# Patient Record
Sex: FEMALE | Race: WHITE | NOT HISPANIC OR LATINO | Employment: OTHER | URBAN - METROPOLITAN AREA
[De-identification: names, ages, dates, MRNs, and addresses within clinical notes are randomized per-mention and may not be internally consistent; named-entity substitution may affect disease eponyms.]

---

## 2017-01-05 ENCOUNTER — ALLSCRIPTS OFFICE VISIT (OUTPATIENT)
Dept: OTHER | Facility: OTHER | Age: 57
End: 2017-01-05

## 2017-01-13 ENCOUNTER — ALLSCRIPTS OFFICE VISIT (OUTPATIENT)
Dept: OTHER | Facility: OTHER | Age: 57
End: 2017-01-13

## 2017-01-19 ENCOUNTER — GENERIC CONVERSION - ENCOUNTER (OUTPATIENT)
Dept: OTHER | Facility: OTHER | Age: 57
End: 2017-01-19

## 2017-02-10 ENCOUNTER — GENERIC CONVERSION - ENCOUNTER (OUTPATIENT)
Dept: OTHER | Facility: OTHER | Age: 57
End: 2017-02-10

## 2017-02-15 ENCOUNTER — HOSPITAL ENCOUNTER (OUTPATIENT)
Dept: MAMMOGRAPHY | Facility: MEDICAL CENTER | Age: 57
Discharge: HOME | End: 2017-02-15
Payer: COMMERCIAL

## 2017-02-15 DIAGNOSIS — Z12.39 ENCOUNTER FOR OTHER SCREENING FOR MALIGNANT NEOPLASM OF BREAST: ICD-10-CM

## 2017-02-15 PROCEDURE — G0202 SCR MAMMO BI INCL CAD: HCPCS

## 2017-02-18 ENCOUNTER — GENERIC CONVERSION - ENCOUNTER (OUTPATIENT)
Dept: OTHER | Facility: OTHER | Age: 57
End: 2017-02-18

## 2017-02-28 ENCOUNTER — GENERIC CONVERSION - ENCOUNTER (OUTPATIENT)
Dept: OTHER | Facility: OTHER | Age: 57
End: 2017-02-28

## 2017-03-20 ENCOUNTER — LAB REQUISITION (OUTPATIENT)
Dept: LAB | Facility: HOSPITAL | Age: 57
End: 2017-03-20
Payer: COMMERCIAL

## 2017-03-20 ENCOUNTER — ALLSCRIPTS OFFICE VISIT (OUTPATIENT)
Dept: OTHER | Facility: OTHER | Age: 57
End: 2017-03-20

## 2017-03-20 DIAGNOSIS — E55.9 VITAMIN D DEFICIENCY: ICD-10-CM

## 2017-03-20 DIAGNOSIS — K21.9 GASTRO-ESOPHAGEAL REFLUX DISEASE WITHOUT ESOPHAGITIS: ICD-10-CM

## 2017-03-20 DIAGNOSIS — F41.9 ANXIETY DISORDER: ICD-10-CM

## 2017-03-20 DIAGNOSIS — E78.2 MIXED HYPERLIPIDEMIA: ICD-10-CM

## 2017-03-20 DIAGNOSIS — E11.9 TYPE 2 DIABETES MELLITUS WITHOUT COMPLICATIONS (HCC): ICD-10-CM

## 2017-03-20 DIAGNOSIS — I10 ESSENTIAL (PRIMARY) HYPERTENSION: ICD-10-CM

## 2017-03-20 DIAGNOSIS — N95.1 FEMALE CLIMACTERIC STATE: ICD-10-CM

## 2017-03-20 DIAGNOSIS — G47.00 INSOMNIA: ICD-10-CM

## 2017-03-20 LAB
25(OH)D3 SERPL-MCNC: 28.5 NG/ML (ref 30–100)
ALBUMIN SERPL BCP-MCNC: 3.4 G/DL (ref 3.5–5)
ALP SERPL-CCNC: 74 U/L (ref 46–116)
ALT SERPL W P-5'-P-CCNC: 32 U/L (ref 12–78)
ANION GAP SERPL CALCULATED.3IONS-SCNC: 8 MMOL/L (ref 4–13)
AST SERPL W P-5'-P-CCNC: 12 U/L (ref 5–45)
BASOPHILS # BLD AUTO: 0.06 THOUSANDS/ΜL (ref 0–0.1)
BASOPHILS NFR BLD AUTO: 0 % (ref 0–1)
BILIRUB SERPL-MCNC: 0.26 MG/DL (ref 0.2–1)
BUN SERPL-MCNC: 10 MG/DL (ref 5–25)
CALCIUM SERPL-MCNC: 9.2 MG/DL (ref 8.3–10.1)
CHLORIDE SERPL-SCNC: 106 MMOL/L (ref 100–108)
CHOLEST SERPL-MCNC: 149 MG/DL (ref 50–200)
CO2 SERPL-SCNC: 28 MMOL/L (ref 21–32)
CREAT SERPL-MCNC: 0.61 MG/DL (ref 0.6–1.3)
EOSINOPHIL # BLD AUTO: 0.14 THOUSAND/ΜL (ref 0–0.61)
EOSINOPHIL NFR BLD AUTO: 1 % (ref 0–6)
ERYTHROCYTE [DISTWIDTH] IN BLOOD BY AUTOMATED COUNT: 14 % (ref 11.6–15.1)
EST. AVERAGE GLUCOSE BLD GHB EST-MCNC: 157 MG/DL
FSH SERPL-ACNC: 47.7 MIU/ML
GFR SERPL CREATININE-BSD FRML MDRD: >60 ML/MIN/1.73SQ M
GLUCOSE P FAST SERPL-MCNC: 120 MG/DL (ref 65–99)
HBA1C MFR BLD: 7.1 % (ref 4.2–6.3)
HCT VFR BLD AUTO: 37.3 % (ref 34.8–46.1)
HDLC SERPL-MCNC: 40 MG/DL (ref 40–60)
HGB BLD-MCNC: 12.5 G/DL (ref 11.5–15.4)
LDLC SERPL CALC-MCNC: 72 MG/DL (ref 0–100)
LH SERPL-ACNC: 18 MIU/ML
LYMPHOCYTES # BLD AUTO: 4.71 THOUSANDS/ΜL (ref 0.6–4.47)
LYMPHOCYTES NFR BLD AUTO: 34 % (ref 14–44)
MCH RBC QN AUTO: 30.4 PG (ref 26.8–34.3)
MCHC RBC AUTO-ENTMCNC: 33.5 G/DL (ref 31.4–37.4)
MCV RBC AUTO: 91 FL (ref 82–98)
MONOCYTES # BLD AUTO: 1.16 THOUSAND/ΜL (ref 0.17–1.22)
MONOCYTES NFR BLD AUTO: 8 % (ref 4–12)
NEUTROPHILS # BLD AUTO: 7.87 THOUSANDS/ΜL (ref 1.85–7.62)
NEUTS SEG NFR BLD AUTO: 57 % (ref 43–75)
NRBC BLD AUTO-RTO: 0 /100 WBCS
PLATELET # BLD AUTO: 581 THOUSANDS/UL (ref 149–390)
PMV BLD AUTO: 10.2 FL (ref 8.9–12.7)
POTASSIUM SERPL-SCNC: 4 MMOL/L (ref 3.5–5.3)
PROT SERPL-MCNC: 6.6 G/DL (ref 6.4–8.2)
RBC # BLD AUTO: 4.11 MILLION/UL (ref 3.81–5.12)
SODIUM SERPL-SCNC: 142 MMOL/L (ref 136–145)
TRIGL SERPL-MCNC: 183 MG/DL
TSH SERPL DL<=0.05 MIU/L-ACNC: 0.76 UIU/ML (ref 0.36–3.74)
WBC # BLD AUTO: 13.97 THOUSAND/UL (ref 4.31–10.16)

## 2017-03-20 PROCEDURE — 82672 ASSAY OF ESTROGEN: CPT | Performed by: FAMILY MEDICINE

## 2017-03-20 PROCEDURE — 83002 ASSAY OF GONADOTROPIN (LH): CPT | Performed by: FAMILY MEDICINE

## 2017-03-20 PROCEDURE — 80061 LIPID PANEL: CPT | Performed by: FAMILY MEDICINE

## 2017-03-20 PROCEDURE — 80053 COMPREHEN METABOLIC PANEL: CPT | Performed by: FAMILY MEDICINE

## 2017-03-20 PROCEDURE — 82306 VITAMIN D 25 HYDROXY: CPT | Performed by: FAMILY MEDICINE

## 2017-03-20 PROCEDURE — 85025 COMPLETE CBC W/AUTO DIFF WBC: CPT | Performed by: FAMILY MEDICINE

## 2017-03-20 PROCEDURE — 83036 HEMOGLOBIN GLYCOSYLATED A1C: CPT | Performed by: FAMILY MEDICINE

## 2017-03-20 PROCEDURE — 84443 ASSAY THYROID STIM HORMONE: CPT | Performed by: FAMILY MEDICINE

## 2017-03-20 PROCEDURE — 83001 ASSAY OF GONADOTROPIN (FSH): CPT | Performed by: FAMILY MEDICINE

## 2017-03-22 LAB — ESTROGEN SERPL-MCNC: 59 PG/ML

## 2017-03-27 ENCOUNTER — ALLSCRIPTS OFFICE VISIT (OUTPATIENT)
Dept: OTHER | Facility: OTHER | Age: 57
End: 2017-03-27

## 2017-03-27 DIAGNOSIS — R10.811 RIGHT UPPER QUADRANT ABDOMINAL TENDERNESS: ICD-10-CM

## 2017-03-27 DIAGNOSIS — R05.9 COUGH: ICD-10-CM

## 2017-03-27 DIAGNOSIS — M54.9 DORSALGIA: ICD-10-CM

## 2017-03-27 DIAGNOSIS — R31.29 OTHER MICROSCOPIC HEMATURIA: ICD-10-CM

## 2017-04-03 ENCOUNTER — HOSPITAL ENCOUNTER (OUTPATIENT)
Dept: ULTRASOUND IMAGING | Facility: MEDICAL CENTER | Age: 57
Discharge: HOME | End: 2017-04-03
Payer: COMMERCIAL

## 2017-04-03 DIAGNOSIS — R10.811 RIGHT UPPER QUADRANT ABDOMINAL TENDERNESS: ICD-10-CM

## 2017-04-03 PROCEDURE — 76705 ECHO EXAM OF ABDOMEN: CPT

## 2017-04-07 ENCOUNTER — ALLSCRIPTS OFFICE VISIT (OUTPATIENT)
Dept: OTHER | Facility: OTHER | Age: 57
End: 2017-04-07

## 2017-04-07 ENCOUNTER — HOSPITAL ENCOUNTER (OUTPATIENT)
Dept: CT IMAGING | Facility: HOSPITAL | Age: 57
Discharge: HOME | End: 2017-04-07
Payer: COMMERCIAL

## 2017-04-07 DIAGNOSIS — M54.9 DORSALGIA: ICD-10-CM

## 2017-04-07 DIAGNOSIS — R31.29 OTHER MICROSCOPIC HEMATURIA: ICD-10-CM

## 2017-04-07 DIAGNOSIS — R10.811 RIGHT UPPER QUADRANT ABDOMINAL TENDERNESS: ICD-10-CM

## 2017-04-07 LAB
BILIRUB UR QL STRIP: NORMAL
CLARITY UR: NORMAL
COLOR UR: YELLOW
GLUCOSE (HISTORICAL): NORMAL
HGB UR QL STRIP.AUTO: NORMAL
KETONES UR STRIP-MCNC: NORMAL MG/DL
LEUKOCYTE ESTERASE UR QL STRIP: NORMAL
NITRITE UR QL STRIP: NORMAL
PH UR STRIP.AUTO: 5.5 [PH]
PROT UR STRIP-MCNC: NORMAL MG/DL
SP GR UR STRIP.AUTO: 1.03
UROBILINOGEN UR QL STRIP.AUTO: 0.2

## 2017-04-07 PROCEDURE — 74177 CT ABD & PELVIS W/CONTRAST: CPT

## 2017-04-07 RX ADMIN — IOHEXOL 100 ML: 350 INJECTION, SOLUTION INTRAVENOUS at 15:19

## 2017-04-07 RX ADMIN — IOHEXOL 50 ML: 240 INJECTION, SOLUTION INTRATHECAL; INTRAVASCULAR; INTRAVENOUS; ORAL at 15:19

## 2017-04-13 ENCOUNTER — GENERIC CONVERSION - ENCOUNTER (OUTPATIENT)
Dept: OTHER | Facility: OTHER | Age: 57
End: 2017-04-13

## 2017-04-17 ENCOUNTER — GENERIC CONVERSION - ENCOUNTER (OUTPATIENT)
Dept: OTHER | Facility: OTHER | Age: 57
End: 2017-04-17

## 2017-04-18 ENCOUNTER — HOSPITAL ENCOUNTER (OUTPATIENT)
Dept: RADIOLOGY | Facility: MEDICAL CENTER | Age: 57
Discharge: HOME | End: 2017-04-18
Admitting: FAMILY MEDICINE
Payer: COMMERCIAL

## 2017-04-18 ENCOUNTER — OFFICE VISIT (OUTPATIENT)
Dept: URGENT CARE | Facility: MEDICAL CENTER | Age: 57
End: 2017-04-18
Payer: COMMERCIAL

## 2017-04-18 DIAGNOSIS — R05.9 COUGH: ICD-10-CM

## 2017-04-18 PROCEDURE — 99203 OFFICE O/P NEW LOW 30 MIN: CPT

## 2017-04-18 PROCEDURE — 71020 HB CHEST X-RAY 2VW FRONTAL&LATL: CPT

## 2017-05-03 ENCOUNTER — GENERIC CONVERSION - ENCOUNTER (OUTPATIENT)
Dept: OTHER | Facility: OTHER | Age: 57
End: 2017-05-03

## 2017-05-03 ENCOUNTER — HOSPITAL ENCOUNTER (OUTPATIENT)
Dept: RADIOLOGY | Facility: MEDICAL CENTER | Age: 57
Discharge: HOME | End: 2017-05-03
Payer: COMMERCIAL

## 2017-05-03 ENCOUNTER — LAB (OUTPATIENT)
Dept: LAB | Facility: MEDICAL CENTER | Age: 57
End: 2017-05-03
Payer: COMMERCIAL

## 2017-05-03 ENCOUNTER — TRANSCRIBE ORDERS (OUTPATIENT)
Dept: ADMINISTRATIVE | Facility: HOSPITAL | Age: 57
End: 2017-05-03

## 2017-05-03 DIAGNOSIS — K31.89 DYSPEPSIA AND OTHER SPECIFIED DISORDERS OF FUNCTION OF STOMACH: ICD-10-CM

## 2017-05-03 DIAGNOSIS — K76.0 FATTY METAMORPHOSIS OF LIVER: ICD-10-CM

## 2017-05-03 DIAGNOSIS — I10 HTN, GOAL BELOW 140/80: ICD-10-CM

## 2017-05-03 DIAGNOSIS — R10.13 DYSPEPSIA AND OTHER SPECIFIED DISORDERS OF FUNCTION OF STOMACH: ICD-10-CM

## 2017-05-03 DIAGNOSIS — K29.20 ALCOHOLIC GASTRITIS WITHOUT BLEEDING, UNSPECIFIED CHRONICITY: ICD-10-CM

## 2017-05-03 DIAGNOSIS — R93.2 NONVISUALIZATION OF GALLBLADDER: Primary | ICD-10-CM

## 2017-05-03 DIAGNOSIS — R14.0 ABDOMINAL DISTENTION: ICD-10-CM

## 2017-05-03 DIAGNOSIS — R10.11 ABDOMINAL PAIN, RIGHT UPPER QUADRANT: ICD-10-CM

## 2017-05-03 DIAGNOSIS — R68.81 EARLY SATIETY: ICD-10-CM

## 2017-05-03 DIAGNOSIS — K29.20 ALCOHOLIC GASTRITIS WITHOUT BLEEDING, UNSPECIFIED CHRONICITY: Primary | ICD-10-CM

## 2017-05-03 DIAGNOSIS — K59.00 UNSPECIFIED CONSTIPATION: ICD-10-CM

## 2017-05-03 DIAGNOSIS — R93.2 NONVISUALIZATION OF GALLBLADDER: ICD-10-CM

## 2017-05-03 DIAGNOSIS — R11.0 NAUSEA ALONE: ICD-10-CM

## 2017-05-03 DIAGNOSIS — R11.0 NAUSEA ALONE: Primary | ICD-10-CM

## 2017-05-03 PROCEDURE — 74022 RADEX COMPL AQT ABD SERIES: CPT

## 2017-05-03 PROCEDURE — 86705 HEP B CORE ANTIBODY IGM: CPT

## 2017-05-03 PROCEDURE — 86704 HEP B CORE ANTIBODY TOTAL: CPT

## 2017-05-03 PROCEDURE — 86706 HEP B SURFACE ANTIBODY: CPT

## 2017-05-03 PROCEDURE — 86803 HEPATITIS C AB TEST: CPT

## 2017-05-03 PROCEDURE — 86256 FLUORESCENT ANTIBODY TITER: CPT

## 2017-05-03 PROCEDURE — 36415 COLL VENOUS BLD VENIPUNCTURE: CPT

## 2017-05-03 PROCEDURE — 87340 HEPATITIS B SURFACE AG IA: CPT

## 2017-05-03 PROCEDURE — 86708 HEPATITIS A ANTIBODY: CPT

## 2017-05-03 PROCEDURE — 86038 ANTINUCLEAR ANTIBODIES: CPT

## 2017-05-03 PROCEDURE — 86235 NUCLEAR ANTIGEN ANTIBODY: CPT

## 2017-05-04 LAB
ACTIN IGG SERPL-ACNC: 15 UNITS (ref 0–19)
HAV AB SER QL IA: NORMAL
HBV CORE AB SER QL: REACTIVE
HBV SURFACE AB SER-ACNC: 243.78 MIU/ML
HBV SURFACE AG SER QL: NORMAL
HCV AB SER QL: NORMAL
MITOCHONDRIA M2 IGG SER-ACNC: 2.5 UNITS (ref 0–20)

## 2017-05-05 LAB — RYE IGE QN: NEGATIVE

## 2017-05-25 ENCOUNTER — GENERIC CONVERSION - ENCOUNTER (OUTPATIENT)
Dept: OTHER | Facility: OTHER | Age: 57
End: 2017-05-25

## 2017-05-26 LAB — MISCELLANEOUS LAB TEST RESULT: NORMAL

## 2017-06-07 ENCOUNTER — HOSPITAL ENCOUNTER (OUTPATIENT)
Dept: NUCLEAR MEDICINE | Facility: HOSPITAL | Age: 57
Discharge: HOME | End: 2017-06-07
Payer: COMMERCIAL

## 2017-06-07 DIAGNOSIS — R68.81 EARLY SATIETY: ICD-10-CM

## 2017-06-07 DIAGNOSIS — R11.0 NAUSEA ALONE: ICD-10-CM

## 2017-06-07 PROCEDURE — 78264 GASTRIC EMPTYING IMG STUDY: CPT

## 2017-06-07 PROCEDURE — A9541 TC99M SULFUR COLLOID: HCPCS

## 2017-06-08 ENCOUNTER — TRANSCRIBE ORDERS (OUTPATIENT)
Dept: ADMINISTRATIVE | Facility: HOSPITAL | Age: 57
End: 2017-06-08

## 2017-06-08 ENCOUNTER — APPOINTMENT (OUTPATIENT)
Dept: LAB | Facility: MEDICAL CENTER | Age: 57
End: 2017-06-08
Attending: INTERNAL MEDICINE
Payer: COMMERCIAL

## 2017-06-08 DIAGNOSIS — K76.0 FATTY METAMORPHOSIS OF LIVER: Primary | ICD-10-CM

## 2017-06-08 DIAGNOSIS — K76.0 FATTY METAMORPHOSIS OF LIVER: ICD-10-CM

## 2017-06-08 LAB
ALBUMIN SERPL BCP-MCNC: 3.8 G/DL (ref 3.5–5)
ALP SERPL-CCNC: 84 U/L (ref 46–116)
ALT SERPL W P-5'-P-CCNC: 37 U/L (ref 12–78)
AMYLASE SERPL-CCNC: 42 IU/L (ref 25–115)
ANION GAP SERPL CALCULATED.3IONS-SCNC: 8 MMOL/L (ref 4–13)
AST SERPL W P-5'-P-CCNC: 17 U/L (ref 5–45)
BILIRUB SERPL-MCNC: 0.35 MG/DL (ref 0.2–1)
BUN SERPL-MCNC: 11 MG/DL (ref 5–25)
CALCIUM SERPL-MCNC: 9.3 MG/DL (ref 8.3–10.1)
CHLORIDE SERPL-SCNC: 102 MMOL/L (ref 100–108)
CO2 SERPL-SCNC: 30 MMOL/L (ref 21–32)
CREAT SERPL-MCNC: 0.78 MG/DL (ref 0.6–1.3)
GFR SERPL CREATININE-BSD FRML MDRD: >60 ML/MIN/1.73SQ M
GLUCOSE P FAST SERPL-MCNC: 230 MG/DL (ref 65–99)
LIPASE SERPL-CCNC: 239 U/L (ref 73–393)
POTASSIUM SERPL-SCNC: 3.8 MMOL/L (ref 3.5–5.3)
PROT SERPL-MCNC: 7.6 G/DL (ref 6.4–8.2)
SODIUM SERPL-SCNC: 140 MMOL/L (ref 136–145)

## 2017-06-08 PROCEDURE — 36415 COLL VENOUS BLD VENIPUNCTURE: CPT

## 2017-06-08 PROCEDURE — 83690 ASSAY OF LIPASE: CPT

## 2017-06-08 PROCEDURE — 82150 ASSAY OF AMYLASE: CPT

## 2017-06-08 PROCEDURE — 80053 COMPREHEN METABOLIC PANEL: CPT

## 2017-07-13 ENCOUNTER — ALLSCRIPTS OFFICE VISIT (OUTPATIENT)
Dept: OTHER | Facility: OTHER | Age: 57
End: 2017-07-13

## 2017-07-14 ENCOUNTER — GENERIC CONVERSION - ENCOUNTER (OUTPATIENT)
Dept: OTHER | Facility: OTHER | Age: 57
End: 2017-07-14

## 2017-08-09 ENCOUNTER — OFFICE VISIT (OUTPATIENT)
Dept: URGENT CARE | Facility: MEDICAL CENTER | Age: 57
End: 2017-08-09
Payer: COMMERCIAL

## 2017-08-09 PROCEDURE — S9083 URGENT CARE CENTER GLOBAL: HCPCS

## 2017-08-09 PROCEDURE — G0382 LEV 3 HOSP TYPE B ED VISIT: HCPCS

## 2017-08-11 ENCOUNTER — ALLSCRIPTS OFFICE VISIT (OUTPATIENT)
Dept: OTHER | Facility: OTHER | Age: 57
End: 2017-08-11

## 2017-08-24 ENCOUNTER — OFFICE VISIT (OUTPATIENT)
Dept: URGENT CARE | Facility: MEDICAL CENTER | Age: 57
End: 2017-08-24
Payer: COMMERCIAL

## 2017-08-24 PROCEDURE — 99213 OFFICE O/P EST LOW 20 MIN: CPT

## 2017-08-28 ENCOUNTER — GENERIC CONVERSION - ENCOUNTER (OUTPATIENT)
Dept: OTHER | Facility: OTHER | Age: 57
End: 2017-08-28

## 2017-08-29 ENCOUNTER — ALLSCRIPTS OFFICE VISIT (OUTPATIENT)
Dept: OTHER | Facility: OTHER | Age: 57
End: 2017-08-29

## 2017-09-06 ENCOUNTER — GENERIC CONVERSION - ENCOUNTER (OUTPATIENT)
Dept: OTHER | Facility: OTHER | Age: 57
End: 2017-09-06

## 2017-09-12 ENCOUNTER — TRANSCRIBE ORDERS (OUTPATIENT)
Dept: ADMINISTRATIVE | Facility: HOSPITAL | Age: 57
End: 2017-09-12

## 2017-09-12 DIAGNOSIS — F90.9 ATTENTION DEFICIT HYPERACTIVITY DISORDER (ADHD), UNSPECIFIED ADHD TYPE: Primary | ICD-10-CM

## 2017-09-18 ENCOUNTER — GENERIC CONVERSION - ENCOUNTER (OUTPATIENT)
Dept: OTHER | Facility: OTHER | Age: 57
End: 2017-09-18

## 2017-09-26 ENCOUNTER — ALLSCRIPTS OFFICE VISIT (OUTPATIENT)
Dept: OTHER | Facility: OTHER | Age: 57
End: 2017-09-26

## 2017-09-26 LAB — S PYO AG THROAT QL: NEGATIVE

## 2017-09-28 ENCOUNTER — GENERIC CONVERSION - ENCOUNTER (OUTPATIENT)
Dept: OTHER | Facility: OTHER | Age: 57
End: 2017-09-28

## 2017-10-30 ENCOUNTER — GENERIC CONVERSION - ENCOUNTER (OUTPATIENT)
Dept: OTHER | Facility: OTHER | Age: 57
End: 2017-10-30

## 2017-11-01 ENCOUNTER — ALLSCRIPTS OFFICE VISIT (OUTPATIENT)
Dept: OTHER | Facility: OTHER | Age: 57
End: 2017-11-01

## 2017-11-02 NOTE — PROGRESS NOTES
Assessment  1  Dacryocystitis of left lacrimal sac (375 30) (H04 302)   2  Need for vaccination (V05 9) (Z23)    Plan  Dacryocystitis of left lacrimal sac    · Tobramycin-Dexamethasone 0 3-0 1 % Ophthalmic Suspension; 1 gtt ou qid x 3  days  Then bid x 3 days  Need for vaccination    · Fluzone Quadrivalent Intramuscular Suspension; INJECT 0 5  ML  Intramuscular; To Be Done: 83HKH9387    Discussion/Summary    --Dacryocystitis L eye: sxs x 2 days  rx given for tobradex gtts  cont zyrtec  don't rub eyes  call further probs  Possible side effects of new medications were reviewed with the patient/guardian today  The treatment plan was reviewed with the patient/guardian  The patient/guardian understands and agrees with the treatment plan      Chief Complaint  Pt presents for irritated L eye x1d  Pt c/o of discharge and itchiness  Pt d/c omeprazole, clonidine, and dicyclomine  History of Present Illness  HPI: 2 day hx of itchy lump L eye x 2 days  recent allergy flare  takes zyrtec daily  Review of Systems    Constitutional: No fever, no chills, feels well, no tiredness, no recent weight gain or loss  ENT: as noted in HPI  Cardiovascular: no complaints of slow or fast heart rate, no chest pain, no palpitations, no leg claudication or lower extremity edema  Active Problems  1  Acute bronchitis (466 0) (J20 9)   2  Adjustment reaction with anxiety (309 24) (F43 22)   3  Anxiety disorder (300 00) (F41 9)   4  Benign essential hypertension (401 1) (I10)   5  Coronary atherosclerosis (414 00) (I25 10)   6  Current tobacco use (305 1) (Z72 0)   7  CVA tenderness (724 5) (M54 9)   8  Depressed (311) (F32 9)   9  Diverticulitis of colon (562 11) (K57 32)   10  DMII (diabetes mellitus, type 2) (250 00) (E11 9)   11  GERD without esophagitis (530 81) (K21 9)   12  IBS (irritable bowel syndrome) (564 1) (K58 9)   13  Inflamed skin tag (701 9,686 9) (L91 8)   14  Insomnia (780 52) (G47 00)   15   Left shoulder pain (719 41) (M25 512)   16  Menopausal disorder (627 9) (N95 9)   17  Menopausal syndrome (627 2) (N95 1)   18  Mixed hyperlipidemia (272 2) (E78 2)   19  Seasonal allergies (477 9) (J30 2)   20  Sore throat (462) (J02 9)   21  Vaginal candidiasis (112 1) (B37 3)   22  Vitamin D deficiency (268 9) (E55 9)    Past Medical History  1  History of Abdominal pain, LLQ (left lower quadrant) (789 04) (R10 32)   2  History of Abnormal mammogram (793 80) (R92 8)   3  History of Acute diverticulitis (562 11) (K57 92)   4  History of Acute sinusitis, recurrence not specified, unspecified location (461 9) (J01 90)   5  History of Acute upper respiratory infection (465 9) (J06 9)   6  History of Acute upper respiratory infection (465 9) (J06 9)   7  History of Acute upper respiratory infection (465 9) (J06 9)   8  History of Cervical cancer screening (V76 2) (Z12 4)   9  History of Cough (786 2) (R05)   10  History of Cough (786 2) (R05)   11  History of Eczema of right external ear (380 22) (H60 541)   12  History of Encounter for other screening for malignant neoplasm of breast (V76 19)    (Z12 39)   13  History of Encounter for screening colonoscopy (V76 51) (Z12 11)   14  History of Encounter for screening mammogram for malignant neoplasm of breast    (V76 12) (Z12 31)   15  History of Facial pain (784 0) (R51)   16  History of Flu vaccine need (V04 81) (Z23)   17  History of Foot Pain (Soft Tissue) (729 5)   18  History of Hip pain, unspecified laterality   19  History of abdominal pain (V13 89) (Z87 898)   20  History of acute bacterial sinusitis (V12 69) (Z87 09)   21  History of acute bronchitis (V12 69) (Z87 09)   22  History of acute bronchitis (V12 69) (Z87 09)   23  History of acute gastritis (V12 70) (Z87 19)   24  History of acute sinusitis (V12 69) (Z87 09)   25  History of acute sinusitis (V12 69) (Z87 09)   26  History of acute sinusitis (V12 69) (Z87 09)   27  History of adverse drug reaction (V14 9) (Z88 9)   28  History of chest pain (V13 89) (Z87 898)   29  History of dizziness (V13 89) (Z87 898)   30  History of fever (V13 89) (Z87 898)   31  History of gastroenteritis (V12 79) (Z87 19)   32  History of hematuria (V13 09) (Z87 448)   33  History of hepatitis A vaccination (V49 89) (Z92 29)   34  History of impetigo (V13 3) (Z87 2)   35  History of ingrown nail (V13 3) (Z87 2)   36  History of nausea (V12 79) (Z87 898)   37  History of nausea and vomiting (V12 79) (Z87 898)   38  History of nausea and vomiting (V12 79) (Z87 898)   39  History of other disorder of urinary system (V13 09) (Z87 448)   40  History of sinusitis (V12 69) (Z87 09)   41  History of streptococcal pharyngitis (V12 09) (Z87 09)   42  Denied: History of substance abuse   37  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)   44  History of Left foot pain (729 5) (M79 672)   45  History of Microscopic hematuria (599 72) (R31 29)   46  History of Myalgia And Myositis (729 1)   47  History of Need for pneumococcal vaccination (V03 82) (Z23)   48  History of Need for vaccination for DTP (V06 1) (Z23)   49  History of Oropharyngeal dysphagia (787 22) (R13 12)   50  History of Other acute sinusitis, recurrence not specified (461 8) (J01 80)   51  History of Other screening mammogram (V76 12) (Z12 31)   52  History of Otitis, externa, infective, right (380 10) (H60 391)   53  History of Right shoulder pain (719 41) (M25 511)   54  History of Right upper quadrant abdominal tenderness (789 61) (R10 811)   55  History of Screening for breast cancer (V76 10) (Z12 31)   56  History of Screening for colon cancer (V76 51) (Z12 11)   57  History of Screening for depression (V79 0) (Z13 89)   58  History of Screening for diabetic retinopathy (V80 2) (Z13 5)   59  History of Sore throat (462) (J02 9)   60  History of Symptoms involving urinary system (788 99) (R39 9)   61  History of Trapezius muscle spasm (028 85) (M62 838)   62   History of Visit For:  Active Problems And Past Medical History Reviewed: The active problems and past medical history were reviewed and updated today  Family History  Mother    1  Denied: Family history of substance abuse   2  Denied: Family history of Mental health problem   3  Family history of Stroke (434 91) (I63 9)  Father    4  Family history of Cancer (199 1) (C80 1)   5  Family history of Diabetes mellitus (250 00) (E11 9)   6  Denied: Family history of substance abuse   7  Family history of Heart disease (429 9) (I51 9)   8  Family history of HTN (hypertension) (401 9) (I10)   9  Denied: Family history of Mental health problem  Sister    8  Family history of Heart disease (429 9) (I51 9)  Brother    6  Family history of Heart disease (429 9) (I51 9)    Social History   · Current Smoker (305 1)   · Current tobacco use (305 1) (Z72 0)  The social history was reviewed and updated today  The social history was reviewed and is unchanged  Surgical History  1  History of Stent Indications: Restenosis At Previous [facility] Location    Current Meds   1  ALPRAZolam 0 5 MG Oral Tablet; Take 1 tablet twice daily as needed, 2 qhs prn sleep; Last Rx:02Jan2015 Ordered   2  Atorvastatin Calcium 40 MG Oral Tablet; Take 1 tablet daily; Therapy: 85LBA6200 to (Last Deisy Cord)  Requested for: 02XBM7920 Ordered   3  Hiram Contour Test In [employer]; TEST TWICE A DAY; Therapy: 08ICB3142 to (Last Rx:61Taq5487)  Requested for: 96Nhi1401 Ordered   4  CloNIDine HCl - 0 1 MG Oral Tablet; TAKE 1 TABLET AT BEDTIME; Therapy: 91MNR5624 to Recorded   5  Dicyclomine HCl - 10 MG Oral Capsule; Therapy: 90UZD3478 to Recorded   6  Doxycycline Monohydrate 100 MG Oral Capsule; TAKE 1 CAPSULE EVERY 12 HOURS   DAILY; Therapy: 61CTN1228 to ([facility])  Requested for: 81MBT4349; Last   Rx:59Ytn7393 Ordered   7  Ecotrin Low Strength 81 MG Oral Tablet Delayed Release; TAKE 1 TABLET DAILY; Therapy: 56NYW3927 to Recorded   8  Fluconazole 150 MG Oral Tablet; TAKE 1 TABLET 1 TIME ONLY; Therapy: 55WWC9877 to (Evaluate:11Oct2017)  Requested for: 10YSY3364; Last   Rx:10Oct2017 Ordered   9  Januvia 100 MG Oral Tablet; Take 1 tablet daily; Therapy: 01HRO1261 to (Regine Naresh)  Requested for: 44Vdz5274; Last   Rx:88Asy2601 Ordered   10  Lexapro 10 MG Oral Tablet; Take 1 tablet daily Recorded   11  Lisinopril 5 MG Oral Tablet; TAKE ONE-HALF (1/2) TABLET DAILY; Therapy: 75DNO3108 to (Last Rx:90Kkc7383)  Requested for: 96Gis7733 Ordered   12  MetFORMIN HCl  MG Oral Tablet Extended Release 24 Hour; take 1 tablet twice a    day; Therapy: 77URM0951 to ()  Requested for: 24Apr2017; Last    Rx:01Bjm0464 Ordered   13  Metoprolol Succinate ER 25 MG Oral Tablet Extended Release 24 Hour; Take 1 tablet    daily; Therapy: 69LLA5028 to (Last Rx:41Isv1488)  Requested for: 20KPL9560 Ordered   14  Modafinil 200 MG Oral Tablet; Therapy: (Mouna Recio) to Recorded   15  Omeprazole 40 MG Oral Capsule Delayed Release; TAKE 1 CAPSULE TWICE DAILY; Therapy: 30YAA4625 to (544 2932) Recorded   16  OneTouch Lancets Miscellaneous; USE AS DIRECTED TWICE DAILY; Therapy: 84NBO1239 to (Last Rx:27Mar2017)  Requested for: 27Mar2017 Ordered   17  OneTouch Ultra Blue In [employer]; TEST TWICE A DAY; Therapy: 43KLQ6108 to (Evaluate:10Wys0395)  Requested for: 27Mar2017; Last    Rx:27Mar2017 Ordered   18  OneTouch Ultra Mini w/Device Kit; ACCU-CHEK RONI SMART VIEW GLUCOMETER    USE AS DIRECTED TWICE DAILY DX 99557; Therapy: 36TMR6864 to (Last Rx:50Zef5090)  Requested for: 41Lin5488 Ordered   19  Pepcid 20 MG Oral Tablet; TAKE 1 TABLET DAILY AS DIRECTED; Therapy: 49FFJ0683 to Recorded   20  Promethazine-Codeine 6 25-10 MG/5ML Oral Syrup; TAKE 1 TSP Every 6 hours PRN; Therapy: 78ACA6477 to (Evaluate:02Oct2017); Last Rx:26Sep2017 Ordered   21  Vitamin D 1000 UNIT Oral Tablet; TAKE 1 TABLET DAILY;     Therapy: 72EWJ4190 to (Evaluate:11Oct2017) Recorded   22  Vyvanse 70 MG Oral Capsule; TAKE 1 CAPSULE DAILY IN THE MORNING Recorded   23  ZyrTEC Allergy 10 MG Oral Tablet; Therapy: 07Apr2017 to Recorded    The medication list was reviewed and updated today  Allergies  1  Levaquin TABS   2  Flexeril TABS    Vitals   Recorded: 37ZMC1275 04:09PM   Temperature 97 6 F, Tympanic   Heart Rate 96   Pulse Quality Normal   Respiration Quality Normal   Respiration 20   Systolic 703, LUE, Sitting   Diastolic 80, LUE, Sitting   Height 5 ft 8 in   Weight 167 lb 8 oz   BMI Calculated 25 47   BSA Calculated 1 9   O2 Saturation 97   LMP Menopause   Pain Scale 0     Physical Exam    Constitutional   General appearance: No acute distress, well appearing and well nourished  Eyes   Conjunctiva and lids: Abnormal  -- inflamed lacrymal duct L eye  Future Appointments    Date/Time Provider Specialty Site   12/06/2017 01:00 PM Shelby Briceño Nurse Schedule  [facility]     Signatures   Electronically signed by :  Miriam Durand DO; Nov 1 2017  4:24PM EST                       (Author)

## 2017-11-06 ENCOUNTER — ALLSCRIPTS OFFICE VISIT (OUTPATIENT)
Dept: OTHER | Facility: OTHER | Age: 57
End: 2017-11-06

## 2017-11-07 NOTE — PROGRESS NOTES
Assessment  1  Laceration of left lower leg with infection (891 1) (S81 812A,L08 9)   2  Cough (786 2) (R05)    Plan   Cough    · Promethazine-Codeine 6 25-10 MG/5ML Oral Syrup; TAKE 5 ML BY MOUTH AT  BEDTIME AS NEEDED    Sulfamethoxazole-Trimethoprim 800-160 MG Oral Tablet; take 1 tablet every twelve hours; Therapy: 65AQZ0016 to (Evaluate:37Fdo3892)  Requested for: 82IBW2863; Last RY:34SGJ0950; Status: ACTIVE Ordered  Rx By: Selestino Place; Dispense: 7 Days ; #:14 Tablet; Refill: 0;   For: Laceration of left lower leg with infection; GOLDIE = N; Verified Transmission to [id number] Last Updated By: System, [employer]; 11/6/2017 2:59:36 PM     Discussion/Summary    Patient is a 53-year-old femaleLeg laceration -appears mildly erythematous  At this time, start treatment for possible bacterial infection  She is given a prescription for Bactrim DS b i d  to take for the next 7 days  She is up-to-date with her Adacel vaccine  ]Persistent cough -likely secondary to patient's 's upper respiratory infection  Start treatment with Bactrim DS b i d  She is also given promethazine with codeine  Follow up if any symptoms are persisting  Chief Complaint  cut left leg on metal can      History of Present Illness  HPI: Patient is a 53-year-old female presents today with CC of left anterior lower leg laceration  She states that she sustained this laceration when she was thrown away garbage  She states that a metal can of dog food caused a laceration on her leg  She did notice bleeding immediately  She did apply a bandage over this area however this morning, she has noticed increased warmth as well as pruritus  She denies any discharge  She states that she has noticed increasing redness surrounding the wound  She also has noticed tenderness  She had implying of antibiotic ointment with minimal relief  also has complaints today of a persistent cough   She believes that she developed this cough from her  who currently has an upper respiratory infection  She has been trying over-the-counter medications with minimal relief  Review of Systems    Constitutional: not feeling poorly-- and-- not feeling tired  ENT: no nosebleeds-- and-- no nasal discharge  Cardiovascular: no chest pain-- and-- no palpitations  Respiratory: cough, but-- no shortness of breath  Gastrointestinal: no nausea-- and-- no diarrhea  Genitourinary: no dysuria-- and-- no pelvic pain  Musculoskeletal: no joint swelling-- and-- no joint stiffness  Integumentary: rash-- and-- skin wound  Neurological: no numbness-- and-- no dizziness  Active Problems  1  Acute bronchitis (466 0) (J20 9)   2  Adjustment reaction with anxiety (309 24) (F43 22)   3  Anxiety disorder (300 00) (F41 9)   4  Benign essential hypertension (401 1) (I10)   5  Coronary atherosclerosis (414 00) (I25 10)   6  Current tobacco use (305 1) (Z72 0)   7  CVA tenderness (724 5) (M54 9)   8  Dacryocystitis of left lacrimal sac (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 30) (H04 302)   9  Depressed (311) (F32 9)   10  Diverticulitis of colon (562 11) (K57 32)   11  DMII (diabetes mellitus, type 2) (250 00) (E11 9)   12  GERD without esophagitis (530 81) (K21 9)   13  IBS (irritable bowel syndrome) (564 1) (K58 9)   14  Inflamed skin tag (701 9,686 9) (L91 8)   15  Insomnia (780 52) (G47 00)   16  Left shoulder pain (719 41) (M25 512)   17  Menopausal disorder (627 9) (N95 9)   18  Menopausal syndrome (627 2) (N95 1)   19  Mixed hyperlipidemia (272 2) (E78 2)   20  Need for vaccination (V05 9) (Z23)   21  Seasonal allergies (477 9) (J30 2)   22  Sore throat (462) (J02 9)   23  Vaginal candidiasis (112 1) (B37 3)   24  Vitamin D deficiency (268 9) (E55 9)    Past Medical History  1  History of Abdominal pain, LLQ (left lower quadrant) (789 04) (R10 32)   2  History of Abnormal mammogram (793 80) (R92 8)   3  History of Acute diverticulitis (562 11) (K57 92)   4   History of Acute sinusitis, recurrence not specified, unspecified location (461 9) (J01 90)   5  History of Acute upper respiratory infection (465 9) (J06 9)   6  History of Acute upper respiratory infection (465 9) (J06 9)   7  History of Acute upper respiratory infection (465 9) (J06 9)   8  History of Cervical cancer screening (V76 2) (Z12 4)   9  History of Cough (786 2) (R05)   10  History of Cough (786 2) (R05)   11  History of Eczema of right external ear (380 22) (H60 541)   12  History of Encounter for other screening for malignant neoplasm of breast (V76 19)    (Z12 39)   13  History of Encounter for screening colonoscopy (V76 51) (Z12 11)   14  History of Encounter for screening mammogram for malignant neoplasm of breast    (V76 12) (Z12 31)   15  History of Facial pain (784 0) (R51)   16  History of Flu vaccine need (V04 81) (Z23)   17  History of Foot Pain (Soft Tissue) (729 5)   18  History of Hip pain, unspecified laterality   19  History of abdominal pain (V13 89) (Z87 898)   20  History of acute bacterial sinusitis (V12 69) (Z87 09)   21  History of acute bronchitis (V12 69) (Z87 09)   22  History of acute bronchitis (V12 69) (Z87 09)   23  History of acute gastritis (V12 70) (Z87 19)   24  History of acute sinusitis (V12 69) (Z87 09)   25  History of acute sinusitis (V12 69) (Z87 09)   26  History of acute sinusitis (V12 69) (Z87 09)   27  History of adverse drug reaction (V14 9) (Z88 9)   28  History of chest pain (V13 89) (Z87 898)   29  History of dizziness (V13 89) (Z87 898)   30  History of fever (V13 89) (Z87 898)   31  History of gastroenteritis (V12 79) (Z87 19)   32  History of hematuria (V13 09) (Z87 448)   33  History of hepatitis A vaccination (V49 89) (Z92 29)   34  History of impetigo (V13 3) (Z87 2)   35  History of ingrown nail (V13 3) (Z87 2)   36  History of nausea (V12 79) (Z87 898)   37  History of nausea and vomiting (V12 79) (Z87 898)   38  History of nausea and vomiting (V12 79) (Z87 898)   39   History of other disorder of urinary system (V13 09) (Z87 448)   40  History of sinusitis (V12 69) (Z87 09)   41  History of streptococcal pharyngitis (V12 09) (Z87 09)   42  Denied: History of substance abuse   37  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)   44  History of Left foot pain (729 5) (M79 672)   45  History of Microscopic hematuria (599 72) (R31 29)   46  History of Myalgia And Myositis (729 1)   47  History of Need for pneumococcal vaccination (V03 82) (Z23)   48  History of Need for vaccination for DTP (V06 1) (Z23)   49  History of Oropharyngeal dysphagia (787 22) (R13 12)   50  History of Other acute sinusitis, recurrence not specified (461 8) (J01 80)   51  History of Other screening mammogram (V76 12) (Z12 31)   52  History of Otitis, externa, infective, right (380 10) (H60 391)   53  History of Right shoulder pain (719 41) (M25 511)   54  History of Right upper quadrant abdominal tenderness (789 61) (R10 811)   55  History of Screening for breast cancer (V76 10) (Z12 31)   56  History of Screening for colon cancer (V76 51) (Z12 11)   57  History of Screening for depression (V79 0) (Z13 89)   58  History of Screening for diabetic retinopathy (V80 2) (Z13 5)   59  History of Sore throat (462) (J02 9)   60  History of Symptoms involving urinary system (788 99) (R39 9)   61  History of Trapezius muscle spasm (728 85) (M62 838)   62  History of Visit For:  Active Problems And Past Medical History Reviewed: The active problems and past medical history were reviewed and updated today  Family History  Mother    1  Denied: Family history of substance abuse   2  Denied: Family history of Mental health problem   3  Family history of Stroke (434 91) (I63 9)  Father    4  Family history of Cancer (199 1) (C80 1)   5  Family history of Diabetes mellitus (250 00) (E11 9)   6  Denied: Family history of substance abuse   7  Family history of Heart disease (429 9) (I51 9)   8   Family history of HTN (hypertension) (401 9) (I10)   9  Denied: Family history of Mental health problem  Sister    8  Family history of Heart disease (429 9) (I51 9)  Brother    6  Family history of Heart disease (429 9) (I51 9)  Family History Reviewed: The family history was reviewed and updated today  Social History   · Current Smoker (305 1)   · Current tobacco use (305 1) (Z72 0)  The social history was reviewed and updated today  The social history was reviewed and is unchanged  Surgical History  1  History of Stent Indications: Restenosis At Previous [facility] Location  Surgical History Reviewed: The surgical history was reviewed and updated today  Current Meds   1  ALPRAZolam 0 5 MG Oral Tablet; Take 1 tablet twice daily as needed, 2 qhs prn sleep; Last Rx:02Jan2015 Ordered   2  Atorvastatin Calcium 40 MG Oral Tablet; Take 1 tablet daily; Therapy: 97DBN5773 to (Last Cady Haw)  Requested for: 25WIG2692 Ordered   3  Hiram Contour Test In [employer]; TEST TWICE A DAY; Therapy: 16CXQ2807 to (Last Rx:69Qep5190)  Requested for: 89Cbc2599 Ordered   4  CloNIDine HCl - 0 1 MG Oral Tablet; TAKE 1 TABLET AT BEDTIME; Therapy: 85KMY7016 to Recorded   5  Dicyclomine HCl - 10 MG Oral Capsule; Therapy: 45AMH2760 to Recorded   6  Doxycycline Monohydrate 100 MG Oral Capsule; TAKE 1 CAPSULE EVERY 12 HOURS   DAILY; Therapy: 96KJV4039 to ()  Requested for: 89EPX5797; Last   Rx:16Cqy6827 Ordered   7  Ecotrin Low Strength 81 MG Oral Tablet Delayed Release; TAKE 1 TABLET DAILY; Therapy: 19QVB2587 to Recorded   8  Fluconazole 150 MG Oral Tablet; TAKE 1 TABLET 1 TIME ONLY; Therapy: 55JTE8727 to (Evaluate:11Oct2017)  Requested for: 64HJS4891; Last   Rx:10Oct2017 Ordered   9  Januvia 100 MG Oral Tablet; Take 1 tablet daily; Therapy: 55CMP0485 to (Williams Fairchild)  Requested for: 14Nij8423; Last   Rx:74Cry4772 Ordered   10  Lexapro 10 MG Oral Tablet; Take 1 tablet daily Recorded   11   Lisinopril 5 MG Oral Tablet; TAKE ONE-HALF (1/2) TABLET DAILY; Therapy: 34AVJ7162 to (Last Rx:93Dcf0414)  Requested for: 71Rvf7983 Ordered   12  MetFORMIN HCl  MG Oral Tablet Extended Release 24 Hour; take 1 tablet twice a    day; Therapy: 74ZKW1772 to [phone])  Requested for: 57Gls8505; Last    Rx:26Zes7595 Ordered   13  Metoprolol Succinate ER 25 MG Oral Tablet Extended Release 24 Hour; Take 1 tablet    daily; Therapy: 60WMB4235 to (Last Rx:42Hvd0255)  Requested for: 57BKQ1752 Ordered   14  Modafinil 200 MG Oral Tablet; Therapy: (Nicole Bustamante) to Recorded   15  Omeprazole 40 MG Oral Capsule Delayed Release; TAKE 1 CAPSULE TWICE DAILY; Therapy: 73PCM5304 to ([phone]) Recorded   16  OneTouch Lancets Miscellaneous; USE AS DIRECTED TWICE DAILY; Therapy: 70DKQ2113 to (Last Rx:27Mar2017)  Requested for: 27Mar2017 Ordered   17  OneTouch Ultra Blue In [employer]; TEST TWICE A DAY; Therapy: 38WWR1110 to (Evaluate:00Rmq0267)  Requested for: 27Mar2017; Last    Rx:27Mar2017 Ordered   18  OneTouch Ultra Mini w/Device Kit; ACCU-CHEK RONI SMART VIEW GLUCOMETER    USE AS DIRECTED TWICE DAILY DX 70812; Therapy: 75INQ6338 to (Last Rx:62Xqk2978)  Requested for: 10Zzx0890 Ordered   19  Pepcid 20 MG Oral Tablet; TAKE 1 TABLET DAILY AS DIRECTED; Therapy: 06HZM0736 to Recorded   20  Promethazine-Codeine 6 25-10 MG/5ML Oral Syrup; TAKE 1 TSP Every 6 hours PRN; Therapy: 29NXA8907 to (Evaluate:02Oct2017); Last Rx:52Wlr7863 Ordered   21  Tobramycin-Dexamethasone 0 3-0 1 % Ophthalmic Suspension; 1 gtt ou qid x 3 days  Then bid x 3 days; Therapy: 28CZH4118 to (Evaluate:07Nov2017)  Requested for: 74HIA9161; Last    Rx:01Nov2017 Ordered   22  Vitamin D 1000 UNIT Oral Tablet; TAKE 1 TABLET DAILY; Therapy: 05FMI0314 to (Evaluate:11Oct2017) Recorded   23  Vyvanse 70 MG Oral Capsule; TAKE 1 CAPSULE DAILY IN THE MORNING Recorded   24  ZyrTEC Allergy 10 MG Oral Tablet;     Therapy: 66Luw9451 to Recorded    The medication list was reviewed and updated today  Allergies  1  Levaquin TABS   2  Flexeril TABS    Physical Exam    Constitutional   General appearance: No acute distress, well appearing and well nourished  Eyes   Conjunctiva and lids: No swelling, erythema or discharge  Pupils and irises: Equal, round and reactive to light  Ears, Nose, Mouth, and Throat   External inspection of ears and nose: Normal     Nasal mucosa, septum, and turbinates: Normal without edema or erythema  Oropharynx: Normal with no erythema, edema, exudate or lesions  Pulmonary   Respiratory effort: No increased work of breathing or signs of respiratory distress  Auscultation of lungs: Clear to auscultation  Cardiovascular   Auscultation of heart: Normal rate and rhythm, normal S1 and S2, without murmurs  Examination of extremities for edema and/or varicosities: Normal     Abdomen   Abdomen: Non-tender, no masses  Liver and spleen: No hepatomegaly or splenomegaly  Lymphatic   Palpation of lymph nodes in neck: No lymphadenopathy  Musculoskeletal   Gait and station: Normal     Inspection/palpation of joints, bones, and muscles: Normal     Skin   Skin and subcutaneous tissue: Normal without rashes or lesions  Neurologic   Cranial nerves: Cranial nerves 2-12 intact  Sensation: No sensory loss  Psychiatric   Orientation to person, place, and time: Normal     Mood and affect: Normal          Future Appointments    Date/Time Provider Specialty Site   12/06/2017 01:00 PM Nurse Edwin Schedule  [facility] [address] [place] [address]   Electronically signed by :  Lavinia Lanes, DO; Nov 6 2017 10:02PM EST                       (Author)

## 2017-11-24 ENCOUNTER — ALLSCRIPTS OFFICE VISIT (OUTPATIENT)
Dept: OTHER | Facility: OTHER | Age: 57
End: 2017-11-24

## 2017-11-25 NOTE — PROGRESS NOTES
Assessment    1  Acute bronchitis (466 0) (J20 9)    Plan  Acute bronchitis    · Amoxicillin-Pot Clavulanate 875-125 MG Oral Tablet; TAKE 1 TABLET EVERY 12HOURS UNTIL GONE   · Promethazine-Codeine 6 25-10 MG/5ML Oral Syrup; TAKE 1 TSP Every 6 hoursPRN    Chief Complaint  Pt c/o cough and sore throat x 5 days  History of Present Illness  HPI: Patient complains of for 5 day history of harsh cough which is minimally productive with postnasal drainage and chest congestion  Review of Systems   Constitutional: feeling poorly-- and-- feeling tired  ENT: as noted in HPI  Cardiovascular: no complaints of slow or fast heart rate, no chest pain, no palpitations, no leg claudication or lower extremity edema  Respiratory: as noted in HPI  Active Problems  1  Acute bronchitis (466 0) (J20 9)   2  Adjustment reaction with anxiety (309 24) (F43 22)   3  Anxiety disorder (300 00) (F41 9)   4  Benign essential hypertension (401 1) (I10)   5  Coronary atherosclerosis (414 00) (I25 10)   6  Cough (786 2) (R05)   7  Current tobacco use (305 1) (Z72 0)   8  CVA tenderness (724 5) (M54 9)   9  Dacryocystitis of left lacrimal sac (375 30) (H04 302)   10  Depressed (311) (F32 9)   11  Diverticulitis of colon (562 11) (K57 32)   12  DMII (diabetes mellitus, type 2) (250 00) (E11 9)   13  GERD without esophagitis (530 81) (K21 9)   14  IBS (irritable bowel syndrome) (564 1) (K58 9)   15  Inflamed skin tag (701 9,686 9) (L91 8)   16  Insomnia (780 52) (G47 00)   17  Laceration of left lower leg with infection (891 1) (S81 812A,L08 9)   18  Left shoulder pain (719 41) (M25 512)   19  Menopausal disorder (627 9) (N95 9)   20  Menopausal syndrome (627 2) (N95 1)   21  Mixed hyperlipidemia (272 2) (E78 2)   22  Need for vaccination (V05 9) (Z23)   23  Seasonal allergies (477 9) (J30 2)   24  Sore throat (462) (J02 9)   25  Vaginal candidiasis (112 1) (B37 3)   26  Vitamin D deficiency (268 9) (E55 9)    Past Medical History    1  History of Abdominal pain, LLQ (left lower quadrant) (789 04) (R10 32)   2  History of Abnormal mammogram (793 80) (R92 8)   3  History of Acute diverticulitis (562 11) (K57 92)   4  History of Acute sinusitis, recurrence not specified, unspecified location (461 9) (J01 90)   5  History of Acute upper respiratory infection (465 9) (J06 9)   6  History of Acute upper respiratory infection (465 9) (J06 9)   7  History of Acute upper respiratory infection (465 9) (J06 9)   8  History of Cervical cancer screening (V76 2) (Z12 4)   9  History of Cough (786 2) (R05)   10  History of Cough (786 2) (R05)   11  History of Eczema of right external ear (380 22) (H60 541)   12  History of Encounter for other screening for malignant neoplasm of breast (V76 19)  (Z12 39)   13  History of Encounter for screening colonoscopy (V76 51) (Z12 11)   14  History of Encounter for screening mammogram for malignant neoplasm of breast  (V76 12) (Z12 31)   15  History of Facial pain (784 0) (R51)   16  History of Flu vaccine need (V04 81) (Z23)   17  History of Foot Pain (Soft Tissue) (729 5)   18  History of Hip pain, unspecified laterality   19  History of abdominal pain (V13 89) (Z87 898)   20  History of acute bacterial sinusitis (V12 69) (Z87 09)   21  History of acute bronchitis (V12 69) (Z87 09)   22  History of acute bronchitis (V12 69) (Z87 09)   23  History of acute gastritis (V12 70) (Z87 19)   24  History of acute sinusitis (V12 69) (Z87 09)   25  History of acute sinusitis (V12 69) (Z87 09)   26  History of acute sinusitis (V12 69) (Z87 09)   27  History of adverse drug reaction (V14 9) (Z88 9)   28  History of chest pain (V13 89) (Z87 898)   29  History of dizziness (V13 89) (Z87 898)   30  History of fever (V13 89) (Z87 898)   31  History of gastroenteritis (V12 79) (Z87 19)   32  History of hematuria (V13 09) (Z87 448)   33  History of hepatitis A vaccination (V49 89) (Z92 29)   34  History of impetigo (V13 3) (Z87 2)   35  History of ingrown nail (V13 3) (Z87 2)   36  History of nausea (V12 79) (Z87 898)   37  History of nausea and vomiting (V12 79) (Z87 898)   38  History of nausea and vomiting (V12 79) (Z87 898)   39  History of other disorder of urinary system (V13 09) (Z87 448)   40  History of sinusitis (V12 69) (Z87 09)   41  History of streptococcal pharyngitis (V12 09) (Z87 09)   42  Denied: History of substance abuse   37  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)   44  History of Left foot pain (729 5) (M79 672)   45  History of Microscopic hematuria (599 72) (R31 29)   46  History of Myalgia And Myositis (729 1)   47  History of Need for pneumococcal vaccination (V03 82) (Z23)   48  History of Need for vaccination for DTP (V06 1) (Z23)   49  History of Oropharyngeal dysphagia (787 22) (R13 12)   50  History of Other acute sinusitis, recurrence not specified (461 8) (J01 80)   51  History of Other screening mammogram (V76 12) (Z12 31)   52  History of Otitis, externa, infective, right (380 10) (H60 391)   53  History of Right shoulder pain (719 41) (M25 511)   54  History of Right upper quadrant abdominal tenderness (789 61) (R10 811)   55  History of Screening for breast cancer (V76 10) (Z12 31)   56  History of Screening for colon cancer (V76 51) (Z12 11)   57  History of Screening for depression (V79 0) (Z13 89)   58  History of Screening for diabetic retinopathy (V80 2) (Z13 5)   59  History of Sore throat (462) (J02 9)   60  History of Symptoms involving urinary system (788 99) (R39 9)   61  History of Trapezius muscle spasm (728 85) (M62 838)   62  History of Visit For:  Active Problems And Past Medical History Reviewed: The active problems and past medical history were reviewed and updated today  Family History  Mother    1  Denied: Family history of substance abuse   2  Denied: Family history of Mental health problem   3  Family history of Stroke (434 91) (I63 9)  Father    4   Family history of Cancer (199 1) (C80 1)   5  Family history of Diabetes mellitus (250 00) (E11 9)   6  Denied: Family history of substance abuse   7  Family history of Heart disease (429 9) (I51 9)   8  Family history of HTN (hypertension) (401 9) (I10)   9  Denied: Family history of Mental health problem  Sister    8  Family history of Heart disease (429 9) (I51 9)  Brother    6  Family history of Heart disease (429 9) (I51 9)    Social History   · Current Smoker (305 1)   · Current tobacco use (305 1) (Z72 0)    Surgical History    1  History of Stent Indications: Restenosis At Previous [facility] Location    Current Meds   1  ALPRAZolam 0 5 MG Oral Tablet; Take 1 tablet twice daily as needed, 2 qhs prn sleep; Last Rx:02Jan2015 Ordered   2  Atorvastatin Calcium 40 MG Oral Tablet; Take 1 tablet daily; Therapy: 12SGQ3916 to (Last Shila Hammans)  Requested for: 54DFF4074 Ordered   3  Hiram Contour Test In [employer]; TEST TWICE A DAY; Therapy: 90BXA4983 to (Last Rx:10Adu1080)  Requested for: 29Cei4650 Ordered   4  CloNIDine HCl - 0 1 MG Oral Tablet; TAKE 1 TABLET AT BEDTIME; Therapy: 80JVX3964 to Recorded   5  Dicyclomine HCl - 10 MG Oral Capsule; Therapy: 40PIE9718 to Recorded   6  Ecotrin Low Strength 81 MG Oral Tablet Delayed Release; TAKE 1 TABLET DAILY; Therapy: 57JRW6997 to Recorded   7  Fluconazole 150 MG Oral Tablet; TAKE 1 TABLET 1 TIME ONLY; Therapy: 89LBN7764 to (Evaluate:11Oct2017)  Requested for: 68NQY1812; Last Rx:10Oct2017 Ordered   8  Januvia 100 MG Oral Tablet; Take 1 tablet daily; Therapy: 30OKN0794 to ([facility] Risk)  Requested for: 37Ljb5090; Last Rx:49Rfq1714 Ordered   9  Lexapro 10 MG Oral Tablet; Take 1 tablet daily Recorded   10  Lisinopril 5 MG Oral Tablet; TAKE ONE-HALF (1/2) TABLET DAILY; Therapy: 16AQR0272 to (Last Rx:13Nov2017)  Requested for: 88WFY7090 Ordered   11  MetFORMIN HCl  MG Oral Tablet Extended Release 24 Hour; take 1 tablet twice a  day;   Therapy: 33HWT1139 to ([phone])  Requested for: 24Apr2017; Last  Rx:31Wey6433 Ordered   12  Metoprolol Succinate ER 25 MG Oral Tablet Extended Release 24 Hour; Take 1 tablet  daily; Therapy: 49ICY4860 to (Last Rx:75Ljm1627)  Requested for: 47WPK4118 Ordered   13  Modafinil 200 MG Oral Tablet; Therapy: (Roxanne Schilling) to Recorded   14  Omeprazole 40 MG Oral Capsule Delayed Release; TAKE 1 CAPSULE TWICE DAILY; Therapy: 90PJX1364 to ([phone]) Recorded   15  OneTouch Lancets Miscellaneous; USE AS DIRECTED TWICE DAILY; Therapy: 79UBJ5655 to (Last Rx:27Mar2017)  Requested for: 27Mar2017 Ordered   16  OneTouch Ultra Blue In [employer]; TEST TWICE A DAY; Therapy: 70ILN0615 to (Evaluate:90Yyq3418)  Requested for: 27Mar2017; Last  Rx:27Mar2017 Ordered   17  OneTouch Ultra Mini w/Device Kit; ACCU-CHEK RONI SMART VIEW GLUCOMETER  USE AS DIRECTED TWICE DAILY DX 96813; Therapy: 00ZCE6159 to (Last Rx:37Jjd0904)  Requested for: 94Rio2460 Ordered   18  Pepcid 20 MG Oral Tablet; TAKE 1 TABLET DAILY AS DIRECTED; Therapy: 37HFO7760 to Recorded   19  Promethazine-Codeine 6 25-10 MG/5ML Oral Syrup; TAKE 1 TSP Every 6 hours PRN; Therapy: 85WUH0327 to (Evaluate:02Oct2017); Last Rx:93Duh7520 Ordered   20  Tobramycin-Dexamethasone 0 3-0 1 % Ophthalmic Suspension; 1 gtt ou qid x 3 days  Then bid x 3 days; Therapy: 60GLF4283 to (Evaluate:07Nov2017)  Requested for: 85LZG4584; Last  Rx:01Nov2017 Ordered   21  Vitamin D 1000 UNIT Oral Tablet; TAKE 1 TABLET DAILY; Therapy: 21SUY6139 to (Evaluate:11Oct2017) Recorded   22  Vyvanse 70 MG Oral Capsule; TAKE 1 CAPSULE DAILY IN THE MORNING Recorded   23  ZyrTEC Allergy 10 MG Oral Tablet; Therapy: 07Apr2017 to Recorded    The medication list was reviewed and updated today  Allergies  1  Levaquin TABS   2   Flexeril TABS    Vitals   Recorded: [id number] 02:33PM   Temperature 97 8 F, Tympanic   Heart Rate 93   Respiration Quality Normal   Respiration 18   Systolic 780, LUE, Sitting Diastolic 86, LUE, Sitting   Weight 169 lb 1 oz   BMI Calculated 25 71   BSA Calculated 1 9   O2 Saturation 98, RA   Pain Scale 0       Physical Exam   Constitutional  General appearance: No acute distress, well appearing and well nourished  Pulmonary  Respiratory effort: No increased work of breathing or signs of respiratory distress  Auscultation of lungs: Clear to auscultation  Cardiovascular  Palpation of heart: Normal PMI, no thrills  Auscultation of heart: Normal rate and rhythm, normal S1 and S2, without murmurs     Examination of extremities for edema and/or varicosities: Normal          Future Appointments    Date/Time Provider Specialty Site   12/06/2017 01:00 PM Riri Peña, Nurse Schedule  [facility]   Electronically signed by : Miriam Gotti DO; Nov 24 2017  3:00PM EST                       (Author)

## 2017-12-05 ENCOUNTER — TRANSCRIBE ORDERS (OUTPATIENT)
Dept: SLEEP CENTER | Facility: CLINIC | Age: 57
End: 2017-12-05

## 2017-12-05 ENCOUNTER — HOSPITAL ENCOUNTER (OUTPATIENT)
Dept: SLEEP CENTER | Facility: CLINIC | Age: 57
Discharge: HOME | End: 2017-12-05
Payer: COMMERCIAL

## 2017-12-05 DIAGNOSIS — F90.9 ATTENTION DEFICIT HYPERACTIVITY DISORDER (ADHD), UNSPECIFIED ADHD TYPE: ICD-10-CM

## 2017-12-05 DIAGNOSIS — G47.33 OBSTRUCTIVE SLEEP APNEA SYNDROME: Primary | ICD-10-CM
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
## 2017-12-06 ENCOUNTER — ALLSCRIPTS OFFICE VISIT (OUTPATIENT)
Dept: OTHER | Facility: OTHER | Age: 57
End: 2017-12-06

## 2017-12-13 ENCOUNTER — HOSPITAL ENCOUNTER (OUTPATIENT)
Dept: SLEEP CENTER | Facility: CLINIC | Age: 57
Discharge: HOME | End: 2017-12-13
Payer: COMMERCIAL

## 2017-12-13 DIAGNOSIS — G47.33 OBSTRUCTIVE SLEEP APNEA SYNDROME: ICD-10-CM

## 2017-12-13 PROCEDURE — 95810 POLYSOM 6/> YRS 4/> PARAM: CPT

## 2017-12-19 ENCOUNTER — TRANSCRIBE ORDERS (OUTPATIENT)
Dept: SLEEP CENTER | Facility: CLINIC | Age: 57
End: 2017-12-19

## 2017-12-19 DIAGNOSIS — G47.33 OBSTRUCTIVE SLEEP APNEA SYNDROME: Primary | ICD-10-CM

## 2017-12-22 ENCOUNTER — TRANSCRIBE ORDERS (OUTPATIENT)
Dept: ADMINISTRATIVE | Facility: HOSPITAL | Age: 57
End: 2017-12-22

## 2017-12-22 ENCOUNTER — GENERIC CONVERSION - ENCOUNTER (OUTPATIENT)
Dept: OTHER | Facility: OTHER | Age: 57
End: 2017-12-22

## 2017-12-22 ENCOUNTER — ALLSCRIPTS OFFICE VISIT (OUTPATIENT)
Dept: OTHER | Facility: OTHER | Age: 57
End: 2017-12-22

## 2017-12-22 ENCOUNTER — HOSPITAL ENCOUNTER (OUTPATIENT)
Dept: CT IMAGING | Facility: HOSPITAL | Age: 57
Discharge: HOME | End: 2017-12-22
Payer: COMMERCIAL

## 2017-12-22 DIAGNOSIS — M54.5 LOW BACK PAIN, UNSPECIFIED BACK PAIN LATERALITY, UNSPECIFIED CHRONICITY, WITH SCIATICA PRESENCE UNSPECIFIED: ICD-10-CM

## 2017-12-22 DIAGNOSIS — R10.2 ADNEXAL TENDERNESS, RIGHT: ICD-10-CM

## 2017-12-22 DIAGNOSIS — M25.561 PAIN IN RIGHT KNEE: ICD-10-CM

## 2017-12-22 DIAGNOSIS — R10.2 PELVIC AND PERINEAL PAIN: ICD-10-CM

## 2017-12-22 DIAGNOSIS — M54.5 LOW BACK PAIN, UNSPECIFIED BACK PAIN LATERALITY, UNSPECIFIED CHRONICITY, WITH SCIATICA PRESENCE UNSPECIFIED: Primary | ICD-10-CM

## 2017-12-22 DIAGNOSIS — M54.50 LOW BACK PAIN: ICD-10-CM

## 2017-12-22 LAB
BILIRUB UR QL STRIP: NEGATIVE
CLARITY UR: NORMAL
COLOR UR: YELLOW
GLUCOSE (HISTORICAL): NEGATIVE
HGB UR QL STRIP.AUTO: NEGATIVE
KETONES UR STRIP-MCNC: NEGATIVE MG/DL
LEUKOCYTE ESTERASE UR QL STRIP: NEGATIVE
NITRITE UR QL STRIP: NEGATIVE
PH UR STRIP.AUTO: 6 [PH]
PROT UR STRIP-MCNC: NEGATIVE MG/DL
SP GR UR STRIP.AUTO: 1.02
UROBILINOGEN UR QL STRIP.AUTO: 0.2

## 2017-12-22 PROCEDURE — 74176 CT ABD & PELVIS W/O CONTRAST: CPT

## 2017-12-23 NOTE — PROGRESS NOTES
Assessment   1  Suprapubic cramping (789 09) (R10 2)   2  Lumbago (724 2) (M54 5)    Plan   Lumbago, Suprapubic cramping    · TraMADol HCl - 50 MG Oral Tablet; TAKE 1 TABLET EVERY 12 HOURS as needed   · CT RENAL STONE STUDY ABDOMEN PELVIS WO CONTRAST; Status:Active; Requested for:94Zhd8246;   Vaginal candidiasis    · Urine Dip Automated- POC; Status:Complete;   Done: 15GQK0150 04:21PM    Discussion/Summary      Patient is a 29-year-old female Suprapubic abdominal discomfort/lumbago -unclear as to the exact etiology of patient's symptoms today  Her urinalysis was negative grossly  She did have a history of nephrolithiasis in the past  Her symptoms do appear likely secondary to this problem however she does have the absence of microscopic hematuria check stat CT stone study to further rule out abnormalities  She was advised on the importance of maintaining proper hydration  She was given a prescription for tramadol to take for her discomfort  She was advised to call if any of her symptoms are worsening  Chief Complaint   Patient is present c/o of Kidney problem and lower back pain is been going on for x 6 days   Pt states she has seen blood when she wipes down and on her urine  History of Present Illness   Back Pain Lumbar, Acute (Brief): The patient is being seen for an initial evaluation of this episode of acute low back pain  The patient presents with complaints of gradual onset of mild symmetrical back pain, described as sharp and aching, non-radiating starting 1 day ago  Associated symptoms: hematuria,-- suprapubic pain-- and-- abdominal pain, but-- no fecal incontinence-- and-- no urinary incontinence  Current Treatment:  she is currently not being treated for this problem  Review of Systems        Constitutional: feeling poorly-- and-- feeling tired  ENT: no ear ache, no loss of hearing, no nosebleeds or nasal discharge, no sore throat or hoarseness        Cardiovascular: no complaints of slow or fast heart rate, no chest pain, no palpitations, no leg claudication or lower extremity edema  Respiratory: no complaints of shortness of breath, no wheezing, no dyspnea on exertion, no orthopnea or PND  Breasts: no complaints of breast pain, breast lump or nipple discharge  Gastrointestinal: no complaints of abdominal pain, no constipation, no nausea or diarrhea, no vomiting, no bloody stools  Genitourinary: no dysuria  Musculoskeletal: arthralgias-- and-- myalgias  Integumentary: no complaints of skin rash or lesion, no itching or dry skin, no skin wounds  Neurological: no complaints of headache, no confusion, no numbness or tingling, no dizziness or fainting  Active Problems   1  Acute gastroenteritis (558 9) (K52 9)   2  Acute headache (784 0) (R51)   3  Acute sinusitis, recurrence not specified, unspecified location (461 9) (J01 90)   4  Adjustment reaction with anxiety (309 24) (F43 22)   5  Anxiety disorder (300 00) (F41 9)   6  Benign essential hypertension (401 1) (I10)   7  Coronary atherosclerosis (414 00) (I25 10)   8  Cough (786 2) (R05)   9  Current tobacco use (305 1) (Z72 0)   10  CVA tenderness (724 5) (M54 9)   11  Dacryocystitis of left lacrimal sac (375 30) (H04 302)   12  Depressed (311) (F32 9)   13  Diverticulitis of colon (562 11) (K57 32)   14  DMII (diabetes mellitus, type 2) (250 00) (E11 9)   15  GERD without esophagitis (530 81) (K21 9)   16  IBS (irritable bowel syndrome) (564 1) (K58 9)   17  Inflamed skin tag (701 9,686 9) (L91 8)   18  Insomnia (780 52) (G47 00)   19  Laceration of left lower leg with infection (891 1) (S81 812A,L08 9)   20  Left shoulder pain (719 41) (M25 512)   21  Menopausal disorder (627 9) (N95 9)   22  Menopausal syndrome (627 2) (N95 1)   23  Mixed hyperlipidemia (272 2) (E78 2)   24  Need for hepatitis A immunization (V05 3) (Z23)   25  Need for vaccination (V05 9) (Z23)   26   Seasonal allergies (477 9) (J30 2)   27  Sore throat (462) (J02 9)   28  Vaginal candidiasis (112 1) (B37 3)   29  Vitamin D deficiency (268 9) (E55 9)    Past Medical History   1  History of Abdominal pain, LLQ (left lower quadrant) (789 04) (R10 32)   2  History of Abnormal mammogram (793 80) (R92 8)   3  History of Acute diverticulitis (562 11) (K57 92)   4  History of Acute sinusitis, recurrence not specified, unspecified location (461 9) (J01 90)   5  History of Acute upper respiratory infection (465 9) (J06 9)   6  History of Acute upper respiratory infection (465 9) (J06 9)   7  History of Acute upper respiratory infection (465 9) (J06 9)   8  History of Cervical cancer screening (V76 2) (Z12 4)   9  History of Cough (786 2) (R05)   10  History of Cough (786 2) (R05)   11  History of Eczema of right external ear (380 22) (H60 541)   12  History of Encounter for other screening for malignant neoplasm of breast (V76 19)      (Z12 39)   13  History of Encounter for screening colonoscopy (V76 51) (Z12 11)   14  History of Encounter for screening mammogram for malignant neoplasm of breast      (V76 12) (Z12 31)   15  History of Facial pain (784 0) (R51)   16  History of Flu vaccine need (V04 81) (Z23)   17  History of Foot Pain (Soft Tissue) (729 5)   18  History of Hip pain, unspecified laterality   19  History of abdominal pain (V13 89) (Z87 898)   20  History of acute bacterial sinusitis (V12 69) (Z87 09)   21  History of acute bronchitis (V12 69) (Z87 09)   22  History of acute bronchitis (V12 69) (Z87 09)   23  History of acute gastritis (V12 70) (Z87 19)   24  History of acute sinusitis (V12 69) (Z87 09)   25  History of acute sinusitis (V12 69) (Z87 09)   26  History of adverse drug reaction (V14 9) (Z88 9)   27  History of chest pain (V13 89) (Z87 898)   28  History of dizziness (V13 89) (Z87 898)   29  History of fever (V13 89) (Z87 898)   30  History of gastroenteritis (V12 79) (Z87 19)   31   History of hematuria (V13 09) (Z87 448)   32  History of hepatitis A vaccination (V49 89) (Z92 29)   33  History of impetigo (V13 3) (Z87 2)   34  History of ingrown nail (V13 3) (Z87 2)   35  History of nausea (V12 79) (Z87 898)   36  History of nausea and vomiting (V12 79) (Z87 898)   37  History of nausea and vomiting (V12 79) (Z87 898)   38  History of other disorder of urinary system (V13 09) (Z87 448)   39  History of sinusitis (V12 69) (Z87 09)   40  History of streptococcal pharyngitis (V12 09) (Z87 09)   41  Denied: History of substance abuse   43  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)   43  History of Left foot pain (729 5) (M79 672)   44  History of Microscopic hematuria (599 72) (R31 29)   45  History of Myalgia And Myositis (729 1)   46  History of Need for pneumococcal vaccination (V03 82) (Z23)   47  History of Need for vaccination for DTP (V06 1) (Z23)   48  History of Oropharyngeal dysphagia (787 22) (R13 12)   49  History of Other acute sinusitis, recurrence not specified (461 8) (J01 80)   50  History of Other screening mammogram (V76 12) (Z12 31)   51  History of Otitis, externa, infective, right (380 10) (H60 391)   52  History of Right shoulder pain (719 41) (M25 511)   53  History of Right upper quadrant abdominal tenderness (789 61) (R10 811)   54  History of Screening for breast cancer (V76 10) (Z12 31)   55  History of Screening for colon cancer (V76 51) (Z12 11)   56  History of Screening for depression (V79 0) (Z13 89)   57  History of Screening for diabetic retinopathy (V80 2) (Z13 5)   58  History of Sore throat (462) (J02 9)   59  History of Symptoms involving urinary system (788 99) (R39 9)   60  History of Trapezius muscle spasm (728 85) (M62 838)   61  History of Visit For:  Active Problems And Past Medical History Reviewed: The active problems and past medical history were reviewed and updated today  Family History   Mother    1  Denied: Family history of substance abuse   2  Denied: Family history of Mental health problem   3  Family history of Stroke (434 91) (I63 9)  Father    4  Family history of Cancer (199 1) (C80 1)   5  Family history of Diabetes mellitus (250 00) (E11 9)   6  Denied: Family history of substance abuse   7  Family history of Heart disease (429 9) (I51 9)   8  Family history of HTN (hypertension) (401 9) (I10)   9  Denied: Family history of Mental health problem  Sister    8  Family history of Heart disease (429 9) (I51 9)  Brother    6  Family history of Heart disease (429 9) (I51 9)  Family History Reviewed: The family history was reviewed and updated today  Social History    · Current Smoker (305 1)   · Current tobacco use (305 1) (Z72 0)  The social history was reviewed and updated today  The social history was reviewed and is unchanged  Surgical History   1  History of Stent Indications: Restenosis At Previous [facility] Location  Surgical History Reviewed: The surgical history was reviewed and updated today  Current Meds    1  Acetaminophen-Codeine #3 300-30 MG Oral Tablet; take 1 tab PO TID prn severe     headaches; Therapy: 62HVN9478 to (Last Rx:93Xfm3924) Ordered   2  ALPRAZolam 0 5 MG Oral Tablet; Take 1 tablet twice daily as needed, 2 qhs prn sleep; Last Rx:02Jan2015 Ordered   3  Atorvastatin Calcium 40 MG Oral Tablet; Take 1 tablet daily; Therapy: 53TFL0167 to (Last [facility])  Requested for: 52NWI9316 Ordered   4  Hiram Contour Test In [employer]; TEST TWICE A DAY; Therapy: 64ETB3859 to (Last Rx:09Oaw4957)  Requested for: 76Dik5769 Ordered   5  CloNIDine HCl - 0 1 MG Oral Tablet; TAKE 1 TABLET AT BEDTIME; Therapy: 58ZRL5574 to Recorded   6  Dicyclomine HCl - 10 MG Oral Capsule; Therapy: 91GHX9959 to Recorded   7  Ecotrin Low Strength 81 MG Oral Tablet Delayed Release; TAKE 1 TABLET DAILY; Therapy: 00XHW4654 to Recorded   8  Januvia 100 MG Oral Tablet; Take 1 tablet daily;      Therapy: 64WNC5737 to (Vickey Yang)  Requested for: 91Nwi8074; Last     Rx:17Ofi2834 Ordered   9  Lexapro 10 MG Oral Tablet; Take 1 tablet daily Recorded   10  Lisinopril 5 MG Oral Tablet; TAKE ONE-HALF (1/2) TABLET DAILY; Therapy: 53FZN4060 to (Last Rx:13Nov2017)  Requested for: 72SPR2296 Ordered   11  MetFORMIN HCl  MG Oral Tablet Extended Release 24 Hour; take 1 tablet twice a      day; Therapy: 18KWA1589 to (Evaluate:32Yyt2151)  Requested for: 41TIL2004; Last      Rx:27Nov2017 Ordered   12  Metoprolol Succinate ER 25 MG Oral Tablet Extended Release 24 Hour; Take 1 tablet      daily; Therapy: 80BSG8444 to (Last Rx:79Ufm5019)  Requested for: 31DSL7323 Ordered   13  Modafinil 200 MG Oral Tablet; Therapy: (Sheryle Reeds) to Recorded   14  Omeprazole 40 MG Oral Capsule Delayed Release; TAKE 1 CAPSULE TWICE DAILY; Therapy: 38GYC8395 to (22 922523) Recorded   15  OneTouch Lancets Miscellaneous; USE AS DIRECTED TWICE DAILY; Therapy: 06QTG0710 to (Last Rx:27Mar2017)  Requested for: 27Mar2017 Ordered   16  OneTouch Ultra Blue In [employer]; TEST TWICE A DAY; Therapy: 23OEU5177 to (Evaluate:80Wes3199)  Requested for: 27Mar2017; Last      Rx:27Mar2017 Ordered   17  OneTouch Ultra Mini w/Device Kit; ACCU-CHEK RONI SMART VIEW GLUCOMETER      USE AS DIRECTED TWICE DAILY DX 03402; Therapy: 15ZCJ8385 to (Last Rx:13Jcr3394)  Requested for: 04Kig5514 Ordered   18  Pepcid 20 MG Oral Tablet; TAKE 1 TABLET DAILY AS DIRECTED; Therapy: 44SBR1231 to Recorded   19  Tobramycin-Dexamethasone 0 3-0 1 % Ophthalmic Suspension; 1 gtt ou qid x 3 days  Then bid x 3 days; Therapy: 67WLA1969 to (Evaluate:07Nov2017)  Requested for: 48QJL4186; Last      Rx:01Nov2017 Ordered   20  Vitamin D 1000 UNIT Oral Tablet; TAKE 1 TABLET DAILY; Therapy: 38VQO9028 to (Evaluate:11Oct2017) Recorded   21  Vyvanse 70 MG Oral Capsule; TAKE 1 CAPSULE DAILY IN THE MORNING Recorded   22   [facility] 10 MG Oral Tablet; Therapy: 07Apr2017 to Recorded     The medication list was reviewed and updated today  Allergies   1  Levaquin TABS   2  Flexeril TABS    Vitals    Recorded: 57Zfy0186 01:57PM   Temperature 97 2 F, Tympanic   Heart Rate 86   Pulse Quality Normal   Respiration Quality Normal   Respiration 16   Systolic 950, LUE, Sitting   Diastolic 80, LUE, Sitting   Height 5 ft 4 57 in   Weight 165 lb 1 oz   BMI Calculated 27 84   BSA Calculated 1 81   O2 Saturation 98, RA   LMP Hysterectomy   Pain Scale 3     Physical Exam        Constitutional      General appearance: No acute distress, well appearing and well nourished  Eyes      Conjunctiva and lids: No swelling, erythema or discharge  Pupils and irises: Equal, round and reactive to light  Ears, Nose, Mouth, and Throat      Nasal mucosa, septum, and turbinates: Normal without edema or erythema  Oropharynx: Normal with no erythema, edema, exudate or lesions  Pulmonary      Respiratory effort: No increased work of breathing or signs of respiratory distress  Auscultation of lungs: Clear to auscultation  Cardiovascular      Auscultation of heart: Normal rate and rhythm, normal S1 and S2, without murmurs  Examination of extremities for edema and/or varicosities: Normal        Abdomen      Abdomen: Abnormal   The abdomen was flat  Bowel sounds were normal  There was moderate tenderness in the suprapubic area-- and-- in the left lower quadrant  Liver and spleen: No hepatomegaly or splenomegaly  Lymphatic      Palpation of lymph nodes in neck: No lymphadenopathy  Musculoskeletal      Gait and station: Normal        Inspection/palpation of joints, bones, and muscles: Abnormal   Palpation - lower mid-lumbar tenderness           Results/Data   Urine Dip Automated- POC 66ZDB8725 04:21PM Garry Johnston      Test Name Result Flag Reference   Color Yellow     Clarity Hazy     Leukocytes NEGATIVE Nitrite NEGATIVE     Blood NEGATIVE     Bilirubin NEGATIVE     Urobilinogen 0 2     Protein NEGATIVE     Ph 6 0     Specific Gravity 1 025     Ketone NEGATIVE     Glucose NEGATIVE          Signatures    Electronically signed by :  Stevie Collins DO; Dec 22 2017  8:52PM EST                       (Author)

## 2018-01-12 VITALS
HEART RATE: 102 BPM | SYSTOLIC BLOOD PRESSURE: 130 MMHG | DIASTOLIC BLOOD PRESSURE: 76 MMHG | OXYGEN SATURATION: 97 % | BODY MASS INDEX: 26.55 KG/M2 | HEIGHT: 65 IN | WEIGHT: 159.38 LBS | TEMPERATURE: 97.3 F

## 2018-01-12 VITALS
DIASTOLIC BLOOD PRESSURE: 86 MMHG | OXYGEN SATURATION: 98 % | HEART RATE: 93 BPM | BODY MASS INDEX: 27.29 KG/M2 | SYSTOLIC BLOOD PRESSURE: 130 MMHG | WEIGHT: 169.06 LBS | RESPIRATION RATE: 18 BRPM | TEMPERATURE: 97.8 F

## 2018-01-13 VITALS
HEIGHT: 65 IN | DIASTOLIC BLOOD PRESSURE: 76 MMHG | TEMPERATURE: 96.8 F | SYSTOLIC BLOOD PRESSURE: 132 MMHG | BODY MASS INDEX: 27.41 KG/M2 | HEART RATE: 75 BPM | RESPIRATION RATE: 18 BRPM | OXYGEN SATURATION: 97 % | WEIGHT: 164.5 LBS

## 2018-01-13 VITALS
DIASTOLIC BLOOD PRESSURE: 64 MMHG | RESPIRATION RATE: 17 BRPM | SYSTOLIC BLOOD PRESSURE: 108 MMHG | TEMPERATURE: 96.8 F | BODY MASS INDEX: 26.26 KG/M2 | WEIGHT: 163.38 LBS | OXYGEN SATURATION: 97 % | HEART RATE: 104 BPM | HEIGHT: 66 IN

## 2018-01-13 VITALS
HEART RATE: 96 BPM | SYSTOLIC BLOOD PRESSURE: 122 MMHG | BODY MASS INDEX: 25.39 KG/M2 | OXYGEN SATURATION: 97 % | TEMPERATURE: 97.6 F | DIASTOLIC BLOOD PRESSURE: 80 MMHG | RESPIRATION RATE: 20 BRPM | WEIGHT: 167.5 LBS | HEIGHT: 68 IN

## 2018-01-14 VITALS
BODY MASS INDEX: 27.58 KG/M2 | OXYGEN SATURATION: 96 % | TEMPERATURE: 95.7 F | HEART RATE: 84 BPM | DIASTOLIC BLOOD PRESSURE: 84 MMHG | WEIGHT: 165.56 LBS | HEIGHT: 65 IN | RESPIRATION RATE: 18 BRPM | SYSTOLIC BLOOD PRESSURE: 144 MMHG

## 2018-01-14 VITALS
RESPIRATION RATE: 18 BRPM | BODY MASS INDEX: 25.64 KG/M2 | OXYGEN SATURATION: 97 % | HEART RATE: 94 BPM | SYSTOLIC BLOOD PRESSURE: 146 MMHG | WEIGHT: 169.19 LBS | HEIGHT: 68 IN | DIASTOLIC BLOOD PRESSURE: 76 MMHG | TEMPERATURE: 97.5 F

## 2018-01-14 VITALS
BODY MASS INDEX: 26.25 KG/M2 | SYSTOLIC BLOOD PRESSURE: 118 MMHG | TEMPERATURE: 95.9 F | OXYGEN SATURATION: 95 % | RESPIRATION RATE: 18 BRPM | DIASTOLIC BLOOD PRESSURE: 74 MMHG | WEIGHT: 163.31 LBS | HEIGHT: 66 IN | HEART RATE: 89 BPM

## 2018-01-14 VITALS
SYSTOLIC BLOOD PRESSURE: 132 MMHG | HEIGHT: 66 IN | TEMPERATURE: 97 F | OXYGEN SATURATION: 98 % | HEART RATE: 98 BPM | WEIGHT: 156.56 LBS | RESPIRATION RATE: 16 BRPM | BODY MASS INDEX: 25.16 KG/M2 | DIASTOLIC BLOOD PRESSURE: 72 MMHG

## 2018-01-14 VITALS
DIASTOLIC BLOOD PRESSURE: 100 MMHG | TEMPERATURE: 99 F | SYSTOLIC BLOOD PRESSURE: 148 MMHG | HEART RATE: 90 BPM | BODY MASS INDEX: 25.55 KG/M2 | HEIGHT: 66 IN | OXYGEN SATURATION: 98 % | WEIGHT: 159 LBS

## 2018-01-14 NOTE — RESULT NOTES
Verified Results  (1) HEMOGLOBIN A1C 64JEJ0566 12:00AM Nunu Knox Order Number: [id number]      5 7-6 4% impaired fasting glucose  >=6 5% diagnosis of diabetes    Falsely low levels are seen in conditions linked to short RBC life span-  hemolytic anemia, and splenomegaly  Falsely elevated levels are seen in situations where there is an increased production of RBC- receipt of erythropoietin or blood transfusions  Adopted from ADA-Clinical Practice Recommendations     Test Name Result Flag Reference   HEMOGLOBIN A1C 6 6 % H 4 0-5 6   EST  AVG  GLUCOSE 143 mg/dl         Plan  DMII (diabetes mellitus, type 2)    · (1) HEMOGLOBIN A1C ; every 6 months;  Last 05OMG5132; Next 39Sib8725;  Status:Active

## 2018-01-15 VITALS
RESPIRATION RATE: 17 BRPM | WEIGHT: 163.44 LBS | HEART RATE: 73 BPM | HEIGHT: 66 IN | BODY MASS INDEX: 26.27 KG/M2 | TEMPERATURE: 95.5 F | DIASTOLIC BLOOD PRESSURE: 60 MMHG | SYSTOLIC BLOOD PRESSURE: 106 MMHG | OXYGEN SATURATION: 98 %

## 2018-01-17 NOTE — RESULT NOTES
Message   Please call pt  Mammogram was normal   Continue with yearly monitoring      Verified Results  [facility] SCREENING BILATERAL W CAD 35EEI1318 03:14PM Gm Burns Order Number: [id number]    - Patient Instructions: To schedule this appointment, please contact Central Scheduling at [phone]  Do not wear any perfume, powder, lotion or deodorant on breast or underarm area  Please bring your doctors order, referral (if needed) and insurance information with you on the day of the test  Failure to bring this information may result in this test being rescheduled  Arrive 15 minutes prior to your appointment time to register  On the day of your test, please bring any prior mammogram or breast studies with you that were not performed at a [facility]  Failure to bring prior exams may result in your test needing to be rescheduled   Order Number: [id number]    - Patient Instructions: To schedule this appointment, please contact Central Scheduling at [phone]  Do not wear any perfume, powder, lotion or deodorant on breast or underarm area  Please bring your doctors order, referral (if needed) and insurance information with you on the day of the test  Failure to bring this information may result in this test being rescheduled  Arrive 15 minutes prior to your appointment time to register  On the day of your test, please bring any prior mammogram or breast studies with you that were not performed at a [facility]  Failure to bring prior exams may result in your test needing to be rescheduled  Test Name Result Flag Reference   MAMMO SCREENING BILATERAL W CAD (Report)     Patient History:   Patient is postmenopausal    No known family history of cancer  Patient is an every day smoker  Patient's BMI is 23 9  Reason for exam: screening, asymptomatic       Mammo Screening Bilateral W CAD: February 15, 2017 - Check In #:    [de-identified]   Bilateral MLO and CC view(s) were taken  XCCL view(s) were taken   of the right breast      Technologist: FAISAL Germain T (R)(M)   Prior study comparison: October 28, 2015, bilateral digital    screening mammogram performed at [place] [address]  September 12, 2014, bilateral WB digitl bilat    fay, performed at [address]  September 9, 2014, bilateral digital screening mammogram performed at [address]  There are scattered fibroglandular densities  No new dominant    soft tissue mass, architectural distortion or suspicious    calcifications are noted  The skin and nipple structures are    within normal limits  Stable nodular density present    bilaterally  No mammographic evidence of malignancy  No    significant changes when compared with prior studies  ACR BI-RADS® Assessments: BiRad:2 - Benign     Recommendation:   Routine screening mammogram of both breasts in 1 year  A    reminder letter will be scheduled  Analyzed by CAD     8-10% of cancers will be missed on mammography  Management of a    palpable abnormality must be based on clinical grounds  Patients   will be notified of their results via letter from our facility  Accredited by [employer] of Radiology and FDA  Transcription Location: [facility] 98: [id number]     Risk Value(s):   Tyrer-Cuzick 10 Year: 2 600%, Tyrer-Cuzick Lifetime: 9 200%,    Myriad Table: 1 5%, JOHN 5 Year: 1 3%, NCI Lifetime: 9 3%   Signed by:   Zeeshan Navarreet MD   2/15/17       Plan  PMH: Encounter for screening mammogram for malignant neoplasm of breast    · DIGTL SCRN MAMMO W/CAD ; every 1 year; Last 45CSX5816;  Next 06FOW0352;  Status:Active

## 2018-01-19 ENCOUNTER — APPOINTMENT (OUTPATIENT)
Dept: RADIOLOGY | Facility: MEDICAL CENTER | Age: 58
End: 2018-01-19
Payer: COMMERCIAL

## 2018-01-19 ENCOUNTER — GENERIC CONVERSION - ENCOUNTER (OUTPATIENT)
Dept: OTHER | Facility: OTHER | Age: 58
End: 2018-01-19

## 2018-01-19 DIAGNOSIS — M25.561 PAIN IN RIGHT KNEE: ICD-10-CM

## 2018-01-19 PROCEDURE — 73564 X-RAY EXAM KNEE 4 OR MORE: CPT

## 2018-01-22 VITALS
BODY MASS INDEX: 25.32 KG/M2 | RESPIRATION RATE: 17 BRPM | OXYGEN SATURATION: 99 % | WEIGHT: 166.5 LBS | DIASTOLIC BLOOD PRESSURE: 84 MMHG | SYSTOLIC BLOOD PRESSURE: 126 MMHG | TEMPERATURE: 96.4 F | HEART RATE: 93 BPM

## 2018-01-22 VITALS
SYSTOLIC BLOOD PRESSURE: 140 MMHG | DIASTOLIC BLOOD PRESSURE: 80 MMHG | OXYGEN SATURATION: 97 % | BODY MASS INDEX: 25.34 KG/M2 | RESPIRATION RATE: 18 BRPM | HEIGHT: 68 IN | WEIGHT: 167.19 LBS | TEMPERATURE: 94.6 F | HEART RATE: 110 BPM

## 2018-01-22 VITALS
OXYGEN SATURATION: 98 % | HEIGHT: 68 IN | DIASTOLIC BLOOD PRESSURE: 88 MMHG | TEMPERATURE: 95.9 F | WEIGHT: 173 LBS | RESPIRATION RATE: 20 BRPM | SYSTOLIC BLOOD PRESSURE: 174 MMHG | HEART RATE: 87 BPM | BODY MASS INDEX: 26.22 KG/M2

## 2018-01-23 ENCOUNTER — GENERIC CONVERSION - ENCOUNTER (OUTPATIENT)
Dept: OTHER | Facility: OTHER | Age: 58
End: 2018-01-23

## 2018-01-23 VITALS
WEIGHT: 165.06 LBS | HEIGHT: 65 IN | SYSTOLIC BLOOD PRESSURE: 140 MMHG | OXYGEN SATURATION: 98 % | BODY MASS INDEX: 27.5 KG/M2 | RESPIRATION RATE: 16 BRPM | TEMPERATURE: 97.2 F | DIASTOLIC BLOOD PRESSURE: 80 MMHG | HEART RATE: 86 BPM

## 2018-01-24 VITALS
TEMPERATURE: 97.7 F | WEIGHT: 159.31 LBS | HEIGHT: 65 IN | BODY MASS INDEX: 26.54 KG/M2 | OXYGEN SATURATION: 99 % | HEART RATE: 92 BPM | SYSTOLIC BLOOD PRESSURE: 132 MMHG | RESPIRATION RATE: 16 BRPM | DIASTOLIC BLOOD PRESSURE: 78 MMHG

## 2018-01-24 NOTE — RESULT NOTES
Verified Results  * XR KNEE 4+ VW RIGHT INJURY 57LOK5341 04:54PM Fariha Poster Order Number: [id number]     Test Name Result Flag Reference   XR KNEE 4+ VW RIGHT (Report)     RIGHT KNEE     INDICATION: Right knee pain  COMPARISON: None  VIEWS: 4     IMAGES: 4     FINDINGS:     There is no acute fracture or dislocation  Trace joint effusion present     There is a mild degree of right knee osteoarthritis  There is perhaps slight narrowing of the medial joint compartment with small marginal hypertrophic spurs  No lytic or blastic lesions are seen  Soft tissues are unremarkable  IMPRESSION:     Mild osteoarthritis of the right knee with trace joint effusion   No acute osseous abnormality       Workstation performed: [id number]     Signed by:   Amee Payne MD   1/23/18

## 2018-01-28 ENCOUNTER — OFFICE VISIT (OUTPATIENT)
Dept: URGENT CARE | Facility: MEDICAL CENTER | Age: 58
End: 2018-01-28
Payer: COMMERCIAL

## 2018-01-28 ENCOUNTER — TRANSCRIBE ORDERS (OUTPATIENT)
Dept: URGENT CARE | Facility: MEDICAL CENTER | Age: 58
End: 2018-01-28

## 2018-01-28 VITALS
DIASTOLIC BLOOD PRESSURE: 74 MMHG | TEMPERATURE: 96.9 F | HEART RATE: 95 BPM | SYSTOLIC BLOOD PRESSURE: 146 MMHG | RESPIRATION RATE: 18 BRPM | OXYGEN SATURATION: 99 %

## 2018-01-28 DIAGNOSIS — R07.89 LEFT-SIDED CHEST WALL PAIN: ICD-10-CM

## 2018-01-28 DIAGNOSIS — M25.561 ACUTE PAIN OF RIGHT KNEE: Primary | ICD-10-CM

## 2018-01-28 DIAGNOSIS — M25.532 LEFT WRIST PAIN: ICD-10-CM

## 2018-01-28 PROCEDURE — 73564 X-RAY EXAM KNEE 4 OR MORE: CPT

## 2018-01-28 PROCEDURE — 71101 X-RAY EXAM UNILAT RIBS/CHEST: CPT

## 2018-01-28 PROCEDURE — 73110 X-RAY EXAM OF WRIST: CPT

## 2018-01-28 PROCEDURE — 99213 OFFICE O/P EST LOW 20 MIN: CPT | Performed by: EMERGENCY MEDICINE

## 2018-01-28 RX ORDER — NAPROXEN 500 MG/1
500 TABLET ORAL 2 TIMES DAILY WITH MEALS
Qty: 20 TABLET | Refills: 0 | Status: SHIPPED | OUTPATIENT
Start: 2018-01-28 | End: 2018-03-01 | Stop reason: SDUPTHER

## 2018-01-28 RX ORDER — PHENTERMINE HYDROCHLORIDE 15 MG/1
15 CAPSULE ORAL EVERY MORNING
COMMUNITY
End: 2018-03-01

## 2018-01-28 RX ORDER — METFORMIN HYDROCHLORIDE 500 MG/1
1 TABLET, EXTENDED RELEASE ORAL 2 TIMES DAILY
COMMUNITY
Start: 2016-06-10 | End: 2018-03-27 | Stop reason: SDUPTHER

## 2018-01-28 RX ORDER — FAMOTIDINE 20 MG/1
1 TABLET, FILM COATED ORAL DAILY
COMMUNITY
Start: 2017-04-07

## 2018-01-28 RX ORDER — OXYCODONE HYDROCHLORIDE AND ACETAMINOPHEN 5; 325 MG/1; MG/1
1 TABLET ORAL EVERY 6 HOURS PRN
Qty: 10 TABLET | Refills: 0 | Status: SHIPPED | OUTPATIENT
Start: 2018-01-28 | End: 2018-03-01 | Stop reason: HOSPADM

## 2018-01-28 RX ORDER — ESCITALOPRAM OXALATE 10 MG/1
10 TABLET ORAL
COMMUNITY
Start: 2014-03-05 | End: 2018-12-04 | Stop reason: SDUPTHER

## 2018-01-28 RX ORDER — OXYCODONE HYDROCHLORIDE AND ACETAMINOPHEN 5; 325 MG/1; MG/1
1 TABLET ORAL EVERY 6 HOURS PRN
Qty: 10 TABLET | Refills: 0 | Status: SHIPPED | OUTPATIENT
Start: 2018-01-28 | End: 2018-01-28 | Stop reason: SDUPTHER

## 2018-01-28 RX ORDER — METOPROLOL SUCCINATE 25 MG/1
1 TABLET, EXTENDED RELEASE ORAL DAILY
COMMUNITY
Start: 2014-07-09 | End: 2019-02-07 | Stop reason: SDUPTHER

## 2018-01-28 RX ORDER — ATORVASTATIN CALCIUM 40 MG/1
1 TABLET, FILM COATED ORAL DAILY
COMMUNITY
Start: 2012-11-27 | End: 2018-04-20 | Stop reason: SDUPTHER

## 2018-01-28 RX ORDER — ALPRAZOLAM 0.5 MG/1
TABLET ORAL
COMMUNITY
End: 2018-03-30

## 2018-01-28 RX ORDER — TRAMADOL HYDROCHLORIDE 50 MG/1
1 TABLET ORAL EVERY 12 HOURS PRN
COMMUNITY
Start: 2018-01-19 | End: 2018-03-01 | Stop reason: HOSPADM

## 2018-01-28 NOTE — PATIENT INSTRUCTIONS
Abrasion   WHAT YOU NEED TO KNOW:   An abrasion is a scrape on your skin  It happens when your skin rubs against a rough surface  Some examples of an abrasion include rug burn, a skinned elbow, or road rash  Abrasions can be many shapes and sizes  The wound may hurt, bleed, bruise, or swell  DISCHARGE INSTRUCTIONS:   Return to the emergency department if:   · The bleeding does not stop after 10 minutes of firm pressure  · You cannot rinse one or more foreign objects out of your wound  · You have red streaks on your skin coming from your wound  Contact your healthcare provider if:   · You have a fever or chills  · Your abrasion is red, warm, swollen, or draining pus  · You have questions or concerns about your condition or care  Care for your abrasion:   · Wash your hands and dry them with a clean towel  · Press a clean cloth against your wound to stop any bleeding  · Rinse your wound with a lot of clean water  Do not use harsh soap, alcohol, or iodine solutions  · Use a clean, wet cloth to remove any objects, such as small pieces of rocks or dirt  · Rub antibiotic ointment on your wound  This may help prevent infection and help your wound heal     · Cover the wound with a non-stick bandage  Change the bandage daily, and if gets wet or dirty  Follow up with your healthcare provider as directed:  Write down your questions so you remember to ask them during your visits  © 2017 [address] Information is for End User's use only and may not be sold, redistributed or otherwise used for commercial purposes  All illustrations and images included in CareNotes® are the copyrighted property of [employer]  or [employer]  The above information is an  only  It is not intended as medical advice for individual conditions or treatments   Talk to your doctor, nurse or pharmacist before following any medical regimen to see if it is safe and effective for you   Knee Pain   WHAT YOU NEED TO KNOW:   Knee pain may start suddenly, or it may be a long-term problem  You may have pain on the side, front, or back of your knee  You may have knee stiffness and swelling  You may hear popping sounds or feel like your knee is giving way or locking up as you walk  You may feel pain when you sit, stand, walk, or climb up and down stairs  Knee pain can be caused by conditions such as obesity, inflammation, or strains or tears in ligaments or tendons  DISCHARGE INSTRUCTIONS:   Follow up with your healthcare provider within 24 hours or as directed: You may need follow-up treatments, such as steroid injections to decrease pain  Write down your questions so you remember to ask them during your visits  Self-care:   · Rest  your knee so it can heal  Limit activities that increase your pain  · Ice  can help reduce swelling  Wrap ice in a towel and put it on your knee for as long and as often as directed  · Compression  with a brace or bandage can help reduce swelling  Use a brace or bandage only as directed  · Elevation  helps decrease pain and swelling  Elevate your knee while you are sitting or lying down  Prop your leg on pillows to keep your knee above the level of your heart  Medicines:   · NSAIDs  help decrease swelling and pain or fever  This medicine is available with or without a doctor's order  NSAIDs can cause stomach bleeding or kidney problems in certain people  If you take blood thinner medicine, always ask your healthcare provider if NSAIDs are safe for you  Always read the medicine label and follow directions  · Acetaminophen  decreases pain and fever  It is available without a doctor's order  Ask how much to take and when to take it  Follow directions  Acetaminophen can cause liver damage if not taken correctly  · Take your medicine as directed  Contact your healthcare provider if you think your medicine is not helping or if you have side effects  Tell him or her if you are allergic to any medicine  Keep a list of the medicines, vitamins, and herbs you take  Include the amounts, and when and why you take them  Bring the list or the pill bottles to follow-up visits  Carry your medicine list with you in case of an emergency  Exercise as directed: You may need to see a physical therapist or do recommended exercises to improve movement and decrease your pain  You may be directed to walk, swim, or ride a bike  Follow your exercise plan exactly as directed to avoid further injury  Contact your healthcare provider if:   · You have questions or concerns about your condition or care  Return to the emergency department if:   · Your pain is worse, even after treatment  · You cannot bend or straighten your leg completely  · The swelling around your knee does not go down even with treatment  · Your knee is painful and hot to the touch  © 2017 [address] Information is for End User's use only and may not be sold, redistributed or otherwise used for commercial purposes  All illustrations and images included in CareNotes® are the copyrighted property of [employer]  or [employer] Guzman  The above information is an  only  It is not intended as medical advice for individual conditions or treatments  Talk to your doctor, nurse or pharmacist before following any medical regimen to see if it is safe and effective for you

## 2018-01-28 NOTE — PROGRESS NOTES
Assessment/Plan:   S/p mechanical fall  R knee pain/swelling, L wrist, L anterior chest wall  Will obtain XRs for fracture, treat pain  Bacitracin applied to wounds  Will place ace wrap on R knee  No fractures seen on my read  Formal read to follow  Diagnoses and all orders for this visit:    Acute pain of right knee  -     XR knee 4+ vw right injury    Left-sided chest wall pain  -     X-ray ribs left with PA chest; Future    Left wrist pain  -     X-ray wrist left 3+ views; Future    Other orders  -     ALPRAZolam (XANAX) 0 5 mg tablet; Take by mouth  -     atorvastatin (LIPITOR) 40 mg tablet; Take 1 tablet by mouth daily  -     Cetirizine HCl (ZYRTEC ALLERGY) 10 MG CAPS; Take by mouth  -     cholecalciferol (VITAMIN D3) 1,000 units tablet; Take by mouth  -     escitalopram (LEXAPRO) 10 mg tablet; Take 10 mg by mouth  -     sitaGLIPtin (JANUVIA) 100 mg tablet; Take 1 tablet by mouth daily  -     lisdexamfetamine (VYVANSE) 70 MG capsule; Take 60 mg by mouth  -     metFORMIN (GLUCOPHAGE-XR) 500 mg 24 hr tablet; Take 1 tablet by mouth 2 (two) times a day  -     metoprolol succinate (TOPROL-XL) 25 mg 24 hr tablet; Take 1 tablet by mouth daily  -     famotidine (PEPCID) 20 mg tablet; Take 1 tablet by mouth daily  -     traMADol (ULTRAM) 50 mg tablet; Take 1 tablet by mouth every 12 (twelve) hours as needed  -     isometheptene-dichloral-apap (MIDRIN) -325 MG per capsule; Take 1 capsule by mouth 4 (four) times a day as needed for headaches  -     phentermine 15 MG capsule; Take 15 mg by mouth every morning          Subjective:     Patient ID: Abelardo Bullard is a 56 y o  female  56year old presents for evaluation after a fall  Patient reports that she tripped over her dogs around 1am this morning and fell down about 6 concrete steps  Denies hitting head or LOC  Denies any neck pain  No blood thinners  She is complaining primarily of R knee pain, L wrist, and L anterior chest wall pain   Abrasions to knees and L arm  Review of Systems   Constitutional: Negative for chills and fever  HENT: Negative for congestion and sore throat  Eyes: Negative for pain and redness  Respiratory: Negative for shortness of breath and wheezing  Cardiovascular: Negative for chest pain and palpitations  Gastrointestinal: Negative for abdominal pain, diarrhea and vomiting  Endocrine: Negative for polydipsia and polyphagia  Genitourinary: Negative for dysuria and flank pain  Musculoskeletal: Positive for arthralgias  Negative for back pain  Skin: Positive for wound  Negative for rash  Neurological: Negative for seizures and headaches  Psychiatric/Behavioral: Negative for agitation and behavioral problems  All other systems reviewed and are negative  Objective:     Physical Exam   Constitutional: She is oriented to person, place, and time  She appears well-developed and well-nourished  HENT:   Head: Normocephalic and atraumatic  Right Ear: External ear normal    Left Ear: External ear normal    Mouth/Throat: Oropharynx is clear and moist    Eyes: Pupils are equal, round, and reactive to light  Right eye exhibits no discharge  Left eye exhibits no discharge  Neck: Normal range of motion  No thyromegaly present  Cardiovascular: Normal rate, regular rhythm and normal heart sounds  No murmur heard  Pulmonary/Chest: Effort normal  No stridor  No respiratory distress  She has no wheezes  She has no rales  Abdominal: Soft  She exhibits no distension  Musculoskeletal: She exhibits no edema  R knee swollen  Pain with pROM  Decreased ROM of knee  Palpable pedal pulses  Tenderness over dorsum of L wrist  No snuff box tenderness  No swelling or bruising  Pain with palpation over L anterior chest wall, just inferior to breast      No c/t/l spine tenderness  No trauma to head  Neurological: She is alert and oriented to person, place, and time  Skin: Skin is warm  No rash noted  Psychiatric: She has a normal mood and affect  Her behavior is normal    Nursing note and vitals reviewed

## 2018-02-11 DIAGNOSIS — I10 ESSENTIAL HYPERTENSION: Primary | ICD-10-CM

## 2018-02-11 RX ORDER — LISINOPRIL 5 MG/1
TABLET ORAL
Qty: 45 TABLET | Refills: 0 | Status: SHIPPED | OUTPATIENT
Start: 2018-02-11 | End: 2018-05-12 | Stop reason: SDUPTHER

## 2018-02-14 ENCOUNTER — OFFICE VISIT (OUTPATIENT)
Dept: FAMILY MEDICINE CLINIC | Facility: CLINIC | Age: 58
End: 2018-02-14
Payer: COMMERCIAL

## 2018-02-14 VITALS
OXYGEN SATURATION: 97 % | BODY MASS INDEX: 26.82 KG/M2 | SYSTOLIC BLOOD PRESSURE: 124 MMHG | TEMPERATURE: 96.5 F | HEIGHT: 64 IN | DIASTOLIC BLOOD PRESSURE: 86 MMHG | WEIGHT: 157.13 LBS | HEART RATE: 85 BPM | RESPIRATION RATE: 16 BRPM

## 2018-02-14 DIAGNOSIS — J01.90 ACUTE NON-RECURRENT SINUSITIS, UNSPECIFIED LOCATION: Primary | ICD-10-CM

## 2018-02-14 PROBLEM — M54.50 LOW BACK PAIN: Status: ACTIVE | Noted: 2017-12-22

## 2018-02-14 PROBLEM — J30.2 SEASONAL ALLERGIES: Status: ACTIVE | Noted: 2017-08-24

## 2018-02-14 PROBLEM — M25.561 RIGHT KNEE PAIN: Status: ACTIVE | Noted: 2018-01-19

## 2018-02-14 PROBLEM — N95.1 MENOPAUSAL SYNDROME: Status: ACTIVE | Noted: 2017-03-20

## 2018-02-14 PROBLEM — K44.9 HIATAL HERNIA: Status: ACTIVE | Noted: 2017-08-28

## 2018-02-14 PROBLEM — H04.302 DACRYOCYSTITIS OF LEFT LACRIMAL SAC: Status: ACTIVE | Noted: 2017-11-01

## 2018-02-14 PROBLEM — K31.84 GASTROPARESIS: Status: ACTIVE | Noted: 2017-08-28

## 2018-02-14 PROBLEM — M25.512 LEFT SHOULDER PAIN: Status: ACTIVE | Noted: 2017-08-11

## 2018-02-14 PROCEDURE — 99213 OFFICE O/P EST LOW 20 MIN: CPT | Performed by: FAMILY MEDICINE

## 2018-02-14 RX ORDER — CEFDINIR 300 MG/1
300 CAPSULE ORAL EVERY 12 HOURS SCHEDULED
Qty: 20 CAPSULE | Refills: 0 | Status: SHIPPED | OUTPATIENT
Start: 2018-02-14 | End: 2018-02-24

## 2018-02-14 RX ORDER — MODAFINIL 200 MG/1
TABLET ORAL
COMMUNITY
End: 2018-03-01 | Stop reason: SDUPTHER

## 2018-02-14 RX ORDER — DICYCLOMINE HYDROCHLORIDE 10 MG/1
CAPSULE ORAL
COMMUNITY
Start: 2017-09-28 | End: 2018-02-14

## 2018-02-14 RX ORDER — TOBRAMYCIN AND DEXAMETHASONE 3; 1 MG/ML; MG/ML
SUSPENSION/ DROPS OPHTHALMIC
COMMUNITY
Start: 2017-11-01 | End: 2018-03-01 | Stop reason: HOSPADM

## 2018-02-14 RX ORDER — CLONIDINE HYDROCHLORIDE 0.1 MG/1
1 TABLET ORAL
COMMUNITY
Start: 2017-09-28 | End: 2018-03-01 | Stop reason: HOSPADM

## 2018-02-14 RX ORDER — BLOOD-GLUCOSE METER
KIT MISCELLANEOUS
COMMUNITY
Start: 2016-08-18

## 2018-02-14 RX ORDER — ACETAMINOPHEN AND CODEINE PHOSPHATE 300; 30 MG/1; MG/1
TABLET ORAL
COMMUNITY
Start: 2017-12-06 | End: 2018-03-30

## 2018-02-14 RX ORDER — ASPIRIN 81 MG/1
1 TABLET ORAL DAILY
COMMUNITY
Start: 2017-09-28

## 2018-02-14 RX ORDER — PROMETHAZINE HYDROCHLORIDE AND CODEINE PHOSPHATE 6.25; 1 MG/5ML; MG/5ML
5 SYRUP ORAL EVERY 4 HOURS PRN
Qty: 120 ML | Refills: 0 | Status: SHIPPED | OUTPATIENT
Start: 2018-02-14 | End: 2018-03-01 | Stop reason: HOSPADM

## 2018-02-14 RX ORDER — FLUCONAZOLE 150 MG/1
1 TABLET ORAL
COMMUNITY
Start: 2017-10-10 | End: 2018-02-14

## 2018-02-14 NOTE — PROGRESS NOTES
Assessment/Plan:    No problem-specific Assessment & Plan notes found for this encounter  There are no diagnoses linked to this encounter  Subjective:      Patient ID: Francie Londono is a 56 y o  female  Patient presents with upper respiratory symptoms     Symptoms started:    a week ago    chills, headache, nasal blockage, pain while swallowing, post nasal drip, productive cough, sinus and nasal congestion, sore throat and nausea      Taking :    mucinex, robitussin dm        The following portions of the patient's history were reviewed and updated as appropriate: allergies, current medications, past family history, past medical history, past social history, past surgical history and problem list     Review of Systems   Constitutional: Negative  HENT: Positive for congestion and postnasal drip  Negative for ear pain, hearing loss, nosebleeds, sore throat and trouble swallowing  Eyes: Negative  Respiratory: Positive for cough  Negative for apnea, chest tightness, shortness of breath and wheezing  Cardiovascular: Negative  Gastrointestinal: Negative for abdominal pain, blood in stool, constipation, diarrhea, nausea and vomiting  Endocrine: Negative  Genitourinary: Negative for difficulty urinating, dysuria, frequency, hematuria and urgency  Musculoskeletal: Negative for arthralgias, joint swelling and myalgias  Skin: Negative for rash  Neurological: Negative for dizziness, syncope, light-headedness, numbness and headaches  Hematological: Negative  Psychiatric/Behavioral: Negative for confusion, dysphoric mood and sleep disturbance  The patient is not nervous/anxious  Objective:    Vitals:    02/14/18 1415   BP: 124/86   Pulse: 85   Resp: 16   Temp: (!) 96 5 °F (35 8 °C)   SpO2: 97%        Physical Exam   Constitutional: She is oriented to person, place, and time  She appears well-developed and well-nourished  No distress     HENT:   Head: Normocephalic and atraumatic  Eyes: Conjunctivae are normal  Pupils are equal, round, and reactive to light  Right eye exhibits no discharge  Neck: Normal range of motion  No thyromegaly present  Injected pharynx with postnasal drainage   Cardiovascular: Normal rate and regular rhythm  Pulmonary/Chest: Effort normal and breath sounds normal  No respiratory distress  Neurological: She is alert and oriented to person, place, and time  Skin: Skin is warm and dry  She is not diaphoretic  Psychiatric: She has a normal mood and affect  Her behavior is normal  Judgment and thought content normal    Nursing note and vitals reviewed

## 2018-02-14 NOTE — PATIENT INSTRUCTIONS
Rhinosinusitis   WHAT YOU NEED TO KNOW:   Rhinosinusitis (RS) is inflammation of your nose and sinuses  It commonly begins as a virus, often as a common cold  Viruses usually last 7 to 10 days and do not need treatment  When the virus does not get better on its own, you may have bacterial RS  This means that bacteria have begun to grow inside your sinuses  Acute RS lasts less than 4 weeks  Chronic RS lasts 12 weeks or more  Recurrent RS is when you have 4 or more episodes of RS in one year  DISCHARGE INSTRUCTIONS:   Return to the emergency department if:   · Your eye and eyelid are red, swollen, and painful  · You cannot open your eye  · You have double vision or you cannot see  · Your eyeball bulges out or you cannot move your eye  · You are more sleepy than normal or you notice changes in your ability to think, move, or talk  · You have a stiff neck, a fever, or a bad headache  · You have swelling of your forehead or scalp  Contact your healthcare provider if:   · Your symptoms are worse or do not improve after 3 to 5 days of treatment  · You have questions or concerns about your condition or care  Medicines: You may need any of the following:  · Acetaminophen  decreases pain and fever  It is available without a doctor's order  Ask how much to take and how often to take it  Follow directions  Acetaminophen can cause liver damage if not taken correctly  · NSAIDs , such as ibuprofen, help decrease swelling, pain, and fever  This medicine is available with or without a doctor's order  NSAIDs can cause stomach bleeding or kidney problems in certain people  If you take blood thinner medicine, always ask your healthcare provider if NSAIDs are safe for you  Always read the medicine label and follow directions  · Nasal steroid sprays  decrease inflammation in your nose and sinuses  · Decongestants  reduce swelling and drain mucus in the nose and sinuses   They may help you breathe easier  · Antihistamines  dry mucus in the nose and relieve sneezing  · Antibiotics  treat a bacterial infection and may be needed if your symptoms do not improve or they get worse  · Take your medicine as directed  Contact your healthcare provider if you think your medicine is not helping or if you have side effects  Tell him or her if you are allergic to any medicine  Keep a list of the medicines, vitamins, and herbs you take  Include the amounts, and when and why you take them  Bring the list or the pill bottles to follow-up visits  Carry your medicine list with you in case of an emergency  Self-care:   · Rinse your sinuses  Use a sinus rinse device to rinse your nasal passages with a saline (salt water) solution  This will help thin the mucus in your nose and rinse away pollen and dirt  It will also help reduce swelling so you can breathe normally  Ask your healthcare provider how often to do this  · Breathe in steam   Heat a bowl of water until you see steam  Lean over the bowl and make a tent over your head with a large towel  Breathe deeply for about 20 minutes  Be careful not to get too close to the steam or burn yourself  Do this 3 times a day  You can also breathe deeply when you take a hot shower  · Sleep with your head elevated  Place an extra pillow under your head before you go to sleep to help your sinuses drain  · Drink liquids as directed  Ask your healthcare provider how much liquid to drink each day and which liquids are best for you  Liquids will thin the mucus in your nose and help it drain  Avoid drinks that contain alcohol or caffeine  · Do not smoke, and avoid secondhand smoke  Nicotine and other chemicals in cigarettes and cigars can make your symptoms worse  Ask your healthcare provider for information if you currently smoke and need help to quit  E-cigarettes or smokeless tobacco still contain nicotine   Talk to your healthcare provider before you use these products  Follow up with your healthcare provider as directed: Follow up if your symptoms are worse or not better after 3 to 5 days of treatment  Write down your questions so you remember to ask them during your visits  © 2017 [facility] Information is for End User's use only and may not be sold, redistributed or otherwise used for commercial purposes  All illustrations and images included in CareNotes® are the copyrighted property of [employer]  or [employer]  The above information is an  only  It is not intended as medical advice for individual conditions or treatments  Talk to your doctor, nurse or pharmacist before following any medical regimen to see if it is safe and effective for you

## 2018-02-20 DIAGNOSIS — IMO0001 UNCONTROLLED TYPE 2 DIABETES MELLITUS WITHOUT COMPLICATION, WITHOUT LONG-TERM CURRENT USE OF INSULIN: Primary | ICD-10-CM

## 2018-02-22 RX ORDER — SITAGLIPTIN 100 MG/1
TABLET, FILM COATED ORAL
Qty: 90 TABLET | Refills: 0 | Status: SHIPPED | OUTPATIENT
Start: 2018-02-22 | End: 2018-05-29 | Stop reason: SDUPTHER

## 2018-03-01 ENCOUNTER — OFFICE VISIT (OUTPATIENT)
Dept: FAMILY MEDICINE CLINIC | Facility: CLINIC | Age: 58
End: 2018-03-01
Payer: COMMERCIAL

## 2018-03-01 VITALS
WEIGHT: 163.44 LBS | DIASTOLIC BLOOD PRESSURE: 78 MMHG | OXYGEN SATURATION: 98 % | HEART RATE: 97 BPM | RESPIRATION RATE: 17 BRPM | BODY MASS INDEX: 27.9 KG/M2 | SYSTOLIC BLOOD PRESSURE: 132 MMHG | HEIGHT: 64 IN | TEMPERATURE: 97.1 F

## 2018-03-01 DIAGNOSIS — J06.9 ACUTE URI: ICD-10-CM

## 2018-03-01 DIAGNOSIS — G89.29 CHRONIC PAIN OF RIGHT KNEE: Primary | ICD-10-CM

## 2018-03-01 DIAGNOSIS — M25.561 CHRONIC PAIN OF RIGHT KNEE: Primary | ICD-10-CM

## 2018-03-01 PROCEDURE — 99214 OFFICE O/P EST MOD 30 MIN: CPT | Performed by: FAMILY MEDICINE

## 2018-03-01 RX ORDER — PROMETHAZINE HYDROCHLORIDE AND CODEINE PHOSPHATE 6.25; 1 MG/5ML; MG/5ML
5 SYRUP ORAL
Qty: 120 ML | Refills: 0 | Status: SHIPPED | OUTPATIENT
Start: 2018-03-01 | End: 2018-03-30 | Stop reason: SDUPTHER

## 2018-03-01 RX ORDER — PHENTERMINE HYDROCHLORIDE 30 MG/1
30 CAPSULE ORAL DAILY
Refills: 1 | COMMUNITY
Start: 2018-02-23 | End: 2018-10-05

## 2018-03-01 RX ORDER — IBUPROFEN 800 MG/1
800 TABLET ORAL EVERY 12 HOURS PRN
Qty: 30 TABLET | Refills: 0 | Status: SHIPPED | OUTPATIENT
Start: 2018-03-01 | End: 2018-10-05

## 2018-03-01 RX ORDER — CEFUROXIME AXETIL 500 MG/1
500 TABLET ORAL EVERY 12 HOURS SCHEDULED
Qty: 14 TABLET | Refills: 0 | Status: SHIPPED | OUTPATIENT
Start: 2018-03-01 | End: 2018-03-08

## 2018-03-01 NOTE — PROGRESS NOTES
Assessment/Plan:   1  Chronic pain of right knee  For patient has been having persistent pain in her right knee  She has been following up with her Orthopedics regularly  She did have a recent arthrocentesis  At this time, continue with current treatment of ibuprofen 100 mg b i d  for symptom relief  She states that this medication has provided moderate relief  The  - ibuprofen (MOTRIN) 800 mg tablet; Take 1 tablet (800 mg total) by mouth every 12 (twelve) hours as needed for mild pain  Dispense: 30 tablet; Refill: 0    2  Acute URI  Patient's symptoms have been persisting  Her she has been having this cough for the past 1-2 weeks  Continue with supportive care  Maintain hydration  Take over-the-counter Mucinex  Start treatment with Ceftin 500 mg b i d  She was advised that she may hold off on starting this and start this if any of her symptoms should worsen  - cefuroxime (CEFTIN) 500 mg tablet; Take 1 tablet (500 mg total) by mouth every 12 (twelve) hours for 7 days  Dispense: 14 tablet; Refill: 0  - promethazine-codeine (PHENERGAN WITH CODEINE) 6 25-10 mg/5 mL syrup; Take 5 mL by mouth daily at bedtime as needed for cough  Dispense: 120 mL; Refill: 0     Diagnoses and all orders for this visit:    Chronic pain of right knee  -     ibuprofen (MOTRIN) 800 mg tablet; Take 1 tablet (800 mg total) by mouth every 12 (twelve) hours as needed for mild pain    Acute URI  -     cefuroxime (CEFTIN) 500 mg tablet; Take 1 tablet (500 mg total) by mouth every 12 (twelve) hours for 7 days  -     promethazine-codeine (PHENERGAN WITH CODEINE) 6 25-10 mg/5 mL syrup; Take 5 mL by mouth daily at bedtime as needed for cough    Other orders  -     phentermine 30 MG capsule; Take 30 mg by mouth daily          Subjective:  Chief Complaint   Patient presents with    Earache     right /     Sore Throat    Generalized Body Aches    Diarrhea     x 2 days     Cough        Patient ID: Cora Pan is a 56 y o  female      URI This is a new problem  The current episode started in the past 7 days  The problem has been unchanged  There has been no fever  Associated symptoms include coughing, headaches, sinus pain, sneezing and a sore throat  Pertinent negatives include no abdominal pain, chest pain, congestion, diarrhea, dysuria, ear pain or nausea  She has tried nothing for the symptoms  The treatment provided no relief  Review of Systems   Constitutional: Negative for activity change, chills, fatigue and fever  HENT: Positive for sinus pain, sneezing and sore throat  Negative for congestion, ear pain and sinus pressure  Eyes: Negative for redness, itching and visual disturbance  Respiratory: Positive for cough  Negative for shortness of breath  Cardiovascular: Negative for chest pain and palpitations  Gastrointestinal: Negative for abdominal pain, diarrhea and nausea  Endocrine: Negative for cold intolerance and heat intolerance  Genitourinary: Negative for dysuria, flank pain and frequency  Musculoskeletal: Negative for arthralgias, back pain, gait problem and myalgias  Skin: Negative for color change  Allergic/Immunologic: Negative for environmental allergies  Neurological: Positive for headaches  Negative for dizziness and numbness  Psychiatric/Behavioral: Negative for behavioral problems and sleep disturbance  The following portions of the patient's history were reviewed and updated as appropriate : past family history, past medical history, past social history and past surgical history  Objective:  Vitals:    03/01/18 1138   BP: 132/78   BP Location: Left arm   Patient Position: Sitting   Cuff Size: Standard   Pulse: 97   Resp: 17   Temp: (!) 97 1 °F (36 2 °C)   TempSrc: Tympanic   SpO2: 98%   Weight: 74 1 kg (163 lb 7 oz)   Height: 5' 4" (1 626 m)        Physical Exam   Constitutional: She is oriented to person, place, and time  She appears well-developed and well-nourished     HENT: Head: Normocephalic and atraumatic  Nose: Nose normal    Mouth/Throat: No oropharyngeal exudate  Eyes: Pupils are equal, round, and reactive to light  Right eye exhibits no discharge  Left eye exhibits no discharge  Neck: Normal range of motion  Neck supple  No tracheal deviation present  Cardiovascular: Normal rate, regular rhythm and intact distal pulses  Exam reveals no gallop and no friction rub  No murmur heard  Pulses:       Dorsalis pedis pulses are 2+ on the right side, and 2+ on the left side  Posterior tibial pulses are 2+ on the right side, and 2+ on the left side  Pulmonary/Chest: Effort normal and breath sounds normal  No respiratory distress  She has no wheezes  She has no rales  Abdominal: Soft  Bowel sounds are normal  She exhibits no distension  There is no tenderness  There is no rebound and no guarding  Musculoskeletal: Normal range of motion  She exhibits no edema  Lymphadenopathy:        Head (right side): No submental and no submandibular adenopathy present  Head (left side): No submental and no submandibular adenopathy present  She has no cervical adenopathy  Right cervical: No superficial cervical, no deep cervical and no posterior cervical adenopathy present  Left cervical: No superficial cervical, no deep cervical and no posterior cervical adenopathy present  Neurological: She is alert and oriented to person, place, and time  No cranial nerve deficit or sensory deficit  Skin: Skin is warm, dry and intact  Psychiatric: Her speech is normal and behavior is normal  Judgment normal  Her mood appears not anxious  Cognition and memory are normal  She does not exhibit a depressed mood  Vitals reviewed

## 2018-03-06 ENCOUNTER — OFFICE VISIT (OUTPATIENT)
Dept: FAMILY MEDICINE CLINIC | Facility: CLINIC | Age: 58
End: 2018-03-06
Payer: COMMERCIAL

## 2018-03-06 VITALS
WEIGHT: 163.8 LBS | BODY MASS INDEX: 27.96 KG/M2 | OXYGEN SATURATION: 96 % | HEART RATE: 76 BPM | RESPIRATION RATE: 20 BRPM | HEIGHT: 64 IN | TEMPERATURE: 96.2 F | SYSTOLIC BLOOD PRESSURE: 130 MMHG | DIASTOLIC BLOOD PRESSURE: 80 MMHG

## 2018-03-06 DIAGNOSIS — E78.2 MIXED HYPERLIPIDEMIA: ICD-10-CM

## 2018-03-06 DIAGNOSIS — F41.9 ANXIETY DISORDER, UNSPECIFIED TYPE: ICD-10-CM

## 2018-03-06 DIAGNOSIS — I10 BENIGN ESSENTIAL HYPERTENSION: ICD-10-CM

## 2018-03-06 DIAGNOSIS — E55.9 VITAMIN D DEFICIENCY: ICD-10-CM

## 2018-03-06 DIAGNOSIS — F32.A DEPRESSION, UNSPECIFIED DEPRESSION TYPE: ICD-10-CM

## 2018-03-06 DIAGNOSIS — I25.10 ATHEROSCLEROSIS OF CORONARY ARTERY OF NATIVE HEART WITHOUT ANGINA PECTORIS, UNSPECIFIED VESSEL OR LESION TYPE: ICD-10-CM

## 2018-03-06 DIAGNOSIS — IMO0001 UNCONTROLLED TYPE 2 DIABETES MELLITUS WITHOUT COMPLICATION, WITHOUT LONG-TERM CURRENT USE OF INSULIN: Primary | ICD-10-CM

## 2018-03-06 LAB
25(OH)D3 SERPL-MCNC: 35.6 NG/ML (ref 30–100)
ALBUMIN SERPL BCP-MCNC: 4.3 G/DL (ref 3.5–5)
ALP SERPL-CCNC: 79 U/L (ref 46–116)
ALT SERPL W P-5'-P-CCNC: 28 U/L (ref 12–78)
ANION GAP SERPL CALCULATED.3IONS-SCNC: 5 MMOL/L (ref 4–13)
AST SERPL W P-5'-P-CCNC: 20 U/L (ref 5–45)
BASOPHILS # BLD AUTO: 0.07 THOUSANDS/ΜL (ref 0–0.1)
BASOPHILS NFR BLD AUTO: 1 % (ref 0–1)
BILIRUB SERPL-MCNC: 0.41 MG/DL (ref 0.2–1)
BUN SERPL-MCNC: 21 MG/DL (ref 5–25)
CALCIUM SERPL-MCNC: 9.7 MG/DL (ref 8.3–10.1)
CHLORIDE SERPL-SCNC: 104 MMOL/L (ref 100–108)
CO2 SERPL-SCNC: 30 MMOL/L (ref 21–32)
CREAT SERPL-MCNC: 0.89 MG/DL (ref 0.6–1.3)
CREAT UR-MCNC: 122 MG/DL
EOSINOPHIL # BLD AUTO: 0.14 THOUSAND/ΜL (ref 0–0.61)
EOSINOPHIL NFR BLD AUTO: 1 % (ref 0–6)
ERYTHROCYTE [DISTWIDTH] IN BLOOD BY AUTOMATED COUNT: 14.6 % (ref 11.6–15.1)
EST. AVERAGE GLUCOSE BLD GHB EST-MCNC: 154 MG/DL
GFR SERPL CREATININE-BSD FRML MDRD: 73 ML/MIN/1.73SQ M
GLUCOSE P FAST SERPL-MCNC: 101 MG/DL (ref 65–99)
HBA1C MFR BLD: 7 % (ref 4.2–6.3)
HCT VFR BLD AUTO: 40.7 % (ref 34.8–46.1)
HGB BLD-MCNC: 13.1 G/DL (ref 11.5–15.4)
LYMPHOCYTES # BLD AUTO: 3.89 THOUSANDS/ΜL (ref 0.6–4.47)
LYMPHOCYTES NFR BLD AUTO: 40 % (ref 14–44)
MCH RBC QN AUTO: 30.3 PG (ref 26.8–34.3)
MCHC RBC AUTO-ENTMCNC: 32.2 G/DL (ref 31.4–37.4)
MCV RBC AUTO: 94 FL (ref 82–98)
MICROALBUMIN UR-MCNC: 19.3 MG/L (ref 0–20)
MICROALBUMIN/CREAT 24H UR: 16 MG/G CREATININE (ref 0–30)
MONOCYTES # BLD AUTO: 1.09 THOUSAND/ΜL (ref 0.17–1.22)
MONOCYTES NFR BLD AUTO: 11 % (ref 4–12)
NEUTROPHILS # BLD AUTO: 4.46 THOUSANDS/ΜL (ref 1.85–7.62)
NEUTS SEG NFR BLD AUTO: 47 % (ref 43–75)
NRBC BLD AUTO-RTO: 0 /100 WBCS
PLATELET # BLD AUTO: 576 THOUSANDS/UL (ref 149–390)
PMV BLD AUTO: 10.4 FL (ref 8.9–12.7)
POTASSIUM SERPL-SCNC: 5.1 MMOL/L (ref 3.5–5.3)
PROT SERPL-MCNC: 7.6 G/DL (ref 6.4–8.2)
RBC # BLD AUTO: 4.33 MILLION/UL (ref 3.81–5.12)
SODIUM SERPL-SCNC: 139 MMOL/L (ref 136–145)
T4 FREE SERPL-MCNC: 0.84 NG/DL (ref 0.76–1.46)
TSH SERPL DL<=0.05 MIU/L-ACNC: 3.93 UIU/ML (ref 0.36–3.74)
WBC # BLD AUTO: 9.66 THOUSAND/UL (ref 4.31–10.16)

## 2018-03-06 PROCEDURE — 82043 UR ALBUMIN QUANTITATIVE: CPT | Performed by: PHYSICIAN ASSISTANT

## 2018-03-06 PROCEDURE — 36415 COLL VENOUS BLD VENIPUNCTURE: CPT | Performed by: PHYSICIAN ASSISTANT

## 2018-03-06 PROCEDURE — 99214 OFFICE O/P EST MOD 30 MIN: CPT | Performed by: PHYSICIAN ASSISTANT

## 2018-03-06 PROCEDURE — 3061F NEG MICROALBUMINURIA REV: CPT | Performed by: FAMILY MEDICINE

## 2018-03-06 PROCEDURE — 80053 COMPREHEN METABOLIC PANEL: CPT | Performed by: PHYSICIAN ASSISTANT

## 2018-03-06 PROCEDURE — 84439 ASSAY OF FREE THYROXINE: CPT | Performed by: PHYSICIAN ASSISTANT

## 2018-03-06 PROCEDURE — 84443 ASSAY THYROID STIM HORMONE: CPT | Performed by: PHYSICIAN ASSISTANT

## 2018-03-06 PROCEDURE — 85025 COMPLETE CBC W/AUTO DIFF WBC: CPT | Performed by: PHYSICIAN ASSISTANT

## 2018-03-06 PROCEDURE — 82306 VITAMIN D 25 HYDROXY: CPT | Performed by: PHYSICIAN ASSISTANT

## 2018-03-06 PROCEDURE — 83036 HEMOGLOBIN GLYCOSYLATED A1C: CPT | Performed by: PHYSICIAN ASSISTANT

## 2018-03-06 PROCEDURE — 80061 LIPID PANEL: CPT | Performed by: PHYSICIAN ASSISTANT

## 2018-03-06 PROCEDURE — 82570 ASSAY OF URINE CREATININE: CPT | Performed by: PHYSICIAN ASSISTANT

## 2018-03-06 NOTE — PROGRESS NOTES
Assessment/Plan:         Diagnoses and all orders for this visit:    Uncontrolled type 2 diabetes mellitus without complication, without long-term current use of insulin (HCC)  -     glucose blood (ONE TOUCH ULTRA TEST) test strip; 1 each by Other route 2 (two) times a day  -     CBC and differential  -     Comprehensive metabolic panel  -     Lipid panel  -     TSH, 3rd generation with T4 reflex  -     Vitamin D 25 hydroxy  -     HEMOGLOBIN A1C W/ EAG ESTIMATION  -     Microalbumin / creatinine urine ratio  -     T4, free; Future  -     T4, free    Benign essential hypertension  -     CBC and differential  -     Comprehensive metabolic panel  -     Lipid panel  -     TSH, 3rd generation with T4 reflex  -     Vitamin D 25 hydroxy  -     HEMOGLOBIN A1C W/ EAG ESTIMATION  -     Microalbumin / creatinine urine ratio  -     T4, free; Future  -     T4, free    Mixed hyperlipidemia  -     CBC and differential  -     Comprehensive metabolic panel  -     Lipid panel  -     TSH, 3rd generation with T4 reflex  -     Vitamin D 25 hydroxy  -     HEMOGLOBIN A1C W/ EAG ESTIMATION  -     Microalbumin / creatinine urine ratio  -     T4, free; Future  -     T4, free    Vitamin D deficiency  -     CBC and differential  -     Comprehensive metabolic panel  -     Lipid panel  -     TSH, 3rd generation with T4 reflex  -     Vitamin D 25 hydroxy  -     HEMOGLOBIN A1C W/ EAG ESTIMATION  -     Microalbumin / creatinine urine ratio  -     T4, free; Future  -     T4, free    Anxiety disorder, unspecified type    Atherosclerosis of coronary artery of native heart without angina pectoris, unspecified vessel or lesion type  -     CBC and differential  -     Comprehensive metabolic panel  -     Lipid panel  -     TSH, 3rd generation with T4 reflex  -     Vitamin D 25 hydroxy  -     HEMOGLOBIN A1C W/ EAG ESTIMATION  -     Microalbumin / creatinine urine ratio  -     T4, free;  Future  -     T4, free    Depression, unspecified depression type Discussed pt's chronic conditions, medications, and assessed her present condition today  Her BP is well controlled on Metoprolol and Lisinopril  Will evaluate her DM , hyperlipidemia, and Vit D deficiency with FBW and urine for MA today and call with results once obtained  Outside of her external stressors causing some breakthrough symptoms, her anxiety and depression are usually well controlled on Lexapro and Alprazolam  I stressed the need for her to tell her podiatrist and eye doctor to send reports of their diabetic foot and eye exams to us for documenting and reporting purposes  She is overall doing well  She says she has been following up every 6 months but if her A1C is not controlled, then she will need to RTO in 4 months with repeat FBW pre-appt  Chief Complaint   Patient presents with    Follow-up     fasting blood work, med check  Subjective:      Patient ID: Devan Yao is a 56 y o  female  Pt presents for F/U of her chronic medical conditions  She is feeling fatigued and is still taking Cefuroxime for a URI  She does not do well in the winter time and it makes her depressed and she also has her hands full because she has a 24year old son who still does not drive and she needs to get him everywhere and is also trying to handle health issues with several family members  She is fasting today for blood work  She has noticed that when she cuts herself that she is a slow healer but she does also have asplenia  She is taking her meds compliantly without issues  Needs refill of her One Touch Test Strips  She checked her sugar last night and it was at 140 which she says is her norm   She is UTD with routine health maintenance and diabetic measures and sees both eye doctor and podiatrist          The following portions of the patient's history were reviewed and updated as appropriate: allergies, current medications, past family history, past medical history, past social history, past surgical history and problem list     Review of Systems   Constitutional: Positive for fatigue  Respiratory: Negative  Cardiovascular: Negative  Gastrointestinal: Negative  Genitourinary: Negative  Psychiatric/Behavioral: Positive for dysphoric mood  Negative for suicidal ideas  The patient is not nervous/anxious  Objective:      /80 (BP Location: Left arm, Patient Position: Sitting, Cuff Size: Standard)   Pulse 76   Temp (!) 96 2 °F (35 7 °C) (Tympanic)   Resp 20   Ht 5' 4" (1 626 m)   Wt 74 3 kg (163 lb 12 8 oz)   SpO2 96%   Breastfeeding? No   BMI 28 12 kg/m²          Physical Exam   Constitutional: She is oriented to person, place, and time  She appears well-developed and well-nourished  No distress  HENT:   Mouth/Throat: Oropharynx is clear and moist    Neck: Neck supple  No thyromegaly present  Cardiovascular: Normal rate, regular rhythm and normal heart sounds  Pulmonary/Chest: Effort normal and breath sounds normal    Musculoskeletal: She exhibits no edema  Neurological: She is alert and oriented to person, place, and time  Psychiatric: She has a normal mood and affect  Vitals reviewed

## 2018-03-07 LAB
CHOLEST SERPL-MCNC: 184 MG/DL (ref 50–200)
HDLC SERPL-MCNC: 46 MG/DL (ref 40–60)
LDLC SERPL CALC-MCNC: 105 MG/DL (ref 0–100)
TRIGL SERPL-MCNC: 164 MG/DL

## 2018-03-08 ENCOUNTER — TELEPHONE (OUTPATIENT)
Dept: FAMILY MEDICINE CLINIC | Facility: CLINIC | Age: 58
End: 2018-03-08

## 2018-03-08 DIAGNOSIS — IMO0001 UNCONTROLLED TYPE 2 DIABETES MELLITUS WITHOUT COMPLICATION, WITHOUT LONG-TERM CURRENT USE OF INSULIN: ICD-10-CM

## 2018-03-08 NOTE — TELEPHONE ENCOUNTER
[facility] called stating that they need a specific name for the strips that you prescribe for patient to test her glucose, they ask if this can be fax to the pharmacy, thank you

## 2018-03-09 DIAGNOSIS — IMO0001 UNCONTROLLED TYPE 2 DIABETES MELLITUS WITHOUT COMPLICATION, WITHOUT LONG-TERM CURRENT USE OF INSULIN: ICD-10-CM

## 2018-03-09 NOTE — TELEPHONE ENCOUNTER
Rx printed out on my printer to be faxed to the pharmacy specifying she needs One Touch Ultra Test Strips

## 2018-03-27 DIAGNOSIS — IMO0001 UNCONTROLLED TYPE 2 DIABETES MELLITUS WITHOUT COMPLICATION, WITHOUT LONG-TERM CURRENT USE OF INSULIN: Primary | ICD-10-CM

## 2018-03-27 RX ORDER — METFORMIN HYDROCHLORIDE 500 MG/1
TABLET, EXTENDED RELEASE ORAL
Qty: 60 TABLET | Refills: 2 | Status: SHIPPED | OUTPATIENT
Start: 2018-03-27 | End: 2018-06-21 | Stop reason: SDUPTHER

## 2018-03-30 ENCOUNTER — OFFICE VISIT (OUTPATIENT)
Dept: FAMILY MEDICINE CLINIC | Facility: CLINIC | Age: 58
End: 2018-03-30
Payer: COMMERCIAL

## 2018-03-30 VITALS
WEIGHT: 163.8 LBS | HEIGHT: 64 IN | TEMPERATURE: 97.8 F | BODY MASS INDEX: 27.96 KG/M2 | RESPIRATION RATE: 15 BRPM | HEART RATE: 94 BPM | DIASTOLIC BLOOD PRESSURE: 64 MMHG | OXYGEN SATURATION: 98 % | SYSTOLIC BLOOD PRESSURE: 110 MMHG

## 2018-03-30 DIAGNOSIS — J06.9 UPPER RESPIRATORY TRACT INFECTION, UNSPECIFIED TYPE: Primary | ICD-10-CM

## 2018-03-30 PROCEDURE — 99213 OFFICE O/P EST LOW 20 MIN: CPT | Performed by: PHYSICIAN ASSISTANT

## 2018-03-30 RX ORDER — CEFUROXIME AXETIL 500 MG/1
500 TABLET ORAL EVERY 12 HOURS SCHEDULED
Qty: 20 TABLET | Refills: 0 | Status: SHIPPED | OUTPATIENT
Start: 2018-03-30 | End: 2018-04-09

## 2018-03-30 RX ORDER — PROMETHAZINE HYDROCHLORIDE AND CODEINE PHOSPHATE 6.25; 1 MG/5ML; MG/5ML
5 SYRUP ORAL
Qty: 120 ML | Refills: 0
Start: 2018-03-30 | End: 2018-10-05

## 2018-03-30 RX ORDER — ALPRAZOLAM 0.25 MG/1
TABLET ORAL
COMMUNITY
Start: 2018-03-20 | End: 2018-11-01 | Stop reason: SDUPTHER

## 2018-03-30 RX ORDER — PROMETHAZINE HYDROCHLORIDE AND CODEINE PHOSPHATE 6.25; 1 MG/5ML; MG/5ML
5 SYRUP ORAL
Qty: 120 ML | Refills: 0 | Status: SHIPPED | OUTPATIENT
Start: 2018-03-30 | End: 2018-03-30 | Stop reason: SDUPTHER

## 2018-04-20 DIAGNOSIS — E78.2 MIXED HYPERLIPIDEMIA: Primary | ICD-10-CM

## 2018-04-20 RX ORDER — ATORVASTATIN CALCIUM 40 MG/1
TABLET, FILM COATED ORAL
Qty: 90 TABLET | Refills: 0 | Status: SHIPPED | OUTPATIENT
Start: 2018-04-20 | End: 2018-07-19 | Stop reason: SDUPTHER

## 2018-05-10 ENCOUNTER — OFFICE VISIT (OUTPATIENT)
Dept: FAMILY MEDICINE CLINIC | Facility: CLINIC | Age: 58
End: 2018-05-10
Payer: COMMERCIAL

## 2018-05-10 VITALS
WEIGHT: 163.4 LBS | HEART RATE: 78 BPM | BODY MASS INDEX: 28.05 KG/M2 | RESPIRATION RATE: 16 BRPM | DIASTOLIC BLOOD PRESSURE: 82 MMHG | SYSTOLIC BLOOD PRESSURE: 164 MMHG | OXYGEN SATURATION: 97 % | TEMPERATURE: 97.3 F

## 2018-05-10 DIAGNOSIS — J30.9 ALLERGIC RHINITIS, UNSPECIFIED SEASONALITY, UNSPECIFIED TRIGGER: Primary | ICD-10-CM

## 2018-05-10 DIAGNOSIS — J06.9 UPPER RESPIRATORY TRACT INFECTION, UNSPECIFIED TYPE: ICD-10-CM

## 2018-05-10 DIAGNOSIS — I10 BENIGN ESSENTIAL HYPERTENSION: ICD-10-CM

## 2018-05-10 DIAGNOSIS — IMO0001 UNCONTROLLED TYPE 2 DIABETES MELLITUS WITHOUT COMPLICATION, WITHOUT LONG-TERM CURRENT USE OF INSULIN: ICD-10-CM

## 2018-05-10 DIAGNOSIS — L30.9 DERMATITIS: ICD-10-CM

## 2018-05-10 PROCEDURE — 99214 OFFICE O/P EST MOD 30 MIN: CPT | Performed by: PHYSICIAN ASSISTANT

## 2018-05-10 RX ORDER — PREDNISONE 20 MG/1
TABLET ORAL
Qty: 18 TABLET | Refills: 0 | Status: SHIPPED | OUTPATIENT
Start: 2018-05-10 | End: 2018-07-25

## 2018-05-10 RX ORDER — CEFUROXIME AXETIL 500 MG/1
500 TABLET ORAL EVERY 12 HOURS SCHEDULED
Qty: 20 TABLET | Refills: 0 | Status: SHIPPED | OUTPATIENT
Start: 2018-05-10 | End: 2018-05-20

## 2018-05-10 RX ORDER — MONTELUKAST SODIUM 10 MG/1
10 TABLET ORAL
Qty: 30 TABLET | Refills: 3 | Status: SHIPPED | OUTPATIENT
Start: 2018-05-10 | End: 2018-09-11 | Stop reason: SDUPTHER

## 2018-05-10 NOTE — PROGRESS NOTES
Assessment/Plan:      Diagnoses and all orders for this visit:    Allergic rhinitis, unspecified seasonality, unspecified trigger  -     cefuroxime (CEFTIN) 500 mg tablet; Take 1 tablet (500 mg total) by mouth every 12 (twelve) hours for 10 days  -     predniSONE 20 mg tablet; Take 3 tabs PO daily x 3 days, then 2 tabs PO daily x 3 days, then 1 tab PO daily x 3 days  -     montelukast (SINGULAIR) 10 mg tablet; Take 1 tablet (10 mg total) by mouth daily at bedtime For allergies    Dermatitis  -     cefuroxime (CEFTIN) 500 mg tablet; Take 1 tablet (500 mg total) by mouth every 12 (twelve) hours for 10 days  -     predniSONE 20 mg tablet; Take 3 tabs PO daily x 3 days, then 2 tabs PO daily x 3 days, then 1 tab PO daily x 3 days    Upper respiratory tract infection, unspecified type  -     cefuroxime (CEFTIN) 500 mg tablet; Take 1 tablet (500 mg total) by mouth every 12 (twelve) hours for 10 days  -     predniSONE 20 mg tablet; Take 3 tabs PO daily x 3 days, then 2 tabs PO daily x 3 days, then 1 tab PO daily x 3 days    Uncontrolled type 2 diabetes mellitus without complication, without long-term current use of insulin (HCC)    Benign essential hypertension       61-year-old female here today for upper respiratory symptoms consistent with a URI /allergic rhinitis  I am uncertain if this is of bacterial cause though she does appear to have excoriations on her face consistent with a nonspecific dermatitis versus folliculitis  She states that some of them were pus filled  She states that started after cutting the grass and sweating and she was scratching at them  She will complete a course of Ceftin b I d  Times 10 days  I have recommended she try Delsym OTC for cough  Unfortunately she is already taking Zyrtec and reports feeling excessively dry so I would need patient to stop this    However her blood pressure is elevated today so I would not recommend any other OTC cold/decongestant medications aside from [place]  I do believe from all aspects from her dermatitis to patient's chest tightness and respiratory symptoms to her allergies being uncontrolled she would benefit from a course of prednisone  I did explain to patient that prednisone can increase her heart rate, blood pressure and her sugars and unfortunately her recent A1c was mildly elevated  At 7  I have advised that she check her blood sugars every day fasting in the a m  And postprandial and should they be consistently greater than 200 she needs to stop prednisone and call the office  She was advised to stay hydrated drinking plenty of water  In place of Zyrtec since it seems to be drying her out excessively I will put her on Singulair  She will already be due for her 4 month follow-up for chronic conditions in 2 months so I have advised that she schedule this before leaving the office today, as she will need to have follow-up for diabetes and hypertension recheck especially  She will call sooner with any concerns  Chief Complaint   Patient presents with    Sore Throat    Earache    Generalized Body Aches       Subjective:     Patient ID: Kaila Plasencia is a 56 y o  female     58y/o female here today for URI sxs past several days, worsening  She reports ear pain, sore throat, chest tightness and hurts to breath in, voice raspy  Was cutting grass and has noticed it started after  Deep cough  She does have some sores in nose  Slight nasal congestion, clear  No fevers  She has tried zyrtec, nasal saline, tylenol  Review of Systems   Constitutional: Positive for fatigue  Negative for fever  HENT: Positive for congestion, ear pain, rhinorrhea, sneezing and sore throat  Respiratory: Positive for cough and chest tightness  Cardiovascular: Negative  Psychiatric/Behavioral: Negative            The following portions of the patient's history were reviewed and updated as appropriate: allergies, current medications, past family history, past medical history, past social history, past surgical history and problem list       Objective:     Physical Exam   Constitutional: She is oriented to person, place, and time  She appears well-developed  She appears ill  No distress  Appearing fatigued   HENT:   Right Ear: Tympanic membrane normal    Left Ear: Tympanic membrane normal    Nose: Mucosal edema and rhinorrhea present  Mouth/Throat: Posterior oropharyngeal erythema present  No oropharyngeal exudate or posterior oropharyngeal edema  Neck: Neck supple  No LAD   Cardiovascular: Normal rate, regular rhythm and normal heart sounds  Pulmonary/Chest: She has decreased breath sounds (moderate, coarse)  Rasp deep dry cough   Neurological: She is alert and oriented to person, place, and time  Skin:   Several scabbed lesions on her face as well as within creases of her external ear on the right, nonspecific,  Possible dermatitis   Psychiatric: She has a normal mood and affect  Vitals reviewed        Vitals:    05/10/18 1350 05/10/18 1425   BP: 150/80 164/82   BP Location: Left arm    Patient Position: Sitting    Cuff Size: Adult    Pulse: 78    Resp: 16    Temp: (!) 97 3 °F (36 3 °C)    TempSrc: Tympanic    SpO2: 97%    Weight: 74 1 kg (163 lb 6 4 oz)

## 2018-05-12 DIAGNOSIS — I10 ESSENTIAL HYPERTENSION: ICD-10-CM

## 2018-05-12 RX ORDER — LISINOPRIL 5 MG/1
TABLET ORAL
Qty: 45 TABLET | Refills: 0 | Status: SHIPPED | OUTPATIENT
Start: 2018-05-12 | End: 2018-08-10 | Stop reason: SDUPTHER

## 2018-05-29 DIAGNOSIS — IMO0001 UNCONTROLLED TYPE 2 DIABETES MELLITUS WITHOUT COMPLICATION, WITHOUT LONG-TERM CURRENT USE OF INSULIN: ICD-10-CM

## 2018-06-04 ENCOUNTER — TELEPHONE (OUTPATIENT)
Dept: FAMILY MEDICINE CLINIC | Facility: CLINIC | Age: 58
End: 2018-06-04

## 2018-06-20 DIAGNOSIS — Z12.39 SCREENING FOR BREAST CANCER: Primary | ICD-10-CM

## 2018-06-21 DIAGNOSIS — IMO0001 UNCONTROLLED TYPE 2 DIABETES MELLITUS WITHOUT COMPLICATION, WITHOUT LONG-TERM CURRENT USE OF INSULIN: ICD-10-CM

## 2018-06-22 RX ORDER — METFORMIN HYDROCHLORIDE 500 MG/1
TABLET, EXTENDED RELEASE ORAL
Qty: 60 TABLET | Refills: 2 | Status: SHIPPED | OUTPATIENT
Start: 2018-06-22 | End: 2018-10-02 | Stop reason: SDUPTHER

## 2018-07-19 DIAGNOSIS — E78.2 MIXED HYPERLIPIDEMIA: ICD-10-CM

## 2018-07-19 RX ORDER — ATORVASTATIN CALCIUM 40 MG/1
TABLET, FILM COATED ORAL
Qty: 90 TABLET | Refills: 1 | Status: SHIPPED | OUTPATIENT
Start: 2018-07-19 | End: 2019-01-15 | Stop reason: SDUPTHER

## 2018-07-25 ENCOUNTER — OFFICE VISIT (OUTPATIENT)
Dept: FAMILY MEDICINE CLINIC | Facility: CLINIC | Age: 58
End: 2018-07-25
Payer: COMMERCIAL

## 2018-07-25 VITALS
RESPIRATION RATE: 16 BRPM | TEMPERATURE: 97.4 F | OXYGEN SATURATION: 97 % | WEIGHT: 153.3 LBS | HEART RATE: 85 BPM | DIASTOLIC BLOOD PRESSURE: 82 MMHG | HEIGHT: 65 IN | SYSTOLIC BLOOD PRESSURE: 118 MMHG | BODY MASS INDEX: 25.54 KG/M2

## 2018-07-25 DIAGNOSIS — I10 BENIGN ESSENTIAL HYPERTENSION: ICD-10-CM

## 2018-07-25 DIAGNOSIS — F41.9 ANXIETY DISORDER, UNSPECIFIED TYPE: ICD-10-CM

## 2018-07-25 DIAGNOSIS — IMO0001 UNCONTROLLED TYPE 2 DIABETES MELLITUS WITHOUT COMPLICATION, WITHOUT LONG-TERM CURRENT USE OF INSULIN: ICD-10-CM

## 2018-07-25 DIAGNOSIS — K21.9 GERD WITHOUT ESOPHAGITIS: ICD-10-CM

## 2018-07-25 DIAGNOSIS — L03.211 FACIAL CELLULITIS: Primary | ICD-10-CM

## 2018-07-25 DIAGNOSIS — E78.2 MIXED HYPERLIPIDEMIA: ICD-10-CM

## 2018-07-25 PROCEDURE — 3079F DIAST BP 80-89 MM HG: CPT | Performed by: FAMILY MEDICINE

## 2018-07-25 PROCEDURE — 3074F SYST BP LT 130 MM HG: CPT | Performed by: FAMILY MEDICINE

## 2018-07-25 PROCEDURE — 99214 OFFICE O/P EST MOD 30 MIN: CPT | Performed by: FAMILY MEDICINE

## 2018-07-25 PROCEDURE — 92250 FUNDUS PHOTOGRAPHY W/I&R: CPT | Performed by: FAMILY MEDICINE

## 2018-07-25 RX ORDER — CEPHALEXIN 500 MG/1
500 CAPSULE ORAL EVERY 12 HOURS SCHEDULED
Qty: 14 CAPSULE | Refills: 0 | Status: SHIPPED | OUTPATIENT
Start: 2018-07-25 | End: 2018-08-01

## 2018-07-25 NOTE — PROGRESS NOTES
Assessment/Plan:   1  Facial cellulitis  Symptoms today appear likely secondary to possible cellulitis  At this time, she is noticing mild discharge  She was instructed on proper wound care  Will start treatment empirically with Keflex 500 mg b i d  for 7 days  - cephalexin (KEFLEX) 500 mg capsule; Take 1 capsule (500 mg total) by mouth every 12 (twelve) hours for 7 days  Dispense: 14 capsule; Refill: 0    2  Uncontrolled type 2 diabetes mellitus without complication, without long-term current use of insulin (HCC)  Unclear of control  She will have her fasting blood work next month  Patient states that her blood sugars have been normal   Will check fasting blood work next week  Continue with metformin, Januvia  High exam was completed today  Follow-up in 6 months   - ONETOUCH DELICA LANCETS FINE MISC; by Does not apply route daily  Dispense: 100 each; Refill: 1  - glucose blood (ONE TOUCH ULTRA TEST) test strip; 1 each by Other route 2 (two) times a day  Dispense: 100 each; Refill: 0  - POCT diabetic eye exam    3  Benign essential hypertension  Blood pressure appears stable today  Continue with current treatment of metoprolol, lisinopril  Encouraged check her blood pressure regularly at home  4  Mixed hyperlipidemia  Will continue with a strict low-fat/low-cholesterol diet dot as well as current treatment with Lipitor  Recheck fasting lipid panel next month  5  Anxiety disorder, unspecified type  Patient's exactly has been stable  Will continue with current treatment of alprazolam as well as her dot Lexapro  6  GERD without esophagitis  Dot the patient's symptoms appear well controlled  Will continue with dot dietary trigger avoidance as well as treatment with Pepcid    Follow-up in 6 months     Diagnoses and all orders for this visit:    Uncontrolled type 2 diabetes mellitus without complication, without long-term current use of insulin (HCC)  -     glucose blood (ONE TOUCH ULTRA TEST) test strip; 1 each by Other route 2 (two) times a day  -     [address]; by Does not apply route daily          Subjective:    Chief Complaint   Patient presents with    Follow-up     Patient presents for a Diabetic Check  Patient ID: Jose E Winston is a 56 y o  female  Patient is a 61-year-old female presents today for follow-up on chronic conditions  She has type 2 diabetes, hypertension, dyslipidemia, anxiety disorder as well as current  She has been taking his medications regularly and tolerating them very well  She has been noticing increasing pustular rash over her face  She has not been applying anything to this area        Review of Systems   Constitutional: Negative for activity change, chills, fatigue and fever  HENT: Negative for congestion, ear pain, sinus pressure and sore throat  Eyes: Negative for redness, itching and visual disturbance  Respiratory: Negative for cough and shortness of breath  Cardiovascular: Negative for chest pain and palpitations  Gastrointestinal: Negative for abdominal pain, diarrhea and nausea  Endocrine: Negative for cold intolerance and heat intolerance  Genitourinary: Negative for dysuria, flank pain and frequency  Musculoskeletal: Negative for arthralgias, back pain, gait problem and myalgias  Skin: Negative for color change  Allergic/Immunologic: Negative for environmental allergies  Neurological: Negative for dizziness, numbness and headaches  Psychiatric/Behavioral: Negative for behavioral problems and sleep disturbance  The following portions of the patient's history were reviewed and updated as appropriate : past family history, past medical history, past social history and past surgical history        Current Outpatient Prescriptions:     ALPRAZolam (XANAX) 0 25 mg tablet, , Disp: , Rfl:     aspirin (ECOTRIN LOW STRENGTH) 81 mg EC tablet, Take 1 tablet by mouth daily, Disp: , Rfl:     atorvastatin (LIPITOR) 40 mg tablet, TAKE 1 TABLET DAILY, Disp: 90 tablet, Rfl: 1    Blood Glucose Monitoring Suppl (ONE TOUCH ULTRA MINI) w/Device KIT, by Does not apply route, Disp: , Rfl:     Cetirizine HCl (ZYRTEC ALLERGY) 10 MG CAPS, Take by mouth, Disp: , Rfl:     cholecalciferol (VITAMIN D3) 1,000 units tablet, Take by mouth, Disp: , Rfl:     escitalopram (LEXAPRO) 10 mg tablet, Take 10 mg by mouth, Disp: , Rfl:     famotidine (PEPCID) 20 mg tablet, Take 1 tablet by mouth daily, Disp: , Rfl:     glucose blood (ONE TOUCH ULTRA TEST) test strip, 1 each by Other route 2 (two) times a day, Disp: 100 each, Rfl: 3    ibuprofen (MOTRIN) 800 mg tablet, Take 1 tablet (800 mg total) by mouth every 12 (twelve) hours as needed for mild pain, Disp: 30 tablet, Rfl: 0    lisdexamfetamine (VYVANSE) 70 MG capsule, Take 60 mg by mouth, Disp: , Rfl:     lisinopril (ZESTRIL) 5 mg tablet, TAKE ONE-HALF (1/2) TABLET DAILY, Disp: 45 tablet, Rfl: 0    metFORMIN (GLUCOPHAGE-XR) 500 mg 24 hr tablet, TAKE 1 TABLET TWICE A DAY, Disp: 60 tablet, Rfl: 2    metoprolol succinate (TOPROL-XL) 25 mg 24 hr tablet, Take 1 tablet by mouth daily, Disp: , Rfl:     montelukast (SINGULAIR) 10 mg tablet, Take 1 tablet (10 mg total) by mouth daily at bedtime For allergies, Disp: 30 tablet, Rfl: 3    phentermine 30 MG capsule, Take 30 mg by mouth daily, Disp: , Rfl: 1    predniSONE 20 mg tablet, Take 3 tabs PO daily x 3 days, then 2 tabs PO daily x 3 days, then 1 tab PO daily x 3 days, Disp: 18 tablet, Rfl: 0    Probiotic Product (PROBIOTIC-10) CAPS, Take by mouth, Disp: , Rfl:     promethazine-codeine (PHENERGAN WITH CODEINE) 6 25-10 mg/5 mL syrup, Take 5 mL by mouth daily at bedtime as needed for cough, Disp: 120 mL, Rfl: 0    sitaGLIPtin (JANUVIA) 100 mg tablet, Take 1 tablet (100 mg total) by mouth daily, Disp: 90 tablet, Rfl: 1    Objective:    Vitals:    07/25/18 1345   BP: 118/82   BP Location: Left arm   Patient Position: Sitting   Cuff Size: Standard   Pulse: 85   Resp: 16   Temp: (!) 97 4 °F (36 3 °C)   TempSrc: Tympanic   SpO2: 97%   Weight: 69 5 kg (153 lb 4 8 oz)   Height: 5' 4 96" (1 65 m)        Physical Exam   Constitutional: She is oriented to person, place, and time  She appears well-developed and well-nourished  HENT:   Head: Normocephalic and atraumatic  Nose: Nose normal    Mouth/Throat: No oropharyngeal exudate  Eyes: Pupils are equal, round, and reactive to light  Right eye exhibits no discharge  Left eye exhibits no discharge  Neck: Normal range of motion  Neck supple  No tracheal deviation present  Cardiovascular: Normal rate, regular rhythm and intact distal pulses  Exam reveals no gallop and no friction rub  No murmur heard  Pulses:       Dorsalis pedis pulses are 2+ on the right side, and 2+ on the left side  Posterior tibial pulses are 2+ on the right side, and 2+ on the left side  Pulmonary/Chest: Effort normal and breath sounds normal  No respiratory distress  She has no wheezes  She has no rales  Abdominal: Soft  Bowel sounds are normal  She exhibits no distension  There is no tenderness  There is no rebound and no guarding  Musculoskeletal: Normal range of motion  She exhibits no edema  Lymphadenopathy:        Head (right side): No submental and no submandibular adenopathy present  Head (left side): No submental and no submandibular adenopathy present  She has no cervical adenopathy  Right cervical: No superficial cervical, no deep cervical and no posterior cervical adenopathy present  Left cervical: No superficial cervical, no deep cervical and no posterior cervical adenopathy present  Neurological: She is alert and oriented to person, place, and time  No cranial nerve deficit or sensory deficit  Skin: Skin is warm, dry and intact  Rash noted  Rash is pustular          Psychiatric: Her speech is normal and behavior is normal  Judgment normal  Her mood appears not anxious  Cognition and memory are normal  She does not exhibit a depressed mood  Vitals reviewed

## 2018-08-10 ENCOUNTER — TELEPHONE (OUTPATIENT)
Dept: FAMILY MEDICINE CLINIC | Facility: CLINIC | Age: 58
End: 2018-08-10

## 2018-08-10 DIAGNOSIS — I10 ESSENTIAL HYPERTENSION: ICD-10-CM

## 2018-08-10 LAB
LEFT EYE DIABETIC RETINOPATHY: NORMAL
LEFT EYE IMAGE QUALITY: NORMAL
RIGHT EYE DIABETIC RETINOPATHY: NORMAL
RIGHT EYE IMAGE QUALITY: NORMAL
SEVERITY (EYE EXAM): NORMAL

## 2018-08-10 PROCEDURE — 3072F LOW RISK FOR RETINOPATHY: CPT | Performed by: FAMILY MEDICINE

## 2018-08-10 PROCEDURE — 4010F ACE/ARB THERAPY RXD/TAKEN: CPT | Performed by: FAMILY MEDICINE

## 2018-08-10 RX ORDER — LISINOPRIL 5 MG/1
TABLET ORAL
Qty: 45 TABLET | Refills: 0 | Status: SHIPPED | OUTPATIENT
Start: 2018-08-10 | End: 2018-11-08 | Stop reason: SDUPTHER

## 2018-08-10 NOTE — TELEPHONE ENCOUNTER
Amarilys Lopez called the office she stated she received an e-mail  from her psychiatrist office A[facility]  She was scheduled to see him on 8/17/2018 and the e-mail said if need medication refills you need to see your pcp  I informed pt that she should bring her actual medication bottles to her appointment with you on 8/16/2018  I did inform her that you would want records  She said she is going to try her best to get them to you   Any question and or concerns please call pt at [phone]

## 2018-08-16 ENCOUNTER — TELEPHONE (OUTPATIENT)
Dept: FAMILY MEDICINE CLINIC | Facility: CLINIC | Age: 58
End: 2018-08-16

## 2018-08-16 ENCOUNTER — OFFICE VISIT (OUTPATIENT)
Dept: FAMILY MEDICINE CLINIC | Facility: CLINIC | Age: 58
End: 2018-08-16
Payer: COMMERCIAL

## 2018-08-16 VITALS
HEIGHT: 64 IN | WEIGHT: 151.6 LBS | HEART RATE: 69 BPM | OXYGEN SATURATION: 98 % | DIASTOLIC BLOOD PRESSURE: 68 MMHG | RESPIRATION RATE: 15 BRPM | SYSTOLIC BLOOD PRESSURE: 128 MMHG | TEMPERATURE: 96.1 F | BODY MASS INDEX: 25.88 KG/M2

## 2018-08-16 DIAGNOSIS — F43.10 PTSD (POST-TRAUMATIC STRESS DISORDER): ICD-10-CM

## 2018-08-16 DIAGNOSIS — F90.1 ATTENTION DEFICIT HYPERACTIVITY DISORDER (ADHD), PREDOMINANTLY HYPERACTIVE TYPE: Primary | ICD-10-CM

## 2018-08-16 PROCEDURE — 99214 OFFICE O/P EST MOD 30 MIN: CPT | Performed by: FAMILY MEDICINE

## 2018-08-16 NOTE — PROGRESS NOTES
Assessment/Plan:   1  Attention deficit hyperactivity disorder (ADHD), predominantly hyperactive type/ PTSD (post-traumatic stress disorder)  Patient appears clinically stable today  She has been seeing her psychiatrist regularly however states that her psychiatrist recently had his license removed  At this time, she states that she has been feeling much better  She has been on high doses of Vyvanse in the past   She is interested in weaning down on this medication  She was previously taking Vyvanse 70 mg b i d     Will decrease this dose to 60 mg b i d  and will continue to decrease monthly until she is stable  Her mood also has been much improved  She only takes her alprazolam p r n  for symptom relief  Lexapro as well  Will continue with his current treatment  Referral given for Psychiatry  Follow up in 1-2 months   - lisdexamfetamine (VYVANSE) 60 MG capsule; Take 1 capsule (60 mg total) by mouth 2 (two) times a day Max Daily Amount: 120 mg  Dispense: 60 capsule; Refill: 0  - Ambulatory referral to Psychiatry; Future         There are no diagnoses linked to this encounter  Subjective:    Chief Complaint   Patient presents with    Follow-up        Patient ID: Shante Navas is a 56 y o  female  Patient is a 82-year-old female presents today  She states that she recently received a letter from her psychiatrist stating that he will no longer be seeing her as a patient  Patient was seeing her psychiatrist for ADHD, as well as PTSD  She has been on treatment with Vyvanse, alprazolam as well as her Lexapro  She has been tolerating all very well  She was on very high doses of Vyvanse follow-up by her psychiatrist and states that she would like to dot weaning down off this medication  Review of Systems   Constitutional: Negative for activity change, chills, fatigue and fever  HENT: Negative for congestion, ear pain, sinus pressure and sore throat      Eyes: Negative for redness, itching and visual disturbance  Respiratory: Negative for cough and shortness of breath  Cardiovascular: Negative for chest pain and palpitations  Gastrointestinal: Negative for abdominal pain, diarrhea and nausea  Endocrine: Negative for cold intolerance and heat intolerance  Genitourinary: Negative for dysuria, flank pain and frequency  Musculoskeletal: Negative for arthralgias, back pain, gait problem and myalgias  Skin: Negative for color change  Allergic/Immunologic: Negative for environmental allergies  Neurological: Negative for dizziness, numbness and headaches  Psychiatric/Behavioral: Negative for behavioral problems and sleep disturbance  The following portions of the patient's history were reviewed and updated as appropriate : past family history, past medical history, past social history and past surgical history        Current Outpatient Prescriptions:     ALPRAZolam (XANAX) 0 25 mg tablet, , Disp: , Rfl:     aspirin (ECOTRIN LOW STRENGTH) 81 mg EC tablet, Take 1 tablet by mouth daily, Disp: , Rfl:     atorvastatin (LIPITOR) 40 mg tablet, TAKE 1 TABLET DAILY, Disp: 90 tablet, Rfl: 1    Blood Glucose Monitoring Suppl (ONE TOUCH ULTRA MINI) w/Device KIT, by Does not apply route, Disp: , Rfl:     cholecalciferol (VITAMIN D3) 1,000 units tablet, Take by mouth, Disp: , Rfl:     escitalopram (LEXAPRO) 10 mg tablet, Take 10 mg by mouth, Disp: , Rfl:     famotidine (PEPCID) 20 mg tablet, Take 1 tablet by mouth daily, Disp: , Rfl:     glucose blood (ONE TOUCH ULTRA TEST) test strip, 1 each by Other route 2 (two) times a day, Disp: 100 each, Rfl: 0    ibuprofen (MOTRIN) 800 mg tablet, Take 1 tablet (800 mg total) by mouth every 12 (twelve) hours as needed for mild pain, Disp: 30 tablet, Rfl: 0    lisdexamfetamine (VYVANSE) 70 MG capsule, Take 60 mg by mouth, Disp: , Rfl:     lisinopril (ZESTRIL) 5 mg tablet, TAKE ONE-HALF (1/2) TABLET DAILY, Disp: 45 tablet, Rfl: 0    metFORMIN (GLUCOPHAGE-XR) 500 mg 24 hr tablet, TAKE 1 TABLET TWICE A DAY, Disp: 60 tablet, Rfl: 2    metoprolol succinate (TOPROL-XL) 25 mg 24 hr tablet, Take 1 tablet by mouth daily, Disp: , Rfl:     montelukast (SINGULAIR) 10 mg tablet, Take 1 tablet (10 mg total) by mouth daily at bedtime For allergies, Disp: 30 tablet, Rfl: 3    ONETOUCH DELICA LANCETS FINE MISC, by Does not apply route daily, Disp: 100 each, Rfl: 1    sitaGLIPtin (JANUVIA) 100 mg tablet, Take 1 tablet (100 mg total) by mouth daily, Disp: 90 tablet, Rfl: 1    Cetirizine HCl (ZYRTEC ALLERGY) 10 MG CAPS, Take by mouth, Disp: , Rfl:     phentermine 30 MG capsule, Take 30 mg by mouth daily, Disp: , Rfl: 1    Probiotic Product (PROBIOTIC-10) CAPS, Take by mouth, Disp: , Rfl:     promethazine-codeine (PHENERGAN WITH CODEINE) 6 25-10 mg/5 mL syrup, Take 5 mL by mouth daily at bedtime as needed for cough (Patient not taking: Reported on 8/16/2018 ), Disp: 120 mL, Rfl: 0    Objective:    Vitals:    08/16/18 1125   BP: 128/68   BP Location: Left arm   Patient Position: Sitting   Cuff Size: Adult   Pulse: 69   Resp: 15   Temp: (!) 96 1 °F (35 6 °C)   TempSrc: Tympanic   SpO2: 98%   Weight: 68 8 kg (151 lb 9 6 oz)   Height: 5' 4" (1 626 m)        Physical Exam   Constitutional: Vital signs are normal  She appears well-developed and well-nourished  She is cooperative  She does not appear ill  No distress  She is sedated  HENT:   Head: Normocephalic and atraumatic  Right Ear: Hearing and external ear normal    Left Ear: Hearing and external ear normal    Nose: Nose normal  No nasal deformity or septal deviation  Mouth/Throat: Oropharynx is clear and moist and mucous membranes are normal    Eyes: EOM and lids are normal  Pupils are equal, round, and reactive to light  Neck: Trachea normal and normal range of motion  Neck supple  No edema and no erythema present  No thyromegaly present  Cardiovascular: Normal rate      Pulmonary/Chest: Effort normal  No respiratory distress  Abdominal: Normal appearance  There is no guarding  Musculoskeletal: Normal range of motion  Right shoulder: She exhibits no pain  Neurological: She is alert  She has normal strength  No cranial nerve deficit or sensory deficit  Skin: Skin is warm and dry  Psychiatric: She has a normal mood and affect   Her speech is normal and behavior is normal  Judgment and thought content normal  Cognition and memory are normal

## 2018-08-16 NOTE — TELEPHONE ENCOUNTER
Patient was seen in our office for an appointment with Jairon Gonsales on 8/16/2018  Pt's psych provider's office is currently closed  I did call I was not bale to get a fax number  I have pt's records release for the [facility] in my bin until we can get a fax number

## 2018-08-20 ENCOUNTER — OFFICE VISIT (OUTPATIENT)
Dept: FAMILY MEDICINE CLINIC | Facility: CLINIC | Age: 58
End: 2018-08-20
Payer: COMMERCIAL

## 2018-08-20 VITALS
WEIGHT: 144.5 LBS | BODY MASS INDEX: 24.67 KG/M2 | SYSTOLIC BLOOD PRESSURE: 110 MMHG | HEIGHT: 64 IN | DIASTOLIC BLOOD PRESSURE: 70 MMHG | OXYGEN SATURATION: 97 % | TEMPERATURE: 98 F | HEART RATE: 101 BPM | RESPIRATION RATE: 16 BRPM

## 2018-08-20 DIAGNOSIS — L25.9 CONTACT DERMATITIS, UNSPECIFIED CONTACT DERMATITIS TYPE, UNSPECIFIED TRIGGER: Primary | ICD-10-CM

## 2018-08-20 PROCEDURE — 96372 THER/PROPH/DIAG INJ SC/IM: CPT | Performed by: FAMILY MEDICINE

## 2018-08-20 PROCEDURE — 99214 OFFICE O/P EST MOD 30 MIN: CPT | Performed by: FAMILY MEDICINE

## 2018-08-20 RX ORDER — DEXAMETHASONE SODIUM PHOSPHATE 4 MG/ML
6 INJECTION, SOLUTION INTRA-ARTICULAR; INTRALESIONAL; INTRAMUSCULAR; INTRAVENOUS; SOFT TISSUE ONCE
Status: COMPLETED | OUTPATIENT
Start: 2018-08-20 | End: 2018-08-20

## 2018-08-20 RX ORDER — PREDNISONE 10 MG/1
TABLET ORAL
Qty: 33 TABLET | Refills: 0 | Status: SHIPPED | OUTPATIENT
Start: 2018-08-20 | End: 2018-10-05

## 2018-08-20 RX ADMIN — DEXAMETHASONE SODIUM PHOSPHATE 6 MG: 4 INJECTION, SOLUTION INTRA-ARTICULAR; INTRALESIONAL; INTRAMUSCULAR; INTRAVENOUS; SOFT TISSUE at 14:55

## 2018-08-20 NOTE — PROGRESS NOTES
Assessment/Plan:   1  Contact dermatitis, unspecified contact dermatitis type, unspecified trigger  Patient's symptoms appear likely secondary to contact dermatitis  She was given IM Decadron today  Start treatment with prednisone taper  She may continue with her cold compresses as well as common lotion  Follow up if any symptoms are persisting   - dexamethasone (DECADRON) injection 6 mg; Inject 1 5 mL (6 mg total) into a muscle once   - predniSONE 10 mg tablet; Take 60mg daily for 3 days, On day day 4 decrease dose by 1 tablet until completed  Dispense: 33 tablet; Refill: 0     There are no diagnoses linked to this encounter  Subjective:    Chief Complaint   Patient presents with    Follow-up     poison ivy/tired/diarrhea/ x 3 days        Patient ID: Diana Frias is a 56 y o  female  Patient is a 29-year-old female presents today with CC of diffuse rash erythematous over her body  She states that she was cutting her lawn over the weekend  She was pulling weeds as well  She has been developing increasing pruritus  Denies any drainage  She has noticed this over her face, arms and legs bilaterally as well as her trunk dot she has been applying calamine lotion with minimal relief  Review of Systems   Constitutional: Positive for fatigue  Negative for activity change, chills and fever  HENT: Negative for congestion, ear pain, sinus pressure and sore throat  Eyes: Negative for redness, itching and visual disturbance  Respiratory: Negative for cough and shortness of breath  Cardiovascular: Negative for chest pain and palpitations  Gastrointestinal: Negative for abdominal pain, diarrhea and nausea  Endocrine: Negative for cold intolerance and heat intolerance  Genitourinary: Negative for dysuria, flank pain and frequency  Musculoskeletal: Negative for arthralgias, back pain, gait problem and myalgias  Skin: Positive for rash  Negative for color change     Allergic/Immunologic: Negative for environmental allergies  Neurological: Negative for dizziness, numbness and headaches  Psychiatric/Behavioral: Negative for behavioral problems and sleep disturbance  The following portions of the patient's history were reviewed and updated as appropriate : past family history, past medical history, past social history and past surgical history        Current Outpatient Prescriptions:     ALPRAZolam (XANAX) 0 25 mg tablet, , Disp: , Rfl:     aspirin (ECOTRIN LOW STRENGTH) 81 mg EC tablet, Take 1 tablet by mouth daily, Disp: , Rfl:     atorvastatin (LIPITOR) 40 mg tablet, TAKE 1 TABLET DAILY, Disp: 90 tablet, Rfl: 1    Blood Glucose Monitoring Suppl (ONE TOUCH ULTRA MINI) w/Device KIT, by Does not apply route, Disp: , Rfl:     Cetirizine HCl (ZYRTEC ALLERGY) 10 MG CAPS, Take by mouth, Disp: , Rfl:     cholecalciferol (VITAMIN D3) 1,000 units tablet, Take by mouth, Disp: , Rfl:     escitalopram (LEXAPRO) 10 mg tablet, Take 10 mg by mouth, Disp: , Rfl:     famotidine (PEPCID) 20 mg tablet, Take 1 tablet by mouth daily, Disp: , Rfl:     glucose blood (ONE TOUCH ULTRA TEST) test strip, 1 each by Other route 2 (two) times a day, Disp: 100 each, Rfl: 0    ibuprofen (MOTRIN) 800 mg tablet, Take 1 tablet (800 mg total) by mouth every 12 (twelve) hours as needed for mild pain, Disp: 30 tablet, Rfl: 0    lisdexamfetamine (VYVANSE) 60 MG capsule, Take 1 capsule (60 mg total) by mouth 2 (two) times a day Max Daily Amount: 120 mg, Disp: 60 capsule, Rfl: 0    lisinopril (ZESTRIL) 5 mg tablet, TAKE ONE-HALF (1/2) TABLET DAILY, Disp: 45 tablet, Rfl: 0    metFORMIN (GLUCOPHAGE-XR) 500 mg 24 hr tablet, TAKE 1 TABLET TWICE A DAY, Disp: 60 tablet, Rfl: 2    metoprolol succinate (TOPROL-XL) 25 mg 24 hr tablet, Take 1 tablet by mouth daily, Disp: , Rfl:     montelukast (SINGULAIR) 10 mg tablet, Take 1 tablet (10 mg total) by mouth daily at bedtime For allergies, Disp: 30 tablet, Rfl: 3    ONETOUCH LEONELA LANCRIVKA FINE MISC, by Does not apply route daily, Disp: 100 each, Rfl: 1    phentermine 30 MG capsule, Take 30 mg by mouth daily, Disp: , Rfl: 1    Probiotic Product (PROBIOTIC-10) CAPS, Take by mouth, Disp: , Rfl:     promethazine-codeine (PHENERGAN WITH CODEINE) 6 25-10 mg/5 mL syrup, Take 5 mL by mouth daily at bedtime as needed for cough (Patient not taking: Reported on 8/16/2018 ), Disp: 120 mL, Rfl: 0    sitaGLIPtin (JANUVIA) 100 mg tablet, Take 1 tablet (100 mg total) by mouth daily, Disp: 90 tablet, Rfl: 1    Objective:    Vitals:    08/20/18 1409   BP: 110/70   BP Location: Left arm   Patient Position: Sitting   Cuff Size: Adult   Pulse: 101   Resp: 16   Temp: 98 °F (36 7 °C)   TempSrc: Tympanic   SpO2: 97%   Weight: 65 5 kg (144 lb 8 oz)   Height: 5' 4 17" (1 63 m)        Physical Exam   Constitutional: Vital signs are normal  She appears well-developed and well-nourished  She is cooperative  She does not appear ill  No distress  HENT:   Head: Normocephalic and atraumatic  Right Ear: Hearing and external ear normal    Left Ear: Hearing and external ear normal    Nose: Nose normal  No nasal deformity or septal deviation  Mouth/Throat: Oropharynx is clear and moist and mucous membranes are normal    Eyes: EOM and lids are normal  Pupils are equal, round, and reactive to light  Neck: Trachea normal and normal range of motion  Neck supple  No edema and no erythema present  No thyromegaly present  Cardiovascular: Normal rate  Pulmonary/Chest: Effort normal  No respiratory distress  Abdominal: Normal appearance  There is no guarding  Musculoskeletal: Normal range of motion  Right shoulder: She exhibits no pain  Neurological: She is alert  She has normal strength  No cranial nerve deficit or sensory deficit  Skin: Skin is warm and dry  Psychiatric: She has a normal mood and affect   Her speech is normal and behavior is normal  Judgment and thought content normal  Cognition and memory are normal

## 2018-09-10 ENCOUNTER — TELEPHONE (OUTPATIENT)
Dept: FAMILY MEDICINE CLINIC | Facility: CLINIC | Age: 58
End: 2018-09-10

## 2018-09-10 DIAGNOSIS — F43.10 PTSD (POST-TRAUMATIC STRESS DISORDER): ICD-10-CM

## 2018-09-10 DIAGNOSIS — F90.1 ATTENTION DEFICIT HYPERACTIVITY DISORDER (ADHD), PREDOMINANTLY HYPERACTIVE TYPE: ICD-10-CM

## 2018-09-10 NOTE — TELEPHONE ENCOUNTER
Per PDMP, last refilled 08/16/2018 - Patient states she is leaving to go out of town due to a family emergency  She is asking if she can have a written RX to take with her out of town

## 2018-09-10 NOTE — TELEPHONE ENCOUNTER
Pt is asking if she can get a fill on her vyvanse 50mg because she is going away this weekend and won't be back for a few weeks and will need a refill in between

## 2018-09-11 DIAGNOSIS — J30.9 ALLERGIC RHINITIS, UNSPECIFIED SEASONALITY, UNSPECIFIED TRIGGER: ICD-10-CM

## 2018-09-11 RX ORDER — MONTELUKAST SODIUM 10 MG/1
10 TABLET ORAL
Qty: 30 TABLET | Refills: 5 | Status: SHIPPED | OUTPATIENT
Start: 2018-09-11 | End: 2020-03-05 | Stop reason: ALTCHOICE

## 2018-10-02 DIAGNOSIS — IMO0001 UNCONTROLLED TYPE 2 DIABETES MELLITUS WITHOUT COMPLICATION, WITHOUT LONG-TERM CURRENT USE OF INSULIN: ICD-10-CM

## 2018-10-02 RX ORDER — METFORMIN HYDROCHLORIDE 500 MG/1
TABLET, EXTENDED RELEASE ORAL
Qty: 60 TABLET | Refills: 2 | Status: SHIPPED | OUTPATIENT
Start: 2018-10-02 | End: 2019-02-05 | Stop reason: SDUPTHER

## 2018-10-05 ENCOUNTER — OFFICE VISIT (OUTPATIENT)
Dept: FAMILY MEDICINE CLINIC | Facility: CLINIC | Age: 58
End: 2018-10-05
Payer: COMMERCIAL

## 2018-10-05 VITALS
OXYGEN SATURATION: 98 % | BODY MASS INDEX: 25.8 KG/M2 | HEIGHT: 64 IN | WEIGHT: 151.1 LBS | HEART RATE: 80 BPM | DIASTOLIC BLOOD PRESSURE: 70 MMHG | RESPIRATION RATE: 16 BRPM | SYSTOLIC BLOOD PRESSURE: 138 MMHG | TEMPERATURE: 96.4 F

## 2018-10-05 DIAGNOSIS — Z23 NEED FOR INFLUENZA VACCINATION: ICD-10-CM

## 2018-10-05 DIAGNOSIS — F43.10 PTSD (POST-TRAUMATIC STRESS DISORDER): ICD-10-CM

## 2018-10-05 DIAGNOSIS — F90.1 ATTENTION DEFICIT HYPERACTIVITY DISORDER (ADHD), PREDOMINANTLY HYPERACTIVE TYPE: ICD-10-CM

## 2018-10-05 DIAGNOSIS — J30.2 SEASONAL ALLERGIES: Primary | ICD-10-CM

## 2018-10-05 PROCEDURE — 90682 RIV4 VACC RECOMBINANT DNA IM: CPT | Performed by: FAMILY MEDICINE

## 2018-10-05 PROCEDURE — 90471 IMMUNIZATION ADMIN: CPT | Performed by: FAMILY MEDICINE

## 2018-10-05 PROCEDURE — 99214 OFFICE O/P EST MOD 30 MIN: CPT | Performed by: FAMILY MEDICINE

## 2018-10-05 NOTE — PROGRESS NOTES
Assessment/Plan:   1  Seasonal allergies  Reviewed patient's symptoms with her  At this time, it appears that her symptoms are likely secondary to allergic rhinitis  There is no sign of infection today  She was advised to continue with supportive care  Maintain hydration  May take over-the-counter Mucinex as well as fluticasone nasal spray  She was advised to follow up if any symptoms are worsening  2  Attention deficit hyperactivity disorder (ADHD), predominantly hyperactive type/PTSD (post-traumatic stress disorder)  Reviewed patient's symptoms with her  At this time, she has been attempting to wean off her Vyvanse over the past 2 months  She has been able to decrease her dose to 50 mg b i d  However while at this dose, she states that she has been noticing severe nervousness as well as decreased concentration  At this time, it is unclear if her symptoms are truly secondary to ADHD versus possible severe anxiety disorder/PTSD  She states that she has been attempting to schedule a consultation with a new psychiatrist however she is looking at a very long wait time for this  Will attempt to help this patient schedule this appointment soon  - lisdexamfetamine (VYVANSE) 60 MG capsule; Take 1 capsule (60 mg total) by mouth 2 (two) times a day Max Daily Amount: 120 mg  Dispense: 60 capsule; Refill: 0    3  Need for influenza vaccination  - influenza vaccine, [id number], quadrivalent, recombinant, PF, 0 5 mL, for patients 18 yr+ (FLUBLOK)     There are no diagnoses linked to this encounter  Subjective:    Chief Complaint   Patient presents with    Follow-up     med check forgetting things   Cold Like Symptoms        Patient ID: Mckay Melvin is a 66 y o  female  Patient is a 77-year-old female presents today with CC of sinus pressure and congestion for the past days  Symptoms started gradually  She denies any fevers chills sinus pressure her cough    She has not been taking anything for symptoms  She believes that her symptoms have been slowly improving  She is also here to follow-up on her ADHD dot since her last visit, she has been able to wean herself down to Vyvanse 50 mg b i d  However at this dose, she has been noticing severe anxiety dot/decrease compress concentration  Her she would like to consider going back to the 60 mg dose for an additional month  Review of Systems   Constitutional: Negative for activity change, chills, fatigue and fever  HENT: Negative for congestion, ear pain, sinus pressure and sore throat  Eyes: Negative for redness, itching and visual disturbance  Respiratory: Negative for cough and shortness of breath  Cardiovascular: Negative for chest pain and palpitations  Gastrointestinal: Negative for abdominal pain, diarrhea and nausea  Endocrine: Negative for cold intolerance and heat intolerance  Genitourinary: Negative for dysuria, flank pain and frequency  Musculoskeletal: Negative for arthralgias, back pain, gait problem and myalgias  Skin: Negative for color change  Allergic/Immunologic: Negative for environmental allergies  Neurological: Negative for dizziness, numbness and headaches  Psychiatric/Behavioral: Positive for decreased concentration  Negative for behavioral problems and sleep disturbance  The patient is nervous/anxious  The following portions of the patient's history were reviewed and updated as appropriate : past family history, past medical history, past social history and past surgical history        Current Outpatient Prescriptions:     ALPRAZolam (XANAX) 0 25 mg tablet, , Disp: , Rfl:     aspirin (ECOTRIN LOW STRENGTH) 81 mg EC tablet, Take 1 tablet by mouth daily, Disp: , Rfl:     atorvastatin (LIPITOR) 40 mg tablet, TAKE 1 TABLET DAILY, Disp: 90 tablet, Rfl: 1    Blood Glucose Monitoring Suppl (ONE TOUCH ULTRA MINI) w/Device KIT, by Does not apply route, Disp: , Rfl:     cholecalciferol (VITAMIN D3) 1,000 units tablet, Take by mouth, Disp: , Rfl:     escitalopram (LEXAPRO) 10 mg tablet, Take 10 mg by mouth, Disp: , Rfl:     famotidine (PEPCID) 20 mg tablet, Take 1 tablet by mouth daily, Disp: , Rfl:     glucose blood (ONE TOUCH ULTRA TEST) test strip, 1 each by Other route 2 (two) times a day, Disp: 100 each, Rfl: 0    lisdexamfetamine (VYVANSE) 50 MG capsule, Take 1 capsule (50 mg total) by mouth 2 (two) times a day Max Daily Amount: 100 mg, Disp: 60 capsule, Rfl: 0    lisinopril (ZESTRIL) 5 mg tablet, TAKE ONE-HALF (1/2) TABLET DAILY, Disp: 45 tablet, Rfl: 0    metFORMIN (GLUCOPHAGE-XR) 500 mg 24 hr tablet, TAKE 1 TABLET TWICE A DAY, Disp: 60 tablet, Rfl: 2    metoprolol succinate (TOPROL-XL) 25 mg 24 hr tablet, Take 1 tablet by mouth daily, Disp: , Rfl:     montelukast (SINGULAIR) 10 mg tablet, TAKE 1 TABLET (10 MG TOTAL) BY MOUTH DAILY AT BEDTIME FOR ALLERGIES, Disp: 30 tablet, Rfl: 5    ONETOUCH DELICA LANCETS FINE MISC, by Does not apply route daily, Disp: 100 each, Rfl: 1    sitaGLIPtin (JANUVIA) 100 mg tablet, Take 1 tablet (100 mg total) by mouth daily, Disp: 90 tablet, Rfl: 1    Objective:    Vitals:    10/05/18 1154   BP: 138/70   BP Location: Left arm   Patient Position: Sitting   Cuff Size: Adult   Pulse: 80   Resp: 16   Temp: (!) 96 4 °F (35 8 °C)   TempSrc: Tympanic   SpO2: 98%   Weight: 68 5 kg (151 lb 1 6 oz)   Height: 5' 4" (1 626 m)        Physical Exam   Constitutional: She is oriented to person, place, and time  She appears well-developed and well-nourished  HENT:   Head: Normocephalic and atraumatic  Nose: Nose normal    Mouth/Throat: No oropharyngeal exudate  Eyes: Pupils are equal, round, and reactive to light  Right eye exhibits no discharge  Left eye exhibits no discharge  Neck: Normal range of motion  Neck supple  No tracheal deviation present  Cardiovascular: Normal rate, regular rhythm and intact distal pulses  Exam reveals no gallop and no friction rub      No murmur heard  Pulses:       Dorsalis pedis pulses are 2+ on the right side, and 2+ on the left side  Posterior tibial pulses are 2+ on the right side, and 2+ on the left side  Pulmonary/Chest: Effort normal and breath sounds normal  No respiratory distress  She has no wheezes  She has no rales  Abdominal: Soft  Bowel sounds are normal  She exhibits no distension  There is no tenderness  There is no rebound and no guarding  Musculoskeletal: Normal range of motion  She exhibits no edema  Lymphadenopathy:        Head (right side): No submental and no submandibular adenopathy present  Head (left side): No submental and no submandibular adenopathy present  She has no cervical adenopathy  Right cervical: No superficial cervical, no deep cervical and no posterior cervical adenopathy present  Left cervical: No superficial cervical, no deep cervical and no posterior cervical adenopathy present  Neurological: She is alert and oriented to person, place, and time  No cranial nerve deficit or sensory deficit  Skin: Skin is warm, dry and intact  Psychiatric: Her speech is normal and behavior is normal  Judgment normal  Her mood appears not anxious  Cognition and memory are normal  She does not exhibit a depressed mood  Vitals reviewed

## 2018-10-29 DIAGNOSIS — F90.1 ATTENTION DEFICIT HYPERACTIVITY DISORDER (ADHD), PREDOMINANTLY HYPERACTIVE TYPE: ICD-10-CM

## 2018-10-29 DIAGNOSIS — F43.10 PTSD (POST-TRAUMATIC STRESS DISORDER): ICD-10-CM

## 2018-10-30 RX ORDER — ALPRAZOLAM 0.25 MG/1
TABLET ORAL
Qty: 30 TABLET | Refills: 0 | Status: CANCELLED | OUTPATIENT
Start: 2018-10-30

## 2018-11-01 DIAGNOSIS — F41.9 ANXIETY DISORDER, UNSPECIFIED TYPE: Primary | ICD-10-CM

## 2018-11-02 RX ORDER — ALPRAZOLAM 0.25 MG/1
0.25 TABLET ORAL
Qty: 30 TABLET | Refills: 0 | Status: SHIPPED | OUTPATIENT
Start: 2018-11-02 | End: 2019-02-19 | Stop reason: SDUPTHER

## 2018-11-08 DIAGNOSIS — I10 ESSENTIAL HYPERTENSION: ICD-10-CM

## 2018-11-08 PROCEDURE — 4010F ACE/ARB THERAPY RXD/TAKEN: CPT | Performed by: FAMILY MEDICINE

## 2018-11-08 RX ORDER — LISINOPRIL 5 MG/1
TABLET ORAL
Qty: 45 TABLET | Refills: 0 | Status: SHIPPED | OUTPATIENT
Start: 2018-11-08 | End: 2019-02-06 | Stop reason: SDUPTHER

## 2018-11-28 DIAGNOSIS — F43.10 PTSD (POST-TRAUMATIC STRESS DISORDER): ICD-10-CM

## 2018-11-28 DIAGNOSIS — F90.1 ATTENTION DEFICIT HYPERACTIVITY DISORDER (ADHD), PREDOMINANTLY HYPERACTIVE TYPE: ICD-10-CM

## 2018-11-28 NOTE — TELEPHONE ENCOUNTER
Last appt 10/05/2018 - Next appt 01/28/19    Per PDMP - Last filled 11/01/2018 - Gumaro'd up to fill 11/30/18

## 2018-12-04 ENCOUNTER — OFFICE VISIT (OUTPATIENT)
Dept: FAMILY MEDICINE CLINIC | Facility: CLINIC | Age: 58
End: 2018-12-04
Payer: COMMERCIAL

## 2018-12-04 VITALS
TEMPERATURE: 97.8 F | BODY MASS INDEX: 25.18 KG/M2 | HEART RATE: 91 BPM | HEIGHT: 64 IN | DIASTOLIC BLOOD PRESSURE: 80 MMHG | SYSTOLIC BLOOD PRESSURE: 120 MMHG | OXYGEN SATURATION: 97 % | WEIGHT: 147.5 LBS

## 2018-12-04 DIAGNOSIS — F41.9 ANXIETY DISORDER, UNSPECIFIED TYPE: Primary | ICD-10-CM

## 2018-12-04 DIAGNOSIS — R53.82 CHRONIC FATIGUE: ICD-10-CM

## 2018-12-04 DIAGNOSIS — F90.1 ATTENTION DEFICIT HYPERACTIVITY DISORDER (ADHD), PREDOMINANTLY HYPERACTIVE TYPE: ICD-10-CM

## 2018-12-04 DIAGNOSIS — F43.10 PTSD (POST-TRAUMATIC STRESS DISORDER): ICD-10-CM

## 2018-12-04 PROCEDURE — 3008F BODY MASS INDEX DOCD: CPT | Performed by: FAMILY MEDICINE

## 2018-12-04 PROCEDURE — 99214 OFFICE O/P EST MOD 30 MIN: CPT | Performed by: FAMILY MEDICINE

## 2018-12-04 RX ORDER — ESCITALOPRAM OXALATE 10 MG/1
10 TABLET ORAL DAILY
Qty: 45 TABLET | Refills: 0 | Status: SHIPPED | OUTPATIENT
Start: 2018-12-04 | End: 2018-12-04 | Stop reason: SDUPTHER

## 2018-12-04 RX ORDER — ESCITALOPRAM OXALATE 20 MG/1
20 TABLET ORAL DAILY
Qty: 30 TABLET | Refills: 1 | Status: SHIPPED | OUTPATIENT
Start: 2018-12-04 | End: 2019-02-19 | Stop reason: SDUPTHER

## 2018-12-04 NOTE — PROGRESS NOTES
Assessment/Plan:   1  Anxiety disorder, unspecified type/PTSD (post-traumatic stress disorder)/Chronic fatigue  Reviewed patient's symptoms today  It appears that her anxiety is exacerbated by multiple home stressors  She was educated on different treatment options for this problem  At this time, she may highly benefit from seeing a psychologist   She may benefit as well from seeing a group/couples counselor  Will increase her Lexapro to 20 mg daily  She was advised to minimize any use of her alprazolam   Follow-up if any symptoms are worsening   - Comprehensive metabolic panel; Future  - Lipid Panel with Direct LDL reflex; Future  - TSH, 3rd generation with Free T4 reflex; Future  - Hemoglobin A1C W/Refl To Glycomark(R); Future  - escitalopram (LEXAPRO) 20 mg tablet; Take 1 tablet (20 mg total) by mouth daily  Dispense: 30 tablet; Refill: 1    2  Attention deficit hyperactivity disorder (ADHD), predominantly hyperactive type  Symptoms have been stable with her current dose of Vyvanse  She was advised that she would highly benefit from seeing a psychiatrist   She is still waiting for an evaluation  Continue with his current treatment  - Comprehensive metabolic panel; Future  - Lipid Panel with Direct LDL reflex; Future  - TSH, 3rd generation with Free T4 reflex; Future  - Hemoglobin A1C W/Refl To Glycomark(R); Future  - lisdexamfetamine (VYVANSE) 60 MG capsule; Take 1 capsule (60 mg total) by mouth 2 (two) times a day Earliest Fill Date: 12/26/18 Max Daily Amount: 120 mg  Dispense: 60 capsule; Refill: 0     Diagnoses and all orders for this visit:    Anxiety disorder, unspecified type  -     escitalopram (LEXAPRO) 10 mg tablet;  Take 1 tablet (10 mg total) by mouth daily    Attention deficit hyperactivity disorder (ADHD), predominantly hyperactive type    PTSD (post-traumatic stress disorder)    Chronic fatigue    Mixed hyperlipidemia    Benign essential hypertension    Uncontrolled type 2 diabetes mellitus without complication, without long-term current use of insulin ([facility] [address] )          Subjective:    Chief Complaint   Patient presents with    Follow-up     med check         Patient ID: Yohan Bell is a 66 y o  female  Patient is a 82-year-old female presents today for a follow-up on her psychiatric conditions  She has anxiety disorder, PTSD as well as ADHD  She has been taking her medications regularly  She denies adverse reactions with her medications  She states that she has been noticing persistent fatigue  She has been under significant stress secondary to multiple factors with her   Review of Systems   Constitutional: Negative for activity change, chills, fatigue and fever  HENT: Negative for congestion, ear pain, sinus pressure and sore throat  Eyes: Negative for redness, itching and visual disturbance  Respiratory: Negative for cough and shortness of breath  Cardiovascular: Negative for chest pain and palpitations  Gastrointestinal: Negative for abdominal pain, diarrhea and nausea  Endocrine: Negative for cold intolerance and heat intolerance  Genitourinary: Negative for dysuria, flank pain and frequency  Musculoskeletal: Negative for arthralgias, back pain, gait problem and myalgias  Skin: Negative for color change  Allergic/Immunologic: Negative for environmental allergies  Neurological: Negative for dizziness, numbness and headaches  Psychiatric/Behavioral: Negative for behavioral problems and sleep disturbance  The following portions of the patient's history were reviewed and updated as appropriate : past family history, past medical history, past social history and past surgical history        Current Outpatient Prescriptions:     ALPRAZolam (XANAX) 0 25 mg tablet, Take 1 tablet (0 25 mg total) by mouth daily at bedtime as needed for anxiety for up to 30 days, Disp: 30 tablet, Rfl: 0    aspirin (ECOTRIN LOW STRENGTH) 81 mg EC tablet, Take 1 tablet by mouth daily, Disp: , Rfl:     atorvastatin (LIPITOR) 40 mg tablet, TAKE 1 TABLET DAILY, Disp: 90 tablet, Rfl: 1    Blood Glucose Monitoring Suppl (ONE TOUCH ULTRA MINI) w/Device KIT, by Does not apply route, Disp: , Rfl:     cholecalciferol (VITAMIN D3) 1,000 units tablet, Take by mouth, Disp: , Rfl:     escitalopram (LEXAPRO) 10 mg tablet, Take 1 tablet (10 mg total) by mouth daily, Disp: 45 tablet, Rfl: 0    famotidine (PEPCID) 20 mg tablet, Take 1 tablet by mouth daily, Disp: , Rfl:     glucose blood (ONE TOUCH ULTRA TEST) test strip, 1 each by Other route 2 (two) times a day, Disp: 100 each, Rfl: 0    lisdexamfetamine (VYVANSE) 60 MG capsule, Take 1 capsule (60 mg total) by mouth 2 (two) times a day Max Daily Amount: 120 mg, Disp: 60 capsule, Rfl: 0    lisinopril (ZESTRIL) 5 mg tablet, TAKE ONE-HALF (1/2) TABLET DAILY, Disp: 45 tablet, Rfl: 0    metFORMIN (GLUCOPHAGE-XR) 500 mg 24 hr tablet, TAKE 1 TABLET TWICE A DAY, Disp: 60 tablet, Rfl: 2    metoprolol succinate (TOPROL-XL) 25 mg 24 hr tablet, Take 1 tablet by mouth daily, Disp: , Rfl:     montelukast (SINGULAIR) 10 mg tablet, TAKE 1 TABLET (10 MG TOTAL) BY MOUTH DAILY AT BEDTIME FOR ALLERGIES, Disp: 30 tablet, Rfl: 5    ONETOUCH DELICA LANCETS FINE MISC, by Does not apply route daily, Disp: 100 each, Rfl: 1    sitaGLIPtin (JANUVIA) 100 mg tablet, Take 1 tablet (100 mg total) by mouth daily, Disp: 90 tablet, Rfl: 1    Objective:    Vitals:    12/04/18 1336   BP: 120/80   BP Location: Left arm   Patient Position: Sitting   Cuff Size: Adult   Pulse: 91   Temp: 97 8 °F (36 6 °C)   TempSrc: Tympanic   SpO2: 97%   Weight: 66 9 kg (147 lb 8 oz)   Height: 5' 4" (1 626 m)        Physical Exam   Constitutional: She is oriented to person, place, and time  She appears well-developed and well-nourished  HENT:   Head: Normocephalic and atraumatic  Nose: Nose normal    Mouth/Throat: No oropharyngeal exudate     Eyes: Pupils are equal, round, and reactive to light  Right eye exhibits no discharge  Left eye exhibits no discharge  Neck: Normal range of motion  Neck supple  No tracheal deviation present  Cardiovascular: Normal rate, regular rhythm and intact distal pulses  Exam reveals no gallop and no friction rub  No murmur heard  Pulses:       Dorsalis pedis pulses are 2+ on the right side, and 2+ on the left side  Posterior tibial pulses are 2+ on the right side, and 2+ on the left side  Pulmonary/Chest: Effort normal and breath sounds normal  No respiratory distress  She has no wheezes  She has no rales  Abdominal: Soft  Bowel sounds are normal  She exhibits no distension  There is no tenderness  There is no rebound and no guarding  Musculoskeletal: Normal range of motion  She exhibits no edema  Lymphadenopathy:        Head (right side): No submental and no submandibular adenopathy present  Head (left side): No submental and no submandibular adenopathy present  She has no cervical adenopathy  Right cervical: No superficial cervical, no deep cervical and no posterior cervical adenopathy present  Left cervical: No superficial cervical, no deep cervical and no posterior cervical adenopathy present  Neurological: She is alert and oriented to person, place, and time  No cranial nerve deficit or sensory deficit  Skin: Skin is warm, dry and intact  Psychiatric: Her speech is normal and behavior is normal  Judgment normal  Her mood appears not anxious  Cognition and memory are normal  She does not exhibit a depressed mood  Vitals reviewed

## 2019-01-02 ENCOUNTER — DOCUMENTATION (OUTPATIENT)
Dept: FAMILY MEDICINE CLINIC | Facility: CLINIC | Age: 59
End: 2019-01-02

## 2019-01-02 DIAGNOSIS — F43.10 PTSD (POST-TRAUMATIC STRESS DISORDER): ICD-10-CM

## 2019-01-02 DIAGNOSIS — F90.1 ATTENTION DEFICIT HYPERACTIVITY DISORDER (ADHD), PREDOMINANTLY HYPERACTIVE TYPE: ICD-10-CM

## 2019-01-02 NOTE — TELEPHONE ENCOUNTER
Pt had short time supply of 7days for rx picked up is now requesting full script sent to [facility]  will need prior authorization

## 2019-01-15 ENCOUNTER — HOSPITAL ENCOUNTER (OUTPATIENT)
Dept: MAMMOGRAPHY | Facility: MEDICAL CENTER | Age: 59
Discharge: HOME | End: 2019-01-15
Payer: COMMERCIAL

## 2019-01-15 VITALS — WEIGHT: 152 LBS | HEIGHT: 65 IN | BODY MASS INDEX: 25.33 KG/M2

## 2019-01-15 DIAGNOSIS — E78.2 MIXED HYPERLIPIDEMIA: ICD-10-CM

## 2019-01-15 DIAGNOSIS — Z12.39 SCREENING FOR BREAST CANCER: ICD-10-CM

## 2019-01-15 PROCEDURE — 77063 BREAST TOMOSYNTHESIS BI: CPT

## 2019-01-15 PROCEDURE — 77067 SCR MAMMO BI INCL CAD: CPT

## 2019-01-15 RX ORDER — ATORVASTATIN CALCIUM 40 MG/1
TABLET, FILM COATED ORAL
Qty: 90 TABLET | Refills: 1 | Status: SHIPPED | OUTPATIENT
Start: 2019-01-15 | End: 2019-07-14 | Stop reason: SDUPTHER

## 2019-01-24 DIAGNOSIS — F43.10 PTSD (POST-TRAUMATIC STRESS DISORDER): ICD-10-CM

## 2019-01-24 DIAGNOSIS — F90.1 ATTENTION DEFICIT HYPERACTIVITY DISORDER (ADHD), PREDOMINANTLY HYPERACTIVE TYPE: ICD-10-CM

## 2019-02-05 ENCOUNTER — OFFICE VISIT (OUTPATIENT)
Dept: FAMILY MEDICINE CLINIC | Facility: CLINIC | Age: 59
End: 2019-02-05
Payer: COMMERCIAL

## 2019-02-05 VITALS
SYSTOLIC BLOOD PRESSURE: 110 MMHG | HEIGHT: 64 IN | TEMPERATURE: 96.3 F | WEIGHT: 149.5 LBS | OXYGEN SATURATION: 97 % | DIASTOLIC BLOOD PRESSURE: 60 MMHG | BODY MASS INDEX: 25.52 KG/M2 | RESPIRATION RATE: 16 BRPM | HEART RATE: 89 BPM

## 2019-02-05 DIAGNOSIS — IMO0001 UNCONTROLLED TYPE 2 DIABETES MELLITUS WITHOUT COMPLICATION, WITHOUT LONG-TERM CURRENT USE OF INSULIN: ICD-10-CM

## 2019-02-05 DIAGNOSIS — J01.90 ACUTE SINUSITIS, RECURRENCE NOT SPECIFIED, UNSPECIFIED LOCATION: Primary | ICD-10-CM

## 2019-02-05 PROCEDURE — 99214 OFFICE O/P EST MOD 30 MIN: CPT | Performed by: FAMILY MEDICINE

## 2019-02-05 RX ORDER — AMOXICILLIN AND CLAVULANATE POTASSIUM 875; 125 MG/1; MG/1
1 TABLET, FILM COATED ORAL EVERY 12 HOURS SCHEDULED
Qty: 14 TABLET | Refills: 0 | Status: SHIPPED | OUTPATIENT
Start: 2019-02-05 | End: 2019-02-12

## 2019-02-05 RX ORDER — METFORMIN HYDROCHLORIDE 500 MG/1
500 TABLET, EXTENDED RELEASE ORAL 2 TIMES DAILY
Qty: 60 TABLET | Refills: 0 | Status: SHIPPED | OUTPATIENT
Start: 2019-02-05 | End: 2019-02-05 | Stop reason: SDUPTHER

## 2019-02-05 RX ORDER — METFORMIN HYDROCHLORIDE 500 MG/1
500 TABLET, EXTENDED RELEASE ORAL 2 TIMES DAILY
Qty: 180 TABLET | Refills: 1 | Status: SHIPPED | OUTPATIENT
Start: 2019-02-05 | End: 2019-07-12 | Stop reason: SDUPTHER

## 2019-02-05 RX ORDER — PROMETHAZINE HYDROCHLORIDE AND CODEINE PHOSPHATE 6.25; 1 MG/5ML; MG/5ML
5 SYRUP ORAL
Qty: 120 ML | Refills: 0 | Status: SHIPPED | OUTPATIENT
Start: 2019-02-05 | End: 2019-12-19 | Stop reason: SDUPTHER

## 2019-02-05 NOTE — PROGRESS NOTES
Assessment/Plan:   1  Acute sinusitis, recurrence not specified, unspecified location  Start supportive care  Maintain hydration  Take over-the-counter Mucinex for symptom relief  She may benefit from a steroid nasal spray such as fluticasone  Will start treatment with Augmentin b i d  For 7 days  She was also given a prescription for promethazine with codeine  Follow up if any symptoms persist   - amoxicillin-clavulanate (AUGMENTIN) 875-125 mg per tablet; Take 1 tablet by mouth every 12 (twelve) hours for 7 days  Dispense: 14 tablet; Refill: 0  - promethazine-codeine (PHENERGAN WITH CODEINE) 6 25-10 mg/5 mL syrup; Take 5 mL by mouth daily at bedtime as needed for cough  Dispense: 120 mL; Refill: 0       Diagnoses and all orders for this visit:    Uncontrolled type 2 diabetes mellitus without complication, without long-term current use of insulin ([facility])  -     metFORMIN (GLUCOPHAGE-XR) 500 mg 24 hr tablet; Take 1 tablet (500 mg total) by mouth 2 (two) times a day          Subjective:    Chief Complaint   Patient presents with    Cold Like Symptoms     pt complains of cough/congested/post nasal drip/ears hurt/diarrhia/vomiting started Wednesday        Patient ID: Lyndon Sykes is a 66 y o  female  URI    This is a new problem  The current episode started in the past 7 days  The problem has been unchanged  There has been no fever  Associated symptoms include congestion, coughing, headaches, rhinorrhea and sinus pain  Pertinent negatives include no abdominal pain, chest pain, diarrhea, dysuria, ear pain, nausea, sore throat or wheezing  Treatments tried: tylenol  The treatment provided mild relief  Review of Systems   Constitutional: Negative for activity change, chills, fatigue and fever  HENT: Positive for congestion, rhinorrhea and sinus pain  Negative for ear pain, sinus pressure and sore throat  Eyes: Negative for redness, itching and visual disturbance  Respiratory: Positive for cough  Negative for shortness of breath and wheezing  Cardiovascular: Negative for chest pain and palpitations  Gastrointestinal: Negative for abdominal pain, diarrhea and nausea  Endocrine: Negative for cold intolerance and heat intolerance  Genitourinary: Negative for dysuria, flank pain and frequency  Musculoskeletal: Negative for arthralgias, back pain, gait problem and myalgias  Skin: Negative for color change  Allergic/Immunologic: Negative for environmental allergies  Neurological: Positive for headaches  Negative for dizziness and numbness  Psychiatric/Behavioral: Negative for behavioral problems and sleep disturbance  The following portions of the patient's history were reviewed and updated as appropriate : past family history, past medical history, past social history and past surgical history        Current Outpatient Prescriptions:     aspirin (ECOTRIN LOW STRENGTH) 81 mg EC tablet, Take 1 tablet by mouth daily, Disp: , Rfl:     atorvastatin (LIPITOR) 40 mg tablet, TAKE 1 TABLET DAILY, Disp: 90 tablet, Rfl: 1    Blood Glucose Monitoring Suppl (ONE TOUCH ULTRA MINI) w/Device KIT, by Does not apply route, Disp: , Rfl:     cholecalciferol (VITAMIN D3) 1,000 units tablet, Take by mouth, Disp: , Rfl:     escitalopram (LEXAPRO) 20 mg tablet, Take 1 tablet (20 mg total) by mouth daily, Disp: 30 tablet, Rfl: 1    famotidine (PEPCID) 20 mg tablet, Take 1 tablet by mouth daily, Disp: , Rfl:     glucose blood (ONE TOUCH ULTRA TEST) test strip, 1 each by Other route 2 (two) times a day, Disp: 100 each, Rfl: 0    lisdexamfetamine (VYVANSE) 60 MG capsule, Take 1 capsule (60 mg total) by mouth 2 (two) times a day Max Daily Amount: 120 mg, Disp: 60 capsule, Rfl: 0    lisinopril (ZESTRIL) 5 mg tablet, TAKE ONE-HALF (1/2) TABLET DAILY, Disp: 45 tablet, Rfl: 0    metFORMIN (GLUCOPHAGE-XR) 500 mg 24 hr tablet, Take 1 tablet (500 mg total) by mouth 2 (two) times a day, Disp: 60 tablet, Rfl: 0   metoprolol succinate (TOPROL-XL) 25 mg 24 hr tablet, Take 1 tablet by mouth daily, Disp: , Rfl:     montelukast (SINGULAIR) 10 mg tablet, TAKE 1 TABLET (10 MG TOTAL) BY MOUTH DAILY AT BEDTIME FOR ALLERGIES, Disp: 30 tablet, Rfl: 5    ONETOUCH DELICA LANCETS FINE MISC, by Does not apply route daily, Disp: 100 each, Rfl: 1    sitaGLIPtin (JANUVIA) 100 mg tablet, Take 1 tablet (100 mg total) by mouth daily, Disp: 90 tablet, Rfl: 1    ALPRAZolam (XANAX) 0 25 mg tablet, Take 1 tablet (0 25 mg total) by mouth daily at bedtime as needed for anxiety for up to 30 days, Disp: 30 tablet, Rfl: 0    Objective:    Vitals:    02/05/19 1036   BP: 110/60   BP Location: Left arm   Patient Position: Sitting   Cuff Size: Adult   Pulse: 89   Resp: 16   Temp: (!) 96 3 °F (35 7 °C)   TempSrc: Tympanic   SpO2: 97%   Weight: 67 8 kg (149 lb 8 oz)   Height: 5' 4" (1 626 m)        Physical Exam   Constitutional: She is oriented to person, place, and time  She appears well-developed and well-nourished  HENT:   Head: Normocephalic and atraumatic  Nose: Nose normal    Mouth/Throat: No oropharyngeal exudate  Eyes: Pupils are equal, round, and reactive to light  Right eye exhibits no discharge  Left eye exhibits no discharge  Neck: Normal range of motion  Neck supple  No tracheal deviation present  Cardiovascular: Normal rate, regular rhythm and intact distal pulses  Exam reveals no gallop and no friction rub  No murmur heard  Pulses:       Dorsalis pedis pulses are 2+ on the right side, and 2+ on the left side  Posterior tibial pulses are 2+ on the right side, and 2+ on the left side  Pulmonary/Chest: Effort normal and breath sounds normal  No respiratory distress  She has no wheezes  She has no rales  Abdominal: Soft  Bowel sounds are normal  She exhibits no distension  There is no tenderness  There is no rebound and no guarding  Musculoskeletal: Normal range of motion  She exhibits no edema     Lymphadenopathy: Head (right side): No submental and no submandibular adenopathy present  Head (left side): No submental and no submandibular adenopathy present  She has no cervical adenopathy  Right cervical: No superficial cervical, no deep cervical and no posterior cervical adenopathy present  Left cervical: No superficial cervical, no deep cervical and no posterior cervical adenopathy present  Neurological: She is alert and oriented to person, place, and time  No cranial nerve deficit or sensory deficit  Skin: Skin is warm, dry and intact  Psychiatric: Her speech is normal and behavior is normal  Judgment normal  Her mood appears not anxious  Cognition and memory are normal  She does not exhibit a depressed mood  Vitals reviewed

## 2019-02-06 DIAGNOSIS — I10 ESSENTIAL HYPERTENSION: ICD-10-CM

## 2019-02-06 PROCEDURE — 4010F ACE/ARB THERAPY RXD/TAKEN: CPT | Performed by: FAMILY MEDICINE

## 2019-02-06 RX ORDER — LISINOPRIL 5 MG/1
TABLET ORAL
Qty: 45 TABLET | Refills: 0 | Status: SHIPPED | OUTPATIENT
Start: 2019-02-06 | End: 2019-05-07 | Stop reason: SDUPTHER

## 2019-02-07 DIAGNOSIS — I10 ESSENTIAL HYPERTENSION: Primary | ICD-10-CM

## 2019-02-07 RX ORDER — METOPROLOL SUCCINATE 25 MG/1
TABLET, EXTENDED RELEASE ORAL
Qty: 90 TABLET | Refills: 1 | Status: SHIPPED | OUTPATIENT
Start: 2019-02-07 | End: 2019-07-09 | Stop reason: SDUPTHER

## 2019-02-09 LAB — HBA1C MFR BLD HPLC: 6.7 %

## 2019-02-19 ENCOUNTER — OFFICE VISIT (OUTPATIENT)
Dept: FAMILY MEDICINE CLINIC | Facility: CLINIC | Age: 59
End: 2019-02-19
Payer: COMMERCIAL

## 2019-02-19 VITALS
HEART RATE: 90 BPM | BODY MASS INDEX: 25.32 KG/M2 | TEMPERATURE: 97.8 F | HEIGHT: 64 IN | DIASTOLIC BLOOD PRESSURE: 78 MMHG | OXYGEN SATURATION: 100 % | RESPIRATION RATE: 17 BRPM | WEIGHT: 148.3 LBS | SYSTOLIC BLOOD PRESSURE: 122 MMHG

## 2019-02-19 DIAGNOSIS — F41.9 ANXIETY DISORDER, UNSPECIFIED TYPE: ICD-10-CM

## 2019-02-19 DIAGNOSIS — E78.2 MIXED HYPERLIPIDEMIA: ICD-10-CM

## 2019-02-19 DIAGNOSIS — F90.1 ATTENTION DEFICIT HYPERACTIVITY DISORDER (ADHD), PREDOMINANTLY HYPERACTIVE TYPE: ICD-10-CM

## 2019-02-19 DIAGNOSIS — F41.9 ANXIETY DISORDER, UNSPECIFIED TYPE: Primary | ICD-10-CM

## 2019-02-19 DIAGNOSIS — IMO0001 UNCONTROLLED TYPE 2 DIABETES MELLITUS WITHOUT COMPLICATION, WITHOUT LONG-TERM CURRENT USE OF INSULIN: ICD-10-CM

## 2019-02-19 DIAGNOSIS — I10 BENIGN ESSENTIAL HYPERTENSION: ICD-10-CM

## 2019-02-19 PROCEDURE — 3078F DIAST BP <80 MM HG: CPT | Performed by: FAMILY MEDICINE

## 2019-02-19 PROCEDURE — 3074F SYST BP LT 130 MM HG: CPT | Performed by: FAMILY MEDICINE

## 2019-02-19 PROCEDURE — 3008F BODY MASS INDEX DOCD: CPT | Performed by: FAMILY MEDICINE

## 2019-02-19 PROCEDURE — 99215 OFFICE O/P EST HI 40 MIN: CPT | Performed by: FAMILY MEDICINE

## 2019-02-19 RX ORDER — ALPRAZOLAM 0.25 MG/1
0.25 TABLET ORAL DAILY PRN
Qty: 30 TABLET | Refills: 0 | Status: SHIPPED | OUTPATIENT
Start: 2019-02-19 | End: 2019-03-01 | Stop reason: SDUPTHER

## 2019-02-19 RX ORDER — ESCITALOPRAM OXALATE 20 MG/1
20 TABLET ORAL DAILY
Qty: 30 TABLET | Refills: 1 | Status: SHIPPED | OUTPATIENT
Start: 2019-02-19 | End: 2019-04-17 | Stop reason: SDUPTHER

## 2019-02-19 NOTE — PROGRESS NOTES
Assessment/Plan:   1  Anxiety disorder, unspecified type  Symptoms appear significantly worsening  She states that this is all secondary to her home stressors with her   She was counseled greatly concerning this matter  At this time, she was advised that she would highly benefit from seeing a counselor  She would ultimately benefit from dot couples counseling with her   Will continue with her current treatment  Will add alprazolam 0 25 mg to be used p r n  For panic symptoms  If any of her symptoms should worsen, she was advised to follow up  - ALPRAZolam (XANAX) 0 25 mg tablet; Take 1 tablet (0 25 mg total) by mouth daily as needed for anxiety for up to 30 days  Dispense: 30 tablet; Refill: 0    2  Attention deficit hyperactivity disorder (ADHD), predominantly hyperactive type  Symptoms appear stable today  At this time, patient would like to decrease her dose ofvyvanse  t will decrease her dose to 50 mg b i d  Mckay Gordon - lisdexamfetamine (VYVANSE) 50 MG capsule; Take 1 capsule (50 mg total) by mouth 2 (two) times a day for 30 daysMax Daily Amount: 100 mg  Dispense: 60 capsule; Refill: 0    3  Uncontrolled type 2 diabetes mellitus without complication, without long-term current use of insulin ([address] )  Patient's A1c appears improving  Her A1c has decreased from 7 0-6 7  Continue with a strict diabetic diet as well as current treatment with Januvia as well as her metformin  4  Benign essential hypertension  Blood pressure is stable today  At this time, will continue with her current treatment of lisinopril    5  Mixed hyperlipidemia  Reviewed fasting lipid panel patient stable today  Continue with her current treatment of atorvastatin  Follow up with patient in 6 months  Diagnoses and all orders for this visit:    Anxiety disorder, unspecified type  -     ALPRAZolam (XANAX) 0 25 mg tablet;  Take 1 tablet (0 25 mg total) by mouth daily as needed for anxiety for up to 30 days    Attention deficit hyperactivity disorder (ADHD), predominantly hyperactive type  -     lisdexamfetamine (VYVANSE) 50 MG capsule; Take 1 capsule (50 mg total) by mouth 2 (two) times a day for 30 daysMax Daily Amount: 100 mg          Subjective:    Chief Complaint   Patient presents with    Diarrhea     for the past 3 days     Abdominal Pain        Patient ID: Maci Chavez is a 66 y o  female  Patient is a 79-year-old female presents today with CC of increasing anxiety  She has had this problem for the past few months  Has been increasing significantly over the past few weeks  She states that she has been failing incredible stress due to her marriage  She has been having great difficulty in dealing with her   She states that she has been frequently working at home to keep things managed however she states that her  has not been helping with anything  This has been causing significant anxiety for patient  She has had this problem with her  in the past   She states that her daughter is visiting from [place] and will be taking her mother back home with her for approximately 1 month  She states that she would like to have her Adderall dose decreased  She believes that this medication is causing more problems for her  Patient also has type 2 diabetes, hypertension as well as dyslipidemia  She has been taking her medications regularly  She denies adverse reactions with her medications  She recently had blood work completed in light reviewed this today  Review of Systems   Constitutional: Negative for activity change, chills, fatigue and fever  HENT: Negative for congestion, ear pain, sinus pressure and sore throat  Eyes: Negative for redness, itching and visual disturbance  Respiratory: Negative for cough and shortness of breath  Cardiovascular: Negative for chest pain and palpitations  Gastrointestinal: Negative for abdominal pain, diarrhea and nausea     Endocrine: Negative for cold intolerance and heat intolerance  Genitourinary: Negative for dysuria, flank pain and frequency  Musculoskeletal: Negative for arthralgias, back pain, gait problem and myalgias  Skin: Negative for color change  Allergic/Immunologic: Negative for environmental allergies  Neurological: Negative for dizziness, numbness and headaches  Psychiatric/Behavioral: Negative for behavioral problems and sleep disturbance  The following portions of the patient's history were reviewed and updated as appropriate : past family history, past medical history, past social history and past surgical history        Current Outpatient Medications:     aspirin (ECOTRIN LOW STRENGTH) 81 mg EC tablet, Take 1 tablet by mouth daily, Disp: , Rfl:     atorvastatin (LIPITOR) 40 mg tablet, TAKE 1 TABLET DAILY, Disp: 90 tablet, Rfl: 1    Blood Glucose Monitoring Suppl (ONE TOUCH ULTRA MINI) w/Device KIT, by Does not apply route, Disp: , Rfl:     cholecalciferol (VITAMIN D3) 1,000 units tablet, Take by mouth, Disp: , Rfl:     escitalopram (LEXAPRO) 20 mg tablet, Take 1 tablet (20 mg total) by mouth daily, Disp: 30 tablet, Rfl: 1    famotidine (PEPCID) 20 mg tablet, Take 1 tablet by mouth daily, Disp: , Rfl:     glucose blood (ONE TOUCH ULTRA TEST) test strip, 1 each by Other route 2 (two) times a day, Disp: 100 each, Rfl: 0    lisdexamfetamine (VYVANSE) 50 MG capsule, Take 1 capsule (50 mg total) by mouth 2 (two) times a day for 30 daysMax Daily Amount: 100 mg, Disp: 60 capsule, Rfl: 0    lisinopril (ZESTRIL) 5 mg tablet, TAKE ONE-HALF (1/2) TABLET DAILY, Disp: 45 tablet, Rfl: 0    metFORMIN (GLUCOPHAGE-XR) 500 mg 24 hr tablet, Take 1 tablet (500 mg total) by mouth 2 (two) times a day, Disp: 180 tablet, Rfl: 1    metoprolol succinate (TOPROL-XL) 25 mg 24 hr tablet, TAKE 1 TABLET DAILY, Disp: 90 tablet, Rfl: 1    montelukast (SINGULAIR) 10 mg tablet, TAKE 1 TABLET (10 MG TOTAL) BY MOUTH DAILY AT BEDTIME FOR ALLERGIES, Disp: 30 tablet, Rfl: 5    ONETOUCH DELICA LANCETS FINE MISC, by Does not apply route daily, Disp: 100 each, Rfl: 1    promethazine-codeine (PHENERGAN WITH CODEINE) 6 25-10 mg/5 mL syrup, Take 5 mL by mouth daily at bedtime as needed for cough, Disp: 120 mL, Rfl: 0    sitaGLIPtin (JANUVIA) 100 mg tablet, Take 1 tablet (100 mg total) by mouth daily, Disp: 90 tablet, Rfl: 1    ALPRAZolam (XANAX) 0 25 mg tablet, Take 1 tablet (0 25 mg total) by mouth daily as needed for anxiety for up to 30 days, Disp: 30 tablet, Rfl: 0    Objective:    Vitals:    02/19/19 1603   BP: 122/78   BP Location: Left arm   Patient Position: Sitting   Cuff Size: Adult   Pulse: 90   Resp: 17   Temp: 97 8 °F (36 6 °C)   TempSrc: Tympanic   SpO2: 100%   Weight: 67 3 kg (148 lb 4 8 oz)   Height: 5' 4" (1 626 m)        Physical Exam   Constitutional: She is oriented to person, place, and time  She appears well-developed and well-nourished  HENT:   Head: Normocephalic and atraumatic  Nose: Nose normal    Mouth/Throat: No oropharyngeal exudate  Eyes: Pupils are equal, round, and reactive to light  Right eye exhibits no discharge  Left eye exhibits no discharge  Neck: Normal range of motion  Neck supple  No tracheal deviation present  Cardiovascular: Normal rate, regular rhythm and intact distal pulses  Exam reveals no gallop and no friction rub  No murmur heard  Pulses:       Dorsalis pedis pulses are 2+ on the right side, and 2+ on the left side  Posterior tibial pulses are 2+ on the right side, and 2+ on the left side  Pulmonary/Chest: Effort normal and breath sounds normal  No respiratory distress  She has no wheezes  She has no rales  Abdominal: Soft  Bowel sounds are normal  She exhibits no distension  There is no tenderness  There is no rebound and no guarding  Musculoskeletal: Normal range of motion  She exhibits no edema     Lymphadenopathy:        Head (right side): No submental and no submandibular adenopathy present  Head (left side): No submental and no submandibular adenopathy present  She has no cervical adenopathy  Right cervical: No superficial cervical, no deep cervical and no posterior cervical adenopathy present  Left cervical: No superficial cervical, no deep cervical and no posterior cervical adenopathy present  Neurological: She is alert and oriented to person, place, and time  No cranial nerve deficit or sensory deficit  Skin: Skin is warm, dry and intact  Psychiatric: Her speech is normal and behavior is normal  Judgment normal  Her mood appears not anxious  Cognition and memory are normal  She does not exhibit a depressed mood  Vitals reviewed

## 2019-03-01 DIAGNOSIS — F41.9 ANXIETY DISORDER, UNSPECIFIED TYPE: ICD-10-CM

## 2019-03-01 DIAGNOSIS — IMO0001 UNCONTROLLED TYPE 2 DIABETES MELLITUS WITHOUT COMPLICATION, WITHOUT LONG-TERM CURRENT USE OF INSULIN: Primary | ICD-10-CM

## 2019-03-01 RX ORDER — ALPRAZOLAM 0.5 MG/1
0.5 TABLET ORAL
Qty: 30 TABLET | Refills: 0 | Status: SHIPPED | OUTPATIENT
Start: 2019-03-01 | End: 2019-08-09 | Stop reason: SDUPTHER

## 2019-03-11 DIAGNOSIS — F90.1 ATTENTION DEFICIT HYPERACTIVITY DISORDER (ADHD), PREDOMINANTLY HYPERACTIVE TYPE: ICD-10-CM

## 2019-03-11 NOTE — TELEPHONE ENCOUNTER
Patient called stating that she will be living out of town tomorrow for her daughter wedding and she will be staying with her for over 2 weeks, patient wants a early refill on her vyvanse send to pharmacy on file, patient is do for her med on 03/19/19 okay per PDMP

## 2019-03-13 ENCOUNTER — TELEPHONE (OUTPATIENT)
Dept: FAMILY MEDICINE CLINIC | Facility: CLINIC | Age: 59
End: 2019-03-13

## 2019-03-13 DIAGNOSIS — F90.1 ATTENTION DEFICIT HYPERACTIVITY DISORDER (ADHD), PREDOMINANTLY HYPERACTIVE TYPE: ICD-10-CM

## 2019-03-20 ENCOUNTER — APPOINTMENT (OUTPATIENT)
Dept: RADIOLOGY | Facility: MEDICAL CENTER | Age: 59
End: 2019-03-20
Payer: COMMERCIAL

## 2019-03-20 ENCOUNTER — OFFICE VISIT (OUTPATIENT)
Dept: FAMILY MEDICINE CLINIC | Facility: CLINIC | Age: 59
End: 2019-03-20
Payer: COMMERCIAL

## 2019-03-20 VITALS
TEMPERATURE: 97.3 F | RESPIRATION RATE: 15 BRPM | SYSTOLIC BLOOD PRESSURE: 124 MMHG | WEIGHT: 153 LBS | DIASTOLIC BLOOD PRESSURE: 76 MMHG | HEART RATE: 65 BPM | OXYGEN SATURATION: 97 % | BODY MASS INDEX: 26.12 KG/M2 | HEIGHT: 64 IN

## 2019-03-20 DIAGNOSIS — M25.512 ACUTE PAIN OF LEFT SHOULDER: ICD-10-CM
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
## 2019-03-20 DIAGNOSIS — R07.81 RIB PAIN ON LEFT SIDE: ICD-10-CM
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
## 2019-03-20 PROCEDURE — 99214 OFFICE O/P EST MOD 30 MIN: CPT | Performed by: FAMILY MEDICINE

## 2019-03-20 PROCEDURE — 3008F BODY MASS INDEX DOCD: CPT | Performed by: FAMILY MEDICINE

## 2019-03-20 PROCEDURE — 73030 X-RAY EXAM OF SHOULDER: CPT

## 2019-03-20 PROCEDURE — 71101 X-RAY EXAM UNILAT RIBS/CHEST: CPT

## 2019-03-20 RX ORDER — TRAMADOL HYDROCHLORIDE 50 MG/1
50 TABLET ORAL EVERY 12 HOURS PRN
Qty: 30 TABLET | Refills: 0 | Status: SHIPPED | OUTPATIENT
Start: 2019-03-20 | End: 2020-02-04 | Stop reason: SDUPTHER

## 2019-03-20 NOTE — PROGRESS NOTES
Assessment/Plan:   1  Fall down steps, initial encounter/ Acute pain of left shoulder/Rib pain on left side  Unclear as to the exact cause of patient's fall  At this time, will continue with routine monitoring  Will check x-ray of shoulder as well as left ribs to rule out any gross fractures  She was advised on importance of maintaining proper inspiratory volume  She may take tramadol for further symptom relief  Continue as well with her Tylenol/Aleve  If any symptoms worsen, she was advised to follow up  - traMADol (ULTRAM) 50 mg tablet; Take 1 tablet (50 mg total) by mouth every 12 (twelve) hours as needed for moderate pain  Dispense: 30 tablet; Refill: 0         There are no diagnoses linked to this encounter  Subjective:    Chief Complaint   Patient presents with    Rib Injury     Pt states she was going down the stairs and she then thinks she may have blanked out  Pt states she fell directly on her L/side hitting her L/shoulder, hip and rib area  Pt states she does have some discomfort when taking deep breaths and coughing            Patient ID: Shad Arevalo is a 66 y o  female  Patient is a 51-year-old female presents today with CC of left shoulder and rib pain  She has had this pain for the past few days  She states that she was at a wedding in [place]  She was walking down stairs and believes that she fell  It is unclear if she had a syncopal episode or if she tripped  Patient states that she did fall forward and hit her ribs as well as her shoulder on the cement floor  She denies any he did head injury or loss of consciousness  She did not proceed to see anybody for this problem  She has been having moderate pain in these areas  She has been having pain with coughing  Review of Systems   Constitutional: Negative for activity change, chills, fatigue and fever  HENT: Negative for congestion, ear pain, sinus pressure and sore throat      Eyes: Negative for redness, itching and visual disturbance  Respiratory: Negative for cough and shortness of breath  Cardiovascular: Negative for chest pain and palpitations  Gastrointestinal: Negative for abdominal pain, diarrhea and nausea  Endocrine: Negative for cold intolerance and heat intolerance  Genitourinary: Negative for dysuria, flank pain and frequency  Musculoskeletal: Positive for arthralgias  Negative for back pain, gait problem and myalgias  Skin: Negative for color change  Allergic/Immunologic: Negative for environmental allergies  Neurological: Negative for dizziness, numbness and headaches  Psychiatric/Behavioral: Negative for behavioral problems and sleep disturbance  The following portions of the patient's history were reviewed and updated as appropriate : past family history, past medical history, past social history and past surgical history        Current Outpatient Medications:     ALPRAZolam (XANAX) 0 5 mg tablet, Take 1 tablet (0 5 mg total) by mouth daily at bedtime as needed for anxiety for up to 30 days, Disp: 30 tablet, Rfl: 0    aspirin (ECOTRIN LOW STRENGTH) 81 mg EC tablet, Take 1 tablet by mouth daily, Disp: , Rfl:     atorvastatin (LIPITOR) 40 mg tablet, TAKE 1 TABLET DAILY, Disp: 90 tablet, Rfl: 1    Blood Glucose Monitoring Suppl (ONE TOUCH ULTRA MINI) w/Device KIT, by Does not apply route, Disp: , Rfl:     cholecalciferol (VITAMIN D3) 1,000 units tablet, Take by mouth, Disp: , Rfl:     Empagliflozin 10 MG TABS, Take 1 tablet (10 mg total) by mouth every morning, Disp: 30 tablet, Rfl: 0    escitalopram (LEXAPRO) 20 mg tablet, Take 1 tablet (20 mg total) by mouth daily, Disp: 30 tablet, Rfl: 1    famotidine (PEPCID) 20 mg tablet, Take 1 tablet by mouth daily, Disp: , Rfl:     glucose blood (ONE TOUCH ULTRA TEST) test strip, 1 each by Other route 2 (two) times a day, Disp: 100 each, Rfl: 0    lisdexamfetamine (VYVANSE) 50 MG capsule, Take 1 capsule (50 mg total) by mouth 2 (two) times a day for 30 daysMax Daily Amount: 100 mg, Disp: 60 capsule, Rfl: 0    lisinopril (ZESTRIL) 5 mg tablet, TAKE ONE-HALF (1/2) TABLET DAILY, Disp: 45 tablet, Rfl: 0    metFORMIN (GLUCOPHAGE-XR) 500 mg 24 hr tablet, Take 1 tablet (500 mg total) by mouth 2 (two) times a day, Disp: 180 tablet, Rfl: 1    metoprolol succinate (TOPROL-XL) 25 mg 24 hr tablet, TAKE 1 TABLET DAILY, Disp: 90 tablet, Rfl: 1    montelukast (SINGULAIR) 10 mg tablet, TAKE 1 TABLET (10 MG TOTAL) BY MOUTH DAILY AT BEDTIME FOR ALLERGIES, Disp: 30 tablet, Rfl: 5    ONETOUCH DELICA LANCETS FINE MISC, by Does not apply route daily, Disp: 100 each, Rfl: 1    promethazine-codeine (PHENERGAN WITH CODEINE) 6 25-10 mg/5 mL syrup, Take 5 mL by mouth daily at bedtime as needed for cough (Patient not taking: Reported on 3/20/2019), Disp: 120 mL, Rfl: 0    sitaGLIPtin (JANUVIA) 100 mg tablet, Take 1 tablet (100 mg total) by mouth daily (Patient not taking: Reported on 3/20/2019), Disp: 90 tablet, Rfl: 1    Objective:    Vitals:    03/20/19 1120   BP: 124/76   BP Location: Left arm   Patient Position: Sitting   Cuff Size: Adult   Pulse: 65   Resp: 15   Temp: (!) 97 3 °F (36 3 °C)   TempSrc: Tympanic   SpO2: 97%   Weight: 69 4 kg (153 lb)   Height: 5' 4" (1 626 m)        Physical Exam   Constitutional: Vital signs are normal  She appears well-developed and well-nourished  She is cooperative  She does not appear ill  No distress  HENT:   Head: Normocephalic and atraumatic  Right Ear: Hearing and external ear normal    Left Ear: Hearing and external ear normal    Nose: Nose normal  No nasal deformity or septal deviation  Mouth/Throat: Oropharynx is clear and moist and mucous membranes are normal    Eyes: Pupils are equal, round, and reactive to light  EOM and lids are normal    Neck: Trachea normal and normal range of motion  Neck supple  No edema and no erythema present  No thyromegaly present  Cardiovascular: Normal rate  Pulmonary/Chest: Effort normal  No respiratory distress  Abdominal: Normal appearance  There is no guarding  Musculoskeletal: Normal range of motion  Right shoulder: She exhibits no pain  Left shoulder: She exhibits tenderness and bony tenderness  Arms:  Neurological: She is alert  She has normal strength  No cranial nerve deficit or sensory deficit  Skin: Skin is warm and dry  Psychiatric: She has a normal mood and affect   Her speech is normal and behavior is normal  Judgment and thought content normal  Cognition and memory are normal

## 2019-03-25 ENCOUNTER — TELEPHONE (OUTPATIENT)
Dept: FAMILY MEDICINE CLINIC | Facility: CLINIC | Age: 59
End: 2019-03-25

## 2019-03-25 NOTE — TELEPHONE ENCOUNTER
Patient would like to know what is causing the pain on her ribs, since the x-rays came back normal, patient would like to know if she needs to follow up with you or what's the next step  Please advice

## 2019-03-26 NOTE — TELEPHONE ENCOUNTER
Called pt, advised per Dr Elizabeth Leo, the pain is caused by a strain in her ribs and a strain can cause severe pain  At this time there is nothing further needed, and she should continue with the treatment she was advised of at her last appointment  Pt stated today is feeling better than yesterday and every day continues to get a little better  Advised pt I would let Dr Elizabeth Leo know that and she should call with any further questions

## 2019-03-26 NOTE — TELEPHONE ENCOUNTER
The pain is caused by a strain in her ribs  A strain can cause severe pain  At this time, there is no further workup needed  Please have her continue with the treatment that I advised her to continue with    Thank you

## 2019-04-08 DIAGNOSIS — F90.1 ATTENTION DEFICIT HYPERACTIVITY DISORDER (ADHD), PREDOMINANTLY HYPERACTIVE TYPE: ICD-10-CM

## 2019-04-09 DIAGNOSIS — F90.1 ATTENTION DEFICIT HYPERACTIVITY DISORDER (ADHD), PREDOMINANTLY HYPERACTIVE TYPE: ICD-10-CM

## 2019-04-17 DIAGNOSIS — F41.9 ANXIETY DISORDER, UNSPECIFIED TYPE: ICD-10-CM

## 2019-04-17 RX ORDER — ESCITALOPRAM OXALATE 20 MG/1
TABLET ORAL
Qty: 30 TABLET | Refills: 1 | Status: SHIPPED | OUTPATIENT
Start: 2019-04-17 | End: 2019-06-23 | Stop reason: SDUPTHER

## 2019-05-06 DIAGNOSIS — F90.1 ATTENTION DEFICIT HYPERACTIVITY DISORDER (ADHD), PREDOMINANTLY HYPERACTIVE TYPE: ICD-10-CM

## 2019-05-07 DIAGNOSIS — I10 ESSENTIAL HYPERTENSION: ICD-10-CM

## 2019-05-07 PROCEDURE — 4010F ACE/ARB THERAPY RXD/TAKEN: CPT | Performed by: FAMILY MEDICINE

## 2019-05-07 RX ORDER — LISINOPRIL 5 MG/1
TABLET ORAL
Qty: 45 TABLET | Refills: 0 | Status: SHIPPED | OUTPATIENT
Start: 2019-05-07 | End: 2019-08-05 | Stop reason: SDUPTHER

## 2019-05-28 ENCOUNTER — OFFICE VISIT (OUTPATIENT)
Dept: FAMILY MEDICINE CLINIC | Facility: CLINIC | Age: 59
End: 2019-05-28
Payer: COMMERCIAL

## 2019-05-28 VITALS
HEIGHT: 66 IN | TEMPERATURE: 96.6 F | BODY MASS INDEX: 25.39 KG/M2 | RESPIRATION RATE: 16 BRPM | HEART RATE: 80 BPM | OXYGEN SATURATION: 97 % | WEIGHT: 158 LBS | SYSTOLIC BLOOD PRESSURE: 130 MMHG | DIASTOLIC BLOOD PRESSURE: 70 MMHG

## 2019-05-28 DIAGNOSIS — F90.1 ATTENTION DEFICIT HYPERACTIVITY DISORDER (ADHD), PREDOMINANTLY HYPERACTIVE TYPE: ICD-10-CM

## 2019-05-28 DIAGNOSIS — J01.90 ACUTE SINUSITIS, RECURRENCE NOT SPECIFIED, UNSPECIFIED LOCATION: Primary | ICD-10-CM

## 2019-05-28 PROCEDURE — 99214 OFFICE O/P EST MOD 30 MIN: CPT | Performed by: FAMILY MEDICINE

## 2019-05-28 RX ORDER — AMOXICILLIN AND CLAVULANATE POTASSIUM 875; 125 MG/1; MG/1
1 TABLET, FILM COATED ORAL EVERY 12 HOURS SCHEDULED
Qty: 14 TABLET | Refills: 0 | Status: SHIPPED | OUTPATIENT
Start: 2019-05-28 | End: 2019-06-04

## 2019-06-17 ENCOUNTER — OFFICE VISIT (OUTPATIENT)
Dept: FAMILY MEDICINE CLINIC | Facility: CLINIC | Age: 59
End: 2019-06-17
Payer: COMMERCIAL

## 2019-06-17 VITALS
TEMPERATURE: 97.6 F | HEART RATE: 86 BPM | WEIGHT: 157.8 LBS | HEIGHT: 64 IN | RESPIRATION RATE: 17 BRPM | DIASTOLIC BLOOD PRESSURE: 68 MMHG | BODY MASS INDEX: 26.94 KG/M2 | OXYGEN SATURATION: 97 % | SYSTOLIC BLOOD PRESSURE: 112 MMHG

## 2019-06-17 DIAGNOSIS — F90.1 ATTENTION DEFICIT HYPERACTIVITY DISORDER (ADHD), PREDOMINANTLY HYPERACTIVE TYPE: ICD-10-CM

## 2019-06-17 PROCEDURE — 99214 OFFICE O/P EST MOD 30 MIN: CPT | Performed by: FAMILY MEDICINE

## 2019-06-17 PROCEDURE — 3008F BODY MASS INDEX DOCD: CPT | Performed by: FAMILY MEDICINE

## 2019-06-23 DIAGNOSIS — F41.9 ANXIETY DISORDER, UNSPECIFIED TYPE: ICD-10-CM

## 2019-06-24 RX ORDER — ESCITALOPRAM OXALATE 20 MG/1
TABLET ORAL
Qty: 30 TABLET | Refills: 1 | Status: SHIPPED | OUTPATIENT
Start: 2019-06-24 | End: 2019-08-17 | Stop reason: SDUPTHER

## 2019-07-09 ENCOUNTER — HOSPITAL ENCOUNTER (OUTPATIENT)
Dept: RADIOLOGY | Facility: CLINIC | Age: 59
Discharge: HOME | End: 2019-07-09

## 2019-07-09 ENCOUNTER — OFFICE VISIT (OUTPATIENT)
Dept: URGENT CARE | Facility: CLINIC | Age: 59
End: 2019-07-09
Payer: COMMERCIAL

## 2019-07-09 VITALS
SYSTOLIC BLOOD PRESSURE: 118 MMHG | OXYGEN SATURATION: 98 % | DIASTOLIC BLOOD PRESSURE: 76 MMHG | RESPIRATION RATE: 20 BRPM | TEMPERATURE: 97.8 F | HEART RATE: 87 BPM

## 2019-07-09 DIAGNOSIS — S99.921A FOOT INJURY, RIGHT, INITIAL ENCOUNTER: ICD-10-CM

## 2019-07-09 DIAGNOSIS — S92.514A CLOSED NONDISPLACED FRACTURE OF PROXIMAL PHALANX OF LESSER TOE OF RIGHT FOOT, INITIAL ENCOUNTER: Primary | ICD-10-CM

## 2019-07-09 DIAGNOSIS — I10 ESSENTIAL HYPERTENSION: ICD-10-CM

## 2019-07-09 PROCEDURE — S9083 URGENT CARE CENTER GLOBAL: HCPCS | Performed by: PHYSICIAN ASSISTANT

## 2019-07-09 PROCEDURE — G0382 LEV 3 HOSP TYPE B ED VISIT: HCPCS | Performed by: PHYSICIAN ASSISTANT

## 2019-07-09 PROCEDURE — 73630 X-RAY EXAM OF FOOT: CPT

## 2019-07-09 RX ORDER — TRAMADOL HYDROCHLORIDE 50 MG/1
50 TABLET ORAL EVERY 6 HOURS PRN
Qty: 12 TABLET | Refills: 0 | Status: SHIPPED | OUTPATIENT
Start: 2019-07-09 | End: 2020-03-05 | Stop reason: ALTCHOICE

## 2019-07-09 RX ORDER — METOPROLOL SUCCINATE 25 MG/1
25 TABLET, EXTENDED RELEASE ORAL DAILY
Qty: 90 TABLET | Refills: 1 | Status: SHIPPED | OUTPATIENT
Start: 2019-07-09 | End: 2019-08-13 | Stop reason: SDUPTHER

## 2019-07-09 NOTE — PROGRESS NOTES
[facility]    NAME: Griselda Rider is a 66 y o  female  : 1960    MRN: [id number]  DATE: 2019  TIME: 2:14 PM    Assessment and Plan   Closed nondisplaced fracture of proximal phalanx of lesser toe of right foot, initial encounter [S92 514A]  1  Closed nondisplaced fracture of proximal phalanx of lesser toe of right foot, initial encounter  XR foot 3+ vw right    traMADol (ULTRAM) 50 mg tablet     X-ray right foot:  4th toe proximal phalanx with oblique fracture minimal separation no gross displacement  [facility] narcotic website consulted  *Pt currently has Rx other controlled substances  Instructed not to take alprazolam with tramadol  Patient was offered ortho through [email or web address]  Patient has her own orthopedic contacts at [facility] and will call 1 of them  Patient had brought her own surgical shoe  Nurse helped patient apply shoe  Nurse fixed crutches to patient and provided instruction for use of crutches  Patient Instructions     Patient Instructions   Rest injured body part  Ice 10-15 minutes off and on every 2-3 hours while awake for 48 hours after injury  After 48 hours you may start using warm compresses if appropriate  Compression:  ACE wrap for compression and support as needed (if indicated)  DO NOT wear ACE wrap to bed  Elevate injured body part as able to help decrease pain and swelling  Call your orthopedist to arrange follow up evaluation  Recommend ambulate with crutches or walker  Surgical shoe for support of foot  Chief Complaint     Chief Complaint   Patient presents with    Foot Pain     Right foot pain  Collided with son's shoe last night       History of Present Illness   Griselda Rider presents to the clinic c/o  82-year-old female with pain right foot near the base of her 3rd and 4th toes  Ran into her grandson and there feet collided  She felt a pop and pain        Review of Systems   Review of Systems Constitutional: Positive for activity change  Negative for appetite change, chills and fever  Respiratory: Negative  Cardiovascular: Negative  Musculoskeletal: Positive for gait problem and joint swelling  Skin: Positive for color change         Current Medications     Long-Term Medications   Medication Sig Dispense Refill    ALPRAZolam (XANAX) 0 5 mg tablet Take 1 tablet (0 5 mg total) by mouth daily at bedtime as needed for anxiety for up to 30 days 30 tablet 0    aspirin (ECOTRIN LOW STRENGTH) 81 mg EC tablet Take 1 tablet by mouth daily      atorvastatin (LIPITOR) 40 mg tablet TAKE 1 TABLET DAILY 90 tablet 1    Blood Glucose Monitoring Suppl (ONE TOUCH ULTRA MINI) w/Device KIT by Does not apply route      cholecalciferol (VITAMIN D3) 1,000 units tablet Take by mouth      Empagliflozin 10 MG TABS Take 1 tablet (10 mg total) by mouth every morning 30 tablet 0    escitalopram (LEXAPRO) 20 mg tablet TAKE 1 TABLET BY MOUTH EVERY DAY 30 tablet 1    famotidine (PEPCID) 20 mg tablet Take 1 tablet by mouth daily      lisdexamfetamine (VYVANSE) 40 MG capsule Take 1 capsule (40 mg total) by mouth 2 (two) times a day for 30 daysMax Daily Amount: 80 mg 60 capsule 0    lisinopril (ZESTRIL) 5 mg tablet TAKE ONE-HALF (1/2) TABLET DAILY 45 tablet 0    metFORMIN (GLUCOPHAGE-XR) 500 mg 24 hr tablet Take 1 tablet (500 mg total) by mouth 2 (two) times a day 180 tablet 1    metoprolol succinate (TOPROL-XL) 25 mg 24 hr tablet TAKE 1 TABLET DAILY 90 tablet 1    montelukast (SINGULAIR) 10 mg tablet TAKE 1 TABLET (10 MG TOTAL) BY MOUTH DAILY AT BEDTIME FOR ALLERGIES 30 tablet 5    ONETOUCH DELICA LANCETS FINE MISC by Does not apply route daily 100 each 1       Current Allergies     Allergies as of 07/09/2019 - Reviewed 07/09/2019   Allergen Reaction Noted    Levaquin [levofloxacin] Anaphylaxis 05/18/2012    Cyclobenzaprine  05/18/2012          The following portions of the patient's history were reviewed and updated as appropriate: allergies, current medications, past family history, past medical history, past social history, past surgical history and problem list   Past Medical History:   Diagnosis Date    Abnormal mammogram     RESOLVED: [id number]    Arthritis     Diabetes mellitus ([facility] [address] )     Eczema of right external ear     RESOLVED: [id number]    Foot drop, left foot     Heart attack ([facility] [address] )     Hematuria     RESOLVED: [id number]    Impetigo     RESOLVED: [id number]    Insomnia related to another mental disorder     AXIS I/II MENTAL DISORDER; RESOLVED: [id number]     Past Surgical History:   Procedure Laterality Date   31 [facility]    still has 1 ovary not sure which one was removed    OTHER SURGICAL HISTORY      STEND INDICATIONS: RESTENOSIS AT PREVIOUS PTCA LOCATION     SPLENECTOMY      TOTAL HIP ARTHROPLASTY Left      Family History   Problem Relation Age of Onset    Stroke Mother     Osteoporosis Mother     Diabetes type II Father     Heart attack Father     Cancer Father     Heart disease Father     Hypertension Father     Heart disease Sister     Diabetes type II Brother     Heart attack Brother     Heart disease Brother     Heart attack Brother     Heart attack Brother     Heart attack Brother     Diabetes type II Brother     Alcohol abuse Neg Hx     Substance Abuse Neg Hx        Objective   /76   Pulse 87   Temp 97 8 °F (36 6 °C) (Tympanic)   Resp 20   SpO2 98%   No LMP recorded  Patient is postmenopausal        Physical Exam     Physical Exam   Constitutional: She appears well-developed and well-nourished  No distress  Antalgic gait  Walking with cane  Musculoskeletal:        Right foot: There is decreased range of motion, tenderness, bony tenderness and swelling  There is no deformity  Feet:    Neurological: She is alert  Skin: She is not diaphoretic  Contusion dorsal aspect right foot near base of 3rd and 4th toes     Nursing note and vitals reviewed

## 2019-07-09 NOTE — PATIENT INSTRUCTIONS
Rest injured body part  Ice 10-15 minutes off and on every 2-3 hours while awake for 48 hours after injury  After 48 hours you may start using warm compresses if appropriate  Compression:  ACE wrap for compression and support as needed (if indicated)  DO NOT wear ACE wrap to bed  Elevate injured body part as able to help decrease pain and swelling  Call your orthopedist to arrange follow up evaluation  Recommend ambulate with crutches or walker  Surgical shoe for support of foot

## 2019-07-12 DIAGNOSIS — IMO0001 UNCONTROLLED TYPE 2 DIABETES MELLITUS WITHOUT COMPLICATION, WITHOUT LONG-TERM CURRENT USE OF INSULIN: ICD-10-CM

## 2019-07-12 DIAGNOSIS — F90.1 ATTENTION DEFICIT HYPERACTIVITY DISORDER (ADHD), PREDOMINANTLY HYPERACTIVE TYPE: ICD-10-CM

## 2019-07-12 NOTE — TELEPHONE ENCOUNTER
Pt called req a refill    lisdexamfetamine (VYVANSE) 40 mg capsule  Take 1 capsule (40 mg total) by mouth 2 (two) times a day for 30 days   Max Daily Amount: 80 mg  Qty: 60 capsule    metFORMIN (GLUCOPHAGE-XR) 500 mg 24 hr tablet  Take 1 tablet (500 mg total) by mouth 2 (two) times a day  Qty: 180 tablet  Refill: 1    Please send to [facility]

## 2019-07-13 DIAGNOSIS — IMO0001 UNCONTROLLED TYPE 2 DIABETES MELLITUS WITHOUT COMPLICATION, WITHOUT LONG-TERM CURRENT USE OF INSULIN: ICD-10-CM

## 2019-07-14 DIAGNOSIS — E78.2 MIXED HYPERLIPIDEMIA: ICD-10-CM

## 2019-07-14 RX ORDER — METFORMIN HYDROCHLORIDE 500 MG/1
TABLET, EXTENDED RELEASE ORAL
Qty: 60 TABLET | Refills: 0 | Status: SHIPPED | OUTPATIENT
Start: 2019-07-14 | End: 2020-03-05 | Stop reason: SDUPTHER

## 2019-07-14 RX ORDER — METFORMIN HYDROCHLORIDE 500 MG/1
500 TABLET, EXTENDED RELEASE ORAL 2 TIMES DAILY
Qty: 180 TABLET | Refills: 1 | Status: SHIPPED | OUTPATIENT
Start: 2019-07-14 | End: 2019-10-24 | Stop reason: SDUPTHER

## 2019-07-15 RX ORDER — ATORVASTATIN CALCIUM 40 MG/1
TABLET, FILM COATED ORAL
Qty: 90 TABLET | Refills: 1 | Status: SHIPPED | OUTPATIENT
Start: 2019-07-15 | End: 2020-01-11

## 2019-08-05 DIAGNOSIS — I10 ESSENTIAL HYPERTENSION: ICD-10-CM

## 2019-08-05 PROCEDURE — 4010F ACE/ARB THERAPY RXD/TAKEN: CPT | Performed by: FAMILY MEDICINE

## 2019-08-05 RX ORDER — LISINOPRIL 5 MG/1
TABLET ORAL
Qty: 45 TABLET | Refills: 0 | Status: SHIPPED | OUTPATIENT
Start: 2019-08-05 | End: 2019-11-03 | Stop reason: SDUPTHER

## 2019-08-08 ENCOUNTER — OFFICE VISIT (OUTPATIENT)
Dept: FAMILY MEDICINE CLINIC | Facility: CLINIC | Age: 59
End: 2019-08-08
Payer: COMMERCIAL

## 2019-08-08 VITALS
HEIGHT: 66 IN | BODY MASS INDEX: 26.03 KG/M2 | WEIGHT: 162 LBS | SYSTOLIC BLOOD PRESSURE: 118 MMHG | RESPIRATION RATE: 16 BRPM | OXYGEN SATURATION: 96 % | HEART RATE: 83 BPM | DIASTOLIC BLOOD PRESSURE: 72 MMHG | TEMPERATURE: 96.8 F

## 2019-08-08 DIAGNOSIS — F90.1 ATTENTION DEFICIT HYPERACTIVITY DISORDER (ADHD), PREDOMINANTLY HYPERACTIVE TYPE: ICD-10-CM

## 2019-08-08 DIAGNOSIS — J31.0 CHRONIC RHINITIS: Primary | ICD-10-CM

## 2019-08-08 PROCEDURE — 99213 OFFICE O/P EST LOW 20 MIN: CPT | Performed by: FAMILY MEDICINE

## 2019-08-08 PROCEDURE — 3008F BODY MASS INDEX DOCD: CPT | Performed by: FAMILY MEDICINE

## 2019-08-08 NOTE — PROGRESS NOTES
Assessment/Plan:   1  Chronic rhinitis  Reviewed patient's symptoms today  Symptoms appear likely secondary to chronic rhinitis  Continue supportive care  Maintain hydration  She may use over-the-counter saline nasal spray for symptom relief  Will hold off on starting antibiotics at this point  If symptoms are not improving, will consider antibiotic treatment  There are no diagnoses linked to this encounter  Subjective:    Chief Complaint   Patient presents with    Nasal Congestion    Eye Problem        Patient ID: Ari Connell is a 66 y o  female  Sinus Problem   This is a new problem  The current episode started in the past 7 days  The problem is unchanged  There has been no fever  Her pain is at a severity of 5/10  The pain is mild  Associated symptoms include ear pain, sinus pressure and sneezing  Pertinent negatives include no chills, congestion, coughing, headaches, shortness of breath or sore throat  Past treatments include saline sprays (zyrtec, guafenisen )  The treatment provided mild relief  Review of Systems   Constitutional: Negative for activity change, chills, fatigue and fever  HENT: Positive for ear pain, sinus pressure and sneezing  Negative for congestion and sore throat  Eyes: Negative for redness, itching and visual disturbance  Respiratory: Negative for cough and shortness of breath  Cardiovascular: Negative for chest pain and palpitations  Gastrointestinal: Negative for abdominal pain, diarrhea and nausea  Endocrine: Negative for cold intolerance and heat intolerance  Genitourinary: Negative for dysuria, flank pain and frequency  Musculoskeletal: Negative for arthralgias, back pain, gait problem and myalgias  Skin: Negative for color change  Allergic/Immunologic: Negative for environmental allergies  Neurological: Negative for dizziness, numbness and headaches  Psychiatric/Behavioral: Negative for behavioral problems and sleep disturbance  The following portions of the patient's history were reviewed and updated as appropriate : past family history, past medical history, past social history and past surgical history        Current Outpatient Medications:     aspirin (ECOTRIN LOW STRENGTH) 81 mg EC tablet, Take 1 tablet by mouth daily, Disp: , Rfl:     atorvastatin (LIPITOR) 40 mg tablet, TAKE 1 TABLET DAILY, Disp: 90 tablet, Rfl: 1    Blood Glucose Monitoring Suppl (ONE TOUCH ULTRA MINI) w/Device KIT, by Does not apply route, Disp: , Rfl:     cholecalciferol (VITAMIN D3) 1,000 units tablet, Take by mouth, Disp: , Rfl:     escitalopram (LEXAPRO) 20 mg tablet, TAKE 1 TABLET BY MOUTH EVERY DAY, Disp: 30 tablet, Rfl: 1    famotidine (PEPCID) 20 mg tablet, Take 1 tablet by mouth daily, Disp: , Rfl:     glucose blood (ONE TOUCH ULTRA TEST) test strip, 1 each by Other route 2 (two) times a day, Disp: 100 each, Rfl: 0    lisdexamfetamine (VYVANSE) 40 MG capsule, Take 1 capsule (40 mg total) by mouth 2 (two) times a day for 30 daysMax Daily Amount: 80 mg, Disp: 60 capsule, Rfl: 0    lisinopril (ZESTRIL) 5 mg tablet, TAKE ONE-HALF (1/2) TABLET DAILY, Disp: 45 tablet, Rfl: 0    metFORMIN (GLUCOPHAGE-XR) 500 mg 24 hr tablet, Take 1 tablet (500 mg total) by mouth 2 (two) times a day, Disp: 180 tablet, Rfl: 1    metFORMIN (GLUCOPHAGE-XR) 500 mg 24 hr tablet, TAKE 1 TABLET BY MOUTH TWICE A DAY, Disp: 60 tablet, Rfl: 0    metoprolol succinate (TOPROL-XL) 25 mg 24 hr tablet, Take 1 tablet (25 mg total) by mouth daily, Disp: 90 tablet, Rfl: 1    ONETOUCH DELICA LANCETS FINE MISC, by Does not apply route daily, Disp: 100 each, Rfl: 1    ALPRAZolam (XANAX) 0 5 mg tablet, Take 1 tablet (0 5 mg total) by mouth daily at bedtime as needed for anxiety for up to 30 days (Patient not taking: Reported on 8/8/2019), Disp: 30 tablet, Rfl: 0    Empagliflozin 10 MG TABS, Take 1 tablet (10 mg total) by mouth every morning, Disp: 30 tablet, Rfl: 0    montelukast (SINGULAIR) 10 mg tablet, TAKE 1 TABLET (10 MG TOTAL) BY MOUTH DAILY AT BEDTIME FOR ALLERGIES (Patient not taking: Reported on 8/8/2019), Disp: 30 tablet, Rfl: 5    promethazine-codeine (PHENERGAN WITH CODEINE) 6 25-10 mg/5 mL syrup, Take 5 mL by mouth daily at bedtime as needed for cough (Patient not taking: Reported on 8/8/2019), Disp: 120 mL, Rfl: 0    traMADol (ULTRAM) 50 mg tablet, Take 1 tablet (50 mg total) by mouth every 12 (twelve) hours as needed for moderate pain (Patient not taking: Reported on 8/8/2019), Disp: 30 tablet, Rfl: 0    traMADol (ULTRAM) 50 mg tablet, Take 1 tablet (50 mg total) by mouth every 6 (six) hours as needed for moderate pain (Patient not taking: Reported on 8/8/2019), Disp: 12 tablet, Rfl: 0    Objective:    Vitals:    08/08/19 1123   BP: 118/72   BP Location: Left arm   Patient Position: Sitting   Pulse: 83   Resp: 16   Temp: (!) 96 8 °F (36 °C)   TempSrc: Tympanic   SpO2: 96%   Weight: 73 5 kg (162 lb)   Height: 5' 6" (1 676 m)        Physical Exam   Constitutional: She is oriented to person, place, and time  She appears well-developed and well-nourished  HENT:   Head: Normocephalic and atraumatic  Nose: Nose normal    Mouth/Throat: No oropharyngeal exudate  Eyes: Pupils are equal, round, and reactive to light  Right eye exhibits no discharge  Left eye exhibits no discharge  Neck: Normal range of motion  Neck supple  No tracheal deviation present  Cardiovascular: Normal rate, regular rhythm and intact distal pulses  Exam reveals no gallop and no friction rub  No murmur heard  Pulses:       Dorsalis pedis pulses are 2+ on the right side, and 2+ on the left side  Posterior tibial pulses are 2+ on the right side, and 2+ on the left side  Pulmonary/Chest: Effort normal and breath sounds normal  No respiratory distress  She has no wheezes  She has no rales  Abdominal: Soft  Bowel sounds are normal  She exhibits no distension  There is no tenderness   There is no rebound and no guarding  Musculoskeletal: Normal range of motion  She exhibits no edema  Lymphadenopathy:        Head (right side): No submental and no submandibular adenopathy present  Head (left side): No submental and no submandibular adenopathy present  She has no cervical adenopathy  Right cervical: No superficial cervical, no deep cervical and no posterior cervical adenopathy present  Left cervical: No superficial cervical, no deep cervical and no posterior cervical adenopathy present  Neurological: She is alert and oriented to person, place, and time  No cranial nerve deficit or sensory deficit  Skin: Skin is warm, dry and intact  Psychiatric: Her speech is normal and behavior is normal  Judgment normal  Her mood appears not anxious  Cognition and memory are normal  She does not exhibit a depressed mood  Vitals reviewed

## 2019-08-09 ENCOUNTER — TELEPHONE (OUTPATIENT)
Dept: FAMILY MEDICINE CLINIC | Facility: CLINIC | Age: 59
End: 2019-08-09

## 2019-08-09 DIAGNOSIS — F41.9 ANXIETY DISORDER, UNSPECIFIED TYPE: ICD-10-CM

## 2019-08-09 RX ORDER — ALPRAZOLAM 0.5 MG/1
0.5 TABLET ORAL
Qty: 30 TABLET | Refills: 0 | Status: SHIPPED | OUTPATIENT
Start: 2019-08-09 | End: 2020-02-04 | Stop reason: SDUPTHER

## 2019-08-13 DIAGNOSIS — I10 ESSENTIAL HYPERTENSION: ICD-10-CM

## 2019-08-13 RX ORDER — METOPROLOL SUCCINATE 25 MG/1
TABLET, EXTENDED RELEASE ORAL
Qty: 90 TABLET | Refills: 1 | Status: SHIPPED | OUTPATIENT
Start: 2019-08-13 | End: 2020-03-10

## 2019-08-17 DIAGNOSIS — F41.9 ANXIETY DISORDER, UNSPECIFIED TYPE: ICD-10-CM

## 2019-08-19 RX ORDER — ESCITALOPRAM OXALATE 20 MG/1
TABLET ORAL
Qty: 30 TABLET | Refills: 1 | Status: SHIPPED | OUTPATIENT
Start: 2019-08-19 | End: 2019-10-24 | Stop reason: SDUPTHER

## 2019-08-30 ENCOUNTER — OFFICE VISIT (OUTPATIENT)
Dept: URGENT CARE | Facility: CLINIC | Age: 59
End: 2019-08-30
Payer: COMMERCIAL

## 2019-08-30 VITALS
SYSTOLIC BLOOD PRESSURE: 128 MMHG | WEIGHT: 160 LBS | HEIGHT: 65 IN | OXYGEN SATURATION: 97 % | HEART RATE: 90 BPM | BODY MASS INDEX: 26.66 KG/M2 | TEMPERATURE: 97.9 F | RESPIRATION RATE: 22 BRPM | DIASTOLIC BLOOD PRESSURE: 60 MMHG

## 2019-08-30 DIAGNOSIS — J06.9 ACUTE URI: ICD-10-CM

## 2019-08-30 DIAGNOSIS — H92.01 OTALGIA, RIGHT: ICD-10-CM

## 2019-08-30 DIAGNOSIS — J02.9 PHARYNGITIS, UNSPECIFIED ETIOLOGY: Primary | ICD-10-CM

## 2019-08-30 LAB — S PYO AG THROAT QL: NEGATIVE

## 2019-08-30 PROCEDURE — 99213 OFFICE O/P EST LOW 20 MIN: CPT | Performed by: PHYSICIAN ASSISTANT

## 2019-08-30 PROCEDURE — 87070 CULTURE OTHR SPECIMN AEROBIC: CPT | Performed by: PHYSICIAN ASSISTANT

## 2019-08-30 PROCEDURE — 87880 STREP A ASSAY W/OPTIC: CPT | Performed by: PHYSICIAN ASSISTANT

## 2019-08-30 RX ORDER — DEXTROMETHORPHAN HYDROBROMIDE AND PROMETHAZINE HYDROCHLORIDE 15; 6.25 MG/5ML; MG/5ML
SYRUP ORAL
Qty: 120 ML | Refills: 0 | Status: SHIPPED | OUTPATIENT
Start: 2019-08-30 | End: 2019-12-21

## 2019-08-30 RX ORDER — AMOXICILLIN 500 MG/1
500 TABLET, FILM COATED ORAL 3 TIMES DAILY
Qty: 30 TABLET | Refills: 0 | Status: SHIPPED | OUTPATIENT
Start: 2019-08-30 | End: 2019-09-09

## 2019-08-30 NOTE — PATIENT INSTRUCTIONS
Push fluids  Tylenol as needed for throat and ear pain  Warm compresses against ear may be soothing  Warm saltwater gargles  Throat swab will be sent for culture  Results take approximately 48-72 hours to return  If significant worsening of sore throat to the point of" hot potato" voice or drooling please proceed to emergency room for immediate evaluation  Otherwise follow with your primary care provider at her next upcoming appointment

## 2019-08-30 NOTE — PROGRESS NOTES
[address] [facility]    NAME: Naina Powell is a 64 y o  female  : 1960    MRN: [id number]  DATE: 2019  TIME: 3:06 PM    Assessment and Plan   Pharyngitis, unspecified etiology [J02 9]  1  Pharyngitis, unspecified etiology  amoxicillin (AMOXIL) 500 MG tablet    POCT rapid strepA    Throat culture   2  Otalgia, right     3  Acute URI  promethazine-dextromethorphan (PHENERGAN-DM) 6 25-15 mg/5 mL oral syrup       Patient Instructions     Patient Instructions   Push fluids  Tylenol as needed for throat and ear pain  Warm compresses against ear may be soothing  Warm saltwater gargles  Throat swab will be sent for culture  Results take approximately 48-72 hours to return  If significant worsening of sore throat to the point of" hot potato" voice or drooling please proceed to emergency room for immediate evaluation  Otherwise follow with your primary care provider at her next upcoming appointment  Chief Complaint     Chief Complaint   Patient presents with    Earache     Patient has right each pain for 4 days that is getting worse  Patient also complains that the right side of her neck hurts and she is having a hard time swallowing  History of Present Illness   Naina Powell presents to the clinic c/o  65-year-old female with right-sided ear ache and sore throat that has been persisting for the last 7 days  She reports that she was all around a lot of family members who had respiratory illnesses  Her mother-in-law's 80th birthday with celebrated last weekend  Patient says she is run down from doing a lot of traveling this summer  Her blood sugars are a little more elevated than normal   She is having difficult time sleeping due to cough  Throat hurts with cough  Pain extends down to the right side of her neck and her glands feel swollen  Review of Systems   Review of Systems   Constitutional: Positive for activity change, appetite change and fatigue   Negative for chills and fever  HENT: Positive for ear pain, postnasal drip, sore throat and trouble swallowing  Negative for congestion, ear discharge, facial swelling, mouth sores, rhinorrhea, sinus pressure, sinus pain, sneezing and voice change  Eyes: Negative  Respiratory: Positive for cough  Negative for apnea, choking, chest tightness, shortness of breath, wheezing and stridor  Cardiovascular: Negative  Skin:        No acute changes   Hematological: Positive for adenopathy         Current Medications     Long-Term Medications   Medication Sig Dispense Refill    ALPRAZolam (XANAX) 0 5 mg tablet Take 1 tablet (0 5 mg total) by mouth daily at bedtime as needed for anxiety for up to 160 days 30 tablet 0    aspirin (ECOTRIN LOW STRENGTH) 81 mg EC tablet Take 1 tablet by mouth daily      atorvastatin (LIPITOR) 40 mg tablet TAKE 1 TABLET DAILY 90 tablet 1    Blood Glucose Monitoring Suppl (ONE TOUCH ULTRA MINI) w/Device KIT by Does not apply route      cholecalciferol (VITAMIN D3) 1,000 units tablet Take by mouth      Empagliflozin 10 MG TABS Take 1 tablet (10 mg total) by mouth every morning 30 tablet 0    escitalopram (LEXAPRO) 20 mg tablet TAKE 1 TABLET BY MOUTH EVERY DAY 30 tablet 1    famotidine (PEPCID) 20 mg tablet Take 1 tablet by mouth daily      lisdexamfetamine (VYVANSE) 40 MG capsule Take 1 capsule (40 mg total) by mouth every morning for 30 daysMax Daily Amount: 40 mg 30 capsule 0    lisinopril (ZESTRIL) 5 mg tablet TAKE ONE-HALF (1/2) TABLET DAILY 45 tablet 0    metFORMIN (GLUCOPHAGE-XR) 500 mg 24 hr tablet Take 1 tablet (500 mg total) by mouth 2 (two) times a day 180 tablet 1    metFORMIN (GLUCOPHAGE-XR) 500 mg 24 hr tablet TAKE 1 TABLET BY MOUTH TWICE A DAY 60 tablet 0    metoprolol succinate (TOPROL-XL) 25 mg 24 hr tablet TAKE 1 TABLET DAILY 90 tablet 1    ONETOUCH DELICA LANCETS FINE MISC by Does not apply route daily 100 each 1    lisdexamfetamine (VYVANSE) 30 MG capsule Take 1 capsule (30 mg total) by mouth every evening With 40 mg taken in the a  m  Max Daily Amount: 30 mg 30 capsule 0    montelukast (SINGULAIR) 10 mg tablet TAKE 1 TABLET (10 MG TOTAL) BY MOUTH DAILY AT BEDTIME FOR ALLERGIES (Patient not taking: Reported on 8/8/2019) 30 tablet 5       Current Allergies     Allergies as of 08/30/2019 - Reviewed 08/30/2019   Allergen Reaction Noted    Levaquin [levofloxacin] Anaphylaxis 05/18/2012    Cyclobenzaprine  05/18/2012          The following portions of the patient's history were reviewed and updated as appropriate: allergies, current medications, past family history, past medical history, past social history, past surgical history and problem list   Past Medical History:   Diagnosis Date    Abnormal mammogram     RESOLVED: [id number]    Arthritis     Diabetes mellitus ([facility] [address] )     Eczema of right external ear     RESOLVED: [id number]    Foot drop, left foot     Heart attack ([facility] [address] )     Hematuria     RESOLVED: [id number]    Impetigo     RESOLVED: [id number]    Insomnia related to another mental disorder     AXIS I/II MENTAL DISORDER; RESOLVED: [id number]     Past Surgical History:   Procedure Laterality Date    HYSTERECTOMY  1998    still has 1 ovary not sure which one was removed    OTHER SURGICAL HISTORY      STEND INDICATIONS: RESTENOSIS AT PREVIOUS PTCA LOCATION     SPLENECTOMY      TOTAL HIP ARTHROPLASTY Left      Family History   Problem Relation Age of Onset    Stroke Mother     Osteoporosis Mother     Diabetes type II Father     Heart attack Father     Cancer Father     Heart disease Father     Hypertension Father     Heart disease Sister     Diabetes type II Brother     Heart attack Brother     Heart disease Brother     Heart attack Brother     Heart attack Brother     Heart attack Brother     Diabetes type II Brother     Alcohol abuse Neg Hx     Substance Abuse Neg Hx        Objective   /60 (BP Location: Left arm, Patient Position: Sitting, Cuff Size: Adult)   Pulse 90   Temp 97 9 °F (36 6 °C)   Resp 22   Ht 5' 5" (1 651 m)   Wt 72 6 kg (160 lb)   SpO2 97%   BMI 26 63 kg/m²   No LMP recorded  Patient is postmenopausal        Physical Exam     Physical Exam   Constitutional: She is oriented to person, place, and time  She appears well-developed and well-nourished  No distress  HENT:   Head: Normocephalic and atraumatic  Right Ear: External ear normal    Left Ear: External ear normal    Nose: Nose normal    Mouth/Throat: No oropharyngeal exudate  Generalized redness of the tonsil pharyngeal, uvula region  Uvula midline  No obvious exudate or fetid breath  Right ear: slightly tender  No swelling of R  EAC  Some wax noted  R  TM normal    Eyes: Pupils are equal, round, and reactive to light  Conjunctivae and EOM are normal  Right eye exhibits no discharge  Left eye exhibits no discharge  Neck: Normal range of motion  Neck supple  Mild right anterior cervical lymphadenopathy with TTP  No supraclavicular lymphadenopathy  Cardiovascular: Normal rate, regular rhythm and normal heart sounds  Exam reveals no gallop and no friction rub  No murmur heard  Pulmonary/Chest: Effort normal and breath sounds normal  No stridor  No respiratory distress  She has no wheezes  She has no rales  Lymphadenopathy:     She has no cervical adenopathy  Neurological: She is alert and oriented to person, place, and time  Skin: Skin is warm and dry  No rash noted  She is not diaphoretic  Psychiatric: She has a normal mood and affect  Nursing note and vitals reviewed

## 2019-09-01 ENCOUNTER — TELEPHONE (OUTPATIENT)
Dept: URGENT CARE | Facility: CLINIC | Age: 59
End: 2019-09-01

## 2019-09-01 LAB — BACTERIA THROAT CULT: NORMAL

## 2019-09-01 NOTE — TELEPHONE ENCOUNTER
Call to patient  Throat culture did not show bacterial growth  She has been taking the antibiotic  She says she is feeling so much better  Cough is gone away and her ear pain has resolved  She may finish antibiotic  She expresses understanding

## 2019-09-03 ENCOUNTER — OFFICE VISIT (OUTPATIENT)
Dept: FAMILY MEDICINE CLINIC | Facility: CLINIC | Age: 59
End: 2019-09-03
Payer: COMMERCIAL

## 2019-09-03 VITALS
DIASTOLIC BLOOD PRESSURE: 76 MMHG | BODY MASS INDEX: 26.26 KG/M2 | HEART RATE: 87 BPM | RESPIRATION RATE: 16 BRPM | WEIGHT: 163.4 LBS | TEMPERATURE: 97.5 F | HEIGHT: 66 IN | SYSTOLIC BLOOD PRESSURE: 134 MMHG | OXYGEN SATURATION: 96 %

## 2019-09-03 DIAGNOSIS — F41.9 ANXIETY DISORDER, UNSPECIFIED TYPE: ICD-10-CM

## 2019-09-03 DIAGNOSIS — I10 BENIGN ESSENTIAL HYPERTENSION: ICD-10-CM

## 2019-09-03 DIAGNOSIS — F90.1 ATTENTION DEFICIT HYPERACTIVITY DISORDER (ADHD), PREDOMINANTLY HYPERACTIVE TYPE: ICD-10-CM

## 2019-09-03 DIAGNOSIS — IMO0001 UNCONTROLLED TYPE 2 DIABETES MELLITUS WITHOUT COMPLICATION, WITHOUT LONG-TERM CURRENT USE OF INSULIN: Primary | ICD-10-CM

## 2019-09-03 DIAGNOSIS — E78.2 MIXED HYPERLIPIDEMIA: ICD-10-CM

## 2019-09-03 PROCEDURE — 3075F SYST BP GE 130 - 139MM HG: CPT | Performed by: FAMILY MEDICINE

## 2019-09-03 PROCEDURE — 3078F DIAST BP <80 MM HG: CPT | Performed by: FAMILY MEDICINE

## 2019-09-03 PROCEDURE — 99214 OFFICE O/P EST MOD 30 MIN: CPT | Performed by: FAMILY MEDICINE

## 2019-09-03 NOTE — PROGRESS NOTES
Assessment/Plan:   1  Attention deficit hyperactivity disorder (ADHD), predominantly hyperactive type  Patient's symptoms have been very well controlled  She states that her current dose of 30 mg as well as 40 mg appears to be effective for her  Will continue at this current dose  Goal for patient is to continue to decrease the total amount of this medication  Will continue follow-up with her at her upcoming visit  - lisdexamfetamine (VYVANSE) 30 MG capsule; Take 1 capsule (30 mg total) by mouth every evening With 40 mg taken in the a  m  Max Daily Amount: 30 mg  Dispense: 30 capsule; Refill: 0  - lisdexamfetamine (VYVANSE) 40 MG capsule; Take 1 capsule (40 mg total) by mouth every morning for 30 daysMax Daily Amount: 40 mg  Dispense: 30 capsule; Refill: 0    2  Uncontrolled type 2 diabetes mellitus without complication, without long-term current use of insulin ([facility] [address] )  Unclear precise control  recheck blood work including CMP, A1c, microalbumin, lipid panel and TSH  Continue with a strict diabetic diet  Continue with current treatment of metformin as well as her Jardiance  3  Benign essential hypertension  Blood pressure stable today  Continue with routine home monitoring  Continue as well with current treatment of metoprolol, lisinopril  4  Mixed hyperlipidemia  Recheck fasting lipid panel with a strict low-fat/low-cholesterol diet tomorrow as well with current treatment atorvastatin  5  Anxiety disorder, unspecified type  Symptoms appears stable today  Patient has been having multiple home stressors specifically for her children  At this time, will continue with current treatment of Lexapro as well as her p r n  Alprazolam   Follow-up in 6 month     Diagnoses and all orders for this visit:    Attention deficit hyperactivity disorder (ADHD), predominantly hyperactive type  -     lisdexamfetamine (VYVANSE) 30 MG capsule;  Take 1 capsule (30 mg total) by mouth every evening With 40 mg taken in the a m Max Daily Amount: 30 mg  -     lisdexamfetamine (VYVANSE) 40 MG capsule; Take 1 capsule (40 mg total) by mouth every morning for 30 daysMax Daily Amount: 40 mg          Subjective:    Chief Complaint   Patient presents with    Follow-up     Pt presents for a 6 month f/u  Patient ID: Terri Bocanegra is a 64 y o  female  Patient is a 31-year-old female presents today for follow-up on chronic conditions  She has type 2 diabetes, hypertension, dyslipidemia, anxiety disorder as well as ADHD  She has been taking her medications regularly  She denies adverse reactions with medications  Review of Systems   Constitutional: Negative for activity change, chills, fatigue and fever  HENT: Negative for congestion, ear pain, sinus pressure and sore throat  Eyes: Negative for redness, itching and visual disturbance  Respiratory: Negative for cough and shortness of breath  Cardiovascular: Negative for chest pain and palpitations  Gastrointestinal: Negative for abdominal pain, diarrhea and nausea  Endocrine: Negative for cold intolerance and heat intolerance  Genitourinary: Negative for dysuria, flank pain and frequency  Musculoskeletal: Negative for arthralgias, back pain, gait problem and myalgias  Skin: Negative for color change  Allergic/Immunologic: Negative for environmental allergies  Neurological: Negative for dizziness, numbness and headaches  Psychiatric/Behavioral: Negative for behavioral problems and sleep disturbance  The following portions of the patient's history were reviewed and updated as appropriate : past family history, past medical history, past social history and past surgical history        Current Outpatient Medications:     ALPRAZolam (XANAX) 0 5 mg tablet, Take 1 tablet (0 5 mg total) by mouth daily at bedtime as needed for anxiety for up to 160 days, Disp: 30 tablet, Rfl: 0    amoxicillin (AMOXIL) 500 MG tablet, Take 1 tablet (500 mg total) by mouth 3 (three) times a day for 10 days, Disp: 30 tablet, Rfl: 0    aspirin (ECOTRIN LOW STRENGTH) 81 mg EC tablet, Take 1 tablet by mouth daily, Disp: , Rfl:     atorvastatin (LIPITOR) 40 mg tablet, TAKE 1 TABLET DAILY, Disp: 90 tablet, Rfl: 1    Blood Glucose Monitoring Suppl (ONE TOUCH ULTRA MINI) w/Device KIT, by Does not apply route, Disp: , Rfl:     cholecalciferol (VITAMIN D3) 1,000 units tablet, Take by mouth, Disp: , Rfl:     Empagliflozin 10 MG TABS, Take 1 tablet (10 mg total) by mouth every morning, Disp: 30 tablet, Rfl: 0    escitalopram (LEXAPRO) 20 mg tablet, TAKE 1 TABLET BY MOUTH EVERY DAY, Disp: 30 tablet, Rfl: 1    famotidine (PEPCID) 20 mg tablet, Take 1 tablet by mouth daily, Disp: , Rfl:     glucose blood (ONE TOUCH ULTRA TEST) test strip, 1 each by Other route 2 (two) times a day, Disp: 100 each, Rfl: 0    lisdexamfetamine (VYVANSE) 30 MG capsule, Take 1 capsule (30 mg total) by mouth every evening With 40 mg taken in the a  m  Max Daily Amount: 30 mg, Disp: 30 capsule, Rfl: 0    lisdexamfetamine (VYVANSE) 40 MG capsule, Take 1 capsule (40 mg total) by mouth every morning for 30 daysMax Daily Amount: 40 mg, Disp: 30 capsule, Rfl: 0    lisinopril (ZESTRIL) 5 mg tablet, TAKE ONE-HALF (1/2) TABLET DAILY, Disp: 45 tablet, Rfl: 0    metFORMIN (GLUCOPHAGE-XR) 500 mg 24 hr tablet, Take 1 tablet (500 mg total) by mouth 2 (two) times a day, Disp: 180 tablet, Rfl: 1    metFORMIN (GLUCOPHAGE-XR) 500 mg 24 hr tablet, TAKE 1 TABLET BY MOUTH TWICE A DAY, Disp: 60 tablet, Rfl: 0    metoprolol succinate (TOPROL-XL) 25 mg 24 hr tablet, TAKE 1 TABLET DAILY, Disp: 90 tablet, Rfl: 1    ONETOUCH DELICA LANCETS FINE MISC, by Does not apply route daily, Disp: 100 each, Rfl: 1    montelukast (SINGULAIR) 10 mg tablet, TAKE 1 TABLET (10 MG TOTAL) BY MOUTH DAILY AT BEDTIME FOR ALLERGIES (Patient not taking: Reported on 8/8/2019), Disp: 30 tablet, Rfl: 5    promethazine-codeine (PHENERGAN WITH CODEINE) 6 25-10 mg/5 mL syrup, Take 5 mL by mouth daily at bedtime as needed for cough (Patient not taking: Reported on 8/8/2019), Disp: 120 mL, Rfl: 0    promethazine-dextromethorphan (PHENERGAN-DM) 6 25-15 mg/5 mL oral syrup, 5 mL Q 4-6 hours or HS p r n  cough  Home use only  (Patient not taking: Reported on 9/3/2019), Disp: 120 mL, Rfl: 0    traMADol (ULTRAM) 50 mg tablet, Take 1 tablet (50 mg total) by mouth every 12 (twelve) hours as needed for moderate pain (Patient not taking: Reported on 8/8/2019), Disp: 30 tablet, Rfl: 0    traMADol (ULTRAM) 50 mg tablet, Take 1 tablet (50 mg total) by mouth every 6 (six) hours as needed for moderate pain (Patient not taking: Reported on 8/8/2019), Disp: 12 tablet, Rfl: 0    Objective:    Vitals:    09/03/19 1525   BP: 134/76   BP Location: Left arm   Patient Position: Sitting   Cuff Size: Standard   Pulse: 87   Resp: 16   Temp: 97 5 °F (36 4 °C)   TempSrc: Tympanic   SpO2: 96%   Weight: 74 1 kg (163 lb 6 4 oz)   Height: 5' 5 75" (1 67 m)        Physical Exam   Constitutional: She is oriented to person, place, and time  She appears well-developed and well-nourished  HENT:   Head: Normocephalic and atraumatic  Nose: Nose normal    Mouth/Throat: No oropharyngeal exudate  Eyes: Pupils are equal, round, and reactive to light  Right eye exhibits no discharge  Left eye exhibits no discharge  Neck: Normal range of motion  Neck supple  No tracheal deviation present  Cardiovascular: Normal rate, regular rhythm and intact distal pulses  Exam reveals no gallop and no friction rub  No murmur heard  Pulses:       Dorsalis pedis pulses are 2+ on the right side, and 2+ on the left side  Posterior tibial pulses are 2+ on the right side, and 2+ on the left side  Pulmonary/Chest: Effort normal and breath sounds normal  No respiratory distress  She has no wheezes  She has no rales  Abdominal: Soft  Bowel sounds are normal  She exhibits no distension  There is no tenderness   There is no rebound and no guarding  Musculoskeletal: Normal range of motion  She exhibits no edema  Lymphadenopathy:        Head (right side): No submental and no submandibular adenopathy present  Head (left side): No submental and no submandibular adenopathy present  She has no cervical adenopathy  Right cervical: No superficial cervical, no deep cervical and no posterior cervical adenopathy present  Left cervical: No superficial cervical, no deep cervical and no posterior cervical adenopathy present  Neurological: She is alert and oriented to person, place, and time  No cranial nerve deficit or sensory deficit  Skin: Skin is warm, dry and intact  Psychiatric: Her speech is normal and behavior is normal  Judgment normal  Her mood appears not anxious  Cognition and memory are normal  She does not exhibit a depressed mood  Vitals reviewed

## 2019-09-27 DIAGNOSIS — F90.1 ATTENTION DEFICIT HYPERACTIVITY DISORDER (ADHD), PREDOMINANTLY HYPERACTIVE TYPE: ICD-10-CM

## 2019-09-27 LAB
1,5-ANHYDROGLUCITOL SERPL-MCNC: 10.8 MCG/ML (ref 7.5–28.4)
ALBUMIN SERPL-MCNC: 4.7 G/DL (ref 3.6–5.1)
ALBUMIN/GLOB SERPL: 1.6 (CALC) (ref 1–2.5)
ALP SERPL-CCNC: 69 U/L (ref 33–130)
ALT SERPL-CCNC: 22 U/L (ref 6–29)
AST SERPL-CCNC: 16 U/L (ref 10–35)
BILIRUB SERPL-MCNC: 0.6 MG/DL (ref 0.2–1.2)
BUN SERPL-MCNC: 15 MG/DL (ref 7–25)
BUN/CREAT SERPL: ABNORMAL (CALC) (ref 6–22)
CALCIUM SERPL-MCNC: 10.4 MG/DL (ref 8.6–10.4)
CHLORIDE SERPL-SCNC: 99 MMOL/L (ref 98–110)
CHOLEST SERPL-MCNC: 183 MG/DL
CHOLEST/HDLC SERPL: 4.2 (CALC)
CO2 SERPL-SCNC: 28 MMOL/L (ref 20–32)
CREAT SERPL-MCNC: 0.84 MG/DL (ref 0.5–1.05)
GLOBULIN SER CALC-MCNC: 2.9 G/DL (CALC) (ref 1.9–3.7)
GLUCOSE SERPL-MCNC: 155 MG/DL (ref 65–99)
HBA1C MFR BLD: 6.7 % OF TOTAL HGB
HDLC SERPL-MCNC: 44 MG/DL
LDLC SERPL CALC-MCNC: 111 MG/DL (CALC)
NONHDLC SERPL-MCNC: 139 MG/DL (CALC)
POTASSIUM SERPL-SCNC: 4.9 MMOL/L (ref 3.5–5.3)
PROT SERPL-MCNC: 7.6 G/DL (ref 6.1–8.1)
SL AMB EGFR AFRICAN AMERICAN: 89 ML/MIN/1.73M2
SL AMB EGFR NON AFRICAN AMERICAN: 77 ML/MIN/1.73M2
SODIUM SERPL-SCNC: 138 MMOL/L (ref 135–146)
TRIGL SERPL-MCNC: 161 MG/DL
TSH SERPL-ACNC: 2.66 MIU/L (ref 0.4–4.5)

## 2019-10-01 DIAGNOSIS — F90.1 ATTENTION DEFICIT HYPERACTIVITY DISORDER (ADHD), PREDOMINANTLY HYPERACTIVE TYPE: ICD-10-CM

## 2019-10-01 NOTE — TELEPHONE ENCOUNTER
Pt would like refill of 30mg Vyvanse  Per task 9/27/2019 this is okay  Please send to pharmacy, pt leaves out of today Tuesday morning

## 2019-10-01 NOTE — TELEPHONE ENCOUNTER
Patient called the office requesting a refill for her lisdexamfetamine (vyvanse) 30 mg be called into her [facility] pharmacy on   Patient is going out of town and is waiting on her rx    Please advise

## 2019-10-07 LAB
ALBUMIN/CREAT UR: 11 MCG/MG CREAT
CREAT UR-MCNC: 101 MG/DL (ref 20–275)
MICROALBUMIN UR-MCNC: 1.1 MG/DL

## 2019-10-10 ENCOUNTER — OFFICE VISIT (OUTPATIENT)
Dept: FAMILY MEDICINE CLINIC | Facility: CLINIC | Age: 59
End: 2019-10-10
Payer: COMMERCIAL

## 2019-10-10 VITALS
WEIGHT: 160.6 LBS | BODY MASS INDEX: 26.76 KG/M2 | HEART RATE: 65 BPM | DIASTOLIC BLOOD PRESSURE: 66 MMHG | TEMPERATURE: 96.6 F | SYSTOLIC BLOOD PRESSURE: 122 MMHG | HEIGHT: 65 IN | OXYGEN SATURATION: 98 % | RESPIRATION RATE: 16 BRPM

## 2019-10-10 DIAGNOSIS — F90.1 ATTENTION DEFICIT HYPERACTIVITY DISORDER (ADHD), PREDOMINANTLY HYPERACTIVE TYPE: ICD-10-CM

## 2019-10-10 DIAGNOSIS — Z23 NEED FOR IMMUNIZATION AGAINST INFLUENZA: ICD-10-CM

## 2019-10-10 DIAGNOSIS — J01.90 ACUTE SINUSITIS, RECURRENCE NOT SPECIFIED, UNSPECIFIED LOCATION: Primary | ICD-10-CM

## 2019-10-10 DIAGNOSIS — Z72.0 CURRENT TOBACCO USE: ICD-10-CM

## 2019-10-10 PROCEDURE — 90682 RIV4 VACC RECOMBINANT DNA IM: CPT | Performed by: FAMILY MEDICINE

## 2019-10-10 PROCEDURE — 99214 OFFICE O/P EST MOD 30 MIN: CPT | Performed by: FAMILY MEDICINE

## 2019-10-10 PROCEDURE — 3008F BODY MASS INDEX DOCD: CPT | Performed by: FAMILY MEDICINE

## 2019-10-10 PROCEDURE — 90471 IMMUNIZATION ADMIN: CPT | Performed by: FAMILY MEDICINE

## 2019-10-10 RX ORDER — AMOXICILLIN AND CLAVULANATE POTASSIUM 875; 125 MG/1; MG/1
1 TABLET, FILM COATED ORAL EVERY 12 HOURS SCHEDULED
Qty: 14 TABLET | Refills: 0 | Status: SHIPPED | OUTPATIENT
Start: 2019-10-10 | End: 2019-10-17

## 2019-10-10 RX ORDER — BUPROPION HYDROCHLORIDE 150 MG/1
TABLET, EXTENDED RELEASE ORAL
Qty: 60 TABLET | Refills: 2 | Status: SHIPPED | OUTPATIENT
Start: 2019-10-10 | End: 2019-11-29 | Stop reason: SDUPTHER

## 2019-10-10 NOTE — PROGRESS NOTES
Assessment/Plan:   1  Acute sinusitis, recurrence not specified, unspecified location  Start supportive care  Maintain hydration  Take over-the-counter Mucinex  Will start treatment with Augmentin b i d  For 7 days  Follow up if any symptoms persist   - amoxicillin-clavulanate (AUGMENTIN) 875-125 mg per tablet; Take 1 tablet by mouth every 12 (twelve) hours for 7 days  Dispense: 14 tablet; Refill: 0    2  Current tobacco use  Review need to pick quit patient's tobacco use  At this time, will start treatment with Wellbutrin 150 mg b i d  She may proceed with this treatment for 3 months  If any symptoms worsen, she was advised to call or follow up  - buPROPion (WELLBUTRIN SR) 150 mg 12 hr tablet; Take 150 mg PO qd x3 days, then take 150mg PO BID  Dispense: 60 tablet; Refill: 2       Diagnoses and all orders for this visit:    Need for immunization against influenza  -     influenza vaccine, 4720-2070, quadrivalent, recombinant, PF, 0 5 mL, for patients 18 yr+ (FLUBLOK)          Subjective:    Chief Complaint   Patient presents with    Cold Like Symptoms     Pt c/o R/ear pain woth nasal congestion and ST, headaches and fatigue  Patient ID: Shine Shook is a 64 y o  female  URI    This is a new problem  The current episode started in the past 7 days  The problem has been unchanged  There has been no fever  Associated symptoms include congestion, coughing, ear pain, rhinorrhea, sinus pain and a sore throat  Pertinent negatives include no abdominal pain, chest pain, diarrhea, dysuria, headaches or nausea  Review of Systems   Constitutional: Negative for activity change, chills, fatigue and fever  HENT: Positive for congestion, ear pain, rhinorrhea, sinus pain and sore throat  Negative for sinus pressure  Eyes: Negative for redness, itching and visual disturbance  Respiratory: Positive for cough  Negative for shortness of breath      Cardiovascular: Negative for chest pain and palpitations  Gastrointestinal: Negative for abdominal pain, diarrhea and nausea  Endocrine: Negative for cold intolerance and heat intolerance  Genitourinary: Negative for dysuria, flank pain and frequency  Musculoskeletal: Negative for arthralgias, back pain, gait problem and myalgias  Skin: Negative for color change  Allergic/Immunologic: Negative for environmental allergies  Neurological: Negative for dizziness, numbness and headaches  Psychiatric/Behavioral: Negative for behavioral problems and sleep disturbance  The following portions of the patient's history were reviewed and updated as appropriate : past family history, past medical history, past social history and past surgical history  Current Outpatient Medications:     ALPRAZolam (XANAX) 0 5 mg tablet, Take 1 tablet (0 5 mg total) by mouth daily at bedtime as needed for anxiety for up to 160 days, Disp: 30 tablet, Rfl: 0    aspirin (ECOTRIN LOW STRENGTH) 81 mg EC tablet, Take 1 tablet by mouth daily, Disp: , Rfl:     atorvastatin (LIPITOR) 40 mg tablet, TAKE 1 TABLET DAILY, Disp: 90 tablet, Rfl: 1    Blood Glucose Monitoring Suppl (ONE TOUCH ULTRA MINI) w/Device KIT, by Does not apply route, Disp: , Rfl:     cholecalciferol (VITAMIN D3) 1,000 units tablet, Take by mouth, Disp: , Rfl:     Empagliflozin 10 MG TABS, Take 1 tablet (10 mg total) by mouth every morning, Disp: 30 tablet, Rfl: 0    escitalopram (LEXAPRO) 20 mg tablet, TAKE 1 TABLET BY MOUTH EVERY DAY, Disp: 30 tablet, Rfl: 1    famotidine (PEPCID) 20 mg tablet, Take 1 tablet by mouth daily, Disp: , Rfl:     glucose blood (ONE TOUCH ULTRA TEST) test strip, 1 each by Other route 2 (two) times a day, Disp: 100 each, Rfl: 0    lisdexamfetamine (VYVANSE) 30 MG capsule, Take 1 capsule (30 mg total) by mouth every evening With 40 mg taken in the a  m  Max Daily Amount: 30 mg, Disp: 30 capsule, Rfl: 0    lisinopril (ZESTRIL) 5 mg tablet, TAKE ONE-HALF (1/2) TABLET DAILY, Disp: 45 tablet, Rfl: 0    metFORMIN (GLUCOPHAGE-XR) 500 mg 24 hr tablet, Take 1 tablet (500 mg total) by mouth 2 (two) times a day, Disp: 180 tablet, Rfl: 1    metFORMIN (GLUCOPHAGE-XR) 500 mg 24 hr tablet, TAKE 1 TABLET BY MOUTH TWICE A DAY, Disp: 60 tablet, Rfl: 0    metoprolol succinate (TOPROL-XL) 25 mg 24 hr tablet, TAKE 1 TABLET DAILY, Disp: 90 tablet, Rfl: 1    ONETOUCH DELICA LANCETS FINE MISC, by Does not apply route daily, Disp: 100 each, Rfl: 1    lisdexamfetamine (VYVANSE) 40 MG capsule, Take 1 capsule (40 mg total) by mouth every morning for 30 daysMax Daily Amount: 40 mg, Disp: 30 capsule, Rfl: 0    montelukast (SINGULAIR) 10 mg tablet, TAKE 1 TABLET (10 MG TOTAL) BY MOUTH DAILY AT BEDTIME FOR ALLERGIES (Patient not taking: Reported on 8/8/2019), Disp: 30 tablet, Rfl: 5    promethazine-codeine (PHENERGAN WITH CODEINE) 6 25-10 mg/5 mL syrup, Take 5 mL by mouth daily at bedtime as needed for cough (Patient not taking: Reported on 8/8/2019), Disp: 120 mL, Rfl: 0    promethazine-dextromethorphan (PHENERGAN-DM) 6 25-15 mg/5 mL oral syrup, 5 mL Q 4-6 hours or HS p r n  cough  Home use only  (Patient not taking: Reported on 9/3/2019), Disp: 120 mL, Rfl: 0    traMADol (ULTRAM) 50 mg tablet, Take 1 tablet (50 mg total) by mouth every 12 (twelve) hours as needed for moderate pain (Patient not taking: Reported on 8/8/2019), Disp: 30 tablet, Rfl: 0    traMADol (ULTRAM) 50 mg tablet, Take 1 tablet (50 mg total) by mouth every 6 (six) hours as needed for moderate pain (Patient not taking: Reported on 8/8/2019), Disp: 12 tablet, Rfl: 0    Objective:    Vitals:    10/10/19 1001   BP: 122/66   BP Location: Left arm   Patient Position: Sitting   Cuff Size: Adult   Pulse: 65   Resp: 16   Temp: (!) 96 6 °F (35 9 °C)   TempSrc: Tympanic   SpO2: 98%   Weight: 72 8 kg (160 lb 9 6 oz)   Height: 5' 5 35" (1 66 m)        Physical Exam   Constitutional: She is oriented to person, place, and time   She appears well-developed and well-nourished  HENT:   Head: Normocephalic and atraumatic  Nose: Nose normal    Mouth/Throat: No oropharyngeal exudate  Eyes: Pupils are equal, round, and reactive to light  Right eye exhibits no discharge  Left eye exhibits no discharge  Neck: Normal range of motion  Neck supple  No tracheal deviation present  Cardiovascular: Normal rate, regular rhythm and intact distal pulses  Exam reveals no gallop and no friction rub  No murmur heard  Pulses:       Dorsalis pedis pulses are 2+ on the right side, and 2+ on the left side  Posterior tibial pulses are 2+ on the right side, and 2+ on the left side  Pulmonary/Chest: Effort normal and breath sounds normal  No respiratory distress  She has no wheezes  She has no rales  Abdominal: Soft  Bowel sounds are normal  She exhibits no distension  There is no tenderness  There is no rebound and no guarding  Musculoskeletal: Normal range of motion  She exhibits no edema  Lymphadenopathy:        Head (right side): No submental and no submandibular adenopathy present  Head (left side): No submental and no submandibular adenopathy present  She has no cervical adenopathy  Right cervical: No superficial cervical, no deep cervical and no posterior cervical adenopathy present  Left cervical: No superficial cervical, no deep cervical and no posterior cervical adenopathy present  Neurological: She is alert and oriented to person, place, and time  No cranial nerve deficit or sensory deficit  Skin: Skin is warm, dry and intact  Psychiatric: Her speech is normal and behavior is normal  Judgment normal  Her mood appears not anxious  Cognition and memory are normal  She does not exhibit a depressed mood  Vitals reviewed

## 2019-10-24 DIAGNOSIS — IMO0001 UNCONTROLLED TYPE 2 DIABETES MELLITUS WITHOUT COMPLICATION, WITHOUT LONG-TERM CURRENT USE OF INSULIN: ICD-10-CM

## 2019-10-24 DIAGNOSIS — F41.9 ANXIETY DISORDER, UNSPECIFIED TYPE: ICD-10-CM

## 2019-10-24 RX ORDER — METFORMIN HYDROCHLORIDE 500 MG/1
500 TABLET, EXTENDED RELEASE ORAL 2 TIMES DAILY
Qty: 180 TABLET | Refills: 1 | Status: SHIPPED | OUTPATIENT
Start: 2019-10-24 | End: 2020-07-23

## 2019-10-24 RX ORDER — ESCITALOPRAM OXALATE 20 MG/1
TABLET ORAL
Qty: 30 TABLET | Refills: 5 | Status: SHIPPED | OUTPATIENT
Start: 2019-10-24 | End: 2020-04-28

## 2019-10-28 DIAGNOSIS — F90.1 ATTENTION DEFICIT HYPERACTIVITY DISORDER (ADHD), PREDOMINANTLY HYPERACTIVE TYPE: ICD-10-CM

## 2019-11-03 DIAGNOSIS — I10 ESSENTIAL HYPERTENSION: ICD-10-CM

## 2019-11-04 RX ORDER — LISINOPRIL 5 MG/1
TABLET ORAL
Qty: 45 TABLET | Refills: 4 | Status: SHIPPED | OUTPATIENT
Start: 2019-11-04 | End: 2021-01-26

## 2019-11-06 DIAGNOSIS — F90.1 ATTENTION DEFICIT HYPERACTIVITY DISORDER (ADHD), PREDOMINANTLY HYPERACTIVE TYPE: ICD-10-CM

## 2019-11-07 ENCOUNTER — TELEPHONE (OUTPATIENT)
Dept: FAMILY MEDICINE CLINIC | Facility: CLINIC | Age: 59
End: 2019-11-07

## 2019-11-07 DIAGNOSIS — F90.1 ATTENTION DEFICIT HYPERACTIVITY DISORDER (ADHD), PREDOMINANTLY HYPERACTIVE TYPE: ICD-10-CM

## 2019-11-07 NOTE — TELEPHONE ENCOUNTER
Pt called, pharmacy needs authorization to fill 40 mg Vyvanse early  Pt leaves tomorrow to travel to elderly in-laws  Please advise  Pt at pharmacy now

## 2019-11-07 NOTE — TELEPHONE ENCOUNTER
Per [place] medication will not be filled at this time, pt will call when she is due for refill in 3-4 days

## 2019-11-22 DIAGNOSIS — F90.1 ATTENTION DEFICIT HYPERACTIVITY DISORDER (ADHD), PREDOMINANTLY HYPERACTIVE TYPE: ICD-10-CM

## 2019-11-29 DIAGNOSIS — Z72.0 CURRENT TOBACCO USE: ICD-10-CM

## 2019-11-30 RX ORDER — BUPROPION HYDROCHLORIDE 150 MG/1
TABLET, EXTENDED RELEASE ORAL
Qty: 60 TABLET | Refills: 2 | Status: SHIPPED | OUTPATIENT
Start: 2019-11-30 | End: 2020-07-08

## 2019-12-09 DIAGNOSIS — F90.1 ATTENTION DEFICIT HYPERACTIVITY DISORDER (ADHD), PREDOMINANTLY HYPERACTIVE TYPE: ICD-10-CM

## 2019-12-15 ENCOUNTER — OFFICE VISIT (OUTPATIENT)
Dept: URGENT CARE | Facility: CLINIC | Age: 59
End: 2019-12-15
Payer: COMMERCIAL

## 2019-12-15 VITALS
WEIGHT: 158.6 LBS | BODY MASS INDEX: 24.04 KG/M2 | TEMPERATURE: 97.1 F | HEART RATE: 89 BPM | OXYGEN SATURATION: 96 % | SYSTOLIC BLOOD PRESSURE: 135 MMHG | HEIGHT: 68 IN | RESPIRATION RATE: 18 BRPM | DIASTOLIC BLOOD PRESSURE: 67 MMHG

## 2019-12-15 DIAGNOSIS — J06.9 UPPER RESPIRATORY TRACT INFECTION, UNSPECIFIED TYPE: ICD-10-CM

## 2019-12-15 DIAGNOSIS — J02.9 PHARYNGITIS, UNSPECIFIED ETIOLOGY: Primary | ICD-10-CM

## 2019-12-15 LAB — S PYO AG THROAT QL: NEGATIVE

## 2019-12-15 PROCEDURE — 87147 CULTURE TYPE IMMUNOLOGIC: CPT | Performed by: PHYSICIAN ASSISTANT

## 2019-12-15 PROCEDURE — 87070 CULTURE OTHR SPECIMN AEROBIC: CPT | Performed by: PHYSICIAN ASSISTANT

## 2019-12-15 PROCEDURE — 99213 OFFICE O/P EST LOW 20 MIN: CPT | Performed by: PHYSICIAN ASSISTANT

## 2019-12-15 RX ORDER — DEXTROMETHORPHAN HYDROBROMIDE AND PROMETHAZINE HYDROCHLORIDE 15; 6.25 MG/5ML; MG/5ML
SOLUTION ORAL
Qty: 118 ML | Refills: 0 | Status: SHIPPED | OUTPATIENT
Start: 2019-12-15 | End: 2019-12-21

## 2019-12-15 RX ORDER — AMOXICILLIN 875 MG/1
875 TABLET, COATED ORAL 2 TIMES DAILY
Qty: 14 TABLET | Refills: 0 | Status: SHIPPED | OUTPATIENT
Start: 2019-12-15 | End: 2019-12-22

## 2019-12-15 NOTE — PATIENT INSTRUCTIONS
Continue pushing fluids  Rest   May try Coricidin HPB for nasal drainage symptoms  Continue nasal saline rinses  Vaporizer or humidified air  Prescription cough syrup for home use only as may cause drowsiness  May be helpful at bedtime to help  Rest   Warm saltwater gargles every 1-2 hours while awake, throat lozenges, Chloraseptic throat spray may be soothing  Take antibiotic as instructed

## 2019-12-15 NOTE — PROGRESS NOTES
[facility]    NAME: Mary Beth Moise is a 64 y o  female  : 1960    MRN: [id number]  DATE: December 15, 2019  TIME: 3:00 PM    Assessment and Plan   Pharyngitis, unspecified etiology [J02 9]  1  Pharyngitis, unspecified etiology  POCT rapid strepA   2  Upper respiratory tract infection, unspecified type         Patient Instructions   There are no Patient Instructions on file for this visit  Chief Complaint     Chief Complaint   Patient presents with    Cough     Patient states she has been experiencing a cough for about one week  Reports cough as dry    Nasal Congestion     Clear nasal secretions for the last week       History of Present Illness   Mary Beth Moise presents to the clinic c/o  28-year-old female with bad sore throat  Had upper respiratory illness around [facility] seemed to get better  Then over a week ago she was helping parents move into an assisted living area near [facility]  She developed some discomfort in the throat, nasal congestion, clear runny nose, scratchy throat, barky cough  Throat feels raw  Has been doing Mucinex, Zyrtec, dull some, hot tea with tata and lemon  Nothing seems to be helping  No chest pain or shortness of breath  Also doing nasal saline rinses  Blood sugars are okay  Did have some body aches and pains and fatigue after helping parents moved  Thought it was related to the activity  Up-to-date on influenza vaccine and pneumococcal vaccine  Review of Systems   Review of Systems   Constitutional: Positive for fatigue  Negative for activity change, appetite change, chills and fever  HENT: Positive for congestion, postnasal drip, rhinorrhea, sinus pressure, sore throat, trouble swallowing and voice change  Negative for ear discharge, ear pain and sinus pain  Eyes: Negative  Respiratory: Positive for cough  Negative for apnea, choking, chest tightness, shortness of breath, wheezing and stridor      Hematological: Positive for adenopathy  Current Medications     Long-Term Medications   Medication Sig Dispense Refill    ALPRAZolam (XANAX) 0 5 mg tablet Take 1 tablet (0 5 mg total) by mouth daily at bedtime as needed for anxiety for up to 160 days 30 tablet 0    aspirin (ECOTRIN LOW STRENGTH) 81 mg EC tablet Take 1 tablet by mouth daily      atorvastatin (LIPITOR) 40 mg tablet TAKE 1 TABLET DAILY 90 tablet 1    Blood Glucose Monitoring Suppl (ONE TOUCH ULTRA MINI) w/Device KIT by Does not apply route      buPROPion (WELLBUTRIN SR) 150 mg 12 hr tablet TAKE 150 MG BY MOUTH EVERY DAY X3 DAYS, THEN TAKE 150MG BY MOUTH TWICE A DAY 60 tablet 2    cholecalciferol (VITAMIN D3) 1,000 units tablet Take by mouth      escitalopram (LEXAPRO) 20 mg tablet TAKE 1 TABLET BY MOUTH EVERY DAY 30 tablet 5    famotidine (PEPCID) 20 mg tablet Take 1 tablet by mouth daily      lisdexamfetamine (VYVANSE) 30 MG capsule Take 1 capsule (30 mg total) by mouth every evening With 40 mg taken in the a  m  Max Daily Amount: 30 mg 30 capsule 0    lisdexamfetamine (VYVANSE) 40 MG capsule Take 1 capsule (40 mg total) by mouth every morningMax Daily Amount: 40 mg 30 capsule 0    lisinopril (ZESTRIL) 5 mg tablet TAKE ONE-HALF (1/2) TABLET DAILY 45 tablet 4    metFORMIN (GLUCOPHAGE-XR) 500 mg 24 hr tablet Take 1 tablet (500 mg total) by mouth 2 (two) times a day 180 tablet 1    metoprolol succinate (TOPROL-XL) 25 mg 24 hr tablet TAKE 1 TABLET DAILY 90 tablet 1    ONETOUCH DELICA LANCETS FINE MISC by Does not apply route daily 100 each 1    Empagliflozin 10 MG TABS Take 1 tablet (10 mg total) by mouth every morning 30 tablet 0    metFORMIN (GLUCOPHAGE-XR) 500 mg 24 hr tablet TAKE 1 TABLET BY MOUTH TWICE A DAY (Patient not taking: Reported on 12/15/2019) 60 tablet 0    montelukast (SINGULAIR) 10 mg tablet TAKE 1 TABLET (10 MG TOTAL) BY MOUTH DAILY AT BEDTIME FOR ALLERGIES (Patient not taking: Reported on 8/8/2019) 30 tablet 5       Current Allergies Allergies as of 12/15/2019 - Reviewed 12/15/2019   Allergen Reaction Noted    Levaquin [levofloxacin] Anaphylaxis 05/18/2012    Cyclobenzaprine  05/18/2012          The following portions of the patient's history were reviewed and updated as appropriate: allergies, current medications, past family history, past medical history, past social history, past surgical history and problem list   Past Medical History:   Diagnosis Date    Abnormal mammogram     RESOLVED: [id number]    Arthritis     Diabetes mellitus [facility] [address] [facility]     Eczema of right external ear     RESOLVED: [id number]    Foot drop, left foot     Heart attack ([facility] [address] )     Hematuria     RESOLVED: [id number]    Impetigo     RESOLVED: [id number]    Insomnia related to another mental disorder     AXIS I/II MENTAL DISORDER; RESOLVED: [id number]     Past Surgical History:   Procedure Laterality Date   31 [facility]    still has 1 ovary not sure which one was removed    OTHER SURGICAL HISTORY      STEND INDICATIONS: RESTENOSIS AT PREVIOUS PTCA LOCATION     SPLENECTOMY      TOTAL HIP ARTHROPLASTY Left      Family History   Problem Relation Age of Onset    Stroke Mother     Osteoporosis Mother     Diabetes type II Father     Heart attack Father     Cancer Father     Heart disease Father     Hypertension Father     Heart disease Sister     Diabetes type II Brother     Heart attack Brother     Heart disease Brother     Heart attack Brother     Heart attack Brother     Heart attack Brother     Diabetes type II Brother     Alcohol abuse Neg Hx     Substance Abuse Neg Hx        Objective   /67 (BP Location: Left arm, Patient Position: Sitting)   Pulse 89   Temp (!) 97 1 °F (36 2 °C)   Resp 18   Ht 5' 8" (1 727 m)   Wt 71 9 kg (158 lb 9 6 oz)   SpO2 96%   BMI 24 12 kg/m²   No LMP recorded  Patient is postmenopausal        Physical Exam     Physical Exam   Constitutional: She is oriented to person, place, and time   She appears well-developed and well-nourished  No distress  Occasional coarse cough  Hoarseness  HENT:   Head: Normocephalic  Right Ear: External ear normal    Left Ear: External ear normal    Mouth/Throat: No oropharyngeal exudate  Generalized redness of the tonsil pharyngeal region without obvious exudate or fetid breath  Uvula midline  Nares slightly red and edematous with clear mucus  Eyes: Pupils are equal, round, and reactive to light  EOM are normal  Right eye exhibits no discharge  Left eye exhibits no discharge  No scleral icterus  Neck: Normal range of motion  Neck supple  Shotty anterior cervical lymphadenopathy  Cardiovascular: Normal rate, regular rhythm and normal heart sounds  Exam reveals no gallop and no friction rub  No murmur heard  Pulmonary/Chest: Effort normal and breath sounds normal  No stridor  No respiratory distress  She has no wheezes  She has no rales  Lymphadenopathy:     She has cervical adenopathy  Neurological: She is alert and oriented to person, place, and time  Skin: Skin is warm and dry  She is not diaphoretic  Psychiatric: She has a normal mood and affect  Nursing note and vitals reviewed

## 2019-12-18 ENCOUNTER — TELEPHONE (OUTPATIENT)
Dept: URGENT CARE | Facility: CLINIC | Age: 59
End: 2019-12-18

## 2019-12-18 LAB — BACTERIA THROAT CULT: ABNORMAL

## 2019-12-18 NOTE — TELEPHONE ENCOUNTER
Message left that throat culture did demonstrate a non group a strep  She is already on an antibiotic and she may finish that  Follow up with family doctor as needed

## 2019-12-19 ENCOUNTER — OFFICE VISIT (OUTPATIENT)
Dept: FAMILY MEDICINE CLINIC | Facility: CLINIC | Age: 59
End: 2019-12-19
Payer: COMMERCIAL

## 2019-12-19 VITALS
SYSTOLIC BLOOD PRESSURE: 124 MMHG | HEART RATE: 88 BPM | DIASTOLIC BLOOD PRESSURE: 76 MMHG | BODY MASS INDEX: 23.95 KG/M2 | WEIGHT: 158 LBS | HEIGHT: 68 IN | TEMPERATURE: 98 F | OXYGEN SATURATION: 95 %

## 2019-12-19 DIAGNOSIS — J20.9 ACUTE BRONCHITIS, UNSPECIFIED ORGANISM: Primary | ICD-10-CM

## 2019-12-19 DIAGNOSIS — J01.90 ACUTE SINUSITIS, RECURRENCE NOT SPECIFIED, UNSPECIFIED LOCATION: ICD-10-CM

## 2019-12-19 PROCEDURE — 99214 OFFICE O/P EST MOD 30 MIN: CPT | Performed by: FAMILY MEDICINE

## 2019-12-19 RX ORDER — AZITHROMYCIN 250 MG/1
TABLET, FILM COATED ORAL
Qty: 6 TABLET | Refills: 0 | Status: SHIPPED | OUTPATIENT
Start: 2019-12-19 | End: 2019-12-23

## 2019-12-19 RX ORDER — PROMETHAZINE HYDROCHLORIDE AND CODEINE PHOSPHATE 6.25; 1 MG/5ML; MG/5ML
5 SYRUP ORAL
Qty: 120 ML | Refills: 0 | Status: SHIPPED | OUTPATIENT
Start: 2019-12-19 | End: 2019-12-21

## 2019-12-19 NOTE — PROGRESS NOTES
Assessment/Plan:   1  Acute bronchitis, unspecified organism  Start supportive care  Maintain hydration  Take over-the-counter Mucinex  Will start treatment with azithromycin  Follow up if any symptoms persist   - azithromycin (ZITHROMAX) 250 mg tablet; Take 2 tablets on first day, then take 1 Tablet daily until completed  Dispense: 6 tablet; Refill: 0           There are no diagnoses linked to this encounter  Subjective:       Chief Complaint   Patient presents with    Cold Like Symptoms      Patient ID: Judah Luna is a 64 y o  female  URI    This is a new problem  The current episode started in the past 7 days  The problem has been unchanged  There has been no fever  Associated symptoms include congestion, coughing, rhinorrhea and sneezing  Pertinent negatives include no abdominal pain, chest pain, diarrhea, dysuria, ear pain, headaches, nausea or sore throat  She has tried nothing for the symptoms  The treatment provided no relief  Review of Systems   Constitutional: Negative for activity change, chills, fatigue and fever  HENT: Positive for congestion, rhinorrhea and sneezing  Negative for ear pain, sinus pressure and sore throat  Eyes: Negative for redness, itching and visual disturbance  Respiratory: Positive for cough  Negative for shortness of breath  Cardiovascular: Negative for chest pain and palpitations  Gastrointestinal: Negative for abdominal pain, diarrhea and nausea  Endocrine: Negative for cold intolerance and heat intolerance  Genitourinary: Negative for dysuria, flank pain and frequency  Musculoskeletal: Negative for arthralgias, back pain, gait problem and myalgias  Skin: Negative for color change  Allergic/Immunologic: Negative for environmental allergies  Neurological: Negative for dizziness, numbness and headaches  Psychiatric/Behavioral: Negative for behavioral problems and sleep disturbance         The following portions of the patient's history were reviewed and updated as appropriate : past family history, past medical history, past social history and past surgical history  Current Outpatient Medications:     ALPRAZolam (XANAX) 0 5 mg tablet, Take 1 tablet (0 5 mg total) by mouth daily at bedtime as needed for anxiety for up to 160 days, Disp: 30 tablet, Rfl: 0    amoxicillin (AMOXIL) 875 mg tablet, Take 1 tablet (875 mg total) by mouth 2 (two) times a day for 7 days, Disp: 14 tablet, Rfl: 0    aspirin (ECOTRIN LOW STRENGTH) 81 mg EC tablet, Take 1 tablet by mouth daily, Disp: , Rfl:     atorvastatin (LIPITOR) 40 mg tablet, TAKE 1 TABLET DAILY, Disp: 90 tablet, Rfl: 1    Blood Glucose Monitoring Suppl (ONE TOUCH ULTRA MINI) w/Device KIT, by Does not apply route, Disp: , Rfl:     buPROPion (WELLBUTRIN SR) 150 mg 12 hr tablet, TAKE 150 MG BY MOUTH EVERY DAY X3 DAYS, THEN TAKE 150MG BY MOUTH TWICE A DAY, Disp: 60 tablet, Rfl: 2    cholecalciferol (VITAMIN D3) 1,000 units tablet, Take by mouth, Disp: , Rfl:     Empagliflozin 10 MG TABS, Take 1 tablet (10 mg total) by mouth every morning, Disp: 30 tablet, Rfl: 0    escitalopram (LEXAPRO) 20 mg tablet, TAKE 1 TABLET BY MOUTH EVERY DAY, Disp: 30 tablet, Rfl: 5    famotidine (PEPCID) 20 mg tablet, Take 1 tablet by mouth daily, Disp: , Rfl:     glucose blood (ONE TOUCH ULTRA TEST) test strip, 1 each by Other route 2 (two) times a day, Disp: 100 each, Rfl: 0    lisdexamfetamine (VYVANSE) 30 MG capsule, Take 1 capsule (30 mg total) by mouth every evening With 40 mg taken in the a  m  Max Daily Amount: 30 mg, Disp: 30 capsule, Rfl: 0    lisdexamfetamine (VYVANSE) 40 MG capsule, Take 1 capsule (40 mg total) by mouth every morningMax Daily Amount: 40 mg, Disp: 30 capsule, Rfl: 0    lisinopril (ZESTRIL) 5 mg tablet, TAKE ONE-HALF (1/2) TABLET DAILY, Disp: 45 tablet, Rfl: 4    metFORMIN (GLUCOPHAGE-XR) 500 mg 24 hr tablet, TAKE 1 TABLET BY MOUTH TWICE A DAY, Disp: 60 tablet, Rfl: 0    metFORMIN (GLUCOPHAGE-XR) 500 mg 24 hr tablet, Take 1 tablet (500 mg total) by mouth 2 (two) times a day, Disp: 180 tablet, Rfl: 1    metoprolol succinate (TOPROL-XL) 25 mg 24 hr tablet, TAKE 1 TABLET DAILY, Disp: 90 tablet, Rfl: 1    montelukast (SINGULAIR) 10 mg tablet, TAKE 1 TABLET (10 MG TOTAL) BY MOUTH DAILY AT BEDTIME FOR ALLERGIES, Disp: 30 tablet, Rfl: 5    ONETOUCH DELICA LANCETS FINE MISC, by Does not apply route daily, Disp: 100 each, Rfl: 1    promethazine-codeine (PHENERGAN WITH CODEINE) 6 25-10 mg/5 mL syrup, Take 5 mL by mouth daily at bedtime as needed for cough, Disp: 120 mL, Rfl: 0    promethazine-dextromethorphan (PHENERGAN-DM) 6 25-15 mg/5 mL oral syrup, 5 mL Q 4-6 hours or HS p r n  cough  Home use only  , Disp: 120 mL, Rfl: 0    Promethazine-DM (PHENERGAN-DM) 6 25-15 mg/5 mL oral syrup, 5 mL q 6 hours or at hs prn cough  Home use only  , Disp: 118 mL, Rfl: 0    traMADol (ULTRAM) 50 mg tablet, Take 1 tablet (50 mg total) by mouth every 12 (twelve) hours as needed for moderate pain, Disp: 30 tablet, Rfl: 0    traMADol (ULTRAM) 50 mg tablet, Take 1 tablet (50 mg total) by mouth every 6 (six) hours as needed for moderate pain, Disp: 12 tablet, Rfl: 0         Objective:         Vitals:    12/19/19 1436   BP: 124/76   BP Location: Left arm   Patient Position: Sitting   Pulse: 88   Temp: 98 °F (36 7 °C)   TempSrc: Tympanic   SpO2: 95%   Weight: 71 7 kg (158 lb)   Height: 5' 8" (1 727 m)     Physical Exam   Constitutional: She is oriented to person, place, and time  She appears well-developed and well-nourished  HENT:   Head: Normocephalic and atraumatic  Nose: Nose normal    Mouth/Throat: No oropharyngeal exudate  Eyes: Pupils are equal, round, and reactive to light  Right eye exhibits no discharge  Left eye exhibits no discharge  Neck: Normal range of motion  Neck supple  No tracheal deviation present  Cardiovascular: Normal rate, regular rhythm and intact distal pulses   Exam reveals no gallop and no friction rub  No murmur heard  Pulses:       Dorsalis pedis pulses are 2+ on the right side, and 2+ on the left side  Posterior tibial pulses are 2+ on the right side, and 2+ on the left side  Pulmonary/Chest: Effort normal and breath sounds normal  No respiratory distress  She has no wheezes  She has no rales  Abdominal: Soft  Bowel sounds are normal  She exhibits no distension  There is no tenderness  There is no rebound and no guarding  Musculoskeletal: Normal range of motion  She exhibits no edema  Lymphadenopathy:        Head (right side): No submental and no submandibular adenopathy present  Head (left side): No submental and no submandibular adenopathy present  She has no cervical adenopathy  Right cervical: No superficial cervical, no deep cervical and no posterior cervical adenopathy present  Left cervical: No superficial cervical, no deep cervical and no posterior cervical adenopathy present  Neurological: She is alert and oriented to person, place, and time  No cranial nerve deficit or sensory deficit  Skin: Skin is warm, dry and intact  Psychiatric: Her speech is normal and behavior is normal  Judgment normal  Her mood appears not anxious  Cognition and memory are normal  She does not exhibit a depressed mood  Vitals reviewed

## 2019-12-21 ENCOUNTER — TELEPHONE (OUTPATIENT)
Dept: FAMILY MEDICINE CLINIC | Facility: CLINIC | Age: 59
End: 2019-12-21

## 2019-12-21 DIAGNOSIS — J20.9 ACUTE BRONCHITIS, UNSPECIFIED ORGANISM: Primary | ICD-10-CM

## 2019-12-21 RX ORDER — GUAIFENESIN AND CODEINE PHOSPHATE 100; 10 MG/5ML; MG/5ML
5 SOLUTION ORAL 3 TIMES DAILY PRN
Qty: 120 ML | Refills: 0 | Status: SHIPPED | OUTPATIENT
Start: 2019-12-21 | End: 2020-01-10 | Stop reason: SDUPTHER

## 2019-12-21 RX ORDER — BENZONATATE 200 MG/1
200 CAPSULE ORAL 3 TIMES DAILY PRN
Qty: 21 CAPSULE | Refills: 0 | Status: SHIPPED | OUTPATIENT
Start: 2019-12-21 | End: 2020-02-04

## 2019-12-21 NOTE — TELEPHONE ENCOUNTER
Pt called was seen by dr Gearldine Dancer Thursday for a cough the cough syrup he prescribed is on backorder at the pharmacy can we call something else in to [facility]

## 2019-12-26 DIAGNOSIS — F90.1 ATTENTION DEFICIT HYPERACTIVITY DISORDER (ADHD), PREDOMINANTLY HYPERACTIVE TYPE: ICD-10-CM

## 2020-01-04 ENCOUNTER — OFFICE VISIT (OUTPATIENT)
Dept: URGENT CARE | Facility: CLINIC | Age: 60
End: 2020-01-04
Payer: COMMERCIAL

## 2020-01-04 ENCOUNTER — APPOINTMENT (OUTPATIENT)
Dept: RADIOLOGY | Facility: CLINIC | Age: 60
End: 2020-01-04
Payer: COMMERCIAL

## 2020-01-04 VITALS
HEART RATE: 103 BPM | SYSTOLIC BLOOD PRESSURE: 138 MMHG | OXYGEN SATURATION: 96 % | RESPIRATION RATE: 18 BRPM | DIASTOLIC BLOOD PRESSURE: 92 MMHG | TEMPERATURE: 97 F

## 2020-01-04 DIAGNOSIS — R05.3 PERSISTENT COUGH: ICD-10-CM

## 2020-01-04 DIAGNOSIS — R05.3 PERSISTENT COUGH: Primary | ICD-10-CM

## 2020-01-04 PROCEDURE — S9083 URGENT CARE CENTER GLOBAL: HCPCS | Performed by: PHYSICIAN ASSISTANT

## 2020-01-04 PROCEDURE — G0382 LEV 3 HOSP TYPE B ED VISIT: HCPCS | Performed by: PHYSICIAN ASSISTANT

## 2020-01-04 PROCEDURE — 71046 X-RAY EXAM CHEST 2 VIEWS: CPT

## 2020-01-04 RX ORDER — METHYLPREDNISOLONE 4 MG/1
TABLET ORAL
Qty: 1 EACH | Refills: 0 | Status: SHIPPED | OUTPATIENT
Start: 2020-01-04 | End: 2020-02-04

## 2020-01-04 RX ORDER — DEXTROMETHORPHAN HYDROBROMIDE AND PROMETHAZINE HYDROCHLORIDE 15; 6.25 MG/5ML; MG/5ML
SOLUTION ORAL
Qty: 118 ML | Refills: 0 | Status: SHIPPED | OUTPATIENT
Start: 2020-01-04 | End: 2020-02-04

## 2020-01-04 NOTE — PATIENT INSTRUCTIONS
You may stop Tessalon Perles and codeine cough syrup  Start new cough syrup and take as directed  Take prednisone as per package instructions  Follow-up with your family doctor if symptoms are not improving over the next 7-10 days  May want to consider ENT evaluation

## 2020-01-04 NOTE — PROGRESS NOTES
[address]    NAME: Pablo José is a 64 y o  female  : 1960    MRN: [id number]  DATE: 2020  TIME: 3:35 PM    Assessment and Plan   Persistent cough [R05]  1  Persistent cough  XR chest pa & lateral    methylPREDNISolone 4 MG tablet therapy pack    Promethazine-DM (PHENERGAN-DM) 6 25-15 mg/5 mL oral syrup       Patient Instructions   Patient Instructions   You may stop Tessalon Perles and codeine cough syrup  Start new cough syrup and take as directed  Take prednisone as per package instructions  Follow-up with your family doctor if symptoms are not improving over the next 7-10 days  May want to consider ENT evaluation  Chief Complaint     Chief Complaint   Patient presents with    Cough     Patient has been sick since after  and has been seen with us and at her PCP  She's back today because she still has a cough that makes her chest, back, and throat hurt  History of Present Illness   Pablo José presents to the clinic c/o  24-year-old female that decided to be seen along with her  when he came in for acute injury  She indicates that she has continued to have respiratory illness since after   She has been seen [facility] at [facility] as well as by her primary care provider  She indicates that she continues to have a lot of postnasal drainage  Denies any runny nose but then says she also has a drippy nose if she leans forward  Coughing spells  Upper chest hurts due to coughing and her voice comes and goes  She has done the amoxicillin, Zithromax, Tessalon Perles and a codeine cough syrup  No resolution of her symptoms  No hemoptysis  Sputum is clear  Mucous is clear  No abnormal weight loss, fever or chills  Significant sore throat  Hurts her throat when she coughs  Also upper chest pain with coughing  Voice comes and goes  Review of Systems   Review of Systems   Constitutional: Positive for fatigue  Negative for activity change, appetite change, chills, diaphoresis and fever  HENT: Positive for postnasal drip, rhinorrhea, sore throat, trouble swallowing and voice change  Negative for congestion, ear discharge, ear pain, sinus pressure, sinus pain and sneezing  Eyes: Negative  Respiratory: Positive for cough  Negative for apnea, choking, chest tightness, shortness of breath, wheezing and stridor  Cardiovascular: Positive for chest pain  Negative for palpitations and leg swelling  Hematological: Negative  Current Medications     Long-Term Medications   Medication Sig Dispense Refill    ALPRAZolam (XANAX) 0 5 mg tablet Take 1 tablet (0 5 mg total) by mouth daily at bedtime as needed for anxiety for up to 160 days 30 tablet 0    aspirin (ECOTRIN LOW STRENGTH) 81 mg EC tablet Take 1 tablet by mouth daily      atorvastatin (LIPITOR) 40 mg tablet TAKE 1 TABLET DAILY 90 tablet 1    Blood Glucose Monitoring Suppl (ONE TOUCH ULTRA MINI) w/Device KIT by Does not apply route      buPROPion (WELLBUTRIN SR) 150 mg 12 hr tablet TAKE 150 MG BY MOUTH EVERY DAY X3 DAYS, THEN TAKE 150MG BY MOUTH TWICE A DAY 60 tablet 2    cholecalciferol (VITAMIN D3) 1,000 units tablet Take by mouth      Empagliflozin 10 MG TABS Take 1 tablet (10 mg total) by mouth every morning 30 tablet 0    escitalopram (LEXAPRO) 20 mg tablet TAKE 1 TABLET BY MOUTH EVERY DAY 30 tablet 5    famotidine (PEPCID) 20 mg tablet Take 1 tablet by mouth daily      lisdexamfetamine (VYVANSE) 30 MG capsule Take 1 capsule (30 mg total) by mouth every evening With 40 mg taken in the a  m  Max Daily Amount: 30 mg 30 capsule 0    lisdexamfetamine (VYVANSE) 40 MG capsule Take 1 capsule (40 mg total) by mouth every morningMax Daily Amount: 40 mg 30 capsule 0    lisinopril (ZESTRIL) 5 mg tablet TAKE ONE-HALF (1/2) TABLET DAILY 45 tablet 4    metFORMIN (GLUCOPHAGE-XR) 500 mg 24 hr tablet TAKE 1 TABLET BY MOUTH TWICE A DAY 60 tablet 0    metFORMIN (GLUCOPHAGE-XR) 500 mg 24 hr tablet Take 1 tablet (500 mg total) by mouth 2 (two) times a day 180 tablet 1    metoprolol succinate (TOPROL-XL) 25 mg 24 hr tablet TAKE 1 TABLET DAILY 90 tablet 1    montelukast (SINGULAIR) 10 mg tablet TAKE 1 TABLET (10 MG TOTAL) BY MOUTH DAILY AT BEDTIME FOR ALLERGIES 30 tablet 5    ONETOUCH DELICA LANCETS FINE MISC by Does not apply route daily 100 each 1       Current Allergies     Allergies as of 01/04/2020 - Reviewed 01/04/2020   Allergen Reaction Noted    Levaquin [levofloxacin] Anaphylaxis 05/18/2012    Cyclobenzaprine  05/18/2012          The following portions of the patient's history were reviewed and updated as appropriate: allergies, current medications, past family history, past medical history, past social history, past surgical history and problem list   Past Medical History:   Diagnosis Date    Abnormal mammogram     RESOLVED: [id number]    Arthritis     Diabetes mellitus ([facility] [address] )     Eczema of right external ear     RESOLVED: [id number]    Foot drop, left foot     Heart attack ([facility] [address] )     Hematuria     RESOLVED: [id number]    Impetigo     RESOLVED: [id number]    Insomnia related to another mental disorder     AXIS I/II MENTAL DISORDER; RESOLVED: [id number]     Past Surgical History:   Procedure Laterality Date    HYSTERECTOMY  1998    still has 1 ovary not sure which one was removed    OTHER SURGICAL HISTORY      STEND INDICATIONS: RESTENOSIS AT PREVIOUS PTCA LOCATION     SPLENECTOMY      TOTAL HIP ARTHROPLASTY Left      Family History   Problem Relation Age of Onset    Stroke Mother     Osteoporosis Mother     Diabetes type II Father     Heart attack Father     Cancer Father     Heart disease Father     Hypertension Father     Heart disease Sister     Diabetes type II Brother     Heart attack Brother     Heart disease Brother     Heart attack Brother     Heart attack Brother     Heart attack Brother     Diabetes type II Brother     Alcohol abuse Neg Hx     Substance Abuse Neg Hx        Objective   /92 (BP Location: Left arm, Patient Position: Sitting, Cuff Size: Adult)   Pulse 103   Temp (!) 97 °F (36 1 °C)   Resp 18   SpO2 96%   No LMP recorded  Patient is postmenopausal        Physical Exam     Physical Exam   Constitutional: She appears well-developed and well-nourished  No distress  Extremely hoarse  Accompanied by spouse  HENT:   Head: Normocephalic  Right Ear: External ear normal    Left Ear: External ear normal    Nose: Nose normal    Mouth/Throat: Oropharynx is clear and moist  No oropharyngeal exudate  Eyes: Pupils are equal, round, and reactive to light  Conjunctivae and EOM are normal  Right eye exhibits no discharge  Left eye exhibits no discharge  Neck: Normal range of motion  Neck supple  Cardiovascular: Normal rate and regular rhythm  Exam reveals no gallop and no friction rub  No murmur heard  Pulmonary/Chest: Effort normal and breath sounds normal  No stridor  No respiratory distress  She has no wheezes  She has no rales  Lymphadenopathy:     She has no cervical adenopathy  Skin: She is not diaphoretic  Nursing note and vitals reviewed

## 2020-01-07 DIAGNOSIS — F90.1 ATTENTION DEFICIT HYPERACTIVITY DISORDER (ADHD), PREDOMINANTLY HYPERACTIVE TYPE: ICD-10-CM

## 2020-01-09 ENCOUNTER — TELEPHONE (OUTPATIENT)
Dept: FAMILY MEDICINE CLINIC | Facility: CLINIC | Age: 60
End: 2020-01-09

## 2020-01-09 NOTE — TELEPHONE ENCOUNTER
[address] SCRIPTS until 1/8/21  PREVIOUS MEDS IN ALLSCRIPTS WERE ADDERALL XR 30MGS AND DEXTRAMPHETAMINE 10MGS  PT ON VYVANSE SINCE 2014

## 2020-01-10 ENCOUNTER — OFFICE VISIT (OUTPATIENT)
Dept: FAMILY MEDICINE CLINIC | Facility: CLINIC | Age: 60
End: 2020-01-10
Payer: COMMERCIAL

## 2020-01-10 VITALS
SYSTOLIC BLOOD PRESSURE: 124 MMHG | DIASTOLIC BLOOD PRESSURE: 78 MMHG | HEART RATE: 82 BPM | BODY MASS INDEX: 24.67 KG/M2 | OXYGEN SATURATION: 96 % | RESPIRATION RATE: 19 BRPM | HEIGHT: 68 IN | TEMPERATURE: 97.5 F | WEIGHT: 162.8 LBS

## 2020-01-10 DIAGNOSIS — J04.0 LARYNGITIS: Primary | ICD-10-CM

## 2020-01-10 PROCEDURE — 3008F BODY MASS INDEX DOCD: CPT | Performed by: FAMILY MEDICINE

## 2020-01-10 PROCEDURE — 99214 OFFICE O/P EST MOD 30 MIN: CPT | Performed by: FAMILY MEDICINE

## 2020-01-10 RX ORDER — GUAIFENESIN AND CODEINE PHOSPHATE 100; 10 MG/5ML; MG/5ML
5 SOLUTION ORAL 3 TIMES DAILY PRN
Qty: 120 ML | Refills: 0 | Status: SHIPPED | OUTPATIENT
Start: 2020-01-10 | End: 2020-02-04

## 2020-01-10 RX ORDER — DOXYCYCLINE HYCLATE 100 MG/1
100 CAPSULE ORAL EVERY 12 HOURS SCHEDULED
Qty: 14 CAPSULE | Refills: 0 | Status: SHIPPED | OUTPATIENT
Start: 2020-01-10 | End: 2020-01-17

## 2020-01-10 NOTE — PROGRESS NOTES
Assessment/Plan:   1  Laryngitis  Reviewed patient's symptoms today  At this time, will prescribe her guaifenesin with codeine  She may take doxycycline for symptom relief  If symptoms are not improving, she will likely need to see Otolaryngology   - guaifenesin-codeine (GUAIFENESIN AC) 100-10 MG/5ML liquid; Take 5 mL by mouth 3 (three) times a day as needed for cough  Dispense: 120 mL; Refill: 0  - doxycycline hyclate (VIBRAMYCIN) 100 mg capsule; Take 1 capsule (100 mg total) by mouth every 12 (twelve) hours for 7 days  Dispense: 14 capsule; Refill: 0           There are no diagnoses linked to this encounter  Subjective:       Chief Complaint   Patient presents with    losing voice      Patient ID: Sridhar Purcell is a 64 y o  female  Patient is a 61-year-old female presents today with CC of persistence of her laryngitis  Since her visit at urgent care, she has completed her prednisone  She reportedly has been having persistent problems  She has a mild cough as well as congestion  She states that her voice is lost   Denies any fevers or chills  Review of Systems   Constitutional: Negative for activity change, chills, fatigue and fever  HENT: Negative for congestion, ear pain, sinus pressure and sore throat  Eyes: Negative for redness, itching and visual disturbance  Respiratory: Negative for cough and shortness of breath  Cardiovascular: Negative for chest pain and palpitations  Gastrointestinal: Negative for abdominal pain, diarrhea and nausea  Endocrine: Negative for cold intolerance and heat intolerance  Genitourinary: Negative for dysuria, flank pain and frequency  Musculoskeletal: Negative for arthralgias, back pain, gait problem and myalgias  Skin: Negative for color change  Allergic/Immunologic: Negative for environmental allergies  Neurological: Negative for dizziness, numbness and headaches     Psychiatric/Behavioral: Negative for behavioral problems and sleep disturbance  The following portions of the patient's history were reviewed and updated as appropriate : past family history, past medical history, past social history and past surgical history  Current Outpatient Medications:     ALPRAZolam (XANAX) 0 5 mg tablet, Take 1 tablet (0 5 mg total) by mouth daily at bedtime as needed for anxiety for up to 160 days, Disp: 30 tablet, Rfl: 0    aspirin (ECOTRIN LOW STRENGTH) 81 mg EC tablet, Take 1 tablet by mouth daily, Disp: , Rfl:     atorvastatin (LIPITOR) 40 mg tablet, TAKE 1 TABLET DAILY, Disp: 90 tablet, Rfl: 1    Blood Glucose Monitoring Suppl (ONE TOUCH ULTRA MINI) w/Device KIT, by Does not apply route, Disp: , Rfl:     buPROPion (WELLBUTRIN SR) 150 mg 12 hr tablet, TAKE 150 MG BY MOUTH EVERY DAY X3 DAYS, THEN TAKE 150MG BY MOUTH TWICE A DAY, Disp: 60 tablet, Rfl: 2    cholecalciferol (VITAMIN D3) 1,000 units tablet, Take by mouth, Disp: , Rfl:     Empagliflozin 10 MG TABS, Take 1 tablet (10 mg total) by mouth every morning, Disp: 30 tablet, Rfl: 0    escitalopram (LEXAPRO) 20 mg tablet, TAKE 1 TABLET BY MOUTH EVERY DAY, Disp: 30 tablet, Rfl: 5    famotidine (PEPCID) 20 mg tablet, Take 1 tablet by mouth daily, Disp: , Rfl:     glucose blood (ONE TOUCH ULTRA TEST) test strip, 1 each by Other route 2 (two) times a day, Disp: 100 each, Rfl: 0    lisdexamfetamine (VYVANSE) 30 MG capsule, Take 1 capsule (30 mg total) by mouth every evening With 40 mg taken in the a  m  Max Daily Amount: 30 mg, Disp: 30 capsule, Rfl: 0    lisdexamfetamine (VYVANSE) 40 MG capsule, Take 1 capsule (40 mg total) by mouth every morningMax Daily Amount: 40 mg, Disp: 30 capsule, Rfl: 0    lisinopril (ZESTRIL) 5 mg tablet, TAKE ONE-HALF (1/2) TABLET DAILY, Disp: 45 tablet, Rfl: 4    metFORMIN (GLUCOPHAGE-XR) 500 mg 24 hr tablet, TAKE 1 TABLET BY MOUTH TWICE A DAY, Disp: 60 tablet, Rfl: 0    metFORMIN (GLUCOPHAGE-XR) 500 mg 24 hr tablet, Take 1 tablet (500 mg total) by mouth 2 (two) times a day, Disp: 180 tablet, Rfl: 1    methylPREDNISolone 4 MG tablet therapy pack, Use as directed on package, Disp: 1 each, Rfl: 0    metoprolol succinate (TOPROL-XL) 25 mg 24 hr tablet, TAKE 1 TABLET DAILY, Disp: 90 tablet, Rfl: 1    montelukast (SINGULAIR) 10 mg tablet, TAKE 1 TABLET (10 MG TOTAL) BY MOUTH DAILY AT BEDTIME FOR ALLERGIES, Disp: 30 tablet, Rfl: 5    ONETOUCH DELICA LANCETS FINE MISC, by Does not apply route daily, Disp: 100 each, Rfl: 1    traMADol (ULTRAM) 50 mg tablet, Take 1 tablet (50 mg total) by mouth every 12 (twelve) hours as needed for moderate pain, Disp: 30 tablet, Rfl: 0    traMADol (ULTRAM) 50 mg tablet, Take 1 tablet (50 mg total) by mouth every 6 (six) hours as needed for moderate pain, Disp: 12 tablet, Rfl: 0    benzonatate (TESSALON) 200 MG capsule, Take 1 capsule (200 mg total) by mouth 3 (three) times a day as needed for cough (Patient not taking: Reported on 1/10/2020), Disp: 21 capsule, Rfl: 0    guaifenesin-codeine (GUAIFENESIN AC) 100-10 MG/5ML liquid, Take 5 mL by mouth 3 (three) times a day as needed for cough (Patient not taking: Reported on 1/10/2020), Disp: 120 mL, Rfl: 0    Promethazine-DM (PHENERGAN-DM) 6 25-15 mg/5 mL oral syrup, 5 mL q 6 hours or at hs prn cough  Home use only  (Patient not taking: Reported on 1/10/2020), Disp: 118 mL, Rfl: 0         Objective:         Vitals:    01/10/20 0843   BP: 124/78   BP Location: Right arm   Patient Position: Sitting   Cuff Size: Adult   Pulse: 82   Resp: 19   Temp: 97 5 °F (36 4 °C)   TempSrc: Tympanic   SpO2: 96%   Weight: 73 8 kg (162 lb 12 8 oz)   Height: 5' 8" (1 727 m)     Physical Exam   Constitutional: She is oriented to person, place, and time  She appears well-developed and well-nourished  HENT:   Head: Normocephalic and atraumatic  Nose: Nose normal    Mouth/Throat: No oropharyngeal exudate  Eyes: Pupils are equal, round, and reactive to light  Right eye exhibits no discharge   Left eye exhibits no discharge  Neck: Normal range of motion  Neck supple  No tracheal deviation present  Cardiovascular: Normal rate, regular rhythm and intact distal pulses  Exam reveals no gallop and no friction rub  No murmur heard  Pulses:       Dorsalis pedis pulses are 2+ on the right side, and 2+ on the left side  Posterior tibial pulses are 2+ on the right side, and 2+ on the left side  Pulmonary/Chest: Effort normal and breath sounds normal  No respiratory distress  She has no wheezes  She has no rales  Abdominal: Soft  Bowel sounds are normal  She exhibits no distension  There is no tenderness  There is no rebound and no guarding  Musculoskeletal: Normal range of motion  She exhibits no edema  Lymphadenopathy:        Head (right side): No submental and no submandibular adenopathy present  Head (left side): No submental and no submandibular adenopathy present  She has no cervical adenopathy  Right cervical: No superficial cervical, no deep cervical and no posterior cervical adenopathy present  Left cervical: No superficial cervical, no deep cervical and no posterior cervical adenopathy present  Neurological: She is alert and oriented to person, place, and time  No cranial nerve deficit or sensory deficit  Skin: Skin is warm, dry and intact  Psychiatric: Her speech is normal and behavior is normal  Judgment normal  Her mood appears not anxious  Cognition and memory are normal  She does not exhibit a depressed mood  Vitals reviewed

## 2020-01-11 DIAGNOSIS — E78.2 MIXED HYPERLIPIDEMIA: ICD-10-CM

## 2020-01-11 RX ORDER — ATORVASTATIN CALCIUM 40 MG/1
TABLET, FILM COATED ORAL
Qty: 90 TABLET | Refills: 0 | Status: SHIPPED | OUTPATIENT
Start: 2020-01-11 | End: 2020-04-12

## 2020-01-24 DIAGNOSIS — F90.1 ATTENTION DEFICIT HYPERACTIVITY DISORDER (ADHD), PREDOMINANTLY HYPERACTIVE TYPE: ICD-10-CM

## 2020-02-04 ENCOUNTER — OFFICE VISIT (OUTPATIENT)
Dept: FAMILY MEDICINE CLINIC | Facility: CLINIC | Age: 60
End: 2020-02-04
Payer: COMMERCIAL

## 2020-02-04 VITALS
DIASTOLIC BLOOD PRESSURE: 80 MMHG | TEMPERATURE: 97.4 F | OXYGEN SATURATION: 94 % | HEART RATE: 97 BPM | HEIGHT: 68 IN | RESPIRATION RATE: 19 BRPM | SYSTOLIC BLOOD PRESSURE: 138 MMHG | WEIGHT: 161 LBS | BODY MASS INDEX: 24.4 KG/M2

## 2020-02-04 DIAGNOSIS — F41.9 ANXIETY DISORDER, UNSPECIFIED TYPE: Primary | ICD-10-CM

## 2020-02-04 PROCEDURE — 3079F DIAST BP 80-89 MM HG: CPT | Performed by: FAMILY MEDICINE

## 2020-02-04 PROCEDURE — 3075F SYST BP GE 130 - 139MM HG: CPT | Performed by: FAMILY MEDICINE

## 2020-02-04 PROCEDURE — 99214 OFFICE O/P EST MOD 30 MIN: CPT | Performed by: FAMILY MEDICINE

## 2020-02-04 RX ORDER — ALPRAZOLAM 0.5 MG/1
0.5 TABLET ORAL
Qty: 30 TABLET | Refills: 0 | Status: SHIPPED | OUTPATIENT
Start: 2020-02-04 | End: 2020-07-08

## 2020-02-04 RX ORDER — BUSPIRONE HYDROCHLORIDE 5 MG/1
5 TABLET ORAL 2 TIMES DAILY
Qty: 60 TABLET | Refills: 5 | Status: SHIPPED | OUTPATIENT
Start: 2020-02-04 | End: 2021-01-15

## 2020-02-04 NOTE — PROGRESS NOTES
Assessment/Plan:   1  Anxiety disorder, unspecified type  Reviewed patient's symptoms today  At this time, her symptoms appear as to home stress/anxiety  She was advised that she would highly benefit from speaking to/psychologist   Referral given today  She may continue with her alprazolam   Will start treatment BuSpar 5 b i d  For symptom relief  Follow up if any symptoms persist   - Ambulatory referral to Social Work; Future  - ALPRAZolam Merleen Severs) 0 5 mg tablet; Take 1 tablet (0 5 mg total) by mouth daily at bedtime as needed for anxiety  Dispense: 30 tablet; Refill: 0  - busPIRone (BUSPAR) 5 mg tablet; Take 1 tablet (5 mg total) by mouth 2 (two) times a day  Dispense: 60 tablet; Refill: 5           There are no diagnoses linked to this encounter  Subjective:       Chief Complaint   Patient presents with    Stress    Nausea      Patient ID: Daryl Norris is a 64 y o  female  Patient is a 16-year-old female presents today with a CC of increasing nausea  She states that she has been under significant stress and believes that this may likely be the cause of her noted he has been having significant marital problems  She has been having increasing panic attacks  She states that has taken alprazolam past       Review of Systems   Constitutional: Negative for activity change, chills, fatigue and fever  HENT: Negative for congestion, ear pain, sinus pressure and sore throat  Eyes: Negative for redness, itching and visual disturbance  Respiratory: Negative for cough and shortness of breath  Cardiovascular: Negative for chest pain and palpitations  Gastrointestinal: Negative for abdominal pain, diarrhea and nausea  Endocrine: Negative for cold intolerance and heat intolerance  Genitourinary: Negative for dysuria, flank pain and frequency  Musculoskeletal: Negative for arthralgias, back pain, gait problem and myalgias  Skin: Negative for color change     Allergic/Immunologic: Negative for environmental allergies  Neurological: Negative for dizziness, numbness and headaches  Psychiatric/Behavioral: Positive for decreased concentration and sleep disturbance  Negative for behavioral problems  The patient is nervous/anxious  The following portions of the patient's history were reviewed and updated as appropriate : past family history, past medical history, past social history and past surgical history  Current Outpatient Medications:     aspirin (ECOTRIN LOW STRENGTH) 81 mg EC tablet, Take 1 tablet by mouth daily, Disp: , Rfl:     atorvastatin (LIPITOR) 40 mg tablet, TAKE 1 TABLET DAILY, Disp: 90 tablet, Rfl: 0    Blood Glucose Monitoring Suppl (ONE TOUCH ULTRA MINI) w/Device KIT, by Does not apply route, Disp: , Rfl:     buPROPion (WELLBUTRIN SR) 150 mg 12 hr tablet, TAKE 150 MG BY MOUTH EVERY DAY X3 DAYS, THEN TAKE 150MG BY MOUTH TWICE A DAY, Disp: 60 tablet, Rfl: 2    cholecalciferol (VITAMIN D3) 1,000 units tablet, Take by mouth, Disp: , Rfl:     Empagliflozin 10 MG TABS, Take 1 tablet (10 mg total) by mouth every morning, Disp: 30 tablet, Rfl: 0    escitalopram (LEXAPRO) 20 mg tablet, TAKE 1 TABLET BY MOUTH EVERY DAY, Disp: 30 tablet, Rfl: 5    famotidine (PEPCID) 20 mg tablet, Take 1 tablet by mouth daily, Disp: , Rfl:     glucose blood (ONE TOUCH ULTRA TEST) test strip, 1 each by Other route 2 (two) times a day, Disp: 100 each, Rfl: 0    lisdexamfetamine (VYVANSE) 30 MG capsule, Take 1 capsule (30 mg total) by mouth every evening With 40 mg taken in the a  m  Max Daily Amount: 30 mg, Disp: 30 capsule, Rfl: 0    lisdexamfetamine (VYVANSE) 40 MG capsule, Take 1 capsule (40 mg total) by mouth every morningMax Daily Amount: 40 mg, Disp: 30 capsule, Rfl: 0    lisinopril (ZESTRIL) 5 mg tablet, TAKE ONE-HALF (1/2) TABLET DAILY, Disp: 45 tablet, Rfl: 4    metFORMIN (GLUCOPHAGE-XR) 500 mg 24 hr tablet, TAKE 1 TABLET BY MOUTH TWICE A DAY, Disp: 60 tablet, Rfl: 0    metFORMIN (GLUCOPHAGE-XR) 500 mg 24 hr tablet, Take 1 tablet (500 mg total) by mouth 2 (two) times a day, Disp: 180 tablet, Rfl: 1    metoprolol succinate (TOPROL-XL) 25 mg 24 hr tablet, TAKE 1 TABLET DAILY, Disp: 90 tablet, Rfl: 1    montelukast (SINGULAIR) 10 mg tablet, TAKE 1 TABLET (10 MG TOTAL) BY MOUTH DAILY AT BEDTIME FOR ALLERGIES, Disp: 30 tablet, Rfl: 5    ONETOUCH DELICA LANCETS FINE MISC, by Does not apply route daily, Disp: 100 each, Rfl: 1    traMADol (ULTRAM) 50 mg tablet, Take 1 tablet (50 mg total) by mouth every 6 (six) hours as needed for moderate pain, Disp: 12 tablet, Rfl: 0    ALPRAZolam (XANAX) 0 5 mg tablet, Take 1 tablet (0 5 mg total) by mouth daily at bedtime as needed for anxiety for up to 160 days, Disp: 30 tablet, Rfl: 0    benzonatate (TESSALON) 200 MG capsule, Take 1 capsule (200 mg total) by mouth 3 (three) times a day as needed for cough (Patient not taking: Reported on 1/10/2020), Disp: 21 capsule, Rfl: 0    guaifenesin-codeine (GUAIFENESIN AC) 100-10 MG/5ML liquid, Take 5 mL by mouth 3 (three) times a day as needed for cough (Patient not taking: Reported on 2/4/2020), Disp: 120 mL, Rfl: 0    methylPREDNISolone 4 MG tablet therapy pack, Use as directed on package (Patient not taking: Reported on 2/4/2020), Disp: 1 each, Rfl: 0    Promethazine-DM (PHENERGAN-DM) 6 25-15 mg/5 mL oral syrup, 5 mL q 6 hours or at hs prn cough  Home use only  (Patient not taking: Reported on 2/4/2020), Disp: 118 mL, Rfl: 0         Objective:         Vitals:    02/04/20 1153   BP: 138/80   BP Location: Right arm   Patient Position: Sitting   Cuff Size: Adult   Pulse: 97   Resp: 19   Temp: (!) 97 4 °F (36 3 °C)   TempSrc: Tympanic   SpO2: 94%   Weight: 73 kg (161 lb)   Height: 5' 8" (1 727 m)     Physical Exam   Constitutional: She is oriented to person, place, and time  She appears well-developed and well-nourished  HENT:   Head: Normocephalic and atraumatic     Nose: Nose normal    Mouth/Throat: No oropharyngeal exudate  Eyes: Pupils are equal, round, and reactive to light  Right eye exhibits no discharge  Left eye exhibits no discharge  Neck: Normal range of motion  Neck supple  No tracheal deviation present  Cardiovascular: Normal rate, regular rhythm and intact distal pulses  Exam reveals no gallop and no friction rub  No murmur heard  Pulses:       Dorsalis pedis pulses are 2+ on the right side, and 2+ on the left side  Posterior tibial pulses are 2+ on the right side, and 2+ on the left side  Pulmonary/Chest: Effort normal and breath sounds normal  No respiratory distress  She has no wheezes  She has no rales  Abdominal: Soft  Bowel sounds are normal  She exhibits no distension  There is no tenderness  There is no rebound and no guarding  Musculoskeletal: Normal range of motion  She exhibits no edema  Lymphadenopathy:        Head (right side): No submental and no submandibular adenopathy present  Head (left side): No submental and no submandibular adenopathy present  She has no cervical adenopathy  Right cervical: No superficial cervical, no deep cervical and no posterior cervical adenopathy present  Left cervical: No superficial cervical, no deep cervical and no posterior cervical adenopathy present  Neurological: She is alert and oriented to person, place, and time  No cranial nerve deficit or sensory deficit  Skin: Skin is warm, dry and intact  Psychiatric: Her speech is normal and behavior is normal  Judgment normal  Her mood appears not anxious  Cognition and memory are normal  She does not exhibit a depressed mood  Vitals reviewed

## 2020-02-07 DIAGNOSIS — F90.1 ATTENTION DEFICIT HYPERACTIVITY DISORDER (ADHD), PREDOMINANTLY HYPERACTIVE TYPE: ICD-10-CM

## 2020-02-10 ENCOUNTER — TELEPHONE (OUTPATIENT)
Dept: FAMILY MEDICINE CLINIC | Facility: CLINIC | Age: 60
End: 2020-02-10

## 2020-02-10 ENCOUNTER — TRANSCRIBE ORDERS (OUTPATIENT)
Dept: ADMINISTRATIVE | Facility: HOSPITAL | Age: 60
End: 2020-02-10

## 2020-02-10 DIAGNOSIS — Z12.31 VISIT FOR SCREENING MAMMOGRAM: Primary | ICD-10-CM

## 2020-02-10 NOTE — TELEPHONE ENCOUNTER
Patient LM stating she has a mammogram scheduled for 04/06/20 at [facility]

## 2020-02-24 DIAGNOSIS — F90.1 ATTENTION DEFICIT HYPERACTIVITY DISORDER (ADHD), PREDOMINANTLY HYPERACTIVE TYPE: ICD-10-CM

## 2020-03-05 ENCOUNTER — OFFICE VISIT (OUTPATIENT)
Dept: FAMILY MEDICINE CLINIC | Facility: CLINIC | Age: 60
End: 2020-03-05
Payer: COMMERCIAL

## 2020-03-05 VITALS
SYSTOLIC BLOOD PRESSURE: 126 MMHG | TEMPERATURE: 97.1 F | RESPIRATION RATE: 16 BRPM | HEART RATE: 86 BPM | DIASTOLIC BLOOD PRESSURE: 80 MMHG | HEIGHT: 66 IN | OXYGEN SATURATION: 96 % | WEIGHT: 163.38 LBS | BODY MASS INDEX: 26.26 KG/M2

## 2020-03-05 DIAGNOSIS — F41.9 ANXIETY DISORDER, UNSPECIFIED TYPE: ICD-10-CM

## 2020-03-05 DIAGNOSIS — F90.1 ATTENTION DEFICIT HYPERACTIVITY DISORDER (ADHD), PREDOMINANTLY HYPERACTIVE TYPE: ICD-10-CM

## 2020-03-05 DIAGNOSIS — IMO0001 UNCONTROLLED TYPE 2 DIABETES MELLITUS WITHOUT COMPLICATION, WITHOUT LONG-TERM CURRENT USE OF INSULIN: ICD-10-CM

## 2020-03-05 DIAGNOSIS — K21.9 GERD WITHOUT ESOPHAGITIS: ICD-10-CM

## 2020-03-05 DIAGNOSIS — J06.9 ACUTE URI: Primary | ICD-10-CM

## 2020-03-05 DIAGNOSIS — I10 BENIGN ESSENTIAL HYPERTENSION: ICD-10-CM

## 2020-03-05 DIAGNOSIS — E78.2 MIXED HYPERLIPIDEMIA: ICD-10-CM

## 2020-03-05 LAB — SL AMB POCT HEMOGLOBIN AIC: 7.7 (ref ?–6.5)

## 2020-03-05 PROCEDURE — 3074F SYST BP LT 130 MM HG: CPT | Performed by: FAMILY MEDICINE

## 2020-03-05 PROCEDURE — 83036 HEMOGLOBIN GLYCOSYLATED A1C: CPT | Performed by: FAMILY MEDICINE

## 2020-03-05 PROCEDURE — 99215 OFFICE O/P EST HI 40 MIN: CPT | Performed by: FAMILY MEDICINE

## 2020-03-05 PROCEDURE — 3079F DIAST BP 80-89 MM HG: CPT | Performed by: FAMILY MEDICINE

## 2020-03-05 PROCEDURE — 3051F HG A1C>EQUAL 7.0%<8.0%: CPT | Performed by: FAMILY MEDICINE

## 2020-03-05 RX ORDER — GUAIFENESIN AND CODEINE PHOSPHATE 100; 10 MG/5ML; MG/5ML
5 SOLUTION ORAL 3 TIMES DAILY PRN
Qty: 120 ML | Refills: 0 | Status: SHIPPED | OUTPATIENT
Start: 2020-03-05 | End: 2021-01-15

## 2020-03-05 RX ORDER — DOXYCYCLINE 100 MG/1
100 TABLET ORAL 2 TIMES DAILY
Qty: 14 TABLET | Refills: 0 | Status: SHIPPED | OUTPATIENT
Start: 2020-03-05 | End: 2020-03-12

## 2020-03-05 NOTE — PROGRESS NOTES
Assessment/Plan:   1  Acute URI  Start supportive care  Maintain hydration  Take over-the-counter Mucinex  Start treatment with doxycycline as well as guaifenesin with codeine   - doxycycline (ADOXA) 100 MG tablet; Take 1 tablet (100 mg total) by mouth 2 (two) times a day for 7 days  Dispense: 14 tablet; Refill: 0  - guaifenesin-codeine (GUAIFENESIN AC) 100-10 MG/5ML liquid; Take 5 mL by mouth 3 (three) times a day as needed for cough  Dispense: 120 mL; Refill: 0    2  Uncontrolled type 2 diabetes mellitus without complication, without long-term current use of insulin (HCC)  Uncontrolled  Patient's A1c was 7 7  She must maintain a very strict diet and exercise plan  Foot exam completed today  Will continue at this time with her current treatment of metformin as well as her regular blood sugar control  If persistently elevated, will consider additional treatment   - POCT hemoglobin A1c  - Comprehensive metabolic panel; Future    3  Benign essential hypertension  Patient's blood pressure appears stable today  Will continue with routine home monitoring as well as her current treatment with metoprolol, lisinopril,    4  Mixed hyperlipidemia  Patient has been noncompliant with her blood work  She must maintain a very strict low-fat/low-cholesterol diet  Recheck lipid panel  Continue with current dose of atorvastatin  - Lipid Panel with Direct LDL reflex; Future    5  GERD without esophagitis  Stable  Continue with dietary trigger avoidance as well as her current treatment with famotidine  6  Anxiety disorder, unspecified type  Patient still with persistent anxiety  This is all secondary to her relationship with her   She still has not seen a psychologist   She was advised that she would highly benefit from seeing a psychologist   Will continue with her current treatment of Lexapro as well as her alprazolam   Follow-up if any symptoms worsen    She was educated on the different treatment options    7  Attention deficit hyperactivity disorder (ADHD), predominantly hyperactive type  Stable today  Patient has been maintaining her current treatment with Vyvanse 70 mg daily  Will continue try to wean her off this medication  Follow-up in 4 months  BMI Counseling: Body mass index is 26 57 kg/m²  The BMI is above normal  Nutrition recommendations include decreasing portion sizes, encouraging healthy choices of fruits and vegetables, decreasing fast food intake, consuming healthier snacks and limiting drinks that contain sugar  Exercise recommendations include moderate physical activity 150 minutes/week and exercising 3-5 times per week  No pharmacotherapy was ordered  Diagnoses and all orders for this visit:    Uncontrolled type 2 diabetes mellitus without complication, without long-term current use of insulin (HCC)  -     POCT hemoglobin A1c    Attention deficit hyperactivity disorder (ADHD), predominantly hyperactive type    Other orders  -     Multiple Vitamins-Minerals (MULTIVITAMIN ADULTS 50+ PO); Take 1 tablet by mouth daily          Subjective:       Chief Complaint   Patient presents with    Follow-up     6m check up to chronic conditions  Pt would like blood work orders placed    Cold Like Symptoms     cough,congestion,PND and raspy voice x 5 days  Has taken OTC medication with little relief      Patient ID: Nancy Rey is a 64 y o  female  Patient is a 44-year-old female presents today for a follow-up on her chronic conditions  She has type 2 diabetes, he had HD, GERD, hypertension, anxiety disorder, dyslipidemia  She has been taking medications regularly  She denies adverse reactions with medications  She has a complaint today of increasing throat discomfort as well as cough  She has been feeling symptoms past few days  Symptoms started gradually  Denies any sick contacts    She has been taking over-the-counter medications such as Mucinex with minimal relief  Review of Systems   Constitutional: Negative for activity change, chills, fatigue and fever  HENT: Negative for congestion, ear pain, sinus pressure and sore throat  Eyes: Negative for redness, itching and visual disturbance  Respiratory: Negative for cough and shortness of breath  Cardiovascular: Negative for chest pain and palpitations  Gastrointestinal: Negative for abdominal pain, diarrhea and nausea  Endocrine: Negative for cold intolerance and heat intolerance  Genitourinary: Negative for dysuria, flank pain and frequency  Musculoskeletal: Negative for arthralgias, back pain, gait problem and myalgias  Skin: Negative for color change  Allergic/Immunologic: Negative for environmental allergies  Neurological: Negative for dizziness, numbness and headaches  Psychiatric/Behavioral: Negative for behavioral problems and sleep disturbance  The following portions of the patient's history were reviewed and updated as appropriate : past family history, past medical history, past social history and past surgical history      Current Outpatient Medications:     aspirin (ECOTRIN LOW STRENGTH) 81 mg EC tablet, Take 1 tablet by mouth daily, Disp: , Rfl:     atorvastatin (LIPITOR) 40 mg tablet, TAKE 1 TABLET DAILY, Disp: 90 tablet, Rfl: 0    Blood Glucose Monitoring Suppl (ONE TOUCH ULTRA MINI) w/Device KIT, by Does not apply route, Disp: , Rfl:     busPIRone (BUSPAR) 5 mg tablet, Take 1 tablet (5 mg total) by mouth 2 (two) times a day, Disp: 60 tablet, Rfl: 5    cholecalciferol (VITAMIN D3) 1,000 units tablet, Take by mouth, Disp: , Rfl:     escitalopram (LEXAPRO) 20 mg tablet, TAKE 1 TABLET BY MOUTH EVERY DAY, Disp: 30 tablet, Rfl: 5    famotidine (PEPCID) 20 mg tablet, Take 1 tablet by mouth daily, Disp: , Rfl:     glucose blood (ONE TOUCH ULTRA TEST) test strip, 1 each by Other route 2 (two) times a day, Disp: 100 each, Rfl: 0    lisdexamfetamine (VYVANSE) 30 MG capsule, Take 1 capsule (30 mg total) by mouth every evening With 40 mg taken in the a  m  Max Daily Amount: 30 mg, Disp: 30 capsule, Rfl: 0    lisdexamfetamine (VYVANSE) 40 MG capsule, Take 1 capsule (40 mg total) by mouth every morningMax Daily Amount: 40 mg, Disp: 30 capsule, Rfl: 0    lisinopril (ZESTRIL) 5 mg tablet, TAKE ONE-HALF (1/2) TABLET DAILY, Disp: 45 tablet, Rfl: 4    metFORMIN (GLUCOPHAGE-XR) 500 mg 24 hr tablet, Take 1 tablet (500 mg total) by mouth 2 (two) times a day, Disp: 180 tablet, Rfl: 1    metoprolol succinate (TOPROL-XL) 25 mg 24 hr tablet, TAKE 1 TABLET DAILY, Disp: 90 tablet, Rfl: 1    Multiple Vitamins-Minerals (MULTIVITAMIN ADULTS 50+ PO), Take 1 tablet by mouth daily, Disp: , Rfl:     ONETOUCH DELICA LANCETS FINE MISC, by Does not apply route daily, Disp: 100 each, Rfl: 1    ALPRAZolam (XANAX) 0 5 mg tablet, Take 1 tablet (0 5 mg total) by mouth daily at bedtime as needed for anxiety (Patient not taking: Reported on 3/5/2020), Disp: 30 tablet, Rfl: 0    buPROPion (WELLBUTRIN SR) 150 mg 12 hr tablet, TAKE 150 MG BY MOUTH EVERY DAY X3 DAYS, THEN TAKE 150MG BY MOUTH TWICE A DAY (Patient not taking: Reported on 3/5/2020), Disp: 60 tablet, Rfl: 2         Objective:     Vitals:    03/05/20 1336   BP: 126/80   BP Location: Left arm   Patient Position: Sitting   Cuff Size: Adult   Pulse: 86   Resp: 16   Temp: (!) 97 1 °F (36 2 °C)   TempSrc: Tympanic   SpO2: 96%   Weight: 74 1 kg (163 lb 6 oz)   Height: 5' 5 75" (1 67 m)     Physical Exam   Constitutional: She is oriented to person, place, and time  She appears well-developed and well-nourished  HENT:   Head: Normocephalic and atraumatic  Nose: Nose normal    Mouth/Throat: No oropharyngeal exudate  Eyes: Pupils are equal, round, and reactive to light  Right eye exhibits no discharge  Left eye exhibits no discharge  Neck: Normal range of motion  Neck supple  No tracheal deviation present     Cardiovascular: Normal rate, regular rhythm and intact distal pulses  Exam reveals no gallop and no friction rub  Pulses are no weak pulses  No murmur heard  Pulses:       Dorsalis pedis pulses are 2+ on the right side, and 2+ on the left side  Posterior tibial pulses are 2+ on the right side, and 2+ on the left side  Pulmonary/Chest: Effort normal and breath sounds normal  No respiratory distress  She has no wheezes  She has no rales  Abdominal: Soft  Bowel sounds are normal  She exhibits no distension  There is no tenderness  There is no rebound and no guarding  Musculoskeletal: Normal range of motion  She exhibits no edema  Feet:   Right Foot:   Skin Integrity: Negative for ulcer, skin breakdown, erythema, warmth, callus or dry skin  Left Foot:   Skin Integrity: Negative for ulcer, skin breakdown, erythema, warmth, callus or dry skin  Lymphadenopathy:        Head (right side): No submental and no submandibular adenopathy present  Head (left side): No submental and no submandibular adenopathy present  She has no cervical adenopathy  Right cervical: No superficial cervical, no deep cervical and no posterior cervical adenopathy present  Left cervical: No superficial cervical, no deep cervical and no posterior cervical adenopathy present  Neurological: She is alert and oriented to person, place, and time  No cranial nerve deficit or sensory deficit  Skin: Skin is warm, dry and intact  Psychiatric: Her speech is normal and behavior is normal  Judgment normal  Her mood appears not anxious  Cognition and memory are normal  She does not exhibit a depressed mood  Vitals reviewed  Patient's shoes and socks removed  Right Foot/Ankle   Right Foot Inspection  Skin Exam: skin normal and skin intact no dry skin, no warmth, no callus, no erythema, no maceration, no abnormal color, no pre-ulcer, no ulcer and no callus                          Toe Exam: ROM and strength within normal limits  Sensory   Vibration: intact  Proprioception: intact   Monofilament testing: intact  Vascular  Capillary refills: < 3 seconds  The right DP pulse is 2+  The right PT pulse is 2+  Left Foot/Ankle  Left Foot Inspection  Skin Exam: skin normal and skin intactno dry skin, no warmth, no erythema, no maceration, normal color, no pre-ulcer, no ulcer and no callus                         Toe Exam: ROM and strength within normal limits                   Sensory   Vibration: intact  Proprioception: intact  Monofilament: intact  Vascular  Capillary refills: < 3 seconds  The left DP pulse is 2+  The left PT pulse is 2+  Assign Risk Category:  No deformity present; No loss of protective sensation;  No weak pulses       Risk: 0

## 2020-03-06 DIAGNOSIS — F90.1 ATTENTION DEFICIT HYPERACTIVITY DISORDER (ADHD), PREDOMINANTLY HYPERACTIVE TYPE: ICD-10-CM

## 2020-03-10 DIAGNOSIS — I10 ESSENTIAL HYPERTENSION: ICD-10-CM

## 2020-03-10 LAB
LEFT EYE DIABETIC RETINOPATHY: NORMAL
RIGHT EYE DIABETIC RETINOPATHY: NORMAL

## 2020-03-10 RX ORDER — METOPROLOL SUCCINATE 25 MG/1
TABLET, EXTENDED RELEASE ORAL
Qty: 90 TABLET | Refills: 1 | Status: SHIPPED | OUTPATIENT
Start: 2020-03-10 | End: 2020-09-10 | Stop reason: SDUPTHER

## 2020-03-12 ENCOUNTER — SOCIAL WORK (OUTPATIENT)
Dept: BEHAVIORAL/MENTAL HEALTH CLINIC | Facility: CLINIC | Age: 60
End: 2020-03-12
Payer: COMMERCIAL

## 2020-03-12 DIAGNOSIS — F43.10 PTSD (POST-TRAUMATIC STRESS DISORDER): Primary | ICD-10-CM

## 2020-03-12 DIAGNOSIS — F32.9 REACTIVE DEPRESSION: ICD-10-CM

## 2020-03-12 PROCEDURE — 90834 PSYTX W PT 45 MINUTES: CPT | Performed by: PSYCHIATRY & NEUROLOGY
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
## 2020-03-24 DIAGNOSIS — F90.1 ATTENTION DEFICIT HYPERACTIVITY DISORDER (ADHD), PREDOMINANTLY HYPERACTIVE TYPE: ICD-10-CM

## 2020-03-24 NOTE — TELEPHONE ENCOUNTER
PT called and needs refill on:     - lisdexamfetamine (vyvanse) 40mg capsule  Take 1 capsule by mouth every morning  Qty: 30 capsule      Send to [facility] on [address]

## 2020-03-25 DIAGNOSIS — F90.1 ATTENTION DEFICIT HYPERACTIVITY DISORDER (ADHD), PREDOMINANTLY HYPERACTIVE TYPE: ICD-10-CM

## 2020-03-26 ENCOUNTER — TELEPHONE (OUTPATIENT)
Dept: FAMILY MEDICINE CLINIC | Facility: CLINIC | Age: 60
End: 2020-03-26

## 2020-03-26 NOTE — TELEPHONE ENCOUNTER
Please reassure patient  At this time as long as she is practicing social distant sitting as well as hand washing and  sanitizing in her current living area she will be at low risk  Please have her continue her current treatment Lexapro  Please check  To see if she has scheduled an appointment with a psychologist yet    Thank you
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
## 2020-03-26 NOTE — TELEPHONE ENCOUNTER
Pt called c/o anxiety  Pt is becoming scared with the events going on with COVID-19  Pt was taking alprazolam 0 5 mg about 1 month ago, please advise  [facility] uses [facility]

## 2020-04-05 DIAGNOSIS — F90.1 ATTENTION DEFICIT HYPERACTIVITY DISORDER (ADHD), PREDOMINANTLY HYPERACTIVE TYPE: ICD-10-CM

## 2020-04-11 DIAGNOSIS — E78.2 MIXED HYPERLIPIDEMIA: ICD-10-CM

## 2020-04-12 RX ORDER — ATORVASTATIN CALCIUM 40 MG/1
TABLET, FILM COATED ORAL
Qty: 90 TABLET | Refills: 1 | Status: SHIPPED | OUTPATIENT
Start: 2020-04-12 | End: 2020-10-07

## 2020-04-21 DIAGNOSIS — F90.1 ATTENTION DEFICIT HYPERACTIVITY DISORDER (ADHD), PREDOMINANTLY HYPERACTIVE TYPE: ICD-10-CM

## 2020-04-28 DIAGNOSIS — F41.9 ANXIETY DISORDER, UNSPECIFIED TYPE: ICD-10-CM

## 2020-04-28 RX ORDER — ESCITALOPRAM OXALATE 20 MG/1
TABLET ORAL
Qty: 30 TABLET | Refills: 5 | Status: SHIPPED | OUTPATIENT
Start: 2020-04-28 | End: 2020-07-27 | Stop reason: SDUPTHER

## 2020-05-02 DIAGNOSIS — F90.1 ATTENTION DEFICIT HYPERACTIVITY DISORDER (ADHD), PREDOMINANTLY HYPERACTIVE TYPE: ICD-10-CM

## 2020-05-03 ENCOUNTER — OFFICE VISIT (OUTPATIENT)
Dept: URGENT CARE | Facility: MEDICAL CENTER | Age: 60
End: 2020-05-03
Payer: COMMERCIAL

## 2020-05-03 ENCOUNTER — APPOINTMENT (OUTPATIENT)
Dept: RADIOLOGY | Facility: MEDICAL CENTER | Age: 60
End: 2020-05-03
Payer: COMMERCIAL

## 2020-05-03 VITALS
WEIGHT: 160 LBS | HEIGHT: 68 IN | TEMPERATURE: 97.8 F | RESPIRATION RATE: 19 BRPM | BODY MASS INDEX: 24.25 KG/M2 | DIASTOLIC BLOOD PRESSURE: 65 MMHG | HEART RATE: 80 BPM | SYSTOLIC BLOOD PRESSURE: 147 MMHG | OXYGEN SATURATION: 98 %

## 2020-05-03 DIAGNOSIS — S20.211A RIB CONTUSION, RIGHT, INITIAL ENCOUNTER: ICD-10-CM

## 2020-05-03 DIAGNOSIS — S20.211A RIB CONTUSION, RIGHT, INITIAL ENCOUNTER: Primary | ICD-10-CM

## 2020-05-03 PROCEDURE — 99213 OFFICE O/P EST LOW 20 MIN: CPT | Performed by: PHYSICIAN ASSISTANT

## 2020-05-03 PROCEDURE — 71101 X-RAY EXAM UNILAT RIBS/CHEST: CPT

## 2020-05-03 RX ORDER — IBUPROFEN 600 MG/1
600 TABLET ORAL EVERY 6 HOURS PRN
Qty: 30 TABLET | Refills: 0 | Status: SHIPPED | OUTPATIENT
Start: 2020-05-03 | End: 2021-05-12

## 2020-05-03 RX ORDER — KETOROLAC TROMETHAMINE 30 MG/ML
15 INJECTION, SOLUTION INTRAMUSCULAR; INTRAVENOUS ONCE
Status: COMPLETED | OUTPATIENT
Start: 2020-05-03 | End: 2020-05-03

## 2020-05-03 RX ADMIN — KETOROLAC TROMETHAMINE 15 MG: 30 INJECTION, SOLUTION INTRAMUSCULAR; INTRAVENOUS at 18:48

## 2020-05-04 ENCOUNTER — TELEPHONE (OUTPATIENT)
Dept: URGENT CARE | Facility: MEDICAL CENTER | Age: 60
End: 2020-05-04

## 2020-05-04 ENCOUNTER — TELEPHONE (OUTPATIENT)
Dept: EMERGENCY DEPT | Facility: HOSPITAL | Age: 60
End: 2020-05-04

## 2020-05-14 ENCOUNTER — TELEMEDICINE (OUTPATIENT)
Dept: FAMILY MEDICINE CLINIC | Facility: CLINIC | Age: 60
End: 2020-05-14
Payer: COMMERCIAL

## 2020-05-14 ENCOUNTER — TELEPHONE (OUTPATIENT)
Dept: FAMILY MEDICINE CLINIC | Facility: CLINIC | Age: 60
End: 2020-05-14

## 2020-05-14 DIAGNOSIS — J01.00 ACUTE NON-RECURRENT MAXILLARY SINUSITIS: Primary | ICD-10-CM

## 2020-05-14 PROCEDURE — 3078F DIAST BP <80 MM HG: CPT | Performed by: FAMILY MEDICINE

## 2020-05-14 PROCEDURE — 3077F SYST BP >= 140 MM HG: CPT | Performed by: FAMILY MEDICINE

## 2020-05-14 PROCEDURE — 99213 OFFICE O/P EST LOW 20 MIN: CPT | Performed by: FAMILY MEDICINE

## 2020-05-14 RX ORDER — DOXYCYCLINE HYCLATE 100 MG/1
100 CAPSULE ORAL EVERY 12 HOURS SCHEDULED
Qty: 20 CAPSULE | Refills: 0 | Status: SHIPPED | OUTPATIENT
Start: 2020-05-14 | End: 2020-05-24

## 2020-05-14 RX ORDER — PROMETHAZINE HYDROCHLORIDE AND CODEINE PHOSPHATE 6.25; 1 MG/5ML; MG/5ML
5 SYRUP ORAL EVERY 4 HOURS PRN
Qty: 120 ML | Refills: 0 | Status: SHIPPED | OUTPATIENT
Start: 2020-05-14 | End: 2021-01-15 | Stop reason: SDUPTHER

## 2020-05-20 DIAGNOSIS — F90.1 ATTENTION DEFICIT HYPERACTIVITY DISORDER (ADHD), PREDOMINANTLY HYPERACTIVE TYPE: ICD-10-CM

## 2020-06-02 DIAGNOSIS — F90.1 ATTENTION DEFICIT HYPERACTIVITY DISORDER (ADHD), PREDOMINANTLY HYPERACTIVE TYPE: ICD-10-CM

## 2020-06-17 ENCOUNTER — TELEPHONE (OUTPATIENT)
Dept: FAMILY MEDICINE CLINIC | Facility: CLINIC | Age: 60
End: 2020-06-17

## 2020-06-17 DIAGNOSIS — F90.1 ATTENTION DEFICIT HYPERACTIVITY DISORDER (ADHD), PREDOMINANTLY HYPERACTIVE TYPE: ICD-10-CM

## 2020-06-29 DIAGNOSIS — F90.1 ATTENTION DEFICIT HYPERACTIVITY DISORDER (ADHD), PREDOMINANTLY HYPERACTIVE TYPE: ICD-10-CM

## 2020-07-08 ENCOUNTER — OFFICE VISIT (OUTPATIENT)
Dept: FAMILY MEDICINE CLINIC | Facility: CLINIC | Age: 60
End: 2020-07-08
Payer: COMMERCIAL

## 2020-07-08 VITALS
TEMPERATURE: 97.6 F | WEIGHT: 160 LBS | HEIGHT: 67 IN | RESPIRATION RATE: 17 BRPM | HEART RATE: 92 BPM | OXYGEN SATURATION: 95 % | BODY MASS INDEX: 25.11 KG/M2 | DIASTOLIC BLOOD PRESSURE: 80 MMHG | SYSTOLIC BLOOD PRESSURE: 130 MMHG

## 2020-07-08 DIAGNOSIS — J01.00 ACUTE NON-RECURRENT MAXILLARY SINUSITIS: Primary | ICD-10-CM

## 2020-07-08 PROCEDURE — 99213 OFFICE O/P EST LOW 20 MIN: CPT | Performed by: FAMILY MEDICINE

## 2020-07-08 PROCEDURE — 3075F SYST BP GE 130 - 139MM HG: CPT | Performed by: FAMILY MEDICINE

## 2020-07-08 PROCEDURE — 3008F BODY MASS INDEX DOCD: CPT | Performed by: FAMILY MEDICINE

## 2020-07-08 PROCEDURE — 3079F DIAST BP 80-89 MM HG: CPT | Performed by: FAMILY MEDICINE

## 2020-07-08 RX ORDER — PROMETHAZINE HYDROCHLORIDE AND CODEINE PHOSPHATE 6.25; 1 MG/5ML; MG/5ML
5 SYRUP ORAL EVERY 4 HOURS PRN
Qty: 120 ML | Refills: 0 | Status: SHIPPED | OUTPATIENT
Start: 2020-07-08 | End: 2021-01-15 | Stop reason: SDUPTHER

## 2020-07-08 RX ORDER — DOXYCYCLINE HYCLATE 100 MG/1
100 CAPSULE ORAL EVERY 12 HOURS SCHEDULED
Qty: 20 CAPSULE | Refills: 0 | Status: SHIPPED | OUTPATIENT
Start: 2020-07-08 | End: 2020-07-18

## 2020-07-08 NOTE — PROGRESS NOTES
[facility]      NAME: Karen Campuzano  AGE: 64 y o  SEX: female  : 1960   MRN: [id number]    DATE: 2020  TIME: 2:05 PM    Assessment and Plan     Problem List Items Addressed This Visit     None      Visit Diagnoses     Acute non-recurrent maxillary sinusitis    -  Primary    rx given for doxy and prometh w/ codeine    Relevant Medications    doxycycline hyclate (VIBRAMYCIN) 100 mg capsule    promethazine-codeine (PHENERGAN WITH CODEINE) 6 25-10 mg/5 mL syrup              Return to office in: call further probs  Chief Complaint     Chief Complaint   Patient presents with    Sinus Problem     x 6 days       History of Present Illness     5 day hx of sinus drainage, pressure, congesion, cough  No fevers  The following portions of the patient's history were reviewed and updated as appropriate: allergies, current medications, past family history, past medical history, past social history, past surgical history and problem list     Review of Systems   Review of Systems   Constitutional: Negative for chills and fever  HENT: Positive for congestion and postnasal drip  Respiratory: Positive for cough  Negative for shortness of breath  Cardiovascular: Negative          Active Problem List     Patient Active Problem List   Diagnosis    Anxiety disorder    Atrophic vaginitis    Benign essential hypertension    Coronary atherosclerosis    Midline cystocele    Dacryocystitis of left lacrimal sac    Diverticulitis of colon    Gastroparesis    GERD without esophagitis    Hiatal hernia    IBS (irritable bowel syndrome)    Insomnia    Left shoulder pain    Low back pain    Menopausal syndrome    Microscopic hematuria    Mixed hyperlipidemia    Mixed stress and urge urinary incontinence    Post-menopausal bleeding    Status post percutaneous transluminal coronary angioplasty    Right knee pain    Seasonal allergies    Uncontrolled type 2 diabetes mellitus without complication, without long-term current use of insulin    Vitamin D deficiency    Attention deficit hyperactivity disorder (ADHD), predominantly hyperactive type    PTSD (post-traumatic stress disorder)    Reactive depression       Objective   /80 (BP Location: Left arm, Patient Position: Sitting, Cuff Size: Adult)   Pulse 92   Temp 97 6 °F (36 4 °C) (Tympanic)   Resp 17   Ht 5' 7 32" (1 71 m)   Wt 72 6 kg (160 lb)   SpO2 95%   BMI 24 82 kg/m²     Physical Exam   Constitutional: She appears well-developed and well-nourished  HENT:   Turbinates inflamed   Neck: Normal range of motion  Neck supple  Pulmonary/Chest: Effort normal and breath sounds normal        Pertinent Laboratory/Diagnostic Studies:  none    Current Medications     Current Outpatient Medications:     aspirin (ECOTRIN LOW STRENGTH) 81 mg EC tablet, Take 1 tablet by mouth daily, Disp: , Rfl:     atorvastatin (LIPITOR) 40 mg tablet, TAKE 1 TABLET DAILY, Disp: 90 tablet, Rfl: 1    Blood Glucose Monitoring Suppl (ONE TOUCH ULTRA MINI) w/Device KIT, by Does not apply route, Disp: , Rfl:     busPIRone (BUSPAR) 5 mg tablet, Take 1 tablet (5 mg total) by mouth 2 (two) times a day, Disp: 60 tablet, Rfl: 5    cholecalciferol (VITAMIN D3) 1,000 units tablet, Take by mouth, Disp: , Rfl:     escitalopram (LEXAPRO) 20 mg tablet, TAKE 1 TABLET BY MOUTH EVERY DAY, Disp: 30 tablet, Rfl: 5    famotidine (PEPCID) 20 mg tablet, Take 1 tablet by mouth daily, Disp: , Rfl:     glucose blood (ONE TOUCH ULTRA TEST) test strip, 1 each by Other route 2 (two) times a day, Disp: 100 each, Rfl: 0    lisdexamfetamine (VYVANSE) 20 MG capsule, Take 1 capsule (20 mg total) by mouth every evening With 40 mg taken in the a  m  Max Daily Amount: 20 mg, Disp: 30 capsule, Rfl: 0    lisdexamfetamine (VYVANSE) 40 MG capsule, Take 1 capsule (40 mg total) by mouth every morningMax Daily Amount: 40 mg, Disp: 30 capsule, Rfl: 0    lisinopril (ZESTRIL) 5 mg tablet, TAKE ONE-HALF (1/2) TABLET DAILY, Disp: 45 tablet, Rfl: 4    metFORMIN (GLUCOPHAGE-XR) 500 mg 24 hr tablet, Take 1 tablet (500 mg total) by mouth 2 (two) times a day, Disp: 180 tablet, Rfl: 1    metoprolol succinate (TOPROL-XL) 25 mg 24 hr tablet, TAKE 1 TABLET DAILY, Disp: 90 tablet, Rfl: 1    Multiple Vitamins-Minerals (MULTIVITAMIN ADULTS 50+ PO), Take 1 tablet by mouth daily, Disp: , Rfl:     ONETOUCH DELICA LANCETS FINE MISC, by Does not apply route daily, Disp: 100 each, Rfl: 1    doxycycline hyclate (VIBRAMYCIN) 100 mg capsule, Take 1 capsule (100 mg total) by mouth every 12 (twelve) hours for 10 days, Disp: 20 capsule, Rfl: 0    guaifenesin-codeine (GUAIFENESIN AC) 100-10 MG/5ML liquid, Take 5 mL by mouth 3 (three) times a day as needed for cough (Patient not taking: Reported on 5/3/2020), Disp: 120 mL, Rfl: 0    ibuprofen (MOTRIN) 600 mg tablet, Take 1 tablet (600 mg total) by mouth every 6 (six) hours as needed for mild pain (Patient not taking: Reported on 7/8/2020), Disp: 30 tablet, Rfl: 0    promethazine-codeine (PHENERGAN WITH CODEINE) 6 25-10 mg/5 mL syrup, Take 5 mL by mouth every 4 (four) hours as needed for cough (Patient not taking: Reported on 7/8/2020), Disp: 120 mL, Rfl: 0    promethazine-codeine (PHENERGAN WITH CODEINE) 6 25-10 mg/5 mL syrup, Take 5 mL by mouth every 4 (four) hours as needed for cough, Disp: 120 mL, Rfl: 0    Health Maintenance     Health Maintenance   Topic Date Due    Annual Physical  08/27/1980    Cervical Cancer Screening  08/27/1983    MAMMOGRAM  01/15/2020    Influenza Vaccine  07/01/2020    HEMOGLOBIN A1C  09/05/2020    Pneumococcal Vaccine: Pediatrics (0 to 5 Years) and At-Risk Patients (6 to 59 Years) (3 of 3 - PPSV23) 03/05/2021 (Originally 8/7/2019)    HIB Vaccine (1 of 1 - Risk 1-dose series) 03/05/2021 (Originally 11/27/1961)    Meningococcal ACWY Vaccine (1 - Risk start before 7 months 4-dose series) 03/05/2021 (Originally 1960)   Lorraine Omer Diabetic Foot Exam  03/07/2021    DM Eye Exam  03/10/2021    BMI: Adult  07/08/2021    DTaP,Tdap,and Td Vaccines (2 - Td) 08/07/2024    CRC Screening: Colonoscopy  03/19/2025    Pneumococcal Vaccine: 65+ Years (2 of 2 - PPSV23) 08/27/2027    Hepatitis C Screening  Completed    HIV Screening  Addressed    Hepatitis B Vaccine  Aged Out    IPV Vaccine  Aged Out    Hepatitis A Vaccine  Aged Out    HPV Vaccine  Aged Out     Immunization History   Administered Date(s) Administered    Fluzone Split Quad 0 5 mL 11/06/2015    Hep A, adult 05/31/2017, 12/06/2017    INFLUENZA 11/06/2015, 12/22/2015, 11/01/2017    Influenza Quadrivalent, 6-35 Months IM 12/22/2015, 11/01/2017    Influenza, recombinant, quadrivalent,injectable, preservative free 10/05/2018, 10/10/2019    Pneumococcal Conjugate 13-Valent 12/22/2015    Pneumococcal Polysaccharide PPV23 08/07/2014    Tdap 08/07/2014       Magdy Gonsalez DO  [facility]

## 2020-07-13 DIAGNOSIS — F90.1 ATTENTION DEFICIT HYPERACTIVITY DISORDER (ADHD), PREDOMINANTLY HYPERACTIVE TYPE: ICD-10-CM

## 2020-07-14 NOTE — TELEPHONE ENCOUNTER
Last OV 3/5/20 ([place])  Per PDMP Vyvanse 20 mg last filled 6/17/20 #30  No future appts  LMOM informing patient she needed to call for follow up appt

## 2020-07-15 ENCOUNTER — OFFICE VISIT (OUTPATIENT)
Dept: FAMILY MEDICINE CLINIC | Facility: CLINIC | Age: 60
End: 2020-07-15
Payer: COMMERCIAL

## 2020-07-15 VITALS
TEMPERATURE: 98 F | WEIGHT: 164 LBS | RESPIRATION RATE: 18 BRPM | HEART RATE: 64 BPM | HEIGHT: 68 IN | DIASTOLIC BLOOD PRESSURE: 70 MMHG | SYSTOLIC BLOOD PRESSURE: 120 MMHG | OXYGEN SATURATION: 97 % | BODY MASS INDEX: 24.86 KG/M2

## 2020-07-15 DIAGNOSIS — F90.1 ATTENTION DEFICIT HYPERACTIVITY DISORDER (ADHD), PREDOMINANTLY HYPERACTIVE TYPE: ICD-10-CM

## 2020-07-15 DIAGNOSIS — E78.2 MIXED HYPERLIPIDEMIA: ICD-10-CM

## 2020-07-15 DIAGNOSIS — M79.642 PAIN IN BOTH HANDS: ICD-10-CM

## 2020-07-15 DIAGNOSIS — F41.9 ANXIETY DISORDER, UNSPECIFIED TYPE: ICD-10-CM

## 2020-07-15 DIAGNOSIS — M79.641 PAIN IN BOTH HANDS: ICD-10-CM

## 2020-07-15 DIAGNOSIS — I10 BENIGN ESSENTIAL HYPERTENSION: ICD-10-CM

## 2020-07-15 DIAGNOSIS — E11.9 TYPE 2 DIABETES MELLITUS WITHOUT COMPLICATION, WITHOUT LONG-TERM CURRENT USE OF INSULIN (HCC): Primary | ICD-10-CM

## 2020-07-15 PROCEDURE — 3008F BODY MASS INDEX DOCD: CPT | Performed by: FAMILY MEDICINE

## 2020-07-15 PROCEDURE — 3078F DIAST BP <80 MM HG: CPT | Performed by: FAMILY MEDICINE

## 2020-07-15 PROCEDURE — 2022F DILAT RTA XM EVC RTNOPTHY: CPT | Performed by: FAMILY MEDICINE

## 2020-07-15 PROCEDURE — 99214 OFFICE O/P EST MOD 30 MIN: CPT | Performed by: FAMILY MEDICINE

## 2020-07-15 PROCEDURE — 3074F SYST BP LT 130 MM HG: CPT | Performed by: FAMILY MEDICINE

## 2020-07-15 PROCEDURE — 3051F HG A1C>EQUAL 7.0%<8.0%: CPT | Performed by: FAMILY MEDICINE

## 2020-07-15 NOTE — PROGRESS NOTES
Assessment/Plan:   1  Type 2 diabetes mellitus without complication, without long-term current use of insulin ([facility] [address] )  Unclear precise control patient's last A1c was 7 7 this past March  Will recheck blood work including CBC, CMP, lipid panel, TSH  Will continue at this time with her current treatment of metformin will complete foot exam as well as screen for vision exam at next visit  - Comprehensive metabolic panel; Future  - Hemoglobin A1C W/Refl To Glycomark(R); Future  - Comprehensive metabolic panel  - Hemoglobin A1C W/Refl To Glycomark(R)    2  Benign essential hypertension  Blood pressure appears stable today  Continue with routine home monitoring  Continue as well with current treatment of metoprolol and lisinopril    3  Mixed hyperlipidemia  Recheck fasting lipid panel  Continue with a strict low-fat/low-cholesterol diet as well as her current treatment with atorvastatin  - Lipid Panel with Direct LDL reflex; Future  - Lipid Panel with Direct LDL reflex    4  Anxiety disorder, unspecified type  Stable today  She denies any recent exacerbations of her anxiety  Will continue with her current treatment of BuSpar as well as her Lexapro  5  Attention deficit hyperactivity disorder (ADHD), predominantly hyperactive type  Patient's symptoms have been stable  She has been successfully weaning down off of her Vyvanse  She currently has cut herself down to 60 mg total in 1 day    6  Pain in both hands  Unclear as to the exact cause of patient's arthralgias  At this time, she is concern for gout  Will check uric acid level  Follow up with patient in 6 months   - Uric acid; Future  - Uric acid           Diagnoses and all orders for this visit:    Attention deficit hyperactivity disorder (ADHD), predominantly hyperactive type    Other orders  -     Cancel: lisdexamfetamine (VYVANSE) 20 MG capsule; Take 1 capsule (20 mg total) by mouth every evening With 40 mg taken in the a  m  Max Daily Amount: 20 mg Subjective:       Chief Complaint   Patient presents with    Follow-up     med check       Patient ID: Preet Ponce is a 64 y o  female  Patient is a 72-year-old female presents today for follow-up on chronic conditions  She has type 2 diabetes, hypertension, dyslipidemia, anxiety disorder, ADHD as well as arthralgias both hands  She has been taking medications regularly  She denies adverse reactions with medications  She has not had any recent blood work completed today      Review of Systems   Constitutional: Negative for activity change, chills, fatigue and fever  HENT: Negative for congestion, ear pain, sinus pressure and sore throat  Eyes: Negative for redness, itching and visual disturbance  Respiratory: Negative for cough and shortness of breath  Cardiovascular: Negative for chest pain and palpitations  Gastrointestinal: Negative for abdominal pain, diarrhea and nausea  Endocrine: Negative for cold intolerance and heat intolerance  Genitourinary: Negative for dysuria, flank pain and frequency  Musculoskeletal: Negative for arthralgias, back pain, gait problem and myalgias  Skin: Negative for color change  Allergic/Immunologic: Negative for environmental allergies  Neurological: Negative for dizziness, numbness and headaches  Psychiatric/Behavioral: Negative for behavioral problems and sleep disturbance  The following portions of the patient's history were reviewed and updated as appropriate : past family history, past medical history, past social history and past surgical history      Current Outpatient Medications:     aspirin (ECOTRIN LOW STRENGTH) 81 mg EC tablet, Take 1 tablet by mouth daily, Disp: , Rfl:     atorvastatin (LIPITOR) 40 mg tablet, TAKE 1 TABLET DAILY, Disp: 90 tablet, Rfl: 1    Blood Glucose Monitoring Suppl (ONE TOUCH ULTRA MINI) w/Device KIT, by Does not apply route, Disp: , Rfl:     busPIRone (BUSPAR) 5 mg tablet, Take 1 tablet (5 mg total) by mouth 2 (two) times a day, Disp: 60 tablet, Rfl: 5    cholecalciferol (VITAMIN D3) 1,000 units tablet, Take by mouth, Disp: , Rfl:     doxycycline hyclate (VIBRAMYCIN) 100 mg capsule, Take 1 capsule (100 mg total) by mouth every 12 (twelve) hours for 10 days, Disp: 20 capsule, Rfl: 0    escitalopram (LEXAPRO) 20 mg tablet, TAKE 1 TABLET BY MOUTH EVERY DAY, Disp: 30 tablet, Rfl: 5    famotidine (PEPCID) 20 mg tablet, Take 1 tablet by mouth daily, Disp: , Rfl:     glucose blood (ONE TOUCH ULTRA TEST) test strip, 1 each by Other route 2 (two) times a day, Disp: 100 each, Rfl: 0    guaifenesin-codeine (GUAIFENESIN AC) 100-10 MG/5ML liquid, Take 5 mL by mouth 3 (three) times a day as needed for cough, Disp: 120 mL, Rfl: 0    ibuprofen (MOTRIN) 600 mg tablet, Take 1 tablet (600 mg total) by mouth every 6 (six) hours as needed for mild pain, Disp: 30 tablet, Rfl: 0    lisdexamfetamine (VYVANSE) 20 MG capsule, Take 1 capsule (20 mg total) by mouth every evening With 40 mg taken in the a  m  Max Daily Amount: 20 mg, Disp: 30 capsule, Rfl: 0    lisdexamfetamine (VYVANSE) 40 MG capsule, Take 1 capsule (40 mg total) by mouth every morningMax Daily Amount: 40 mg, Disp: 30 capsule, Rfl: 0    lisinopril (ZESTRIL) 5 mg tablet, TAKE ONE-HALF (1/2) TABLET DAILY, Disp: 45 tablet, Rfl: 4    metFORMIN (GLUCOPHAGE-XR) 500 mg 24 hr tablet, Take 1 tablet (500 mg total) by mouth 2 (two) times a day, Disp: 180 tablet, Rfl: 1    metoprolol succinate (TOPROL-XL) 25 mg 24 hr tablet, TAKE 1 TABLET DAILY, Disp: 90 tablet, Rfl: 1    Multiple Vitamins-Minerals (MULTIVITAMIN ADULTS 50+ PO), Take 1 tablet by mouth daily, Disp: , Rfl:     ONETOUCH DELICA LANCETS FINE MISC, by Does not apply route daily, Disp: 100 each, Rfl: 1    promethazine-codeine (PHENERGAN WITH CODEINE) 6 25-10 mg/5 mL syrup, Take 5 mL by mouth every 4 (four) hours as needed for cough, Disp: 120 mL, Rfl: 0    promethazine-codeine (PHENERGAN WITH CODEINE) 6 25-10 mg/5 mL syrup, Take 5 mL by mouth every 4 (four) hours as needed for cough, Disp: 120 mL, Rfl: 0         Objective:         Vitals:    07/15/20 1117   BP: 120/70   BP Location: Left arm   Patient Position: Sitting   Cuff Size: Adult   Pulse: 64   Resp: 18   Temp: 98 °F (36 7 °C)   TempSrc: Tympanic   SpO2: 97%   Weight: 74 4 kg (164 lb)   Height: 5' 7 5" (1 715 m)     Physical Exam   Constitutional: She is oriented to person, place, and time  She appears well-developed and well-nourished  HENT:   Head: Normocephalic and atraumatic  Nose: Nose normal    Mouth/Throat: No oropharyngeal exudate  Eyes: Pupils are equal, round, and reactive to light  Right eye exhibits no discharge  Left eye exhibits no discharge  Neck: Normal range of motion  Neck supple  No tracheal deviation present  Cardiovascular: Normal rate, regular rhythm and intact distal pulses  Exam reveals no gallop and no friction rub  No murmur heard  Pulses:       Dorsalis pedis pulses are 2+ on the right side, and 2+ on the left side  Posterior tibial pulses are 2+ on the right side, and 2+ on the left side  Pulmonary/Chest: Effort normal and breath sounds normal  No respiratory distress  She has no wheezes  She has no rales  Abdominal: Soft  Bowel sounds are normal  She exhibits no distension  There is no tenderness  There is no rebound and no guarding  Musculoskeletal: Normal range of motion  She exhibits no edema  Lymphadenopathy:        Head (right side): No submental and no submandibular adenopathy present  Head (left side): No submental and no submandibular adenopathy present  She has no cervical adenopathy  Right cervical: No superficial cervical, no deep cervical and no posterior cervical adenopathy present  Left cervical: No superficial cervical, no deep cervical and no posterior cervical adenopathy present  Neurological: She is alert and oriented to person, place, and time   No cranial nerve deficit or sensory deficit  Skin: Skin is warm, dry and intact  Psychiatric: Her speech is normal and behavior is normal  Judgment normal  Her mood appears not anxious  Cognition and memory are normal  She does not exhibit a depressed mood  Vitals reviewed

## 2020-07-23 DIAGNOSIS — E11.9 TYPE 2 DIABETES MELLITUS WITHOUT COMPLICATION, WITHOUT LONG-TERM CURRENT USE OF INSULIN (HCC): Primary | ICD-10-CM

## 2020-07-23 RX ORDER — METFORMIN HYDROCHLORIDE 500 MG/1
TABLET, EXTENDED RELEASE ORAL
Qty: 60 TABLET | Refills: 5 | Status: SHIPPED | OUTPATIENT
Start: 2020-07-23 | End: 2021-04-12 | Stop reason: SDUPTHER

## 2020-07-27 DIAGNOSIS — F90.1 ATTENTION DEFICIT HYPERACTIVITY DISORDER (ADHD), PREDOMINANTLY HYPERACTIVE TYPE: ICD-10-CM

## 2020-07-27 DIAGNOSIS — F41.9 ANXIETY DISORDER, UNSPECIFIED TYPE: ICD-10-CM

## 2020-07-27 RX ORDER — ESCITALOPRAM OXALATE 20 MG/1
20 TABLET ORAL DAILY
Qty: 30 TABLET | Refills: 5 | Status: SHIPPED | OUTPATIENT
Start: 2020-07-27 | End: 2021-09-07

## 2020-08-10 LAB — HBA1C MFR BLD HPLC: 7.5 %

## 2020-08-10 PROCEDURE — 3051F HG A1C>EQUAL 7.0%<8.0%: CPT | Performed by: FAMILY MEDICINE

## 2020-08-11 DIAGNOSIS — F90.1 ATTENTION DEFICIT HYPERACTIVITY DISORDER (ADHD), PREDOMINANTLY HYPERACTIVE TYPE: ICD-10-CM

## 2020-08-25 DIAGNOSIS — F90.1 ATTENTION DEFICIT HYPERACTIVITY DISORDER (ADHD), PREDOMINANTLY HYPERACTIVE TYPE: ICD-10-CM

## 2020-09-09 DIAGNOSIS — F90.1 ATTENTION DEFICIT HYPERACTIVITY DISORDER (ADHD), PREDOMINANTLY HYPERACTIVE TYPE: ICD-10-CM

## 2020-09-10 DIAGNOSIS — F90.1 ATTENTION DEFICIT HYPERACTIVITY DISORDER (ADHD), PREDOMINANTLY HYPERACTIVE TYPE: ICD-10-CM

## 2020-09-10 DIAGNOSIS — I10 ESSENTIAL HYPERTENSION: ICD-10-CM

## 2020-09-10 RX ORDER — METOPROLOL SUCCINATE 25 MG/1
25 TABLET, EXTENDED RELEASE ORAL DAILY
Qty: 90 TABLET | Refills: 1 | Status: SHIPPED | OUTPATIENT
Start: 2020-09-10 | End: 2021-03-06

## 2020-09-21 ENCOUNTER — TELEMEDICINE (OUTPATIENT)
Dept: FAMILY MEDICINE CLINIC | Facility: CLINIC | Age: 60
End: 2020-09-21
Payer: COMMERCIAL

## 2020-09-21 DIAGNOSIS — H66.90 ACUTE OTITIS MEDIA, UNSPECIFIED OTITIS MEDIA TYPE: Primary | ICD-10-CM

## 2020-09-21 PROCEDURE — 99213 OFFICE O/P EST LOW 20 MIN: CPT | Performed by: FAMILY MEDICINE

## 2020-09-21 RX ORDER — AMOXICILLIN AND CLAVULANATE POTASSIUM 875; 125 MG/1; MG/1
1 TABLET, FILM COATED ORAL EVERY 12 HOURS SCHEDULED
Qty: 20 TABLET | Refills: 0 | Status: SHIPPED | OUTPATIENT
Start: 2020-09-21 | End: 2020-10-01

## 2020-09-21 RX ORDER — PROMETHAZINE HYDROCHLORIDE AND CODEINE PHOSPHATE 6.25; 1 MG/5ML; MG/5ML
5 SYRUP ORAL EVERY 4 HOURS PRN
Qty: 120 ML | Refills: 0 | Status: SHIPPED | OUTPATIENT
Start: 2020-09-21 | End: 2021-01-15

## 2020-09-21 NOTE — PROGRESS NOTES
Virtual Regular Visit      Assessment/Plan:    Problem List Items Addressed This Visit     None      Visit Diagnoses     Acute otitis media, unspecified otitis media type    -  Primary    Relevant Medications    amoxicillin-clavulanate (Augmentin) 875-125 mg per tablet    promethazine-codeine (PHENERGAN WITH CODEINE) 6 25-10 mg/5 mL syrup        Will give Rx for Augmentin and pro meth with codeine  Drink plenty of fluids  Call further problems       Reason for visit is No chief complaint on file  Encounter provider Zena Burden DO    Provider located at [place] [address]  [address] [facility] [address] [place] [address]  [phone]      Recent Visits  No visits were found meeting these conditions  Showing recent visits within past 7 days and meeting all other requirements     Today's Visits  Date Type Provider Dept   09/21/20 Telemedicine Zena Burden DO [address] [address] [address] Wiseman today's visits and meeting all other requirements     Future Appointments  No visits were found meeting these conditions  Showing future appointments within next 150 days and meeting all other requirements        The patient was identified by name and date of birth  Reginald Price was informed that this is a telemedicine visit and that the visit [place] [facility] and [place] was informed that this is a secure, HIPAA-compliant platform  She agrees to proceed     My office door was closed  No one else was in the room  She acknowledged consent and understanding of privacy and security of the video platform  The patient has agreed to participate and understands they can discontinue the visit at any time  Patient is aware this is a billable service  Raegan  Reginald Price is a 64 y o  female see HPI         2-3 d hx of sore throat, right ear pain, cough       Past Medical History:   Diagnosis Date    Abnormal mammogram     RESOLVED: [id number]    Arthritis     Diabetes mellitus ([facility] [address] )     Eczema of right external ear     RESOLVED: [id number]    Foot drop, left foot     Heart attack ([facility] [address] )     Hematuria     RESOLVED: [id number]    Impetigo     RESOLVED: [id number]    Insomnia related to another mental disorder     AXIS I/II MENTAL DISORDER; RESOLVED: [id number]       Past Surgical History:   Procedure Laterality Date    HYSTERECTOMY  1998    still has 1 ovary not sure which one was removed    OTHER SURGICAL HISTORY      STEND INDICATIONS: RESTENOSIS AT PREVIOUS PTCA LOCATION     SPLENECTOMY      TOTAL HIP ARTHROPLASTY Left        Current Outpatient Medications   Medication Sig Dispense Refill    amoxicillin-clavulanate (Augmentin) 875-125 mg per tablet Take 1 tablet by mouth every 12 (twelve) hours for 10 days 20 tablet 0    aspirin (ECOTRIN LOW STRENGTH) 81 mg EC tablet Take 1 tablet by mouth daily      atorvastatin (LIPITOR) 40 mg tablet TAKE 1 TABLET DAILY 90 tablet 1    Blood Glucose Monitoring Suppl (ONE TOUCH ULTRA MINI) w/Device KIT by Does not apply route      busPIRone (BUSPAR) 5 mg tablet Take 1 tablet (5 mg total) by mouth 2 (two) times a day 60 tablet 5    cholecalciferol (VITAMIN D3) 1,000 units tablet Take by mouth      escitalopram (LEXAPRO) 20 mg tablet Take 1 tablet (20 mg total) by mouth daily 30 tablet 5    famotidine (PEPCID) 20 mg tablet Take 1 tablet by mouth daily      glucose blood (ONE TOUCH ULTRA TEST) test strip 1 each by Other route 2 (two) times a day 100 each 0    guaifenesin-codeine (GUAIFENESIN AC) 100-10 MG/5ML liquid Take 5 mL by mouth 3 (three) times a day as needed for cough 120 mL 0    ibuprofen (MOTRIN) 600 mg tablet Take 1 tablet (600 mg total) by mouth every 6 (six) hours as needed for mild pain 30 tablet 0    lisdexamfetamine (VYVANSE) 20 MG capsule Take 1 capsule (20 mg total) by mouth every evening With 40 mg taken in the a  m  Max Daily Amount: 20 mg 30 capsule 0    lisdexamfetamine (VYVANSE) 40 MG capsule Take 1 capsule (40 mg total) by mouth every morningMax Daily Amount: 40 mg 30 capsule 0    lisinopril (ZESTRIL) 5 mg tablet TAKE ONE-HALF (1/2) TABLET DAILY 45 tablet 4    metFORMIN (GLUCOPHAGE-XR) 500 mg 24 hr tablet TAKE 1 TABLET BY MOUTH TWICE A DAY 60 tablet 5    metoprolol succinate (TOPROL-XL) 25 mg 24 hr tablet Take 1 tablet (25 mg total) by mouth daily 90 tablet 1    Multiple Vitamins-Minerals (MULTIVITAMIN ADULTS 50+ PO) Take 1 tablet by mouth daily      ONETOUCH DELICA LANCETS FINE MISC by Does not apply route daily 100 each 1    promethazine-codeine (PHENERGAN WITH CODEINE) 6 25-10 mg/5 mL syrup Take 5 mL by mouth every 4 (four) hours as needed for cough 120 mL 0    promethazine-codeine (PHENERGAN WITH CODEINE) 6 25-10 mg/5 mL syrup Take 5 mL by mouth every 4 (four) hours as needed for cough 120 mL 0    promethazine-codeine (PHENERGAN WITH CODEINE) 6 25-10 mg/5 mL syrup Take 5 mL by mouth every 4 (four) hours as needed for cough 120 mL 0     No current facility-administered medications for this visit  Allergies   Allergen Reactions    Levaquin [Levofloxacin] Anaphylaxis    Cyclobenzaprine        Review of Systems   Constitutional: Negative for chills and fever  HENT: Positive for congestion and postnasal drip  Respiratory: Positive for cough  Negative for shortness of breath  Cardiovascular: Negative  Video Exam    There were no vitals filed for this visit  Physical Exam  Constitutional:       Appearance: She is well-developed  Neck:      Musculoskeletal: Normal range of motion and neck supple  Pulmonary:      Effort: Pulmonary effort is normal       Breath sounds: Normal breath sounds  I spent 15 minutes directly with the patient during this visit      [address] acknowledges that she has consented to an online visit or consultation   She understands that the online visit is based solely on information provided by her, and that, in the absence of a face-to-face physical evaluation by the physician, the diagnosis she receives is both limited and provisional in terms of accuracy and completeness  This is not intended to replace a full medical face-to-face evaluation by the physician  Clif Umana understands and accepts these terms

## 2020-09-22 DIAGNOSIS — F90.1 ATTENTION DEFICIT HYPERACTIVITY DISORDER (ADHD), PREDOMINANTLY HYPERACTIVE TYPE: ICD-10-CM

## 2020-10-07 DIAGNOSIS — E78.2 MIXED HYPERLIPIDEMIA: ICD-10-CM

## 2020-10-07 DIAGNOSIS — F90.1 ATTENTION DEFICIT HYPERACTIVITY DISORDER (ADHD), PREDOMINANTLY HYPERACTIVE TYPE: ICD-10-CM

## 2020-10-07 RX ORDER — ATORVASTATIN CALCIUM 40 MG/1
TABLET, FILM COATED ORAL
Qty: 90 TABLET | Refills: 1 | Status: SHIPPED | OUTPATIENT
Start: 2020-10-07 | End: 2021-04-05

## 2020-10-09 DIAGNOSIS — F90.1 ATTENTION DEFICIT HYPERACTIVITY DISORDER (ADHD), PREDOMINANTLY HYPERACTIVE TYPE: ICD-10-CM

## 2020-10-20 ENCOUNTER — OFFICE VISIT (OUTPATIENT)
Dept: FAMILY MEDICINE CLINIC | Facility: CLINIC | Age: 60
End: 2020-10-20
Payer: COMMERCIAL

## 2020-10-20 VITALS
TEMPERATURE: 97.5 F | DIASTOLIC BLOOD PRESSURE: 80 MMHG | HEIGHT: 68 IN | SYSTOLIC BLOOD PRESSURE: 124 MMHG | WEIGHT: 162.8 LBS | RESPIRATION RATE: 18 BRPM | HEART RATE: 79 BPM | BODY MASS INDEX: 24.67 KG/M2 | OXYGEN SATURATION: 98 %

## 2020-10-20 DIAGNOSIS — F90.1 ATTENTION DEFICIT HYPERACTIVITY DISORDER (ADHD), PREDOMINANTLY HYPERACTIVE TYPE: Primary | ICD-10-CM

## 2020-10-20 DIAGNOSIS — Z23 NEED FOR PROPHYLACTIC VACCINATION AND INOCULATION AGAINST INFLUENZA: ICD-10-CM

## 2020-10-20 PROCEDURE — 3725F SCREEN DEPRESSION PERFORMED: CPT | Performed by: FAMILY MEDICINE

## 2020-10-20 PROCEDURE — 3079F DIAST BP 80-89 MM HG: CPT | Performed by: FAMILY MEDICINE

## 2020-10-20 PROCEDURE — 90682 RIV4 VACC RECOMBINANT DNA IM: CPT | Performed by: FAMILY MEDICINE

## 2020-10-20 PROCEDURE — 90471 IMMUNIZATION ADMIN: CPT | Performed by: FAMILY MEDICINE

## 2020-10-20 PROCEDURE — 99214 OFFICE O/P EST MOD 30 MIN: CPT | Performed by: FAMILY MEDICINE

## 2020-10-30 ENCOUNTER — TELEPHONE (OUTPATIENT)
Dept: FAMILY MEDICINE CLINIC | Facility: CLINIC | Age: 60
End: 2020-10-30

## 2020-10-30 DIAGNOSIS — F90.1 ATTENTION DEFICIT HYPERACTIVITY DISORDER (ADHD), PREDOMINANTLY HYPERACTIVE TYPE: ICD-10-CM

## 2020-11-09 DIAGNOSIS — F90.1 ATTENTION DEFICIT HYPERACTIVITY DISORDER (ADHD), PREDOMINANTLY HYPERACTIVE TYPE: ICD-10-CM

## 2020-11-10 DIAGNOSIS — F90.1 ATTENTION DEFICIT HYPERACTIVITY DISORDER (ADHD), PREDOMINANTLY HYPERACTIVE TYPE: ICD-10-CM

## 2020-11-25 DIAGNOSIS — F90.1 ATTENTION DEFICIT HYPERACTIVITY DISORDER (ADHD), PREDOMINANTLY HYPERACTIVE TYPE: ICD-10-CM

## 2020-12-08 DIAGNOSIS — F90.1 ATTENTION DEFICIT HYPERACTIVITY DISORDER (ADHD), PREDOMINANTLY HYPERACTIVE TYPE: ICD-10-CM

## 2020-12-20 DIAGNOSIS — F90.1 ATTENTION DEFICIT HYPERACTIVITY DISORDER (ADHD), PREDOMINANTLY HYPERACTIVE TYPE: ICD-10-CM

## 2021-01-05 DIAGNOSIS — F90.1 ATTENTION DEFICIT HYPERACTIVITY DISORDER (ADHD), PREDOMINANTLY HYPERACTIVE TYPE: ICD-10-CM

## 2021-01-15 ENCOUNTER — OFFICE VISIT (OUTPATIENT)
Dept: FAMILY MEDICINE CLINIC | Facility: CLINIC | Age: 61
End: 2021-01-15
Payer: COMMERCIAL

## 2021-01-15 VITALS
RESPIRATION RATE: 17 BRPM | TEMPERATURE: 98.4 F | HEART RATE: 105 BPM | WEIGHT: 165 LBS | BODY MASS INDEX: 25.01 KG/M2 | HEIGHT: 68 IN | DIASTOLIC BLOOD PRESSURE: 72 MMHG | SYSTOLIC BLOOD PRESSURE: 138 MMHG | OXYGEN SATURATION: 96 %

## 2021-01-15 DIAGNOSIS — I10 BENIGN ESSENTIAL HYPERTENSION: ICD-10-CM

## 2021-01-15 DIAGNOSIS — Z90.81 H/O SPLENECTOMY: ICD-10-CM

## 2021-01-15 DIAGNOSIS — E78.2 MIXED HYPERLIPIDEMIA: ICD-10-CM

## 2021-01-15 DIAGNOSIS — E11.9 TYPE 2 DIABETES MELLITUS WITHOUT COMPLICATION, WITHOUT LONG-TERM CURRENT USE OF INSULIN (HCC): Primary | ICD-10-CM

## 2021-01-15 DIAGNOSIS — F90.1 ATTENTION DEFICIT HYPERACTIVITY DISORDER (ADHD), PREDOMINANTLY HYPERACTIVE TYPE: ICD-10-CM

## 2021-01-15 DIAGNOSIS — K21.9 GERD WITHOUT ESOPHAGITIS: ICD-10-CM

## 2021-01-15 DIAGNOSIS — Z23 NEED FOR PNEUMOCOCCAL VACCINATION: ICD-10-CM

## 2021-01-15 DIAGNOSIS — F41.9 ANXIETY DISORDER, UNSPECIFIED TYPE: ICD-10-CM

## 2021-01-15 PROCEDURE — 3078F DIAST BP <80 MM HG: CPT | Performed by: FAMILY MEDICINE

## 2021-01-15 PROCEDURE — 3008F BODY MASS INDEX DOCD: CPT | Performed by: FAMILY MEDICINE

## 2021-01-15 PROCEDURE — 90471 IMMUNIZATION ADMIN: CPT | Performed by: FAMILY MEDICINE

## 2021-01-15 PROCEDURE — 99214 OFFICE O/P EST MOD 30 MIN: CPT | Performed by: FAMILY MEDICINE

## 2021-01-15 PROCEDURE — 3075F SYST BP GE 130 - 139MM HG: CPT | Performed by: FAMILY MEDICINE

## 2021-01-15 PROCEDURE — 90732 PPSV23 VACC 2 YRS+ SUBQ/IM: CPT | Performed by: FAMILY MEDICINE

## 2021-01-15 NOTE — PROGRESS NOTES
Assessment/Plan:   1  Type 2 diabetes mellitus without complication, without long-term current use of insulin ([address] )    Unclear precise control  Patient's last A1c was elevated at 7 5  She is due for repeat blood work  Will check CMP, lipid panel and A1c  She is advised to continue with a strict diabetic diet  Continue as well with current treatment of metformin 500 mg b i d  2  Benign essential hypertension    Patient's blood pressure appears stable today  Continue with routine home monitoring  Continue as well with current treatment of lisinopril as well as metoprolol daily  3  Mixed hyperlipidemia    Unclear of recent control  Recheck fasting lipid panel  Continue with a strict low-fat /low-cholesterol diet as well as her current treatment with atorvastatin  4  Anxiety disorder, unspecified type/Attention deficit hyperactivity disorder (ADHD), predominantly hyperactive type    Has been increased recently  At this time, is unclear if her symptoms are mix of ADHD as well as her generalized anxiety disorder  Will continue at this time with current treatment of Lexapro  Will increase her dose of Vyvanse to 50 mg in the morning and 20 mg at night  5  GERD without esophagitis    Stable today  Continue with dietary trigger avoidance as well as her current treatment with famotidine  Follow up with patient in 3-4 months  BMI Counseling: Body mass index is 25 27 kg/m²  The BMI is above normal  Nutrition recommendations include decreasing portion sizes, encouraging healthy choices of fruits and vegetables, decreasing fast food intake, consuming healthier snacks and limiting drinks that contain sugar  Exercise recommendations include moderate physical activity 150 minutes/week and exercising 3-5 times per week  No pharmacotherapy was ordered  There are no diagnoses linked to this encounter        Subjective:       Chief Complaint   Patient presents with    Follow-up     6 month Patient ID: Tone Mcclure is a 64 y o  female  Patient is a 61-year-old  Female presents today for a follow-up on her chronic conditions  She has type 2 diabetes, hypertension, dyslipidemia, anxiety disorder/ADHD as well as current  She has been taking her medications regularly  She denies adverse reactions with medications  She states that she has been having increasing anxiety  She states that her mind is scattered  She has been trying to wean off of her Vyvanse however believes that she may need a slightly higher dose  Review of Systems   Constitutional: Negative for activity change, chills, fatigue and fever  HENT: Negative for congestion, ear pain, sinus pressure and sore throat  Eyes: Negative for redness, itching and visual disturbance  Respiratory: Negative for cough and shortness of breath  Cardiovascular: Negative for chest pain and palpitations  Gastrointestinal: Negative for abdominal pain, diarrhea and nausea  Endocrine: Negative for cold intolerance and heat intolerance  Genitourinary: Negative for dysuria, flank pain and frequency  Musculoskeletal: Negative for arthralgias, back pain, gait problem and myalgias  Skin: Negative for color change  Allergic/Immunologic: Negative for environmental allergies  Neurological: Negative for dizziness, numbness and headaches  Psychiatric/Behavioral: Negative for behavioral problems and sleep disturbance  The following portions of the patient's history were reviewed and updated as appropriate : past family history, past medical history, past social history and past surgical history      Current Outpatient Medications:     aspirin (ECOTRIN LOW STRENGTH) 81 mg EC tablet, Take 1 tablet by mouth daily, Disp: , Rfl:     atorvastatin (LIPITOR) 40 mg tablet, TAKE 1 TABLET DAILY, Disp: 90 tablet, Rfl: 1    Blood Glucose Monitoring Suppl (ONE TOUCH ULTRA MINI) w/Device KIT, by Does not apply route, Disp: , Rfl:    busPIRone (BUSPAR) 5 mg tablet, Take 1 tablet (5 mg total) by mouth 2 (two) times a day, Disp: 60 tablet, Rfl: 5    cholecalciferol (VITAMIN D3) 1,000 units tablet, Take by mouth, Disp: , Rfl:     escitalopram (LEXAPRO) 20 mg tablet, Take 1 tablet (20 mg total) by mouth daily, Disp: 30 tablet, Rfl: 5    famotidine (PEPCID) 20 mg tablet, Take 1 tablet by mouth daily, Disp: , Rfl:     glucose blood (ONE TOUCH ULTRA TEST) test strip, 1 each by Other route 2 (two) times a day, Disp: 100 each, Rfl: 0    ibuprofen (MOTRIN) 600 mg tablet, Take 1 tablet (600 mg total) by mouth every 6 (six) hours as needed for mild pain, Disp: 30 tablet, Rfl: 0    lisdexamfetamine (VYVANSE) 20 MG capsule, Take 1 capsule (20 mg total) by mouth every evening With 40 mg taken in the a  m  Max Daily Amount: 20 mg, Disp: 30 capsule, Rfl: 0    lisdexamfetamine (VYVANSE) 40 MG capsule, Take 1 capsule (40 mg total) by mouth every morningMax Daily Amount: 40 mg, Disp: 30 capsule, Rfl: 0    lisinopril (ZESTRIL) 5 mg tablet, TAKE ONE-HALF (1/2) TABLET DAILY, Disp: 45 tablet, Rfl: 4    metFORMIN (GLUCOPHAGE-XR) 500 mg 24 hr tablet, TAKE 1 TABLET BY MOUTH TWICE A DAY, Disp: 60 tablet, Rfl: 5    metoprolol succinate (TOPROL-XL) 25 mg 24 hr tablet, Take 1 tablet (25 mg total) by mouth daily, Disp: 90 tablet, Rfl: 1    Multiple Vitamins-Minerals (MULTIVITAMIN ADULTS 50+ PO), Take 1 tablet by mouth daily, Disp: , Rfl:     ONETOUCH DELICA LANCETS FINE MISC, by Does not apply route daily, Disp: 100 each, Rfl: 1    guaifenesin-codeine (GUAIFENESIN AC) 100-10 MG/5ML liquid, Take 5 mL by mouth 3 (three) times a day as needed for cough (Patient not taking: Reported on 1/15/2021), Disp: 120 mL, Rfl: 0    promethazine-codeine (PHENERGAN WITH CODEINE) 6 25-10 mg/5 mL syrup, Take 5 mL by mouth every 4 (four) hours as needed for cough (Patient not taking: Reported on 1/15/2021), Disp: 120 mL, Rfl: 0         Objective:         Vitals:    01/15/21 1452   BP: 138/72   BP Location: Right arm   Patient Position: Sitting   Cuff Size: Adult   Pulse: 105   Resp: 17   Temp: 98 4 °F (36 9 °C)   TempSrc: Tympanic   SpO2: 96%   Weight: 74 8 kg (165 lb)   Height: 5' 7 75" (1 721 m)     Physical Exam  Vitals signs reviewed  Constitutional:       Appearance: She is well-developed  HENT:      Head: Normocephalic and atraumatic  Nose: Nose normal       Mouth/Throat:      Pharynx: No oropharyngeal exudate  Eyes:      General: No scleral icterus  Right eye: No discharge  Left eye: No discharge  Pupils: Pupils are equal, round, and reactive to light  Neck:      Musculoskeletal: Normal range of motion and neck supple  Trachea: No tracheal deviation  Cardiovascular:      Rate and Rhythm: Normal rate and regular rhythm  Pulses:           Dorsalis pedis pulses are 2+ on the right side and 2+ on the left side  Posterior tibial pulses are 2+ on the right side and 2+ on the left side  Heart sounds: Normal heart sounds  No murmur  No friction rub  No gallop  Pulmonary:      Effort: Pulmonary effort is normal  No respiratory distress  Breath sounds: Normal breath sounds  No wheezing or rales  Abdominal:      General: Bowel sounds are normal  There is no distension  Palpations: Abdomen is soft  Tenderness: There is no abdominal tenderness  There is no guarding or rebound  Musculoskeletal: Normal range of motion  Lymphadenopathy:      Head:      Right side of head: No submental or submandibular adenopathy  Left side of head: No submental or submandibular adenopathy  Cervical: No cervical adenopathy  Right cervical: No superficial, deep or posterior cervical adenopathy  Left cervical: No superficial, deep or posterior cervical adenopathy  Skin:     General: Skin is warm and dry  Findings: No erythema  Neurological:      Mental Status: She is alert and oriented to person, place, and time  Cranial Nerves: No cranial nerve deficit  Sensory: No sensory deficit  Psychiatric:         Mood and Affect: Mood is not anxious or depressed  Speech: Speech normal          Behavior: Behavior normal          Thought Content:  Thought content normal          Judgment: Judgment normal

## 2021-01-18 ENCOUNTER — TELEPHONE (OUTPATIENT)
Dept: FAMILY MEDICINE CLINIC | Facility: CLINIC | Age: 61
End: 2021-01-18

## 2021-01-26 DIAGNOSIS — I10 ESSENTIAL HYPERTENSION: ICD-10-CM

## 2021-01-26 PROCEDURE — 4010F ACE/ARB THERAPY RXD/TAKEN: CPT | Performed by: FAMILY MEDICINE

## 2021-01-26 RX ORDER — LISINOPRIL 5 MG/1
TABLET ORAL
Qty: 45 TABLET | Refills: 3 | Status: SHIPPED | OUTPATIENT
Start: 2021-01-26 | End: 2022-01-21

## 2021-02-02 DIAGNOSIS — F90.1 ATTENTION DEFICIT HYPERACTIVITY DISORDER (ADHD), PREDOMINANTLY HYPERACTIVE TYPE: ICD-10-CM

## 2021-02-14 DIAGNOSIS — F90.1 ATTENTION DEFICIT HYPERACTIVITY DISORDER (ADHD), PREDOMINANTLY HYPERACTIVE TYPE: ICD-10-CM

## 2021-02-15 DIAGNOSIS — F90.1 ATTENTION DEFICIT HYPERACTIVITY DISORDER (ADHD), PREDOMINANTLY HYPERACTIVE TYPE: ICD-10-CM

## 2021-03-01 DIAGNOSIS — F90.1 ATTENTION DEFICIT HYPERACTIVITY DISORDER (ADHD), PREDOMINANTLY HYPERACTIVE TYPE: ICD-10-CM

## 2021-03-05 DIAGNOSIS — I10 ESSENTIAL HYPERTENSION: ICD-10-CM

## 2021-03-06 RX ORDER — METOPROLOL SUCCINATE 25 MG/1
TABLET, EXTENDED RELEASE ORAL
Qty: 90 TABLET | Refills: 1 | Status: SHIPPED | OUTPATIENT
Start: 2021-03-06 | End: 2021-09-01

## 2021-03-10 DIAGNOSIS — Z23 ENCOUNTER FOR IMMUNIZATION: ICD-10-CM

## 2021-03-12 DIAGNOSIS — F90.1 ATTENTION DEFICIT HYPERACTIVITY DISORDER (ADHD), PREDOMINANTLY HYPERACTIVE TYPE: ICD-10-CM

## 2021-03-24 ENCOUNTER — HOSPITAL ENCOUNTER (OUTPATIENT)
Dept: MAMMOGRAPHY | Facility: MEDICAL CENTER | Age: 61
Discharge: HOME | End: 2021-03-24
Payer: COMMERCIAL

## 2021-03-24 ENCOUNTER — IMMUNIZATIONS (OUTPATIENT)
Dept: FAMILY MEDICINE CLINIC | Facility: HOSPITAL | Age: 61
End: 2021-03-24
Payer: COMMERCIAL

## 2021-03-24 VITALS — WEIGHT: 165 LBS | BODY MASS INDEX: 25.9 KG/M2 | HEIGHT: 67 IN

## 2021-03-24 DIAGNOSIS — Z23 ENCOUNTER FOR IMMUNIZATION: Primary | ICD-10-CM

## 2021-03-24 DIAGNOSIS — Z12.31 VISIT FOR SCREENING MAMMOGRAM: ICD-10-CM

## 2021-03-24 PROCEDURE — 77067 SCR MAMMO BI INCL CAD: CPT

## 2021-03-24 PROCEDURE — 77063 BREAST TOMOSYNTHESIS BI: CPT

## 2021-03-28 DIAGNOSIS — F90.1 ATTENTION DEFICIT HYPERACTIVITY DISORDER (ADHD), PREDOMINANTLY HYPERACTIVE TYPE: ICD-10-CM

## 2021-04-05 DIAGNOSIS — E78.2 MIXED HYPERLIPIDEMIA: ICD-10-CM

## 2021-04-05 RX ORDER — ATORVASTATIN CALCIUM 40 MG/1
TABLET, FILM COATED ORAL
Qty: 90 TABLET | Refills: 0 | Status: SHIPPED | OUTPATIENT
Start: 2021-04-05 | End: 2021-07-06

## 2021-04-12 DIAGNOSIS — F90.1 ATTENTION DEFICIT HYPERACTIVITY DISORDER (ADHD), PREDOMINANTLY HYPERACTIVE TYPE: ICD-10-CM

## 2021-04-12 DIAGNOSIS — E11.9 TYPE 2 DIABETES MELLITUS WITHOUT COMPLICATION, WITHOUT LONG-TERM CURRENT USE OF INSULIN (HCC): ICD-10-CM

## 2021-04-12 RX ORDER — METFORMIN HYDROCHLORIDE 500 MG/1
500 TABLET, EXTENDED RELEASE ORAL 2 TIMES DAILY
Qty: 60 TABLET | Refills: 0 | Status: SHIPPED | OUTPATIENT
Start: 2021-04-12 | End: 2021-08-12

## 2021-04-19 ENCOUNTER — OFFICE VISIT (OUTPATIENT)
Dept: URGENT CARE | Facility: CLINIC | Age: 61
End: 2021-04-19
Payer: COMMERCIAL

## 2021-04-19 ENCOUNTER — APPOINTMENT (OUTPATIENT)
Dept: RADIOLOGY | Facility: CLINIC | Age: 61
End: 2021-04-19
Payer: COMMERCIAL

## 2021-04-19 VITALS
DIASTOLIC BLOOD PRESSURE: 58 MMHG | SYSTOLIC BLOOD PRESSURE: 122 MMHG | HEART RATE: 79 BPM | OXYGEN SATURATION: 96 % | TEMPERATURE: 96 F | RESPIRATION RATE: 18 BRPM

## 2021-04-19 DIAGNOSIS — R07.89 CHEST WALL PAIN: Primary | ICD-10-CM

## 2021-04-19 DIAGNOSIS — H10.31 ACUTE CONJUNCTIVITIS OF RIGHT EYE, UNSPECIFIED ACUTE CONJUNCTIVITIS TYPE: ICD-10-CM

## 2021-04-19 DIAGNOSIS — R07.89 CHEST WALL PAIN: ICD-10-CM

## 2021-04-19 PROCEDURE — S9083 URGENT CARE CENTER GLOBAL: HCPCS | Performed by: PHYSICIAN ASSISTANT

## 2021-04-19 PROCEDURE — G0382 LEV 3 HOSP TYPE B ED VISIT: HCPCS | Performed by: PHYSICIAN ASSISTANT

## 2021-04-19 PROCEDURE — 71046 X-RAY EXAM CHEST 2 VIEWS: CPT

## 2021-04-19 RX ORDER — POLYMYXIN B SULFATE AND TRIMETHOPRIM 1; 10000 MG/ML; [USP'U]/ML
1 SOLUTION OPHTHALMIC EVERY 6 HOURS
Qty: 10 ML | Refills: 0 | Status: SHIPPED | OUTPATIENT
Start: 2021-04-19 | End: 2021-05-12

## 2021-04-19 NOTE — PROGRESS NOTES
[address] [employer]        NAME: Dong Martinez is a 64 y o  female  : 1960    MRN: [id number]  DATE: 2021  TIME: 4:06 PM    Assessment and Plan   Chest wall pain [R07 89]  1  Chest wall pain  XR chest pa & lateral   2  Acute conjunctivitis of right eye, unspecified acute conjunctivitis type  polymyxin b-trimethoprim (POLYTRIM) ophthalmic solution         Patient Instructions       Use eyedrops as directed   Motrin and/or Tylenol as needed for pain control  Follow up with PCP in 3-5 days  Proceed to  ER if symptoms worsen  Chief Complaint     Chief Complaint   Patient presents with    Skin Problem     Patient feels that she has a lump to the upper mid chest         History of Present Illness        61-year-old female presents with 2 complaints  First complaint is right eye irritation with drainage for the past 4 days  Has been using multiple LD medications in eyedrops without any relief  Patient reports when she woke up this morning eye was crusted shut and have a lot of purulent drainage  Denies any blurry vision double vision  No fevers or chills  Second complaint is swelling and painful area on her chest when she presses on it  Denies any redness or swelling  No traumas reported  Eye Problem   The right eye is affected  This is a new problem  The current episode started in the past 7 days  The problem occurs constantly  The problem has been waxing and waning  There was no injury mechanism  The pain is mild  There is known exposure to pink eye  She does not wear contacts  Associated symptoms include an eye discharge and eye redness  Pertinent negatives include no blurred vision, double vision, fever, foreign body sensation, itching, nausea, recent URI or vomiting  She has tried nothing for the symptoms  The treatment provided no relief  Chest Pain   This is a new problem  The current episode started in the past 7 days   The onset quality is gradual  The problem occurs constantly  The problem has been waxing and waning  Pain location: Upper chest area  The pain is moderate  The quality of the pain is described as sharp  Pertinent negatives include no back pain, claudication, fever, leg pain, nausea, palpitations, shortness of breath or vomiting  There are no known risk factors  Her past medical history is significant for MI  Review of Systems   Review of Systems   Constitutional: Negative  Negative for fever  HENT: Negative  Eyes: Positive for discharge and redness  Negative for blurred vision, double vision and itching  Respiratory: Negative  Negative for shortness of breath  Cardiovascular: Positive for chest pain  Negative for palpitations and claudication  Gastrointestinal: Negative  Negative for nausea and vomiting  Musculoskeletal: Negative  Negative for back pain  Skin: Negative  Neurological: Negative  Current Medications       Current Outpatient Medications:     aspirin (ECOTRIN LOW STRENGTH) 81 mg EC tablet, Take 1 tablet by mouth daily, Disp: , Rfl:     atorvastatin (LIPITOR) 40 mg tablet, TAKE 1 TABLET DAILY, Disp: 90 tablet, Rfl: 0    Blood Glucose Monitoring Suppl (ONE TOUCH ULTRA MINI) w/Device KIT, by Does not apply route, Disp: , Rfl:     cholecalciferol (VITAMIN D3) 1,000 units tablet, Take by mouth, Disp: , Rfl:     escitalopram (LEXAPRO) 20 mg tablet, Take 1 tablet (20 mg total) by mouth daily, Disp: 30 tablet, Rfl: 5    famotidine (PEPCID) 20 mg tablet, Take 1 tablet by mouth daily, Disp: , Rfl:     glucose blood (ONE TOUCH ULTRA TEST) test strip, 1 each by Other route 2 (two) times a day, Disp: 100 each, Rfl: 0    lisdexamfetamine (VYVANSE) 20 MG capsule, Take 1 capsule (20 mg total) by mouth every evening With 40 mg taken in the a  m  Max Daily Amount: 20 mg, Disp: 30 capsule, Rfl: 0    lisdexamfetamine (VYVANSE) 50 MG capsule, Take 1 capsule (50 mg total) by mouth every morningMax Daily Amount: 50 mg, Disp: 30 capsule, Rfl: 0    lisinopril (ZESTRIL) 5 mg tablet, TAKE ONE-HALF (1/2) TABLET DAILY, Disp: 45 tablet, Rfl: 3    metFORMIN (GLUCOPHAGE-XR) 500 mg 24 hr tablet, Take 1 tablet (500 mg total) by mouth 2 (two) times a day, Disp: 60 tablet, Rfl: 0    metoprolol succinate (TOPROL-XL) 25 mg 24 hr tablet, TAKE 1 TABLET DAILY, Disp: 90 tablet, Rfl: 1    Multiple Vitamins-Minerals (MULTIVITAMIN ADULTS 50+ PO), Take 1 tablet by mouth daily, Disp: , Rfl:     ONETOUCH DELICA LANCETS FINE MISC, by Does not apply route daily, Disp: 100 each, Rfl: 1    ibuprofen (MOTRIN) 600 mg tablet, Take 1 tablet (600 mg total) by mouth every 6 (six) hours as needed for mild pain, Disp: 30 tablet, Rfl: 0    polymyxin b-trimethoprim (POLYTRIM) ophthalmic solution, Administer 1 drop to the right eye every 6 (six) hours, Disp: 10 mL, Rfl: 0    Current Allergies     Allergies as of 04/19/2021 - Reviewed 04/19/2021   Allergen Reaction Noted    Levaquin [levofloxacin] Anaphylaxis 05/18/2012    Cyclobenzaprine  05/18/2012            The following portions of the patient's history were reviewed and updated as appropriate: allergies, current medications, past family history, past medical history, past social history, past surgical history and problem list      Past Medical History:   Diagnosis Date    Abnormal mammogram     RESOLVED: [id number]    Arthritis     Diabetes mellitus ([facility] [address] )     Eczema of right external ear     RESOLVED: [id number]    Foot drop, left foot     Heart attack ([facility] [address] )     Hematuria     RESOLVED: [id number]    Impetigo     RESOLVED: [id number]    Insomnia related to another mental disorder     AXIS I/II MENTAL DISORDER; RESOLVED: [id number]       Past Surgical History:   Procedure Laterality Date   [facility]    still has 1 ovary not sure which one was removed    OTHER SURGICAL HISTORY      STEND INDICATIONS: RESTENOSIS AT PREVIOUS PTCA LOCATION     SPLENECTOMY      TOTAL HIP ARTHROPLASTY Left        Family History   Problem Relation Age of Onset    Stroke Mother     Osteoporosis Mother     Diabetes type II Father     Heart attack Father     Cancer Father     Heart disease Father     Hypertension Father     Heart disease Sister     Diabetes type II Brother     Heart attack Brother     Heart disease Brother     Heart attack Brother     Heart attack Brother     Heart attack Brother     Diabetes type II Brother     Alcohol abuse Neg Hx     Substance Abuse Neg Hx          Medications have been verified  Objective   /58   Pulse 79   Temp (!) 96 °F (35 6 °C) (Tympanic)   Resp 18   SpO2 96%   No LMP recorded  Patient is postmenopausal        Physical Exam     Physical Exam  Vitals signs and nursing note reviewed  Constitutional:       General: She is not in acute distress  Appearance: She is well-developed  HENT:      Head: Normocephalic and atraumatic  Right Ear: Hearing, tympanic membrane, ear canal and external ear normal       Left Ear: Hearing, tympanic membrane, ear canal and external ear normal       Nose: Nose normal       Mouth/Throat:      Pharynx: Uvula midline  No oropharyngeal exudate  Eyes:      General: Lids are normal          Right eye: No discharge  Left eye: No discharge  Extraocular Movements: Extraocular movements intact  Conjunctiva/sclera:      Right eye: Right conjunctiva is injected  Exudate present  No chemosis or hemorrhage  Left eye: Left conjunctiva is not injected  No chemosis, exudate or hemorrhage  Neck:      Musculoskeletal: Normal range of motion and neck supple  Cardiovascular:      Rate and Rhythm: Normal rate and regular rhythm  Heart sounds: Normal heart sounds  No murmur  Pulmonary:      Effort: Pulmonary effort is normal  No respiratory distress  Breath sounds: Normal breath sounds  No wheezing or rales  Chest:      Chest wall: Tenderness present   No mass, lacerations, deformity, swelling, crepitus or edema  There is no dullness to percussion  Abdominal:      General: Bowel sounds are normal       Palpations: Abdomen is soft  Tenderness: There is no abdominal tenderness  Musculoskeletal: Normal range of motion  Lymphadenopathy:      Cervical: No cervical adenopathy  Skin:     General: Skin is warm and dry  Neurological:      Mental Status: She is alert and oriented to person, place, and time  chest x-ray reviewed    No abnormalities noted

## 2021-04-19 NOTE — PATIENT INSTRUCTIONS
Use eyedrops as directed   Motrin and/or Tylenol as needed for pain control  Follow up with PCP in 3-5 days  Proceed to  ER if symptoms worsen  Chest Wall Pain   AMBULATORY CARE:   Chest wall pain  may be caused by problems with the muscles, cartilage, or bones of the chest wall  Chest wall pain may also be caused by pain that spreads to your chest from another part of your body  The pain may be aching, severe, dull, or sharp  It may come and go, or it may be constant  The pain may be worse when you move in certain ways, breathe deeply, or cough  Call 911 if:   · You have any of the following signs of a heart attack:      ? Squeezing, pressure, or pain in your chest    ? You may  also have any of the following:     § Discomfort or pain in your back, neck, jaw, stomach, or arm    § Shortness of breath    § Nausea or vomiting    § Lightheadedness or a sudden cold sweat      Seek care immediately if:   · You have severe pain  Contact your healthcare provider if:   · You develop a rash  · You have other new symptoms  · Your pain does not improve, even with treatment  · You have questions or concerns about your condition or care  Treatment  depends on the cause of your chest wall pain  You may need any of the following to treat or manage your pain:  · NSAIDs , such as ibuprofen, help decrease swelling, pain, and fever  This medicine is available with or without a doctor's order  NSAIDs can cause stomach bleeding or kidney problems in certain people  If you take blood thinner medicine, always ask your healthcare provider if NSAIDs are safe for you  Always read the medicine label and follow directions  · Acetaminophen  decreases pain  It is available without a doctor's order  Ask how much to take and how often to take it  Follow directions  Acetaminophen can cause liver damage if not taken correctly  · A cream  may be applied to your chest to decrease pain  · Rest  as needed   Avoid activities that make your chest wall pain worse  · Apply heat  on your chest for 20 to 30 minutes every 2 hours for as many days as directed  Heat helps decrease pain and muscle spasms  · Apply ice  on your chest for 15 to 20 minutes every hour or as directed  Use an ice pack, or put crushed ice in a plastic bag  Cover it with a towel  Ice helps prevent tissue damage and decreases swelling and pain  Follow up with your healthcare provider as directed:  Write down your questions so you remember to ask them during your visits  © Copyright [address] Information is for End User's use only and may not be sold, redistributed or otherwise used for commercial purposes  All illustrations and images included in CareNotes® are the copyrighted property of A D A M [employer] Inc  or [employer]  The above information is an  only  It is not intended as medical advice for individual conditions or treatments  Talk to your doctor, nurse or pharmacist before following any medical regimen to see if it is safe and effective for you  Conjunctivitis   AMBULATORY CARE:   Conjunctivitis,  or pink eye, is inflammation of your conjunctiva  The conjunctiva is a thin tissue that covers the front of your eye and the back of your eyelids  The conjunctiva helps protect your eye and keep it moist  Conjunctivitis may be caused by bacteria, allergies, or a virus  If your conjunctivitis is caused by bacteria, it may get better on its own in about 7 days  Viral conjunctivitis can last up to 3 weeks  Common symptoms may include any of the following: You will usually have symptoms in both eyes if your conjunctivitis is caused by allergies  You may also have other allergic symptoms, such as a rash or runny nose  Symptoms will usually start in 1 eye if your conjunctivitis is caused by a virus or bacteria    · Redness in the whites of your eye    · Itching in your eye or around your eye    · Feeling like there is something in your eye    · Watery or thick, sticky discharge    · Crusty eyelids when you wake up in the morning    · Burning, stinging, or swelling in your eye    · Pain when you see bright light    Seek care immediately if:   · You have worsening eye pain  · The swelling in your eye gets worse, even after treatment  · Your vision suddenly becomes worse or you cannot see at all  Contact your healthcare provider if:   · You develop a fever and ear pain  · You have tiny bumps or spots of blood on your eye  · You have questions or concerns about your condition or care  Treatment  will depend on the cause of your conjunctivitis  You may need antibiotics or allergy medicine as a pill, eye drop, or eye ointment  Manage your symptoms:   · Apply a cool compress  Wet a washcloth with cold water and place it on your eye  This will help decrease itching and irritation  · Do not wear contact lenses  They can irritate your eye  Throw away the pair you are using and ask when you can wear them again  Use a new pair of lenses when your healthcare provider says it is okay  · Avoid irritants  Stay away from smoke filled areas  Shield your eyes from wind and sun  · Flush your eye  You may need to flush your eye with saline to help decrease your symptoms  Ask for more information on how to flush your eye  Medicines:  Treatment depends on what is causing your conjunctivitis  You may be given any of the following:  · Allergy medicine  helps decrease itchy, red, swollen eyes caused by allergies  It may be given as a pill, eye drops, or nasal spray  · Antibiotics  may be needed if your conjunctivitis is caused by bacteria  This medicine may be given as a pill, eye drops, or eye ointment  · Take your medicine as directed  Contact your healthcare provider if you think your medicine is not helping or if you have side effects  Tell him or her if you are allergic to any medicine   Keep a list of the medicines, vitamins, and herbs you take  Include the amounts, and when and why you take them  Bring the list or the pill bottles to follow-up visits  Carry your medicine list with you in case of an emergency  Prevent the spread of conjunctivitis:   · Wash your hands with soap and water often  Wash your hands before and after you touch your eyes  Also wash your hands before you prepare or eat food and after you use the bathroom or change a diaper  · Avoid allergens  Try to avoid the things that cause your allergies, such as pets, dust, or grass  · Avoid contact with others  Do not share towels or washcloths  Try to stay away from others as much as possible  Ask when you can return to work or school  · Throw away eye makeup  The bacteria that caused your conjunctivitis can stay in eye makeup  Throw away mascara and other eye makeup  © Copyright [facility] [address] Information is for End User's use only and may not be sold, redistributed or otherwise used for commercial purposes  All illustrations and images included in CareNotes® are the copyrighted property of A D A M , Inc  or [facility]   The above information is an  only  It is not intended as medical advice for individual conditions or treatments  Talk to your doctor, nurse or pharmacist before following any medical regimen to see if it is safe and effective for you

## 2021-04-23 ENCOUNTER — IMMUNIZATIONS (OUTPATIENT)
Dept: FAMILY MEDICINE CLINIC | Facility: HOSPITAL | Age: 61
End: 2021-04-23

## 2021-04-23 DIAGNOSIS — Z23 ENCOUNTER FOR IMMUNIZATION: Primary | ICD-10-CM

## 2021-04-23 PROCEDURE — 0012A SARS-COV-2 / COVID-19 MRNA VACCINE (MODERNA) 100 MCG: CPT

## 2021-04-23 PROCEDURE — 91301 SARS-COV-2 / COVID-19 MRNA VACCINE (MODERNA) 100 MCG: CPT

## 2021-04-28 DIAGNOSIS — F90.1 ATTENTION DEFICIT HYPERACTIVITY DISORDER (ADHD), PREDOMINANTLY HYPERACTIVE TYPE: ICD-10-CM

## 2021-04-28 NOTE — TELEPHONE ENCOUNTER
----- Message from Joon Allan MA sent at 4/28/2021  2:51 PM EDT -----  Please call to schedule diabetic check, med check, diabetic foot exam with [facility]  Please remind patient she needs to have fasting labs done at her earliest convenience  Orders in [address] [place] [address] Rd

## 2021-04-29 DIAGNOSIS — F90.1 ATTENTION DEFICIT HYPERACTIVITY DISORDER (ADHD), PREDOMINANTLY HYPERACTIVE TYPE: ICD-10-CM

## 2021-05-04 LAB
ALBUMIN SERPL-MCNC: 4.6 G/DL (ref 3.6–5.1)
ALBUMIN/GLOB SERPL: 1.6 (CALC) (ref 1–2.5)
ALP SERPL-CCNC: 76 U/L (ref 37–153)
ALT SERPL-CCNC: 32 U/L (ref 6–29)
AST SERPL-CCNC: 20 U/L (ref 10–35)
BILIRUB SERPL-MCNC: 0.6 MG/DL (ref 0.2–1.2)
BUN SERPL-MCNC: 24 MG/DL (ref 7–25)
BUN/CREAT SERPL: ABNORMAL (CALC) (ref 6–22)
CALCIUM SERPL-MCNC: 10.5 MG/DL (ref 8.6–10.4)
CHLORIDE SERPL-SCNC: 102 MMOL/L (ref 98–110)
CHOLEST SERPL-MCNC: 275 MG/DL
CHOLEST/HDLC SERPL: 5.9 (CALC)
CO2 SERPL-SCNC: 26 MMOL/L (ref 20–32)
CREAT SERPL-MCNC: 0.96 MG/DL (ref 0.5–1.05)
GLOBULIN SER CALC-MCNC: 2.9 G/DL (CALC) (ref 1.9–3.7)
GLUCOSE SERPL-MCNC: 175 MG/DL (ref 65–99)
HBA1C MFR BLD: 7.4 % OF TOTAL HGB
HDLC SERPL-MCNC: 47 MG/DL
LDLC SERPL CALC-MCNC: 186 MG/DL (CALC)
NONHDLC SERPL-MCNC: 228 MG/DL (CALC)
POTASSIUM SERPL-SCNC: 5 MMOL/L (ref 3.5–5.3)
PROT SERPL-MCNC: 7.5 G/DL (ref 6.1–8.1)
SL AMB EGFR AFRICAN AMERICAN: 76 ML/MIN/1.73M2
SL AMB EGFR NON AFRICAN AMERICAN: 65 ML/MIN/1.73M2
SODIUM SERPL-SCNC: 137 MMOL/L (ref 135–146)
TRIGL SERPL-MCNC: 227 MG/DL

## 2021-05-04 PROCEDURE — 3051F HG A1C>EQUAL 7.0%<8.0%: CPT | Performed by: FAMILY MEDICINE

## 2021-05-07 ENCOUNTER — OFFICE VISIT (OUTPATIENT)
Dept: FAMILY MEDICINE CLINIC | Facility: CLINIC | Age: 61
End: 2021-05-07
Payer: COMMERCIAL

## 2021-05-07 ENCOUNTER — TELEPHONE (OUTPATIENT)
Dept: FAMILY MEDICINE CLINIC | Facility: CLINIC | Age: 61
End: 2021-05-07

## 2021-05-07 ENCOUNTER — PATIENT MESSAGE (OUTPATIENT)
Dept: FAMILY MEDICINE CLINIC | Facility: CLINIC | Age: 61
End: 2021-05-07

## 2021-05-07 VITALS
BODY MASS INDEX: 25.43 KG/M2 | TEMPERATURE: 97.5 F | RESPIRATION RATE: 16 BRPM | OXYGEN SATURATION: 96 % | DIASTOLIC BLOOD PRESSURE: 70 MMHG | WEIGHT: 162 LBS | HEART RATE: 86 BPM | SYSTOLIC BLOOD PRESSURE: 110 MMHG | HEIGHT: 67 IN

## 2021-05-07 DIAGNOSIS — F90.1 ATTENTION DEFICIT HYPERACTIVITY DISORDER (ADHD), PREDOMINANTLY HYPERACTIVE TYPE: ICD-10-CM

## 2021-05-07 DIAGNOSIS — E78.2 MIXED HYPERLIPIDEMIA: ICD-10-CM

## 2021-05-07 DIAGNOSIS — K21.9 GERD WITHOUT ESOPHAGITIS: ICD-10-CM

## 2021-05-07 DIAGNOSIS — I10 ESSENTIAL HYPERTENSION: ICD-10-CM

## 2021-05-07 DIAGNOSIS — F41.9 ANXIETY DISORDER, UNSPECIFIED TYPE: ICD-10-CM

## 2021-05-07 DIAGNOSIS — H10.31 ACUTE CONJUNCTIVITIS OF RIGHT EYE, UNSPECIFIED ACUTE CONJUNCTIVITIS TYPE: Primary | ICD-10-CM

## 2021-05-07 DIAGNOSIS — R61 EXCESSIVE SWEATING: ICD-10-CM

## 2021-05-07 DIAGNOSIS — M79.642 LEFT HAND PAIN: ICD-10-CM

## 2021-05-07 DIAGNOSIS — H10.31 ACUTE CONJUNCTIVITIS OF RIGHT EYE, UNSPECIFIED ACUTE CONJUNCTIVITIS TYPE: ICD-10-CM

## 2021-05-07 DIAGNOSIS — E11.9 TYPE 2 DIABETES MELLITUS WITHOUT COMPLICATION, WITHOUT LONG-TERM CURRENT USE OF INSULIN (HCC): Primary | ICD-10-CM

## 2021-05-07 PROCEDURE — 99215 OFFICE O/P EST HI 40 MIN: CPT | Performed by: FAMILY MEDICINE

## 2021-05-07 RX ORDER — TOBRAMYCIN 3 MG/ML
1 SOLUTION/ DROPS OPHTHALMIC
Qty: 5 ML | Refills: 0 | Status: SHIPPED | OUTPATIENT
Start: 2021-05-07 | End: 2021-05-14

## 2021-05-07 NOTE — TELEPHONE ENCOUNTER
[facility] does not have the eye ointment, they do have eye drops  Can pt use drops till ointment comes in? Can you put in an order?

## 2021-05-07 NOTE — PROGRESS NOTES
Assessment/Plan:   1  Type 2 diabetes mellitus without complication, without long-term current use of insulin ([facility])    Patient's A1c appears slowly improving  Her A1c has decreased to 7 4  At this time, continue with a strict diabetic diet and exercise plan  Continue as well with her current treatment of metformin  Continue with routine blood sugar monitoring  Foot exam completed today  She is following up with ophthalmologist over the next few weeks  - Comprehensive metabolic panel; Future  - Hemoglobin A1C W/Refl To Glycomark(R); Future  - Comprehensive metabolic panel  - Hemoglobin A1C W/Refl To Glycomark(R)    2  Attention deficit hyperactivity disorder (ADHD), predominantly hyperactive type   stable today  At this time, she denies any recent exacerbations of her symptoms  Will continue with current treatment of Vyvanse  PDMP does not show any abnormalities  - lisdexamfetamine (VYVANSE) 50 MG capsule; Take 1 capsule (50 mg total) by mouth every morningMax Daily Amount: 50 mg  Dispense: 30 capsule; Refill: 0    3  Essential hypertension    Blood pressure appears very well controlled today  Continue with routine home monitoring as well as her current treatment with lisinopril as well as her metoprolol  4  Mixed hyperlipidemia    Patient's triglycerides appear largely elevated  Her LDL also shows elevation  She must maintain a very strict low-fat /low-cholesterol diet  Will continue with her current dose of atorvastatin  If she does not decrease this level, she will likely need to start fenofibrate or increase the dose of her statin  - Lipid Panel with Direct LDL reflex; Future  - Lipid Panel with Direct LDL reflex    5  GERD without esophagitis    Stable  Will continue with dietary trigger avoidance as well as her current treatment with   Famotidine  6  Anxiety disorder, unspecified type   patient's anxiety appears stable    At this time, continue with her current treatment of Lexapro 20 mg daily     7  Left hand pain    Unclear as to exact cause of patient's symptoms today  Symptoms may possibly be secondary to trigger finger  Will check x-rays to rule out gross abnormalities  Will consider referral to hand specialist   - XR finger left third digit-middle; Future  - XR hand 3+ vw left; Future    8  Excessive sweating   unclear as to exact cause of patient's symptoms  At this time, reviewed the retro differential   Will check TSH to further rule out a thyroid abnormality  Follow up with patient in 6 months   - TSH, 3rd generation with Free T4 reflex; Future  - TSH, 3rd generation with Free T4 reflex  - TSH, 3rd generation with Free T4 reflex; Future    BMI Counseling: Body mass index is 25 43 kg/m²  The BMI is above normal  Nutrition recommendations include decreasing portion sizes, encouraging healthy choices of fruits and vegetables, decreasing fast food intake, consuming healthier snacks and limiting drinks that contain sugar  Exercise recommendations include moderate physical activity 150 minutes/week and exercising 3-5 times per week  No pharmacotherapy was ordered  Patient referred to PCP due to patient being overweight  Depression Screening and Follow-up Plan: Patient advised to follow-up with PCP for further management  There are no diagnoses linked to this encounter  Subjective:       Chief Complaint   Patient presents with    Follow-up     6 months      Patient ID: Edyta Cartagena is a 64 y o  female  Presents today for follow-up on chronic conditions  She has type 2 diabetes, ADHD, hypertension, dyslipidemia, GERD, anxiety disorder  She has been taking medications regularly  She denies adverse reactions with medications  She has been having increasing left hand pain  She states this is from located over her 3rd digit  She states that she is unable to close her hand completely  She denies any trauma to this area      She also has complaints today of excessive sweating  She notes is frequently throughout the day  She would like to have further evaluation of this problem  HPI    Review of Systems   Constitutional: Negative for activity change, chills, fatigue and fever  HENT: Negative for congestion, ear pain, sinus pressure and sore throat  Eyes: Negative for redness, itching and visual disturbance  Respiratory: Negative for cough and shortness of breath  Cardiovascular: Negative for chest pain and palpitations  Gastrointestinal: Negative for abdominal pain, diarrhea and nausea  Endocrine: Negative for cold intolerance and heat intolerance  Genitourinary: Negative for dysuria, flank pain and frequency  Musculoskeletal: Negative for arthralgias, back pain, gait problem and myalgias  Skin: Negative for color change  Allergic/Immunologic: Negative for environmental allergies  Neurological: Negative for dizziness, numbness and headaches  Psychiatric/Behavioral: Negative for behavioral problems and sleep disturbance  The following portions of the patient's history were reviewed and updated as appropriate : past family history, past medical history, past social history and past surgical history      Current Outpatient Medications:     aspirin (ECOTRIN LOW STRENGTH) 81 mg EC tablet, Take 1 tablet by mouth daily, Disp: , Rfl:     atorvastatin (LIPITOR) 40 mg tablet, TAKE 1 TABLET DAILY, Disp: 90 tablet, Rfl: 0    Blood Glucose Monitoring Suppl (ONE TOUCH ULTRA MINI) w/Device KIT, by Does not apply route, Disp: , Rfl:     cholecalciferol (VITAMIN D3) 1,000 units tablet, Take by mouth, Disp: , Rfl:     escitalopram (LEXAPRO) 20 mg tablet, Take 1 tablet (20 mg total) by mouth daily, Disp: 30 tablet, Rfl: 5    famotidine (PEPCID) 20 mg tablet, Take 1 tablet by mouth daily, Disp: , Rfl:     glucose blood (ONE TOUCH ULTRA TEST) test strip, 1 each by Other route 2 (two) times a day, Disp: 100 each, Rfl: 0    lisdexamfetamine (VYVANSE) 20 MG capsule, Take 1 capsule (20 mg total) by mouth every evening With 40 mg taken in the a  m  Max Daily Amount: 20 mg, Disp: 30 capsule, Rfl: 0    lisdexamfetamine (VYVANSE) 50 MG capsule, Take 1 capsule (50 mg total) by mouth every morningMax Daily Amount: 50 mg, Disp: 30 capsule, Rfl: 0    lisinopril (ZESTRIL) 5 mg tablet, TAKE ONE-HALF (1/2) TABLET DAILY, Disp: 45 tablet, Rfl: 3    metFORMIN (GLUCOPHAGE-XR) 500 mg 24 hr tablet, Take 1 tablet (500 mg total) by mouth 2 (two) times a day, Disp: 60 tablet, Rfl: 0    metoprolol succinate (TOPROL-XL) 25 mg 24 hr tablet, TAKE 1 TABLET DAILY, Disp: 90 tablet, Rfl: 1    Multiple Vitamins-Minerals (MULTIVITAMIN ADULTS 50+ PO), Take 1 tablet by mouth daily, Disp: , Rfl:     ONETOUCH DELICA LANCETS FINE MISC, by Does not apply route daily, Disp: 100 each, Rfl: 1    ibuprofen (MOTRIN) 600 mg tablet, Take 1 tablet (600 mg total) by mouth every 6 (six) hours as needed for mild pain, Disp: 30 tablet, Rfl: 0    polymyxin b-trimethoprim (POLYTRIM) ophthalmic solution, Administer 1 drop to the right eye every 6 (six) hours (Patient not taking: Reported on 5/7/2021), Disp: 10 mL, Rfl: 0         Objective:         Vitals:    05/07/21 1059   BP: 110/70   BP Location: Left arm   Patient Position: Sitting   Cuff Size: Adult   Pulse: 86   Resp: 16   Temp: 97 5 °F (36 4 °C)   TempSrc: Tympanic   SpO2: 96%   Weight: 73 5 kg (162 lb)   Height: 5' 6 93" (1 7 m)     Physical Exam  Vitals signs reviewed  Constitutional:       Appearance: She is well-developed  HENT:      Head: Normocephalic and atraumatic  Nose: Nose normal       Mouth/Throat:      Pharynx: No oropharyngeal exudate  Eyes:      General: No scleral icterus  Right eye: No discharge  Left eye: No discharge  Pupils: Pupils are equal, round, and reactive to light  Neck:      Musculoskeletal: Normal range of motion and neck supple  Trachea: No tracheal deviation     Cardiovascular:      Rate and Rhythm: Normal rate and regular rhythm  Pulses: no weak pulses          Dorsalis pedis pulses are 2+ on the right side and 2+ on the left side  Posterior tibial pulses are 2+ on the right side and 2+ on the left side  Heart sounds: Normal heart sounds  No murmur  No friction rub  No gallop  Pulmonary:      Effort: Pulmonary effort is normal  No respiratory distress  Breath sounds: Normal breath sounds  No wheezing or rales  Abdominal:      General: Bowel sounds are normal  There is no distension  Palpations: Abdomen is soft  Tenderness: There is no abdominal tenderness  There is no guarding or rebound  Musculoskeletal: Normal range of motion  Feet:    Feet:      Right foot:      Skin integrity: No ulcer, skin breakdown, erythema, warmth, callus or dry skin  Left foot:      Skin integrity: No ulcer, skin breakdown, erythema, warmth, callus or dry skin  Lymphadenopathy:      Head:      Right side of head: No submental or submandibular adenopathy  Left side of head: No submental or submandibular adenopathy  Cervical: No cervical adenopathy  Right cervical: No superficial, deep or posterior cervical adenopathy  Left cervical: No superficial, deep or posterior cervical adenopathy  Skin:     General: Skin is warm and dry  Findings: No erythema  Neurological:      Mental Status: She is alert and oriented to person, place, and time  Cranial Nerves: No cranial nerve deficit  Sensory: No sensory deficit  Psychiatric:         Mood and Affect: Mood is not anxious or depressed  Speech: Speech normal          Behavior: Behavior normal          Thought Content: Thought content normal          Judgment: Judgment normal          Patient's shoes and socks removed  Right Foot/Ankle   Right Foot Inspection  Skin Exam: skin normal and skin intact no dry skin, no warmth, no callus, no erythema, no maceration, no abnormal color, no pre-ulcer, no ulcer and no callus                          Toe Exam: ROM and strength within normal limits  Sensory   Vibration: intact  Proprioception: intact   Monofilament testing: intact  Vascular  Capillary refills: < 3 seconds  The right DP pulse is 2+  The right PT pulse is 2+  Left Foot/Ankle  Left Foot Inspection  Skin Exam: skin normal and skin intactno dry skin, no warmth, no erythema, no maceration, normal color, no pre-ulcer, no ulcer and no callus                         Toe Exam: ROM and strength within normal limits                   Sensory   Vibration: intact  Proprioception: intact  Monofilament: intact  Vascular  Capillary refills: < 3 seconds  The left DP pulse is 2+  The left PT pulse is 2+  Assign Risk Category:  No deformity present; Loss of protective sensation;  No weak pulses       Risk: 0

## 2021-05-12 ENCOUNTER — OFFICE VISIT (OUTPATIENT)
Dept: FAMILY MEDICINE CLINIC | Facility: CLINIC | Age: 61
End: 2021-05-12
Payer: COMMERCIAL

## 2021-05-12 VITALS
TEMPERATURE: 98 F | HEART RATE: 97 BPM | HEIGHT: 67 IN | DIASTOLIC BLOOD PRESSURE: 72 MMHG | WEIGHT: 168 LBS | OXYGEN SATURATION: 98 % | BODY MASS INDEX: 26.37 KG/M2 | SYSTOLIC BLOOD PRESSURE: 122 MMHG

## 2021-05-12 DIAGNOSIS — Z12.4 SCREENING FOR CERVICAL CANCER: ICD-10-CM

## 2021-05-12 DIAGNOSIS — B34.9 VIRAL INFECTION, UNSPECIFIED: ICD-10-CM

## 2021-05-12 DIAGNOSIS — Z90.81 HISTORY OF SPLENECTOMY: ICD-10-CM

## 2021-05-12 DIAGNOSIS — J06.9 UPPER RESPIRATORY TRACT INFECTION, UNSPECIFIED TYPE: Primary | ICD-10-CM

## 2021-05-12 PROBLEM — D31.31 BENIGN NEOPLASM OF CHOROID OF RIGHT EYE: Status: ACTIVE | Noted: 2020-03-10

## 2021-05-12 PROBLEM — F32.A DEPRESSED: Status: ACTIVE | Noted: 2020-03-12

## 2021-05-12 PROBLEM — Z23 NEED FOR VACCINATION: Status: ACTIVE | Noted: 2021-05-12

## 2021-05-12 PROBLEM — F43.22 ADJUSTMENT REACTION WITH ANXIETY: Status: ACTIVE | Noted: 2021-05-12

## 2021-05-12 PROBLEM — H35.439 PAVING STONE RETINAL DEGENERATION: Status: ACTIVE | Noted: 2020-03-10

## 2021-05-12 PROBLEM — H43.813 DEGENERATION OF POSTERIOR VITREOUS BODY OF BOTH EYES: Status: ACTIVE | Noted: 2020-03-10

## 2021-05-12 PROBLEM — Z72.0 CURRENT TOBACCO USE: Status: ACTIVE | Noted: 2021-05-12

## 2021-05-12 PROBLEM — H25.13 AGE-RELATED NUCLEAR CATARACT OF BOTH EYES: Status: ACTIVE | Noted: 2020-03-10

## 2021-05-12 LAB — SARS-COV-2 RNA RESP QL NAA+PROBE: NEGATIVE

## 2021-05-12 PROCEDURE — U0003 INFECTIOUS AGENT DETECTION BY NUCLEIC ACID (DNA OR RNA); SEVERE ACUTE RESPIRATORY SYNDROME CORONAVIRUS 2 (SARS-COV-2) (CORONAVIRUS DISEASE [COVID-19]), AMPLIFIED PROBE TECHNIQUE, MAKING USE OF HIGH THROUGHPUT TECHNOLOGIES AS DESCRIBED BY CMS-2020-01-R: HCPCS | Performed by: FAMILY MEDICINE

## 2021-05-12 PROCEDURE — 3074F SYST BP LT 130 MM HG: CPT | Performed by: FAMILY MEDICINE

## 2021-05-12 PROCEDURE — 3078F DIAST BP <80 MM HG: CPT | Performed by: FAMILY MEDICINE

## 2021-05-12 PROCEDURE — U0005 INFEC AGEN DETEC AMPLI PROBE: HCPCS | Performed by: FAMILY MEDICINE

## 2021-05-12 PROCEDURE — 99213 OFFICE O/P EST LOW 20 MIN: CPT | Performed by: FAMILY MEDICINE

## 2021-05-12 PROCEDURE — 3008F BODY MASS INDEX DOCD: CPT | Performed by: FAMILY MEDICINE

## 2021-05-12 RX ORDER — BENZONATATE 100 MG/1
100 CAPSULE ORAL 3 TIMES DAILY PRN
Qty: 20 CAPSULE | Refills: 0 | Status: SHIPPED | OUTPATIENT
Start: 2021-05-12 | End: 2021-12-14

## 2021-05-12 RX ORDER — AMOXICILLIN AND CLAVULANATE POTASSIUM 875; 125 MG/1; MG/1
1 TABLET, FILM COATED ORAL EVERY 12 HOURS SCHEDULED
Qty: 14 TABLET | Refills: 0 | Status: SHIPPED | OUTPATIENT
Start: 2021-05-12 | End: 2021-05-19

## 2021-05-12 NOTE — PROGRESS NOTES
Assessment/Plan:    Upper respiratory tract infection  Suspect viral; pt vaccinated but given sx will also check for COVID19 given she is visiting mother-in-law next week; reviewed supportive care and OTC symptom relief; given hx of splenectomy advised to hold on abx and only start if COVID19 negative and not improving in the next few days        Diagnoses and all orders for this visit:    Upper respiratory tract infection, unspecified type  -     benzonatate (TESSALON PERLES) 100 mg capsule; Take 1 capsule (100 mg total) by mouth 3 (three) times a day as needed for cough  -     amoxicillin-clavulanate (AUGMENTIN) 875-125 mg per tablet; Take 1 tablet by mouth every 12 (twelve) hours for 7 days    Viral infection, unspecified  -     No[facility] Coron[facility] (Covid-19),[facility]; Future    History of splenectomy  -     amoxicillin-clavulanate (AUGMENTIN) 875-125 mg per tablet; Take 1 tablet by mouth every 12 (twelve) hours for 7 days          Subjective:      Patient ID: Jordan Odell is a 64 y o  female  Patient is complaining of cough and sore throat  Cough just started  No wheezing  Complaining of congestion and post nasal drip  No fevers  She is concerned she is starting to have a sinus infection  Bringing up some mucus  Is a current smoker  No known sick contacts  No known COVID19  Hx of splenectomy which concerns her  No SOB  No diarrhea/N/V  No significant headache  Felt a little fatigued but improving today  Patient was recently in for her right eye symptoms and conjunctivitis and was given drops which is improving  The following portions of the patient's history were reviewed and updated as appropriate: allergies, current medications, past family history, past medical history, past social history, past surgical history and problem list     Review of Systems   Constitutional: Negative for chills, fatigue and fever     HENT: Positive for congestion, rhinorrhea, sinus pressure and sore throat  Negative for ear pain, sinus pain and trouble swallowing  Eyes: Negative for discharge and redness  Respiratory: Positive for cough  Negative for shortness of breath and wheezing  Objective:      /72 (BP Location: Left arm, Patient Position: Sitting)   Pulse 97   Temp 98 °F (36 7 °C)   Ht 5' 7" (1 702 m)   Wt 76 2 kg (168 lb)   SpO2 98%   BMI 26 31 kg/m²          Physical Exam  Vitals signs reviewed  Constitutional:       General: She is not in acute distress  Appearance: Normal appearance  She is not ill-appearing, toxic-appearing or diaphoretic  HENT:      Head: Normocephalic and atraumatic  Right Ear: Tympanic membrane and ear canal normal  No drainage or tenderness  No middle ear effusion  Tympanic membrane is not erythematous  Left Ear: Tympanic membrane and ear canal normal  No swelling or tenderness  No middle ear effusion  Tympanic membrane is not erythematous  Nose: Congestion and rhinorrhea present  Mouth/Throat:      Mouth: No oral lesions  Pharynx: Posterior oropharyngeal erythema present  No pharyngeal swelling or oropharyngeal exudate  Eyes:      General: No scleral icterus  Right eye: No discharge  Left eye: No discharge  Conjunctiva/sclera: Conjunctivae normal    Cardiovascular:      Rate and Rhythm: Normal rate and regular rhythm  Pulses: Normal pulses  Heart sounds: Normal heart sounds  No murmur  No gallop  Pulmonary:      Effort: Pulmonary effort is normal  No respiratory distress  Breath sounds: Normal breath sounds  No stridor  No wheezing, rhonchi or rales  Musculoskeletal:      Right lower leg: No edema  Left lower leg: No edema  Lymphadenopathy:      Cervical: No cervical adenopathy  Neurological:      General: No focal deficit present  Mental Status: She is alert and oriented to person, place, and time     Psychiatric:         Mood and Affect: Mood normal          Behavior: Behavior normal          Thought Content:  Thought content normal          Judgment: Judgment normal

## 2021-05-12 NOTE — PATIENT INSTRUCTIONS
Continue with Claritin and nasal spray  Use tessalon perles as needed for cough  Complete testing and continue quarantine until you receive your test results  Call if it gets worse  If test is negative and sinus symptoms continue to worsen and not improve, start antibiotic

## 2021-05-12 NOTE — ASSESSMENT & PLAN NOTE
Suspect viral; pt vaccinated but given sx will also check for COVID19 given she is visiting mother-in-law next week; reviewed supportive care and OTC symptom relief; given hx of splenectomy advised to hold on abx and only start if COVID19 negative and not improving in the next few days

## 2021-05-15 ENCOUNTER — TELEPHONE (OUTPATIENT)
Dept: FAMILY MEDICINE CLINIC | Facility: CLINIC | Age: 61
End: 2021-05-15

## 2021-05-15 DIAGNOSIS — R05.9 COUGH: Primary | ICD-10-CM

## 2021-05-15 RX ORDER — PROMETHAZINE HYDROCHLORIDE AND CODEINE PHOSPHATE 6.25; 1 MG/5ML; MG/5ML
5 SYRUP ORAL EVERY 6 HOURS PRN
Qty: 120 ML | Refills: 0 | Status: SHIPPED | OUTPATIENT
Start: 2021-05-15 | End: 2021-06-24 | Stop reason: SDUPTHER

## 2021-05-15 NOTE — TELEPHONE ENCOUNTER
Patient was prescribed tessalon perles by [facility]  She is still having terrible coughing fits that last for a long time  She has trouble catching her breath and sleeping  Wants to know if you would call something else in  She has also tried Delsym, but no relief    Is out of town visiting sick relative      [facility]617 [phone]

## 2021-05-25 LAB — TSH SERPL-ACNC: 2.49 MIU/L (ref 0.4–4.5)

## 2021-05-27 DIAGNOSIS — F90.1 ATTENTION DEFICIT HYPERACTIVITY DISORDER (ADHD), PREDOMINANTLY HYPERACTIVE TYPE: ICD-10-CM

## 2021-06-09 DIAGNOSIS — F90.1 ATTENTION DEFICIT HYPERACTIVITY DISORDER (ADHD), PREDOMINANTLY HYPERACTIVE TYPE: ICD-10-CM

## 2021-06-24 ENCOUNTER — OFFICE VISIT (OUTPATIENT)
Dept: FAMILY MEDICINE CLINIC | Facility: CLINIC | Age: 61
End: 2021-06-24
Payer: COMMERCIAL

## 2021-06-24 VITALS
WEIGHT: 163 LBS | HEART RATE: 82 BPM | BODY MASS INDEX: 25.58 KG/M2 | OXYGEN SATURATION: 97 % | DIASTOLIC BLOOD PRESSURE: 72 MMHG | HEIGHT: 67 IN | TEMPERATURE: 96.5 F | SYSTOLIC BLOOD PRESSURE: 120 MMHG

## 2021-06-24 DIAGNOSIS — R05.9 COUGH: ICD-10-CM

## 2021-06-24 DIAGNOSIS — J22 LOWER RESPIRATORY INFECTION (E.G., BRONCHITIS, PNEUMONIA, PNEUMONITIS, PULMONITIS): Primary | ICD-10-CM

## 2021-06-24 PROCEDURE — 3074F SYST BP LT 130 MM HG: CPT | Performed by: FAMILY MEDICINE

## 2021-06-24 PROCEDURE — 3008F BODY MASS INDEX DOCD: CPT | Performed by: FAMILY MEDICINE

## 2021-06-24 PROCEDURE — 99214 OFFICE O/P EST MOD 30 MIN: CPT | Performed by: FAMILY MEDICINE

## 2021-06-24 PROCEDURE — 3078F DIAST BP <80 MM HG: CPT | Performed by: FAMILY MEDICINE

## 2021-06-24 RX ORDER — AZITHROMYCIN 250 MG/1
TABLET, FILM COATED ORAL
Qty: 6 TABLET | Refills: 0 | Status: SHIPPED | OUTPATIENT
Start: 2021-06-24 | End: 2021-06-29

## 2021-06-24 RX ORDER — FLUOROMETHOLONE ACETATE 1 MG/ML
SUSPENSION/ DROPS OPHTHALMIC
COMMUNITY
Start: 2021-06-14 | End: 2021-12-14

## 2021-06-24 RX ORDER — FLUOROMETHOLONE ACETATE 1 MG/ML
SUSPENSION/ DROPS OPHTHALMIC
COMMUNITY
Start: 2021-06-15 | End: 2022-07-05 | Stop reason: ALTCHOICE

## 2021-06-24 RX ORDER — PROMETHAZINE HYDROCHLORIDE AND CODEINE PHOSPHATE 6.25; 1 MG/5ML; MG/5ML
5 SYRUP ORAL EVERY 6 HOURS PRN
Qty: 120 ML | Refills: 0 | Status: SHIPPED | OUTPATIENT
Start: 2021-06-24 | End: 2021-12-14

## 2021-06-24 RX ORDER — CETIRIZINE HYDROCHLORIDE 10 MG/1
TABLET ORAL DAILY
COMMUNITY

## 2021-06-24 RX ORDER — METHYLPREDNISOLONE 4 MG/1
TABLET ORAL
Qty: 21 EACH | Refills: 0 | Status: SHIPPED | OUTPATIENT
Start: 2021-06-24 | End: 2021-12-15

## 2021-06-24 RX ORDER — MINERAL OIL/PETROLATUM,WHITE 20%-80%
OINTMENT (GRAM) OPHTHALMIC (EYE)
COMMUNITY

## 2021-06-24 NOTE — ASSESSMENT & PLAN NOTE
Increase fluid intake and get plenty of rest       Please start azithromycin and medrol dose juan f which was sent to your pharmacy  If you have sore / scratch / irritated throat, you may try the following:          Warm salt water gargles every 1-2 hours while awake, throat lozenges, Tylenol and/or ibuprofen  Please note that a cough is not necessarily a bad thing  It is the body's way of protecting the airways  If a cough is keeping you from sleeping at night, you may use cough suppressant such as Delsym Cough Syrup, NyQuil, Robitussin DM  Most upper respiratory symptoms start to improve after 7-10 days but may take a few weeks to completely resolve  You may also use Ibuprofen or Acetaminophen containing product for symptom relief  Follow up in 1 week if your symptoms persist or worsen  Please call the office if you have any questions  The patient verbalized understanding of treatment plan

## 2021-06-24 NOTE — PROGRESS NOTES
Assessment/Plan:    1  Lower respiratory infection (e g , bronchitis, pneumonia, pneumonitis, pulmonitis)  Assessment & Plan:  Increase fluid intake and get plenty of rest       Please start azithromycin and medrol dose juan f which was sent to your pharmacy  If you have sore / scratch / irritated throat, you may try the following:          Warm salt water gargles every 1-2 hours while awake, throat lozenges, Tylenol and/or ibuprofen  Please note that a cough is not necessarily a bad thing  It is the body's way of protecting the airways  If a cough is keeping you from sleeping at night, you may use cough suppressant such as Delsym Cough Syrup, NyQuil, Robitussin DM  Most upper respiratory symptoms start to improve after 7-10 days but may take a few weeks to completely resolve  You may also use Ibuprofen or Acetaminophen containing product for symptom relief  Follow up in 1 week if your symptoms persist or worsen  Please call the office if you have any questions  The patient verbalized understanding of treatment plan  Orders:  -     azithromycin (ZITHROMAX) 250 mg tablet; Take 2 tablets (500 mg total) by mouth every 24 hours for 1 day, THEN 1 tablet (250 mg total) every 24 hours for 4 days  -     methylPREDNISolone 4 MG tablet therapy pack; Use as directed on package    2  Cough  -     promethazine-codeine (PHENERGAN WITH CODEINE) 6 25-10 mg/5 mL syrup; Take 5 mL by mouth every 6 (six) hours as needed for cough      Subjective:      Patient ID: Yohan Bell is a 64 y o  female  HPI    Patient was seen a month ago for URI and was prescribed Augmentin, tessalon perles and phenergan with codiene  Her symptoms did resolve  She then developed new symptoms of mucous in her chest last week  She is taking Mucinex every 12 hours which is helping  She has pain in her throat with talking  She had fever of 100 8F yesterday  She was taking Tylenol as needed   Patient states that her throat feels tight  Her voice is also hoarse  The coughing is keeping her up at night  The following portions of the patient's history were reviewed and updated as appropriate: allergies, current medications, past family history, past medical history, past social history, past surgical history, and problem list       Current Outpatient Medications:     aspirin (ECOTRIN LOW STRENGTH) 81 mg EC tablet, Take 1 tablet by mouth daily, Disp: , Rfl:     atorvastatin (LIPITOR) 40 mg tablet, TAKE 1 TABLET DAILY, Disp: 90 tablet, Rfl: 0    Blood Glucose Monitoring Suppl (ONE TOUCH ULTRA MINI) w/Device KIT, by Does not apply route, Disp: , Rfl:     cetirizine (ZyrTEC) 10 mg tablet, Take by mouth Daily, Disp: , Rfl:     cholecalciferol (VITAMIN D3) 1,000 units tablet, Take by mouth, Disp: , Rfl:     escitalopram (LEXAPRO) 20 mg tablet, Take 1 tablet (20 mg total) by mouth daily, Disp: 30 tablet, Rfl: 5    famotidine (PEPCID) 20 mg tablet, Take 1 tablet by mouth daily, Disp: , Rfl:     fluorometholone acetate (Flarex) 0 1 % ophthalmic suspension, instill 1 drop by ophthalmic route 4 times every day into right eye for 4 weeks, Disp: , Rfl:     glucose blood (ONE TOUCH ULTRA TEST) test strip, 1 each by Other route 2 (two) times a day, Disp: 100 each, Rfl: 0    Light Mineral Oil-Mineral Oil (RETAINE MGD OP), Apply to eye, Disp: , Rfl:     lisdexamfetamine (VYVANSE) 20 MG capsule, Take 1 capsule (20 mg total) by mouth every evening With 40 mg taken in the a  m  Max Daily Amount: 20 mg, Disp: 30 capsule, Rfl: 0    lisdexamfetamine (VYVANSE) 50 MG capsule, Take 1 capsule (50 mg total) by mouth every morningMax Daily Amount: 50 mg, Disp: 30 capsule, Rfl: 0    lisinopril (ZESTRIL) 5 mg tablet, TAKE ONE-HALF (1/2) TABLET DAILY, Disp: 45 tablet, Rfl: 3    metFORMIN (GLUCOPHAGE-XR) 500 mg 24 hr tablet, Take 1 tablet (500 mg total) by mouth 2 (two) times a day, Disp: 60 tablet, Rfl: 0    metoprolol succinate (TOPROL-XL) 25 mg 24 hr tablet, TAKE 1 TABLET DAILY, Disp: 90 tablet, Rfl: 1    Multiple Vitamins-Minerals (MULTIVITAMIN ADULTS 50+ PO), Take 1 tablet by mouth daily, Disp: , Rfl:     ONETOUCH DELICA LANCETS FINE MISC, by Does not apply route daily, Disp: 100 each, Rfl: 1    White Petrolatum-Mineral Oil (Retaine PM) OINT, Apply to eye, Disp: , Rfl:     azithromycin (ZITHROMAX) 250 mg tablet, Take 2 tablets (500 mg total) by mouth every 24 hours for 1 day, THEN 1 tablet (250 mg total) every 24 hours for 4 days  , Disp: 6 tablet, Rfl: 0    benzonatate (TESSALON PERLES) 100 mg capsule, Take 1 capsule (100 mg total) by mouth 3 (three) times a day as needed for cough (Patient not taking: Reported on 6/24/2021), Disp: 20 capsule, Rfl: 0    Flarex 0 1 % ophthalmic suspension, , Disp: , Rfl:     methylPREDNISolone 4 MG tablet therapy pack, Use as directed on package, Disp: 21 each, Rfl: 0    promethazine-codeine (PHENERGAN WITH CODEINE) 6 25-10 mg/5 mL syrup, Take 5 mL by mouth every 6 (six) hours as needed for cough, Disp: 120 mL, Rfl: 0      Review of Systems   Constitutional: Positive for fatigue and fever  Negative for chills  HENT: Positive for sore throat and voice change  Negative for ear pain  Eyes: Negative for pain and visual disturbance  Respiratory: Positive for cough  Negative for chest tightness, shortness of breath and wheezing  Cardiovascular: Negative for chest pain and palpitations  Gastrointestinal: Negative for abdominal pain and vomiting  Genitourinary: Negative for dysuria and hematuria  Musculoskeletal: Negative for arthralgias and back pain  Skin: Negative for color change and rash  Neurological: Negative for seizures and syncope  All other systems reviewed and are negative          Objective:      /72   Pulse 82   Temp (!) 96 5 °F (35 8 °C) (Tympanic)   Ht 5' 7" (1 702 m)   Wt 73 9 kg (163 lb)   SpO2 97%   BMI 25 53 kg/m²          Physical Exam  Vitals and nursing note reviewed  Constitutional:       General: She is not in acute distress  Appearance: Normal appearance  She is ill-appearing  HENT:      Head: Normocephalic and atraumatic  Nose: Nose normal       Mouth/Throat:      Mouth: Mucous membranes are moist       Pharynx: Oropharynx is clear  No oropharyngeal exudate or posterior oropharyngeal erythema  Eyes:      Extraocular Movements: Extraocular movements intact  Conjunctiva/sclera: Conjunctivae normal    Cardiovascular:      Rate and Rhythm: Normal rate and regular rhythm  Heart sounds: Normal heart sounds  No murmur heard  Pulmonary:      Effort: Pulmonary effort is normal  No respiratory distress  Breath sounds: Normal breath sounds  No wheezing  Musculoskeletal:      Cervical back: Normal range of motion  Lymphadenopathy:      Cervical: Cervical adenopathy present  Skin:     General: Skin is warm  Neurological:      General: No focal deficit present  Mental Status: She is alert and oriented to person, place, and time  Psychiatric:         Mood and Affect: Mood normal          Behavior: Behavior normal          Thought Content:  Thought content normal          Judgment: Judgment normal

## 2021-06-27 ENCOUNTER — TELEPHONE (OUTPATIENT)
Dept: FAMILY MEDICINE CLINIC | Facility: CLINIC | Age: 61
End: 2021-06-27

## 2021-06-27 DIAGNOSIS — F90.1 ATTENTION DEFICIT HYPERACTIVITY DISORDER (ADHD), PREDOMINANTLY HYPERACTIVE TYPE: ICD-10-CM

## 2021-06-28 NOTE — TELEPHONE ENCOUNTER
Patient called stating her Vyvanse 20 mg that was recently sent had the wrong Sig  Please resend with corrected sig    Takes 1 Tab in the evening

## 2021-07-08 DIAGNOSIS — F90.1 ATTENTION DEFICIT HYPERACTIVITY DISORDER (ADHD), PREDOMINANTLY HYPERACTIVE TYPE: ICD-10-CM

## 2021-07-08 NOTE — TELEPHONE ENCOUNTER
Pt called for a refill of lisdexamfetamine (VYVANSE) 50 MG capsule to be sent to [facility] [address] , [place]\[facility] [place] [facility]\"" [phone] for Saturday

## 2021-08-05 DIAGNOSIS — F90.1 ATTENTION DEFICIT HYPERACTIVITY DISORDER (ADHD), PREDOMINANTLY HYPERACTIVE TYPE: ICD-10-CM

## 2021-08-12 DIAGNOSIS — E11.9 TYPE 2 DIABETES MELLITUS WITHOUT COMPLICATION, WITHOUT LONG-TERM CURRENT USE OF INSULIN (HCC): ICD-10-CM

## 2021-08-12 RX ORDER — METFORMIN HYDROCHLORIDE 500 MG/1
TABLET, EXTENDED RELEASE ORAL
Qty: 60 TABLET | Refills: 5 | Status: SHIPPED | OUTPATIENT
Start: 2021-08-12 | End: 2021-12-14 | Stop reason: SDUPTHER

## 2021-08-17 ENCOUNTER — OFFICE VISIT (OUTPATIENT)
Dept: URGENT CARE | Facility: CLINIC | Age: 61
End: 2021-08-17
Payer: COMMERCIAL

## 2021-08-17 VITALS — OXYGEN SATURATION: 96 % | TEMPERATURE: 97.6 F | RESPIRATION RATE: 18 BRPM | HEART RATE: 93 BPM

## 2021-08-17 DIAGNOSIS — J32.9 SINOBRONCHITIS: Primary | ICD-10-CM

## 2021-08-17 DIAGNOSIS — J40 SINOBRONCHITIS: Primary | ICD-10-CM

## 2021-08-17 PROCEDURE — U0005 INFEC AGEN DETEC AMPLI PROBE: HCPCS | Performed by: PHYSICIAN ASSISTANT

## 2021-08-17 PROCEDURE — U0003 INFECTIOUS AGENT DETECTION BY NUCLEIC ACID (DNA OR RNA); SEVERE ACUTE RESPIRATORY SYNDROME CORONAVIRUS 2 (SARS-COV-2) (CORONAVIRUS DISEASE [COVID-19]), AMPLIFIED PROBE TECHNIQUE, MAKING USE OF HIGH THROUGHPUT TECHNOLOGIES AS DESCRIBED BY CMS-2020-01-R: HCPCS | Performed by: PHYSICIAN ASSISTANT

## 2021-08-17 PROCEDURE — 99213 OFFICE O/P EST LOW 20 MIN: CPT | Performed by: FAMILY MEDICINE

## 2021-08-17 RX ORDER — DOXYCYCLINE 100 MG/1
100 TABLET ORAL 2 TIMES DAILY
Qty: 14 TABLET | Refills: 0 | Status: SHIPPED | OUTPATIENT
Start: 2021-08-17 | End: 2021-08-24

## 2021-08-17 RX ORDER — PROMETHAZINE HYDROCHLORIDE AND CODEINE PHOSPHATE 6.25; 1 MG/5ML; MG/5ML
5 SYRUP ORAL EVERY 4 HOURS PRN
Qty: 120 ML | Refills: 0 | Status: SHIPPED | OUTPATIENT
Start: 2021-08-17 | End: 2021-12-14

## 2021-08-17 RX ORDER — ALBUTEROL SULFATE 90 UG/1
2 AEROSOL, METERED RESPIRATORY (INHALATION) EVERY 6 HOURS PRN
Qty: 8 G | Refills: 0 | Status: SHIPPED | OUTPATIENT
Start: 2021-08-17 | End: 2021-12-14 | Stop reason: SDUPTHER

## 2021-08-17 NOTE — PROGRESS NOTES
[address]    NAME: Haydee Ovalle is a 64 y o  female  : 1960    MRN: [id number]  DATE: 2021  TIME: 4:45 PM    Assessment and Plan   Sinobronchitis [J32 9, J40]  1  Sinobronchitis  Novel Coronavirus (Covid-19),PCR [facility] - Office Collection    doxycycline (ADOXA) 100 MG tablet    promethazine-codeine (PHENERGAN WITH CODEINE) 6 25-10 mg/5 mL syrup    albuterol (PROVENTIL HFA,VENTOLIN HFA) 90 mcg/act inhaler       Patient Instructions   Patient Instructions   Push fluids  Start antibiotic as instructed  Prescription cough medicine as needed  Please note that cough is not necessarily a bad thing in helps to protect your airways  Would recommend just using prescription cough medicine for rest     During the day would recommend cough expectorant such as Mucinex plain  1 tablet twice a day with full glass of fluid  Warm tea with honey to soothe throat  Honey can also be and expectorant  Albuterol inhaler as needed for chest tightness, wheezing, spastic cough  COVID testing initiated  Those results may take 2-3 days to get back  May get those results off of the [facility]'s my chart web site  Transmission precautions advised  Follow-up with your primary care provider if not improving over the next 3-5 days  If significant worsening with chest pain, shortness of breath or profound weakness proceed to emergency room immediately for further evaluation  Chief Complaint     Chief Complaint   Patient presents with    Cough       History of Present Illness   Haydee Ovalle presents to the clinic c/o    72-year-old female comes in with nasal congestion, sneezing, cough, sore throat, headache, runny nose, chest congestion  Started last Friday after being exposed to son's boyfriend that has sinus infection  Patient quit smoking 2 months ago  Low-grade fever  Taking Tylenol and Delsym without much relief  Denies any history of asthma or pneumonia    Has had COVID vaccines  Review of Systems   Review of Systems   Constitutional: Positive for activity change, appetite change and fatigue  Negative for chills, diaphoresis and fever  HENT: Positive for congestion, postnasal drip, rhinorrhea, sinus pressure, sinus pain, sneezing and sore throat  Eyes: Negative  Respiratory: Positive for cough and chest tightness  Negative for choking, shortness of breath, wheezing and stridor  Cardiovascular: Negative for palpitations and leg swelling  Chest pain with cough   Neurological: Positive for headaches  Hematological: Negative for adenopathy  Current Medications     Long-Term Medications   Medication Sig Dispense Refill    aspirin (ECOTRIN LOW STRENGTH) 81 mg EC tablet Take 1 tablet by mouth daily      atorvastatin (LIPITOR) 40 mg tablet TAKE 1 TABLET DAILY 90 tablet 1    Blood Glucose Monitoring Suppl (ONE TOUCH ULTRA MINI) w/Device KIT by Does not apply route      cholecalciferol (VITAMIN D3) 1,000 units tablet Take by mouth      escitalopram (LEXAPRO) 20 mg tablet Take 1 tablet (20 mg total) by mouth daily 30 tablet 5    famotidine (PEPCID) 20 mg tablet Take 1 tablet by mouth daily      lisdexamfetamine (VYVANSE) 20 MG capsule Take 1 capsule (20 mg total) by mouth every evening With 50 mg taken in the a  m  Max Daily Amount: 20 mg 30 capsule 0    lisdexamfetamine (VYVANSE) 50 MG capsule Take 1 capsule (50 mg total) by mouth every morningMax Daily Amount: 50 mg 30 capsule 0    lisinopril (ZESTRIL) 5 mg tablet TAKE ONE-HALF (1/2) TABLET DAILY 45 tablet 3    metFORMIN (GLUCOPHAGE-XR) 500 mg 24 hr tablet TAKE 1 TABLET BY MOUTH TWICE A DAY (30 D) 60 tablet 5    metoprolol succinate (TOPROL-XL) 25 mg 24 hr tablet TAKE 1 TABLET DAILY 90 tablet 1    ONETOUCH DELICA LANCETS FINE MISC by Does not apply route daily 100 each 1    Flarex 0 1 % ophthalmic suspension  (Patient not taking: Reported on 6/24/2021)      fluorometholone acetate (Flarex) 0 1 % ophthalmic suspension instill 1 drop by ophthalmic route 4 times every day into right eye for 4 weeks (Patient not taking: Reported on 8/17/2021)      White Petrolatum-Mineral Oil (Retaine PM) OINT Apply to eye         Current Allergies     Allergies as of 08/17/2021 - Reviewed 08/17/2021   Allergen Reaction Noted    Levaquin [levofloxacin] Anaphylaxis 05/18/2012    Cyclobenzaprine  05/18/2012          The following portions of the patient's history were reviewed and updated as appropriate: allergies, current medications, past family history, past medical history, past social history, past surgical history and problem list   Past Medical History:   Diagnosis Date    Abnormal mammogram     RESOLVED: [id number]    Arthritis     [facility] [address] [facility]     Eczema of right external ear     RESOLVED: [id number]    Foot drop, left foot     Heart attack ([facility] [address] [facility]     Hematuria     RESOLVED: [id number]    Impetigo     RESOLVED: [id number]    Insomnia related to another mental disorder     AXIS I/II MENTAL DISORDER; RESOLVED: [id number]     Past Surgical History:   Procedure Laterality Date    HYSTERECTOMY  1998    still has 1 ovary not sure which one was removed    OTHER SURGICAL HISTORY      STEND INDICATIONS: RESTENOSIS AT PREVIOUS PTCA LOCATION     SPLENECTOMY      TOTAL HIP ARTHROPLASTY Left      Family History   Problem Relation Age of Onset    Stroke Mother     Osteoporosis Mother     Diabetes type II Father     Heart attack Father     Cancer Father     Heart disease Father     Hypertension Father     Heart disease Sister     Diabetes type II Brother     Heart attack Brother     Heart disease Brother     Heart attack Brother     Heart attack Brother     Heart attack Brother     Diabetes type II Brother     Alcohol abuse Neg Hx     Substance Abuse Neg Hx        Objective   Pulse 93   Temp 97 6 °F (36 4 °C)   Resp 18   SpO2 96%   No LMP recorded   Patient is postmenopausal        Physical Exam Physical Exam  Vitals and nursing note reviewed  Constitutional:       General: She is not in acute distress  Appearance: Normal appearance  She is ill-appearing  She is not toxic-appearing or diaphoretic  Comments: Appears mildly ill but in no acute distress   HENT:      Head: Normocephalic and atraumatic  Right Ear: Tympanic membrane, ear canal and external ear normal  There is no impacted cerumen  Left Ear: Tympanic membrane, ear canal and external ear normal  There is no impacted cerumen  Nose: Congestion and rhinorrhea present  Mouth/Throat:      Mouth: Mucous membranes are moist       Pharynx: No oropharyngeal exudate or posterior oropharyngeal erythema  Comments: Cobblestoning posterior pharynx  Eyes:      General: No scleral icterus  Right eye: No discharge  Left eye: No discharge  Extraocular Movements: Extraocular movements intact  Conjunctiva/sclera: Conjunctivae normal       Pupils: Pupils are equal, round, and reactive to light  Cardiovascular:      Rate and Rhythm: Normal rate and regular rhythm  Heart sounds: Normal heart sounds  No murmur heard  No friction rub  No gallop  Pulmonary:      Effort: Pulmonary effort is normal  No respiratory distress  Breath sounds: Normal breath sounds  No stridor  No wheezing, rhonchi or rales  Musculoskeletal:      Cervical back: Normal range of motion and neck supple  No rigidity or tenderness  No muscular tenderness  Right lower leg: No edema  Left lower leg: No edema  Lymphadenopathy:      Cervical: No cervical adenopathy  Skin:     General: Skin is warm and dry  Coloration: Skin is not pale  Findings: No rash  Comments: No acute rashes   Neurological:      Mental Status: She is alert and oriented to person, place, and time     Psychiatric:         Mood and Affect: Mood normal          Behavior: Behavior normal

## 2021-08-18 LAB — SARS-COV-2 RNA RESP QL NAA+PROBE: NEGATIVE

## 2021-08-23 DIAGNOSIS — F90.1 ATTENTION DEFICIT HYPERACTIVITY DISORDER (ADHD), PREDOMINANTLY HYPERACTIVE TYPE: ICD-10-CM

## 2021-09-01 DIAGNOSIS — I10 ESSENTIAL HYPERTENSION: ICD-10-CM

## 2021-09-01 RX ORDER — METOPROLOL SUCCINATE 25 MG/1
TABLET, EXTENDED RELEASE ORAL
Qty: 90 TABLET | Refills: 1 | Status: SHIPPED | OUTPATIENT
Start: 2021-09-01 | End: 2022-02-28

## 2021-09-02 DIAGNOSIS — F90.1 ATTENTION DEFICIT HYPERACTIVITY DISORDER (ADHD), PREDOMINANTLY HYPERACTIVE TYPE: ICD-10-CM

## 2021-09-05 DIAGNOSIS — F41.9 ANXIETY DISORDER, UNSPECIFIED TYPE: ICD-10-CM

## 2021-09-07 RX ORDER — ESCITALOPRAM OXALATE 20 MG/1
TABLET ORAL
Qty: 30 TABLET | Refills: 5 | Status: SHIPPED | OUTPATIENT
Start: 2021-09-07 | End: 2022-04-04

## 2021-09-21 DIAGNOSIS — F90.1 ATTENTION DEFICIT HYPERACTIVITY DISORDER (ADHD), PREDOMINANTLY HYPERACTIVE TYPE: ICD-10-CM

## 2021-10-01 DIAGNOSIS — F90.1 ATTENTION DEFICIT HYPERACTIVITY DISORDER (ADHD), PREDOMINANTLY HYPERACTIVE TYPE: ICD-10-CM

## 2021-10-18 DIAGNOSIS — F90.1 ATTENTION DEFICIT HYPERACTIVITY DISORDER (ADHD), PREDOMINANTLY HYPERACTIVE TYPE: ICD-10-CM

## 2021-11-01 DIAGNOSIS — F90.1 ATTENTION DEFICIT HYPERACTIVITY DISORDER (ADHD), PREDOMINANTLY HYPERACTIVE TYPE: ICD-10-CM

## 2021-11-05 ENCOUNTER — RA CDI HCC (OUTPATIENT)
Dept: OTHER | Facility: HOSPITAL | Age: 61
End: 2021-11-05

## 2021-11-14 DIAGNOSIS — F90.1 ATTENTION DEFICIT HYPERACTIVITY DISORDER (ADHD), PREDOMINANTLY HYPERACTIVE TYPE: ICD-10-CM

## 2021-11-28 DIAGNOSIS — F90.1 ATTENTION DEFICIT HYPERACTIVITY DISORDER (ADHD), PREDOMINANTLY HYPERACTIVE TYPE: ICD-10-CM

## 2021-12-10 LAB
1,5-ANHYDROGLUCITOL SERPL-MCNC: 7.6 MCG/ML (ref 7.5–28.4)
ALBUMIN SERPL-MCNC: 4.7 G/DL (ref 3.6–5.1)
ALBUMIN/GLOB SERPL: 1.5 (CALC) (ref 1–2.5)
ALP SERPL-CCNC: 89 U/L (ref 37–153)
ALT SERPL-CCNC: 21 U/L (ref 6–29)
AST SERPL-CCNC: 21 U/L (ref 10–35)
BILIRUB SERPL-MCNC: 0.5 MG/DL (ref 0.2–1.2)
BUN SERPL-MCNC: 26 MG/DL (ref 7–25)
BUN/CREAT SERPL: 34 (CALC) (ref 6–22)
CALCIUM SERPL-MCNC: 10.5 MG/DL (ref 8.6–10.4)
CHLORIDE SERPL-SCNC: 100 MMOL/L (ref 98–110)
CHOLEST SERPL-MCNC: 192 MG/DL
CHOLEST/HDLC SERPL: 3.7 (CALC)
CO2 SERPL-SCNC: 31 MMOL/L (ref 20–32)
CREAT SERPL-MCNC: 0.77 MG/DL (ref 0.5–1.05)
GLOBULIN SER CALC-MCNC: 3.1 G/DL (CALC) (ref 1.9–3.7)
GLUCOSE SERPL-MCNC: 156 MG/DL (ref 65–99)
HBA1C MFR BLD: 7.4 % OF TOTAL HGB
HDLC SERPL-MCNC: 52 MG/DL
LDLC SERPL CALC-MCNC: 121 MG/DL (CALC)
NONHDLC SERPL-MCNC: 140 MG/DL (CALC)
POTASSIUM SERPL-SCNC: 4.8 MMOL/L (ref 3.5–5.3)
PROT SERPL-MCNC: 7.8 G/DL (ref 6.1–8.1)
SL AMB EGFR AFRICAN AMERICAN: 98 ML/MIN/1.73M2
SL AMB EGFR NON AFRICAN AMERICAN: 85 ML/MIN/1.73M2
SODIUM SERPL-SCNC: 137 MMOL/L (ref 135–146)
T4 FREE SERPL-MCNC: 1 NG/DL (ref 0.8–1.8)
TRIGL SERPL-MCNC: 93 MG/DL
TSH SERPL-ACNC: 6.48 MIU/L (ref 0.4–4.5)

## 2021-12-10 PROCEDURE — 3051F HG A1C>EQUAL 7.0%<8.0%: CPT | Performed by: FAMILY MEDICINE

## 2021-12-14 ENCOUNTER — OFFICE VISIT (OUTPATIENT)
Dept: FAMILY MEDICINE CLINIC | Facility: CLINIC | Age: 61
End: 2021-12-14
Payer: COMMERCIAL

## 2021-12-14 VITALS
SYSTOLIC BLOOD PRESSURE: 120 MMHG | DIASTOLIC BLOOD PRESSURE: 80 MMHG | TEMPERATURE: 97.5 F | BODY MASS INDEX: 26.06 KG/M2 | HEIGHT: 67 IN | HEART RATE: 92 BPM | WEIGHT: 166 LBS | OXYGEN SATURATION: 97 % | RESPIRATION RATE: 16 BRPM

## 2021-12-14 DIAGNOSIS — F90.1 ATTENTION DEFICIT HYPERACTIVITY DISORDER (ADHD), PREDOMINANTLY HYPERACTIVE TYPE: ICD-10-CM

## 2021-12-14 DIAGNOSIS — J01.00 ACUTE NON-RECURRENT MAXILLARY SINUSITIS: ICD-10-CM

## 2021-12-14 DIAGNOSIS — I10 ESSENTIAL HYPERTENSION: Primary | ICD-10-CM

## 2021-12-14 DIAGNOSIS — J40 SINOBRONCHITIS: ICD-10-CM

## 2021-12-14 DIAGNOSIS — R94.6 ABNORMAL THYROID FUNCTION TEST: ICD-10-CM

## 2021-12-14 DIAGNOSIS — F41.9 ANXIETY DISORDER, UNSPECIFIED TYPE: ICD-10-CM

## 2021-12-14 DIAGNOSIS — E11.9 TYPE 2 DIABETES MELLITUS WITHOUT COMPLICATION, WITHOUT LONG-TERM CURRENT USE OF INSULIN (HCC): ICD-10-CM

## 2021-12-14 DIAGNOSIS — E78.2 MIXED HYPERLIPIDEMIA: ICD-10-CM

## 2021-12-14 DIAGNOSIS — J32.9 SINOBRONCHITIS: ICD-10-CM

## 2021-12-14 DIAGNOSIS — Z11.9 ENCOUNTER FOR SCREENING FOR INFECTIOUS AND PARASITIC DISEASES, UNSPECIFIED: ICD-10-CM

## 2021-12-14 PROCEDURE — 99215 OFFICE O/P EST HI 40 MIN: CPT | Performed by: FAMILY MEDICINE

## 2021-12-14 PROCEDURE — 87636 SARSCOV2 & INF A&B AMP PRB: CPT | Performed by: FAMILY MEDICINE

## 2021-12-14 PROCEDURE — 3008F BODY MASS INDEX DOCD: CPT | Performed by: FAMILY MEDICINE

## 2021-12-14 PROCEDURE — 1036F TOBACCO NON-USER: CPT | Performed by: FAMILY MEDICINE

## 2021-12-14 PROCEDURE — 3079F DIAST BP 80-89 MM HG: CPT | Performed by: FAMILY MEDICINE

## 2021-12-14 RX ORDER — ALBUTEROL SULFATE 90 UG/1
2 AEROSOL, METERED RESPIRATORY (INHALATION) EVERY 6 HOURS PRN
Qty: 8 G | Refills: 0 | Status: SHIPPED | OUTPATIENT
Start: 2021-12-14 | End: 2022-03-30 | Stop reason: SDUPTHER

## 2021-12-14 RX ORDER — PROMETHAZINE HYDROCHLORIDE AND CODEINE PHOSPHATE 6.25; 1 MG/5ML; MG/5ML
5 SYRUP ORAL EVERY 4 HOURS PRN
Qty: 120 ML | Refills: 0 | Status: SHIPPED | OUTPATIENT
Start: 2021-12-14 | End: 2022-03-30 | Stop reason: SDUPTHER

## 2021-12-14 RX ORDER — DOXYCYCLINE 100 MG/1
100 TABLET ORAL 2 TIMES DAILY
Qty: 14 TABLET | Refills: 0 | Status: SHIPPED | OUTPATIENT
Start: 2021-12-14 | End: 2021-12-21

## 2021-12-14 RX ORDER — METFORMIN HYDROCHLORIDE 500 MG/1
500 TABLET, EXTENDED RELEASE ORAL
Qty: 60 TABLET | Refills: 5 | Status: SHIPPED | OUTPATIENT
Start: 2021-12-14 | End: 2022-03-08 | Stop reason: SDUPTHER

## 2021-12-15 ENCOUNTER — TELEPHONE (OUTPATIENT)
Dept: FAMILY MEDICINE CLINIC | Facility: CLINIC | Age: 61
End: 2021-12-15

## 2021-12-16 LAB
FLUAV RNA RESP QL NAA+PROBE: NEGATIVE
FLUBV RNA RESP QL NAA+PROBE: NEGATIVE
SARS-COV-2 RNA RESP QL NAA+PROBE: NEGATIVE

## 2021-12-31 DIAGNOSIS — E78.2 MIXED HYPERLIPIDEMIA: ICD-10-CM

## 2022-01-01 NOTE — TELEPHONE ENCOUNTER
Pt called requesting a refill  Pt states she has enough till 10/04/2019 but she will be going out of town on Tuesday Morning and wants to have her medications before she leaves  PDMP checked last filled 9/12/2019 #30    Last office visit 9/03/2019    Future appt   3/05/2019 39w

## 2022-01-03 RX ORDER — ATORVASTATIN CALCIUM 40 MG/1
TABLET, FILM COATED ORAL
Qty: 90 TABLET | Refills: 3 | Status: SHIPPED | OUTPATIENT
Start: 2022-01-03

## 2022-01-04 ENCOUNTER — TELEPHONE (OUTPATIENT)
Dept: FAMILY MEDICINE CLINIC | Facility: CLINIC | Age: 62
End: 2022-01-04

## 2022-01-05 ENCOUNTER — OFFICE VISIT (OUTPATIENT)
Dept: URGENT CARE | Facility: CLINIC | Age: 62
End: 2022-01-05
Payer: COMMERCIAL

## 2022-01-05 ENCOUNTER — TELEPHONE (OUTPATIENT)
Dept: FAMILY MEDICINE CLINIC | Facility: CLINIC | Age: 62
End: 2022-01-05

## 2022-01-05 VITALS
HEART RATE: 88 BPM | RESPIRATION RATE: 18 BRPM | BODY MASS INDEX: 25.11 KG/M2 | HEIGHT: 67 IN | OXYGEN SATURATION: 98 % | WEIGHT: 160 LBS | TEMPERATURE: 96.4 F

## 2022-01-05 DIAGNOSIS — R05.9 COUGH: Primary | ICD-10-CM

## 2022-01-05 DIAGNOSIS — J32.9 SINOBRONCHITIS: ICD-10-CM

## 2022-01-05 DIAGNOSIS — J40 SINOBRONCHITIS: ICD-10-CM

## 2022-01-05 PROCEDURE — G0382 LEV 3 HOSP TYPE B ED VISIT: HCPCS | Performed by: PHYSICIAN ASSISTANT

## 2022-01-05 PROCEDURE — U0003 INFECTIOUS AGENT DETECTION BY NUCLEIC ACID (DNA OR RNA); SEVERE ACUTE RESPIRATORY SYNDROME CORONAVIRUS 2 (SARS-COV-2) (CORONAVIRUS DISEASE [COVID-19]), AMPLIFIED PROBE TECHNIQUE, MAKING USE OF HIGH THROUGHPUT TECHNOLOGIES AS DESCRIBED BY CMS-2020-01-R: HCPCS | Performed by: PHYSICIAN ASSISTANT

## 2022-01-05 PROCEDURE — S9083 URGENT CARE CENTER GLOBAL: HCPCS | Performed by: PHYSICIAN ASSISTANT

## 2022-01-05 RX ORDER — DOXYCYCLINE 100 MG/1
100 TABLET ORAL 2 TIMES DAILY
Qty: 14 TABLET | Refills: 0 | Status: SHIPPED | OUTPATIENT
Start: 2022-01-05 | End: 2022-01-12

## 2022-01-05 NOTE — PROGRESS NOTES
[address] [facility]    NAME: Ekta Roper is a 63 y o  female  : 1960    MRN: [id number]  DATE: 2022  TIME: 3:53 PM    Assessment and Plan   Cough [R05 9]  1  Cough  COVID Only -Office Collect   2  Sinobronchitis  doxycycline (ADOXA) 100 MG tablet     Due to medication space and is taking and frequency of narcotic cough medicine, this provider is not comfortable with refilling this cough medicine at this time  She may attempt contacting her primary care for refills if she feels like she really needs it  Patient expressed understanding  Use inhaler as needed  Patient Instructions   Patient Instructions   Take antibiotic as instructed  If this is a viral illness and antibiotic will not work  COVID testing initiated  Results take approximately 72 hours to return  You may access those on your [employer] chart site  We are unable to prescribing narcotic cough syrup at this time  You may reach out to your primary care to see if they are comfortable refilling that  Continue to push fluids  Use your inhaler as needed  Testing initiated for COVID  Recommend home quarantine while waiting for your results  Covid results may take up to approximately 72+  hours to return  (Please note this  time begins once specimen reaches the lab and not from the time of your visit )    If you got the COVID / Influenza testing done, results may take up to 5-6 days to return  There are a number of viral respiratory illnesses that can present similarly  Most are self-limiting  Antibiotics do not help viral illnesses  Utilizing [employer] Chart is the easiest way to get your test results  (If patient does not already have an active account, there are directions on front of clinical summary  to help with establishing personal [employer] Chart account        IF PATIENT RESULTS ARE POSITIVE, please continue patient self isolation and contact patient primary care provider as soon as possible to inform of patient results and to discuss need for any further evaluation / recommendations / treatment options  Return to work / school / regular activities per school / work protocols or patient's PCP's instructions or recommendations  If patient does not have a primary care provider, you may call the [address] [place], [place] [address]line for questions  [phone]  Please note - if you have COVID, full recovery may take anywhere from 2 weeks to several months based on your age, comorbidities, severity of illness  As with any respiratory illness, transmission precautions are strongly advised  Masking  Isolating  Hand washing  Frequent cleaning of common use surfaces  If significant worsening of your symptoms (profound weakness, chest pain, shortness of breath), proceed to ER for further evaluation  Symptomatic treatment  as needed  If sore throat:  Warm saltwater gargles every 1-2 hours while awake  Tylenol (or ibuprofen if allowed) as needed for any sore throat, fever, body aches and pains  If sinus pain / pressure:  Nasal saline spray may be helpful  Use every 2-3 hours while awake  Breathing in steamy air from shower and blowing nose to clear maybe helpful and can be done throughout day for comfort  Cold or warm compresses to head for comfort  Decongestant (if not contraindicated) may be helpful  Tylenol or Ibuprofen (if not contraindicated) may be helpful  Expectorant to keep mucous thin may also be helpful  PLEASE NOTE:  Yellow or green mucous does not necessarily mean a bacterial infection  Nasal drainage, congestion and / or cough typically peak around 7-10 days and then slowly resolve over next few weeks  (unless COVID, Influenza or RSV which can last longer)  You may use over the counter cough / cold medication as directed    This provider likes Mucinex D 12 hour formula - 1/2 to 1 tablet twice a day with full glass of fluid for daytime symptom relief (DO NOT TAKE IF YOU HAVE HIGH BLOOD PRESSURE  May take Coricidin HP and / or use plain Mucinex  If cough:  Cough drops, throat lozenges as needed  Vaporizer by the bedside  Warm tea with honey  Please note that having a cough is not necessarily a bad thing  It's often your body's protective mechanism to help keep airways clear  Push fluids  Rest       If having fever or chills, recommend no work or outside activities  Recommend fever gone for a good 24 hours without having to take anti fever medicine before returning to work / regular activities  If you have a fever, it  typically lasts  approximately 3-5 days (unless influenza or COVID  Fever may last longer)  If you develop unusual or persisting chest pain, shortness of breath, and / or weakness seek further attention at ER or call 911 for emergency attention  Follow up with your PCP if not improving over next 7-10 days  Chief Complaint     Chief Complaint   Patient presents with    Cold Like Symptoms     sinus congestion, cough, headache, body aches  fever controlled with ibuprofen  symptoms began 1 day ago  History of Present Illness   Jordan Odell presents to the clinic c/o  65-year-old female with history recurrent sinus bronchitis problems comes in with recurrence of sinus pain congestion drainage cough body aches and pains diaphoresis fatigue that started over the last couple days  Said she was sick on 12/14/2021 with a sinus infection and treated with an antibiotic as well as prescription cough syrup  She says she never felt like she got over that  She would like more prescription cough syrup and says only the codeine really helps  She has had her COVID vaccine x2 but no booster yet  Is not aware of being anyone around with COVID lately  Says that they did not gather with family over the holidays        Review of Systems   Review of Systems   Constitutional: Positive for activity change, appetite change, chills, diaphoresis, fatigue and fever  HENT: Positive for congestion, postnasal drip, rhinorrhea, sinus pressure and sinus pain  Respiratory: Positive for cough and chest tightness  Negative for shortness of breath, wheezing and stridor  Cardiovascular: Negative  Musculoskeletal: Positive for myalgias  Neurological: Positive for headaches  Current Medications     Long-Term Medications   Medication Sig Dispense Refill    aspirin (ECOTRIN LOW STRENGTH) 81 mg EC tablet Take 1 tablet by mouth daily      atorvastatin (LIPITOR) 40 mg tablet TAKE 1 TABLET DAILY 90 tablet 3    Blood Glucose Monitoring Suppl (ONE TOUCH ULTRA MINI) w/Device KIT by Does not apply route      cholecalciferol (VITAMIN D3) 1,000 units tablet Take by mouth      escitalopram (LEXAPRO) 20 mg tablet TAKE 1 TABLET BY MOUTH EVERY DAY 30 tablet 5    famotidine (PEPCID) 20 mg tablet Take 1 tablet by mouth daily      lisdexamfetamine (VYVANSE) 20 MG capsule Take 1 capsule (20 mg total) by mouth every evening With 40 mg taken in the a m   Max Daily Amount: 20 mg 30 capsule 0    lisdexamfetamine (VYVANSE) 40 MG capsule Take 1 capsule (40 mg total) by mouth every morning Max Daily Amount: 40 mg 30 capsule 0    lisinopril (ZESTRIL) 5 mg tablet TAKE ONE-HALF (1/2) TABLET DAILY 45 tablet 3    metFORMIN (GLUCOPHAGE-XR) 500 mg 24 hr tablet Take 1 tablet (500 mg total) by mouth daily with breakfast 60 tablet 5    metoprolol succinate (TOPROL-XL) 25 mg 24 hr tablet TAKE 1 TABLET DAILY 90 tablet 1    ONETOUCH DELICA LANCETS FINE MISC by Does not apply route daily 100 each 1    White Petrolatum-Mineral Oil (Retaine PM) OINT Apply to eye      Flarex 0 1 % ophthalmic suspension  (Patient not taking: Reported on 6/24/2021)         Current Allergies     Allergies as of 01/05/2022 - Reviewed 01/05/2022   Allergen Reaction Noted    Levaquin [levofloxacin] Anaphylaxis 05/18/2012    Cyclobenzaprine  05/18/2012          The following portions of the patient's history were reviewed and updated as appropriate: allergies, current medications, past family history, past medical history, past social history, past surgical history and problem list   Past Medical History:   Diagnosis Date    Abnormal mammogram     RESOLVED: [id number]    Arthritis     Diabetes mellitus ([facility] [address]     Eczema of right external ear     RESOLVED: [id number]    Foot drop, left foot     Heart attack ([facility] [address] [facility]     Hematuria     RESOLVED: [id number]    Impetigo     RESOLVED: [id number]    Insomnia related to another mental disorder     AXIS I/II MENTAL DISORDER; RESOLVED: [id number]     Past Surgical History:   Procedure Laterality Date    HYSTERECTOMY  1998    still has 1 ovary not sure which one was removed    OTHER SURGICAL HISTORY      STEND INDICATIONS: RESTENOSIS AT PREVIOUS PTCA LOCATION     SPLENECTOMY      TOTAL HIP ARTHROPLASTY Left      Family History   Problem Relation Age of Onset    Stroke Mother     Osteoporosis Mother     Diabetes type II Father     Heart attack Father     Cancer Father     Heart disease Father     Hypertension Father     Heart disease Sister     Diabetes type II Brother     Heart attack Brother     Heart disease Brother     Heart attack Brother     Heart attack Brother     Heart attack Brother     Diabetes type II Brother     Alcohol abuse Neg Hx     Substance Abuse Neg Hx        Objective   Pulse 88   Temp (!) 96 4 °F (35 8 °C) (Tympanic)   Resp 18   Ht 5' 7" (1 702 m)   Wt 72 6 kg (160 lb)   SpO2 98%   BMI 25 06 kg/m²   No LMP recorded  Patient is postmenopausal        Physical Exam     Physical Exam  Vitals and nursing note reviewed  Constitutional:       General: She is not in acute distress  Appearance: Normal appearance  She is ill-appearing  She is not toxic-appearing or diaphoretic  Comments: Appears mildly ill but in no acute distress  HENT:      Head: Normocephalic and atraumatic  Nose: Congestion and rhinorrhea present  Mouth/Throat:      Mouth: Mucous membranes are moist       Pharynx: No oropharyngeal exudate or posterior oropharyngeal erythema  Comments: Cobblestoning posterior pharynx  Eyes:      General: No scleral icterus  Right eye: No discharge  Left eye: No discharge  Extraocular Movements: Extraocular movements intact  Conjunctiva/sclera: Conjunctivae normal       Pupils: Pupils are equal, round, and reactive to light  Cardiovascular:      Rate and Rhythm: Normal rate and regular rhythm  Heart sounds: Normal heart sounds  No murmur heard  No friction rub  No gallop  Pulmonary:      Effort: Pulmonary effort is normal  No respiratory distress  Breath sounds: Normal breath sounds  No stridor  No wheezing, rhonchi or rales  Musculoskeletal:      Cervical back: Normal range of motion and neck supple  No rigidity or tenderness  No muscular tenderness  Lymphadenopathy:      Cervical: No cervical adenopathy  Skin:     General: Skin is warm and dry  Coloration: Skin is not pale  Findings: No rash  Comments: No acute rashes   Neurological:      Mental Status: She is alert and oriented to person, place, and time     Psychiatric:         Mood and Affect: Mood normal          Behavior: Behavior normal

## 2022-01-05 NOTE — PATIENT INSTRUCTIONS
Take antibiotic as instructed  If this is a viral illness and antibiotic will not work  COVID testing initiated  Results take approximately 72 hours to return  You may access those on your [employer] chart site  We are unable to prescribing narcotic cough syrup at this time  You may reach out to your primary care to see if they are comfortable refilling that  Continue to push fluids  Use your inhaler as needed  Testing initiated for COVID  Recommend home quarantine while waiting for your results  Covid results may take up to approximately 72+  hours to return  (Please note this  time begins once specimen reaches the lab and not from the time of your visit )    If you got the COVID / Influenza testing done, results may take up to 5-6 days to return  There are a number of viral respiratory illnesses that can present similarly  Most are self-limiting  Antibiotics do not help viral illnesses  Utilizing [employer] Chart is the easiest way to get your test results  (If patient does not already have an active account, there are directions on front of clinical summary  to help with establishing personal [employer] Chart account  IF PATIENT RESULTS ARE POSITIVE, please continue patient self isolation and contact patient primary care provider as soon as possible to inform of patient results and to discuss need for any further evaluation / recommendations / treatment options  Return to work / school / regular activities per school / work protocols or patient's PCP's instructions or recommendations  If patient does not have a primary care provider, you may call the [address] [place], [place] [address]line for questions  [phone]  Please note - if you have COVID, full recovery may take anywhere from 2 weeks to several months based on your age, comorbidities, severity of illness  As with any respiratory illness, transmission precautions are strongly advised  Masking  Isolating  Hand washing  Frequent cleaning of common use surfaces  If significant worsening of your symptoms (profound weakness, chest pain, shortness of breath), proceed to ER for further evaluation  Symptomatic treatment  as needed  If sore throat:  Warm saltwater gargles every 1-2 hours while awake  Tylenol (or ibuprofen if allowed) as needed for any sore throat, fever, body aches and pains  If sinus pain / pressure:  Nasal saline spray may be helpful  Use every 2-3 hours while awake  Breathing in steamy air from shower and blowing nose to clear maybe helpful and can be done throughout day for comfort  Cold or warm compresses to head for comfort  Decongestant (if not contraindicated) may be helpful  Tylenol or Ibuprofen (if not contraindicated) may be helpful  Expectorant to keep mucous thin may also be helpful  PLEASE NOTE:  Yellow or green mucous does not necessarily mean a bacterial infection  Nasal drainage, congestion and / or cough typically peak around 7-10 days and then slowly resolve over next few weeks  (unless COVID, Influenza or RSV which can last longer)  You may use over the counter cough / cold medication as directed  This provider likes Mucinex D 12 hour formula - 1/2 to 1 tablet twice a day with full glass of fluid for daytime symptom relief (DO NOT TAKE IF YOU HAVE HIGH BLOOD PRESSURE  May take Coricidin HP and / or use plain Mucinex  If cough:  Cough drops, throat lozenges as needed  Vaporizer by the bedside  Warm tea with honey  Please note that having a cough is not necessarily a bad thing  It's often your body's protective mechanism to help keep airways clear  Push fluids  Rest       If having fever or chills, recommend no work or outside activities  Recommend fever gone for a good 24 hours without having to take anti fever medicine before returning to work / regular activities       If you have a fever, it  typically lasts approximately 3-5 days (unless influenza or COVID  Fever may last longer)  If you develop unusual or persisting chest pain, shortness of breath, and / or weakness seek further attention at ER or call 911 for emergency attention  Follow up with your PCP if not improving over next 7-10 days

## 2022-01-05 NOTE — TELEPHONE ENCOUNTER
Pt came in this afternoon asking for a cough syrup to be sent to [facility], she had previously been seen downstairs at [facility] now

## 2022-01-08 LAB — SARS-COV-2 RNA RESP QL NAA+PROBE: NEGATIVE

## 2022-01-21 DIAGNOSIS — I10 ESSENTIAL HYPERTENSION: ICD-10-CM

## 2022-01-21 PROCEDURE — 4010F ACE/ARB THERAPY RXD/TAKEN: CPT | Performed by: FAMILY MEDICINE

## 2022-01-21 RX ORDER — LISINOPRIL 5 MG/1
TABLET ORAL
Qty: 45 TABLET | Refills: 3 | Status: SHIPPED | OUTPATIENT
Start: 2022-01-21

## 2022-02-06 DIAGNOSIS — F90.1 ATTENTION DEFICIT HYPERACTIVITY DISORDER (ADHD), PREDOMINANTLY HYPERACTIVE TYPE: ICD-10-CM

## 2022-02-08 ENCOUNTER — APPOINTMENT (OUTPATIENT)
Dept: RADIOLOGY | Facility: CLINIC | Age: 62
End: 2022-02-08
Payer: COMMERCIAL

## 2022-02-08 ENCOUNTER — OFFICE VISIT (OUTPATIENT)
Dept: FAMILY MEDICINE CLINIC | Facility: CLINIC | Age: 62
End: 2022-02-08
Payer: COMMERCIAL

## 2022-02-08 VITALS
RESPIRATION RATE: 18 BRPM | TEMPERATURE: 98.4 F | BODY MASS INDEX: 26.24 KG/M2 | HEART RATE: 92 BPM | HEIGHT: 67 IN | WEIGHT: 167.2 LBS | SYSTOLIC BLOOD PRESSURE: 132 MMHG | DIASTOLIC BLOOD PRESSURE: 86 MMHG | OXYGEN SATURATION: 98 %

## 2022-02-08 DIAGNOSIS — M54.6 CHRONIC MIDLINE THORACIC BACK PAIN: Primary | ICD-10-CM

## 2022-02-08 DIAGNOSIS — G89.29 CHRONIC MIDLINE THORACIC BACK PAIN: Primary | ICD-10-CM

## 2022-02-08 DIAGNOSIS — M54.6 CHRONIC MIDLINE THORACIC BACK PAIN: ICD-10-CM

## 2022-02-08 DIAGNOSIS — G89.29 CHRONIC MIDLINE THORACIC BACK PAIN: ICD-10-CM

## 2022-02-08 PROCEDURE — 3008F BODY MASS INDEX DOCD: CPT | Performed by: FAMILY MEDICINE

## 2022-02-08 PROCEDURE — 3079F DIAST BP 80-89 MM HG: CPT | Performed by: FAMILY MEDICINE

## 2022-02-08 PROCEDURE — 72072 X-RAY EXAM THORAC SPINE 3VWS: CPT

## 2022-02-08 PROCEDURE — 99214 OFFICE O/P EST MOD 30 MIN: CPT | Performed by: FAMILY MEDICINE

## 2022-02-08 PROCEDURE — 1036F TOBACCO NON-USER: CPT | Performed by: FAMILY MEDICINE

## 2022-02-08 PROCEDURE — 3075F SYST BP GE 130 - 139MM HG: CPT | Performed by: FAMILY MEDICINE

## 2022-02-08 PROCEDURE — 72050 X-RAY EXAM NECK SPINE 4/5VWS: CPT

## 2022-02-08 RX ORDER — TRAMADOL HYDROCHLORIDE 50 MG/1
50 TABLET ORAL EVERY 12 HOURS PRN
Qty: 20 TABLET | Refills: 0 | Status: SHIPPED | OUTPATIENT
Start: 2022-02-08 | End: 2022-05-18

## 2022-02-08 RX ORDER — CELECOXIB 100 MG/1
100 CAPSULE ORAL 2 TIMES DAILY
Qty: 40 CAPSULE | Refills: 0 | Status: SHIPPED | OUTPATIENT
Start: 2022-02-08 | End: 2022-05-18

## 2022-02-08 RX ORDER — CYCLOBENZAPRINE HCL 5 MG
5 TABLET ORAL
Qty: 20 TABLET | Refills: 0 | Status: SHIPPED | OUTPATIENT
Start: 2022-02-08 | End: 2022-05-18

## 2022-02-08 NOTE — PROGRESS NOTES
Assessment/Plan:   1  Chronic midline thoracic back pain  Reviewed patient's symptoms today  At this time, is unclear as to exact cause of her thoracic back pain  Symptoms are concerning due to the trauma that she had when she had her back in her house  She states that this happened while she was standing up in she had her back on a banister  At this time, will check x-rays to rule out gross abnormalities  Start treatment with Celebrex as well as Flexeril  She may take tramadol only for severe pain relief  Follow-up if any symptoms persist   - XR spine cervical complete 4 or 5 vw non injury; Future  - XR spine thoracic 3 vw; Future  - celecoxib (CeleBREX) 100 mg capsule; Take 1 capsule (100 mg total) by mouth 2 (two) times a day  Dispense: 40 capsule; Refill: 0  - cyclobenzaprine (FLEXERIL) 5 mg tablet; Take 1 tablet (5 mg total) by mouth daily at bedtime  Dispense: 20 tablet; Refill: 0  - traMADol (ULTRAM) 50 mg tablet; Take 1 tablet (50 mg total) by mouth every 12 (twelve) hours as needed for moderate pain  Dispense: 20 tablet; Refill: 0           There are no diagnoses linked to this encounter  Subjective:       Chief Complaint   Patient presents with    Back Pain      Patient ID: Mckay Melvin is a 63 y o  female  Back Pain  This is a new problem  The current episode started 1 to 4 weeks ago  The problem occurs intermittently  The problem has been gradually worsening since onset  The pain is present in the thoracic spine  Radiates to: upper arms  The pain is mild  Pertinent negatives include no abdominal pain, chest pain, dysuria, fever, headaches or numbness  She has tried nothing for the symptoms  Review of Systems   Constitutional: Negative for activity change, chills, fatigue and fever  HENT: Negative for congestion, ear pain, sinus pressure and sore throat  Eyes: Negative for redness, itching and visual disturbance  Respiratory: Negative for cough and shortness of breath  Cardiovascular: Negative for chest pain and palpitations  Gastrointestinal: Negative for abdominal pain, diarrhea and nausea  Endocrine: Negative for cold intolerance and heat intolerance  Genitourinary: Negative for dysuria, flank pain and frequency  Musculoskeletal: Negative for arthralgias, back pain, gait problem and myalgias  Skin: Negative for color change  Allergic/Immunologic: Negative for environmental allergies  Neurological: Negative for dizziness, numbness and headaches  Psychiatric/Behavioral: Negative for behavioral problems and sleep disturbance  The following portions of the patient's history were reviewed and updated as appropriate : past family history, past medical history, past social history and past surgical history  Current Outpatient Medications:     albuterol (PROVENTIL HFA,VENTOLIN HFA) 90 mcg/act inhaler, Inhale 2 puffs every 6 (six) hours as needed for wheezing, Disp: 8 g, Rfl: 0    aspirin (ECOTRIN LOW STRENGTH) 81 mg EC tablet, Take 1 tablet by mouth daily, Disp: , Rfl:     atorvastatin (LIPITOR) 40 mg tablet, TAKE 1 TABLET DAILY, Disp: 90 tablet, Rfl: 3    Blood Glucose Monitoring Suppl (ONE TOUCH ULTRA MINI) w/Device KIT, by Does not apply route, Disp: , Rfl:     cetirizine (ZyrTEC) 10 mg tablet, Take by mouth Daily, Disp: , Rfl:     cholecalciferol (VITAMIN D3) 1,000 units tablet, Take by mouth, Disp: , Rfl:     escitalopram (LEXAPRO) 20 mg tablet, TAKE 1 TABLET BY MOUTH EVERY DAY, Disp: 30 tablet, Rfl: 5    famotidine (PEPCID) 20 mg tablet, Take 1 tablet by mouth daily, Disp: , Rfl:     glucose blood (ONE TOUCH ULTRA TEST) test strip, 1 each by Other route 2 (two) times a day, Disp: 100 each, Rfl: 0    lisdexamfetamine (VYVANSE) 20 MG capsule, Take 1 capsule (20 mg total) by mouth every evening With 40 mg taken in the a m   Max Daily Amount: 20 mg, Disp: 30 capsule, Rfl: 0    lisdexamfetamine (VYVANSE) 40 MG capsule, Take 1 capsule (40 mg total) by mouth every morning Max Daily Amount: 40 mg, Disp: 30 capsule, Rfl: 0    lisinopril (ZESTRIL) 5 mg tablet, TAKE ONE-HALF (1/2) TABLET DAILY, Disp: 45 tablet, Rfl: 3    metFORMIN (GLUCOPHAGE-XR) 500 mg 24 hr tablet, Take 1 tablet (500 mg total) by mouth daily with breakfast, Disp: 60 tablet, Rfl: 5    metoprolol succinate (TOPROL-XL) 25 mg 24 hr tablet, TAKE 1 TABLET DAILY, Disp: 90 tablet, Rfl: 1    Multiple Vitamins-Minerals (MULTIVITAMIN ADULTS 50+ PO), Take 1 tablet by mouth daily, Disp: , Rfl:     ONETOUCH DELICA LANCETS FINE MISC, by Does not apply route daily, Disp: 100 each, Rfl: 1    White Petrolatum-Mineral Oil (Retaine PM) OINT, Apply to eye, Disp: , Rfl:     Flarex 0 1 % ophthalmic suspension, , Disp: , Rfl:     promethazine-codeine (PHENERGAN WITH CODEINE) 6 25-10 mg/5 mL syrup, Take 5 mL by mouth every 4 (four) hours as needed for cough (Patient not taking: Reported on 1/5/2022 ), Disp: 120 mL, Rfl: 0         Objective:         Vitals:    02/08/22 1521   BP: 132/86   BP Location: Left arm   Patient Position: Sitting   Pulse: 92   Resp: 18   Temp: 98 4 °F (36 9 °C)   TempSrc: Tympanic   SpO2: 98%   Weight: 75 8 kg (167 lb 3 2 oz)   Height: 5' 7" (1 702 m)     Physical Exam  Vitals reviewed  Constitutional:       Appearance: She is well-developed  HENT:      Head: Normocephalic and atraumatic  Nose: Nose normal       Mouth/Throat:      Pharynx: No oropharyngeal exudate  Eyes:      General: No scleral icterus  Right eye: No discharge  Left eye: No discharge  Pupils: Pupils are equal, round, and reactive to light  Neck:      Trachea: No tracheal deviation  Cardiovascular:      Rate and Rhythm: Normal rate and regular rhythm  Pulses:           Dorsalis pedis pulses are 2+ on the right side and 2+ on the left side  Posterior tibial pulses are 2+ on the right side and 2+ on the left side  Heart sounds: Normal heart sounds  No murmur heard    No friction rub  No gallop  Pulmonary:      Effort: Pulmonary effort is normal  No respiratory distress  Breath sounds: Normal breath sounds  No wheezing or rales  Abdominal:      General: Bowel sounds are normal  There is no distension  Palpations: Abdomen is soft  Tenderness: There is no abdominal tenderness  There is no guarding or rebound  Musculoskeletal:         General: Normal range of motion  Cervical back: Normal range of motion and neck supple  Lymphadenopathy:      Head:      Right side of head: No submental or submandibular adenopathy  Left side of head: No submental or submandibular adenopathy  Cervical: No cervical adenopathy  Right cervical: No superficial, deep or posterior cervical adenopathy  Left cervical: No superficial, deep or posterior cervical adenopathy  Skin:     General: Skin is warm and dry  Findings: No erythema  Neurological:      Mental Status: She is alert and oriented to person, place, and time  Cranial Nerves: No cranial nerve deficit  Sensory: No sensory deficit  Psychiatric:         Mood and Affect: Mood is not anxious or depressed  Speech: Speech normal          Behavior: Behavior normal          Thought Content:  Thought content normal          Judgment: Judgment normal

## 2022-02-09 ENCOUNTER — TELEPHONE (OUTPATIENT)
Dept: FAMILY MEDICINE CLINIC | Facility: CLINIC | Age: 62
End: 2022-02-09

## 2022-02-28 DIAGNOSIS — I10 ESSENTIAL HYPERTENSION: ICD-10-CM

## 2022-02-28 RX ORDER — METOPROLOL SUCCINATE 25 MG/1
TABLET, EXTENDED RELEASE ORAL
Qty: 90 TABLET | Refills: 3 | Status: SHIPPED | OUTPATIENT
Start: 2022-02-28

## 2022-03-07 DIAGNOSIS — F90.1 ATTENTION DEFICIT HYPERACTIVITY DISORDER (ADHD), PREDOMINANTLY HYPERACTIVE TYPE: ICD-10-CM

## 2022-03-13 DIAGNOSIS — F90.1 ATTENTION DEFICIT HYPERACTIVITY DISORDER (ADHD), PREDOMINANTLY HYPERACTIVE TYPE: ICD-10-CM

## 2022-03-30 ENCOUNTER — OFFICE VISIT (OUTPATIENT)
Dept: FAMILY MEDICINE CLINIC | Facility: CLINIC | Age: 62
End: 2022-03-30
Payer: COMMERCIAL

## 2022-03-30 VITALS
TEMPERATURE: 98.4 F | HEART RATE: 77 BPM | DIASTOLIC BLOOD PRESSURE: 62 MMHG | OXYGEN SATURATION: 96 % | RESPIRATION RATE: 18 BRPM | WEIGHT: 162.8 LBS | HEIGHT: 67 IN | BODY MASS INDEX: 25.55 KG/M2 | SYSTOLIC BLOOD PRESSURE: 118 MMHG

## 2022-03-30 DIAGNOSIS — F90.1 ATTENTION DEFICIT HYPERACTIVITY DISORDER (ADHD), PREDOMINANTLY HYPERACTIVE TYPE: ICD-10-CM

## 2022-03-30 DIAGNOSIS — J20.9 ACUTE BRONCHITIS, UNSPECIFIED ORGANISM: ICD-10-CM

## 2022-03-30 PROCEDURE — 3074F SYST BP LT 130 MM HG: CPT | Performed by: FAMILY MEDICINE

## 2022-03-30 PROCEDURE — 99214 OFFICE O/P EST MOD 30 MIN: CPT | Performed by: FAMILY MEDICINE

## 2022-03-30 PROCEDURE — 3078F DIAST BP <80 MM HG: CPT | Performed by: FAMILY MEDICINE

## 2022-03-30 PROCEDURE — 1036F TOBACCO NON-USER: CPT | Performed by: FAMILY MEDICINE

## 2022-03-30 PROCEDURE — 3008F BODY MASS INDEX DOCD: CPT | Performed by: FAMILY MEDICINE

## 2022-03-30 RX ORDER — PROMETHAZINE HYDROCHLORIDE AND CODEINE PHOSPHATE 6.25; 1 MG/5ML; MG/5ML
5 SYRUP ORAL EVERY 8 HOURS PRN
Qty: 120 ML | Refills: 0 | Status: SHIPPED | OUTPATIENT
Start: 2022-03-30 | End: 2022-05-18

## 2022-03-30 RX ORDER — DOXYCYCLINE 100 MG/1
100 TABLET ORAL 2 TIMES DAILY
Qty: 14 TABLET | Refills: 0 | Status: SHIPPED | OUTPATIENT
Start: 2022-03-30 | End: 2022-04-06

## 2022-03-30 RX ORDER — ALBUTEROL SULFATE 90 UG/1
2 AEROSOL, METERED RESPIRATORY (INHALATION) EVERY 6 HOURS PRN
Qty: 8 G | Refills: 0 | Status: SHIPPED | OUTPATIENT
Start: 2022-03-30

## 2022-03-30 NOTE — PROGRESS NOTES
Assessment/Plan:   1  Acute bronchitis, unspecified organism  Symptoms appear likely secondary to acute bronchitis  Start supportive care  Maintain hydration  Take over-the-counter Mucinex  Start treatment with doxycycline  At this time, she has been having persistent coughing  At her request, she was given a prescription for promethazine with codeine  PDMP does not show any  Abnormalities  - albuterol (PROVENTIL HFA,VENTOLIN HFA) 90 mcg/act inhaler; Inhale 2 puffs every 6 (six) hours as needed for wheezing  Dispense: 8 g; Refill: 0  - promethazine-codeine (PHENERGAN WITH CODEINE) 6 25-10 mg/5 mL syrup; Take 5 mL by mouth every 8 (eight) hours as needed for cough  Dispense: 120 mL; Refill: 0  - doxycycline (ADOXA) 100 MG tablet; Take 1 tablet (100 mg total) by mouth 2 (two) times a day for 7 days  Dispense: 14 tablet; Refill: 0    2  Attention deficit hyperactivity disorder (ADHD), predominantly hyperactive type  - lisdexamfetamine (VYVANSE) 40 MG capsule; Take 1 capsule (40 mg total) by mouth every morning Max Daily Amount: 40 mg  Dispense: 30 capsule; Refill: 0           There are no diagnoses linked to this encounter  Subjective:       Chief Complaint   Patient presents with    Cold Like Symptoms     x4 days     Dizziness     started last week       Patient ID: Mckay Melvin is a 63 y o  female  Dizziness  This is a new problem  The current episode started in the past 7 days  The problem occurs constantly  The problem has been unchanged  Associated symptoms include chills, congestion, coughing, headaches and a sore throat  Pertinent negatives include no abdominal pain, arthralgias, chest pain, fatigue, fever, myalgias, nausea or numbness  Nothing aggravates the symptoms  Treatments tried: Delsum, Mucinex  The treatment provided no relief  Review of Systems   Constitutional: Positive for chills  Negative for activity change, fatigue and fever     HENT: Positive for congestion and sore throat  Negative for ear pain and sinus pressure  Eyes: Negative for redness, itching and visual disturbance  Respiratory: Positive for cough  Negative for shortness of breath  Cardiovascular: Negative for chest pain and palpitations  Gastrointestinal: Negative for abdominal pain, diarrhea and nausea  Endocrine: Negative for cold intolerance and heat intolerance  Genitourinary: Negative for dysuria, flank pain and frequency  Musculoskeletal: Negative for arthralgias, back pain, gait problem and myalgias  Skin: Negative for color change  Allergic/Immunologic: Negative for environmental allergies  Neurological: Positive for dizziness and headaches  Negative for numbness  Psychiatric/Behavioral: Negative for behavioral problems and sleep disturbance  The following portions of the patient's history were reviewed and updated as appropriate : past family history, past medical history, past social history and past surgical history      Current Outpatient Medications:     albuterol (PROVENTIL HFA,VENTOLIN HFA) 90 mcg/act inhaler, Inhale 2 puffs every 6 (six) hours as needed for wheezing, Disp: 8 g, Rfl: 0    aspirin (ECOTRIN LOW STRENGTH) 81 mg EC tablet, Take 1 tablet by mouth daily, Disp: , Rfl:     atorvastatin (LIPITOR) 40 mg tablet, TAKE 1 TABLET DAILY, Disp: 90 tablet, Rfl: 3    Blood Glucose Monitoring Suppl (ONE TOUCH ULTRA MINI) w/Device KIT, by Does not apply route, Disp: , Rfl:     celecoxib (CeleBREX) 100 mg capsule, Take 1 capsule (100 mg total) by mouth 2 (two) times a day, Disp: 40 capsule, Rfl: 0    cetirizine (ZyrTEC) 10 mg tablet, Take by mouth Daily, Disp: , Rfl:     cholecalciferol (VITAMIN D3) 1,000 units tablet, Take by mouth, Disp: , Rfl:     cyclobenzaprine (FLEXERIL) 5 mg tablet, Take 1 tablet (5 mg total) by mouth daily at bedtime, Disp: 20 tablet, Rfl: 0    escitalopram (LEXAPRO) 20 mg tablet, TAKE 1 TABLET BY MOUTH EVERY DAY, Disp: 30 tablet, Rfl: 5   famotidine (PEPCID) 20 mg tablet, Take 1 tablet by mouth daily, Disp: , Rfl:     Flarex 0 1 % ophthalmic suspension,  , Disp: , Rfl:     glucose blood (ONE TOUCH ULTRA TEST) test strip, 1 each by Other route 2 (two) times a day, Disp: 100 each, Rfl: 0    lisdexamfetamine (VYVANSE) 20 MG capsule, Take 1 capsule (20 mg total) by mouth every evening With 40 mg taken in the a m  Max Daily Amount: 20 mg, Disp: 30 capsule, Rfl: 0    lisdexamfetamine (VYVANSE) 40 MG capsule, Take 1 capsule (40 mg total) by mouth every morning Max Daily Amount: 40 mg, Disp: 30 capsule, Rfl: 0    lisinopril (ZESTRIL) 5 mg tablet, TAKE ONE-HALF (1/2) TABLET DAILY, Disp: 45 tablet, Rfl: 3    metFORMIN (GLUCOPHAGE-XR) 500 mg 24 hr tablet, Take 1 tablet (500 mg total) by mouth daily with breakfast, Disp: 60 tablet, Rfl: 4    metoprolol succinate (TOPROL-XL) 25 mg 24 hr tablet, TAKE 1 TABLET DAILY, Disp: 90 tablet, Rfl: 3    Multiple Vitamins-Minerals (MULTIVITAMIN ADULTS 50+ PO), Take 1 tablet by mouth daily, Disp: , Rfl:     ONETOUCH DELICA LANCETS FINE MISC, by Does not apply route daily, Disp: 100 each, Rfl: 1    traMADol (ULTRAM) 50 mg tablet, Take 1 tablet (50 mg total) by mouth every 12 (twelve) hours as needed for moderate pain, Disp: 20 tablet, Rfl: 0    White Petrolatum-Mineral Oil (Retaine PM) OINT, Apply to eye, Disp: , Rfl:     promethazine-codeine (PHENERGAN WITH CODEINE) 6 25-10 mg/5 mL syrup, Take 5 mL by mouth every 4 (four) hours as needed for cough (Patient not taking: Reported on 3/30/2022 ), Disp: 120 mL, Rfl: 0         Objective:         Vitals:    03/30/22 1002   BP: 118/62   BP Location: Left arm   Patient Position: Sitting   Cuff Size: Adult   Pulse: 77   Resp: 18   Temp: 98 4 °F (36 9 °C)   TempSrc: Tympanic   SpO2: 96%   Weight: 73 8 kg (162 lb 12 8 oz)   Height: 5' 7" (1 702 m)     Physical Exam  Vitals reviewed  Constitutional:       Appearance: She is well-developed     HENT:      Head: Normocephalic and atraumatic  Nose: Nose normal       Mouth/Throat:      Pharynx: No oropharyngeal exudate  Eyes:      General: No scleral icterus  Right eye: No discharge  Left eye: No discharge  Pupils: Pupils are equal, round, and reactive to light  Neck:      Trachea: No tracheal deviation  Cardiovascular:      Rate and Rhythm: Normal rate and regular rhythm  Pulses:           Dorsalis pedis pulses are 2+ on the right side and 2+ on the left side  Posterior tibial pulses are 2+ on the right side and 2+ on the left side  Heart sounds: Normal heart sounds  No murmur heard  No friction rub  No gallop  Pulmonary:      Effort: Pulmonary effort is normal  No respiratory distress  Breath sounds: Normal breath sounds  No wheezing or rales  Abdominal:      General: Bowel sounds are normal  There is no distension  Palpations: Abdomen is soft  Tenderness: There is no abdominal tenderness  There is no guarding or rebound  Musculoskeletal:         General: Normal range of motion  Cervical back: Normal range of motion and neck supple  Lymphadenopathy:      Head:      Right side of head: No submental or submandibular adenopathy  Left side of head: No submental or submandibular adenopathy  Cervical: No cervical adenopathy  Right cervical: No superficial, deep or posterior cervical adenopathy  Left cervical: No superficial, deep or posterior cervical adenopathy  Skin:     General: Skin is warm and dry  Findings: No erythema  Neurological:      Mental Status: She is alert and oriented to person, place, and time  Cranial Nerves: No cranial nerve deficit  Sensory: No sensory deficit  Psychiatric:         Mood and Affect: Mood is not anxious or depressed  Speech: Speech normal          Behavior: Behavior normal          Thought Content:  Thought content normal          Judgment: Judgment normal

## 2022-04-04 DIAGNOSIS — F41.9 ANXIETY DISORDER, UNSPECIFIED TYPE: ICD-10-CM

## 2022-04-04 RX ORDER — ESCITALOPRAM OXALATE 20 MG/1
TABLET ORAL
Qty: 30 TABLET | Refills: 5 | Status: SHIPPED | OUTPATIENT
Start: 2022-04-04

## 2022-04-12 DIAGNOSIS — F90.1 ATTENTION DEFICIT HYPERACTIVITY DISORDER (ADHD), PREDOMINANTLY HYPERACTIVE TYPE: ICD-10-CM

## 2022-04-26 ENCOUNTER — OFFICE VISIT (OUTPATIENT)
Dept: FAMILY MEDICINE CLINIC | Facility: CLINIC | Age: 62
End: 2022-04-26
Payer: COMMERCIAL

## 2022-04-26 VITALS
HEART RATE: 88 BPM | TEMPERATURE: 98.6 F | SYSTOLIC BLOOD PRESSURE: 110 MMHG | BODY MASS INDEX: 25.74 KG/M2 | WEIGHT: 164 LBS | DIASTOLIC BLOOD PRESSURE: 70 MMHG | RESPIRATION RATE: 19 BRPM | HEIGHT: 67 IN | OXYGEN SATURATION: 95 %

## 2022-04-26 DIAGNOSIS — H69.83 DYSFUNCTION OF BOTH EUSTACHIAN TUBES: ICD-10-CM

## 2022-04-26 DIAGNOSIS — R42 DIZZINESS: ICD-10-CM

## 2022-04-26 DIAGNOSIS — F90.1 ATTENTION DEFICIT HYPERACTIVITY DISORDER (ADHD), PREDOMINANTLY HYPERACTIVE TYPE: ICD-10-CM

## 2022-04-26 DIAGNOSIS — Z12.31 ENCOUNTER FOR SCREENING MAMMOGRAM FOR MALIGNANT NEOPLASM OF BREAST: Primary | ICD-10-CM

## 2022-04-26 PROCEDURE — 3074F SYST BP LT 130 MM HG: CPT | Performed by: FAMILY MEDICINE

## 2022-04-26 PROCEDURE — 1036F TOBACCO NON-USER: CPT | Performed by: FAMILY MEDICINE

## 2022-04-26 PROCEDURE — 3008F BODY MASS INDEX DOCD: CPT | Performed by: FAMILY MEDICINE

## 2022-04-26 PROCEDURE — 99214 OFFICE O/P EST MOD 30 MIN: CPT | Performed by: FAMILY MEDICINE

## 2022-04-26 PROCEDURE — 3078F DIAST BP <80 MM HG: CPT | Performed by: FAMILY MEDICINE

## 2022-04-26 RX ORDER — MECLIZINE HYDROCHLORIDE 25 MG/1
25 TABLET ORAL EVERY 8 HOURS PRN
Qty: 30 TABLET | Refills: 0 | Status: SHIPPED | OUTPATIENT
Start: 2022-04-26 | End: 2022-05-18

## 2022-04-26 RX ORDER — PREDNISONE 10 MG/1
TABLET ORAL
Qty: 21 TABLET | Refills: 0 | Status: SHIPPED | OUTPATIENT
Start: 2022-04-26 | End: 2022-05-18

## 2022-04-26 NOTE — PROGRESS NOTES
Assessment/Plan:   1  Dizziness/Dysfunction of both eustachian tubes    Reviewed patient's symptoms today  At this time, symptoms appear likely secondary to eustachian tube dysfunction  Continue with supportive care  Maintain hydration  She may take Mucinex  Will start treatment with a prednisone taper as well as meclizine p r n     Follow-up if any symptoms persist   - predniSONE 10 mg tablet; Take 6 tablets By mouth  In the morning Qd  Decrease by  By 1 tablet daily until completed  Dispense: 21 tablet; Refill: 0  - meclizine (ANTIVERT) 25 mg tablet; Take 1 tablet (25 mg total) by mouth every 8 (eight) hours as needed for dizziness  Dispense: 30 tablet; Refill: 0    2  ADHD  Vyvanse decreased to 30mg in AM at pts request   Tolerating tx well  PDMP review           Diagnoses and all orders for this visit:    Encounter for screening mammogram for malignant neoplasm of breast  -     Mammo screening bilateral w 3d & cad; Future    Attention deficit hyperactivity disorder (ADHD), predominantly hyperactive type  -     lisdexamfetamine (VYVANSE) 30 MG capsule; Take 1 capsule (30 mg total) by mouth every morning Max Daily Amount: 30 mg    Dizziness  -     predniSONE 10 mg tablet; Take 6 tablets By mouth  In the morning Qd  Decrease by  By 1 tablet daily until completed  -     meclizine (ANTIVERT) 25 mg tablet; Take 1 tablet (25 mg total) by mouth every 8 (eight) hours as needed for dizziness    Dysfunction of both eustachian tubes  -     predniSONE 10 mg tablet; Take 6 tablets By mouth  In the morning Qd  Decrease by  By 1 tablet daily until completed  -     meclizine (ANTIVERT) 25 mg tablet; Take 1 tablet (25 mg total) by mouth every 8 (eight) hours as needed for dizziness        Subjective:       Chief Complaint   Patient presents with    Dizziness     started a few weeks ago     Medication Management     discuss decrease Vyvanse dose       Patient ID: Mary Beth Moise is a 63 y o  female  Dizziness  This is a new problem  Episode onset: 1 month ago  The problem occurs intermittently  The problem has been unchanged  Associated symptoms include congestion and coughing  Pertinent negatives include no abdominal pain, arthralgias, chest pain, chills, fatigue, fever, headaches, myalgias, nausea, numbness or sore throat  Associated symptoms comments: Sinus pressure and ear pressure  The symptoms are aggravated by twisting, walking and bending  She has tried acetaminophen (delsum) for the symptoms  The treatment provided no relief  Review of Systems   Constitutional: Negative for activity change, chills, fatigue and fever  HENT: Positive for congestion  Negative for ear pain, sinus pressure and sore throat  Eyes: Negative for redness, itching and visual disturbance  Respiratory: Positive for cough  Negative for shortness of breath  Cardiovascular: Negative for chest pain and palpitations  Gastrointestinal: Negative for abdominal pain, diarrhea and nausea  Endocrine: Negative for cold intolerance and heat intolerance  Genitourinary: Negative for dysuria, flank pain and frequency  Musculoskeletal: Negative for arthralgias, back pain, gait problem and myalgias  Skin: Negative for color change  Allergic/Immunologic: Negative for environmental allergies  Neurological: Positive for dizziness  Negative for numbness and headaches  Psychiatric/Behavioral: Negative for behavioral problems and sleep disturbance  The following portions of the patient's history were reviewed and updated as appropriate : past family history, past medical history, past social history and past surgical history      Current Outpatient Medications:     albuterol (PROVENTIL HFA,VENTOLIN HFA) 90 mcg/act inhaler, Inhale 2 puffs every 6 (six) hours as needed for wheezing, Disp: 8 g, Rfl: 0    aspirin (ECOTRIN LOW STRENGTH) 81 mg EC tablet, Take 1 tablet by mouth daily, Disp: , Rfl:    atorvastatin (LIPITOR) 40 mg tablet, TAKE 1 TABLET DAILY, Disp: 90 tablet, Rfl: 3    Blood Glucose Monitoring Suppl (ONE TOUCH ULTRA MINI) w/Device KIT, by Does not apply route, Disp: , Rfl:     celecoxib (CeleBREX) 100 mg capsule, Take 1 capsule (100 mg total) by mouth 2 (two) times a day, Disp: 40 capsule, Rfl: 0    cetirizine (ZyrTEC) 10 mg tablet, Take by mouth Daily, Disp: , Rfl:     cholecalciferol (VITAMIN D3) 1,000 units tablet, Take by mouth, Disp: , Rfl:     cyclobenzaprine (FLEXERIL) 5 mg tablet, Take 1 tablet (5 mg total) by mouth daily at bedtime, Disp: 20 tablet, Rfl: 0    escitalopram (LEXAPRO) 20 mg tablet, TAKE 1 TABLET BY MOUTH EVERY DAY, Disp: 30 tablet, Rfl: 5    famotidine (PEPCID) 20 mg tablet, Take 1 tablet by mouth daily, Disp: , Rfl:     Flarex 0 1 % ophthalmic suspension,  , Disp: , Rfl:     glucose blood (ONE TOUCH ULTRA TEST) test strip, 1 each by Other route 2 (two) times a day, Disp: 100 each, Rfl: 0    lisdexamfetamine (VYVANSE) 20 MG capsule, Take 1 capsule (20 mg total) by mouth every evening With 40 mg taken in the a m   Max Daily Amount: 20 mg, Disp: 30 capsule, Rfl: 0    lisdexamfetamine (VYVANSE) 30 MG capsule, Take 1 capsule (30 mg total) by mouth every morning Max Daily Amount: 30 mg, Disp: 30 capsule, Rfl: 0    lisinopril (ZESTRIL) 5 mg tablet, TAKE ONE-HALF (1/2) TABLET DAILY, Disp: 45 tablet, Rfl: 3    metFORMIN (GLUCOPHAGE-XR) 500 mg 24 hr tablet, Take 1 tablet (500 mg total) by mouth daily with breakfast, Disp: 60 tablet, Rfl: 4    metoprolol succinate (TOPROL-XL) 25 mg 24 hr tablet, TAKE 1 TABLET DAILY, Disp: 90 tablet, Rfl: 3    Multiple Vitamins-Minerals (MULTIVITAMIN ADULTS 50+ PO), Take 1 tablet by mouth daily, Disp: , Rfl:     ONETOUCH DELICA LANCETS FINE MISC, by Does not apply route daily, Disp: 100 each, Rfl: 1    traMADol (ULTRAM) 50 mg tablet, Take 1 tablet (50 mg total) by mouth every 12 (twelve) hours as needed for moderate pain, Disp: 20 tablet, Rfl: 0    White Petrolatum-Mineral Oil (Retaine PM) OINT, Apply to eye, Disp: , Rfl:     meclizine (ANTIVERT) 25 mg tablet, Take 1 tablet (25 mg total) by mouth every 8 (eight) hours as needed for dizziness, Disp: 30 tablet, Rfl: 0    predniSONE 10 mg tablet, Take 6 tablets By mouth  In the morning Qd  Decrease by  By 1 tablet daily until completed  , Disp: 21 tablet, Rfl: 0    promethazine-codeine (PHENERGAN WITH CODEINE) 6 25-10 mg/5 mL syrup, Take 5 mL by mouth every 8 (eight) hours as needed for cough (Patient not taking: Reported on 4/26/2022 ), Disp: 120 mL, Rfl: 0         Objective:         Vitals:    04/26/22 1923   BP: 110/70   BP Location: Left arm   Patient Position: Sitting   Cuff Size: Adult   Pulse: 88   Resp: 19   Temp: 98 6 °F (37 °C)   TempSrc: Tympanic   SpO2: 95%   Weight: 74 4 kg (164 lb)   Height: 5' 7" (1 702 m)     Physical Exam  Vitals reviewed  Constitutional:       Appearance: She is well-developed  HENT:      Head: Normocephalic and atraumatic  Nose: Nose normal       Mouth/Throat:      Pharynx: No oropharyngeal exudate  Eyes:      General: No scleral icterus  Right eye: No discharge  Left eye: No discharge  Pupils: Pupils are equal, round, and reactive to light  Neck:      Trachea: No tracheal deviation  Cardiovascular:      Rate and Rhythm: Normal rate and regular rhythm  Pulses:           Dorsalis pedis pulses are 2+ on the right side and 2+ on the left side  Posterior tibial pulses are 2+ on the right side and 2+ on the left side  Heart sounds: Normal heart sounds  No murmur heard  No friction rub  No gallop  Pulmonary:      Effort: Pulmonary effort is normal  No respiratory distress  Breath sounds: Normal breath sounds  No wheezing or rales  Abdominal:      General: Bowel sounds are normal  There is no distension  Palpations: Abdomen is soft  Tenderness: There is no abdominal tenderness   There is no guarding or rebound  Musculoskeletal:         General: Normal range of motion  Cervical back: Normal range of motion and neck supple  Lymphadenopathy:      Head:      Right side of head: No submental or submandibular adenopathy  Left side of head: No submental or submandibular adenopathy  Cervical: No cervical adenopathy  Right cervical: No superficial, deep or posterior cervical adenopathy  Left cervical: No superficial, deep or posterior cervical adenopathy  Skin:     General: Skin is warm and dry  Findings: No erythema  Neurological:      Mental Status: She is alert and oriented to person, place, and time  Cranial Nerves: No cranial nerve deficit  Sensory: No sensory deficit  Psychiatric:         Mood and Affect: Mood is not anxious or depressed  Speech: Speech normal          Behavior: Behavior normal          Thought Content:  Thought content normal          Judgment: Judgment normal

## 2022-05-07 DIAGNOSIS — F90.1 ATTENTION DEFICIT HYPERACTIVITY DISORDER (ADHD), PREDOMINANTLY HYPERACTIVE TYPE: ICD-10-CM

## 2022-05-13 ENCOUNTER — OFFICE VISIT (OUTPATIENT)
Dept: URGENT CARE | Facility: CLINIC | Age: 62
End: 2022-05-13
Payer: COMMERCIAL

## 2022-05-13 VITALS — HEART RATE: 99 BPM | TEMPERATURE: 97.5 F | RESPIRATION RATE: 20 BRPM | OXYGEN SATURATION: 99 %

## 2022-05-13 DIAGNOSIS — R05.9 COUGH: Primary | ICD-10-CM

## 2022-05-13 DIAGNOSIS — J02.9 ACUTE PHARYNGITIS, UNSPECIFIED ETIOLOGY: ICD-10-CM

## 2022-05-13 DIAGNOSIS — Z20.822 EXPOSURE TO COVID-19 VIRUS: ICD-10-CM

## 2022-05-13 PROCEDURE — G0382 LEV 3 HOSP TYPE B ED VISIT: HCPCS | Performed by: PHYSICIAN ASSISTANT

## 2022-05-13 PROCEDURE — S9083 URGENT CARE CENTER GLOBAL: HCPCS | Performed by: PHYSICIAN ASSISTANT

## 2022-05-13 PROCEDURE — U0005 INFEC AGEN DETEC AMPLI PROBE: HCPCS | Performed by: PHYSICIAN ASSISTANT

## 2022-05-13 PROCEDURE — U0003 INFECTIOUS AGENT DETECTION BY NUCLEIC ACID (DNA OR RNA); SEVERE ACUTE RESPIRATORY SYNDROME CORONAVIRUS 2 (SARS-COV-2) (CORONAVIRUS DISEASE [COVID-19]), AMPLIFIED PROBE TECHNIQUE, MAKING USE OF HIGH THROUGHPUT TECHNOLOGIES AS DESCRIBED BY CMS-2020-01-R: HCPCS | Performed by: PHYSICIAN ASSISTANT

## 2022-05-13 RX ORDER — LIDOCAINE HYDROCHLORIDE 20 MG/ML
10 SOLUTION OROPHARYNGEAL 4 TIMES DAILY PRN
Qty: 120 ML | Refills: 0 | Status: SHIPPED | OUTPATIENT
Start: 2022-05-13 | End: 2022-05-18

## 2022-05-13 NOTE — PROGRESS NOTES
[facility]    NAME: Rashid Jimenez is a 63 y o  female  : 1960    MRN: [id number]  DATE: May 13, 2022  TIME: 2:44 PM    Assessment and Plan   Cough [R05 9]  1  Cough  COVID Only - Collected at [facility]   2  Exposure to COVID-19 virus  COVID Only - Collected at [facility]   3  Acute pharyngitis, unspecified etiology  Lidocaine Viscous HCl (XYLOCAINE) 2 % mucosal solution       Patient Instructions   Patient Instructions   Testing initiated for COVID  Recommend home quarantine while waiting for your results  Covid results may take up to approximately 24-48+  hours to return  (Please note this  time begins once specimen reaches the lab and not from the time of your visit )      There are a number of viral respiratory illnesses that can present similarly  Most are self-limiting  Antibiotics do not help viral illnesses  Utilizing [employer] is the easiest way to get your test results  (If patient does not already have an active account, there are directions on or towards front of clinical summary  to help with establishing personal [employer] account  IF PATIENT RESULTS ARE POSITIVE, please continue home quarantine and contact patient primary care provider as soon as possible to inform of results and to discuss need for any further evaluation / recommendations / treatment options  Return to work / school / regular activities per school / work protocols or patient's PCP's instructions or recommendations  If patient does not have a primary care provider, you may call the [address] [place] [address] for questions and possible arrangement of PCP evaluation  [phone]  Please note - if you have COVID, acute recovery may take up to 4 weeks  Prolonged recovery may take several months or longer based on your age, comorbidities, severity of illness and vaccine status          As with any respiratory illness, transmission precautions are strongly advised  Masking  Isolating  Hand washing  Frequent cleaning of common use surfaces  Symptomatic treatment  as needed  If sore throat:  Warm saltwater gargles every 1-2 hours while awake  Tylenol (or ibuprofen if allowed) as needed for any sore throat, fever, body aches and pains  If sinus pain / pressure:  Nasal saline spray may be helpful  Use every 2-3 hours while awake  Breathing in steamy air from shower and blowing nose to clear maybe helpful and can be done throughout day for comfort  Cold or warm compresses to head for comfort  Decongestant (if not contraindicated) may be helpful  Tylenol or Ibuprofen (if not contraindicated) may be helpful  Expectorant to keep mucous thin may also be helpful  PLEASE NOTE:  Yellow or green mucous does not necessarily mean a bacterial infection  Nasal drainage, congestion and / or cough typically peak around 7-10 days and then slowly resolve over next few weeks  (unless COVID, Influenza or RSV which can last longer)  You may use over the counter cough / cold medication as directed  This provider likes Mucinex D 12 hour formula - 1/2 to 1 tablet twice a day with full glass of fluid for daytime symptom relief (DO NOT TAKE IF YOU HAVE HIGH BLOOD PRESSURE  May take Coricidin HP and / or use plain Mucinex  If cough:  Cough drops, throat lozenges as needed  Vaporizer by the bedside  Warm tea with honey  May use nighttime cough medicine to help with sleep if needed -  Robitussin DM, Delsym or the like  Cough suppressants may cause drowsiness so recommend home use only  Please note that having a cough is not necessarily a bad thing  It's often your body's protective mechanism to help keep airways clear  If headache:  Tylenol (or Ibuprofen if allowed)  Cold compresses to head for comfort as needed  Rest   Fluids  If earache:  Tylenol (or Ibuprofen if allowed)    Warm compresses over ear(s) for comfort as needed  OTC nasal spray such as Flonase may be helpful  Rest   Fluids  If having fever or chills:  Tylenol (or Ibuprofen if allowed)  Recommend no work or outside activities  Rest   Fluids  (If you have a fever, it  typically lasts  approximately 3-5 days (unless influenza or COVID  Fever may last longer)  If diarrhea:  Clear liquids  You may want to avoid any milk products, fried - greasy foods, and / or spicy foods  If you are passing bloody diarrhea, seek further medical care  As a rule, we do not recommend using anti-diarrhea aids for acute diarrhea conditions  If significant worsening of your symptoms (ie  profound weakness, chest pain, shortness of breath, worst headache of your life, persistent vomiting), proceed to ER or call 911 for further evaluation  Follow up with your PCP if not improving over next 5-7 days  Retesting may be warranted if negative Covid test and recommended by your PCP  Chief Complaint     Chief Complaint   Patient presents with    Cough     Cough for 1 week   SOn and daughter in law had covid 10 days ago       History of Present Illness   Cora Hodge presents to the clinic c/o  53-year-old female comes in with persistent cough that started about a week ago  2 weeks ago her son and daughter-in-law tested positive for COVID  They live in her home  She has used her albuterol inhaler, Tylenol, Advil and has done some warm saltwater gargles for sore throat  She has done multiple home COVID test that have been negative  Sleep is interrupted and has been using Delsym which does give her some relief  No fevers but some sweating  Review of Systems   Review of Systems   Constitutional: Positive for activity change, diaphoresis and fatigue  Negative for appetite change, chills and fever  HENT: Positive for ear discharge, ear pain, postnasal drip, sore throat and voice change  Negative for congestion, rhinorrhea, sinus pressure and sinus pain  Increased wax left ear   Eyes: Negative  Respiratory: Positive for cough  Negative for apnea, choking, chest tightness, shortness of breath, wheezing and stridor  Cardiovascular: Negative  Current Medications     Long-Term Medications   Medication Sig Dispense Refill    aspirin (ECOTRIN LOW STRENGTH) 81 mg EC tablet Take 1 tablet by mouth daily      atorvastatin (LIPITOR) 40 mg tablet TAKE 1 TABLET DAILY 90 tablet 3    Blood Glucose Monitoring Suppl (ONE TOUCH ULTRA MINI) w/Device KIT by Does not apply route      celecoxib (CeleBREX) 100 mg capsule Take 1 capsule (100 mg total) by mouth 2 (two) times a day 40 capsule 0    cholecalciferol (VITAMIN D3) 1,000 units tablet Take by mouth      cyclobenzaprine (FLEXERIL) 5 mg tablet Take 1 tablet (5 mg total) by mouth daily at bedtime 20 tablet 0    escitalopram (LEXAPRO) 20 mg tablet TAKE 1 TABLET BY MOUTH EVERY DAY 30 tablet 5    famotidine (PEPCID) 20 mg tablet Take 1 tablet by mouth daily      Flarex 0 1 % ophthalmic suspension        lisdexamfetamine (VYVANSE) 20 MG capsule Take 1 capsule (20 mg total) by mouth every evening With 40 mg taken in the a m   Max Daily Amount: 20 mg 30 capsule 0    lisdexamfetamine (VYVANSE) 30 MG capsule Take 1 capsule (30 mg total) by mouth every morning Max Daily Amount: 30 mg 30 capsule 0    lisinopril (ZESTRIL) 5 mg tablet TAKE ONE-HALF (1/2) TABLET DAILY 45 tablet 3    meclizine (ANTIVERT) 25 mg tablet Take 1 tablet (25 mg total) by mouth every 8 (eight) hours as needed for dizziness 30 tablet 0    metFORMIN (GLUCOPHAGE-XR) 500 mg 24 hr tablet Take 1 tablet (500 mg total) by mouth daily with breakfast 60 tablet 4    metoprolol succinate (TOPROL-XL) 25 mg 24 hr tablet TAKE 1 TABLET DAILY 90 tablet 3    ONETOUCH DELICA LANCETS FINE MISC by Does not apply route daily 100 each 1    White Petrolatum-Mineral Oil (Retaine PM) OINT Apply to eye         Current Allergies     Allergies as of 05/13/2022 - Reviewed 05/13/2022   Allergen Reaction Noted    Levaquin [levofloxacin] Anaphylaxis 05/18/2012          The following portions of the patient's history were reviewed and updated as appropriate: allergies, current medications, past family history, past medical history, past social history, past surgical history and problem list   Past Medical History:   Diagnosis Date    Abnormal mammogram     RESOLVED: [id number]    Arthritis     Diabetes mellitus ([address] )     Eczema of right external ear     RESOLVED: [id number]    Foot drop, left foot     [facility]     Hematuria     RESOLVED: [id number]    Impetigo     RESOLVED: [id number]    Insomnia related to another mental disorder     AXIS I/II MENTAL DISORDER; RESOLVED: [id number]     Past Surgical History:   Procedure Laterality Date    HYSTERECTOMY  1998    still has 1 ovary not sure which one was removed    OTHER SURGICAL HISTORY      STEND INDICATIONS: RESTENOSIS AT PREVIOUS PTCA LOCATION     SPLENECTOMY      TOTAL HIP ARTHROPLASTY Left      Family History   Problem Relation Age of Onset    Stroke Mother     Osteoporosis Mother     Diabetes type II Father     Heart attack Father     Cancer Father     Heart disease Father     Hypertension Father     Heart disease Sister     Diabetes type II Brother     Heart attack Brother     Heart disease Brother     Heart attack Brother     Heart attack Brother     Heart attack Brother     Diabetes type II Brother     Alcohol abuse Neg Hx     Substance Abuse Neg Hx        Objective   Pulse 99   Temp 97 5 °F (36 4 °C) (Tympanic)   Resp 20   SpO2 99%   No LMP recorded  Patient is postmenopausal        Physical Exam     Physical Exam  Vitals and nursing note reviewed  Constitutional:       General: She is not in acute distress  Appearance: Normal appearance  She is well-developed  She is not ill-appearing, toxic-appearing or diaphoretic  HENT:      Head: Normocephalic and atraumatic        Right Ear: Tympanic membrane, ear canal and external ear normal       Left Ear: Ear canal and external ear normal       Ears:      Comments: Mild retraction left TM     Nose: Congestion present  No rhinorrhea  Mouth/Throat:      Mouth: Mucous membranes are moist       Pharynx: Posterior oropharyngeal erythema present  No oropharyngeal exudate  Comments: Mild cobblestoning and redness posterior pharynx  Mild swelling of uvula  Eyes:      General: No scleral icterus  Right eye: No discharge  Left eye: No discharge  Extraocular Movements: Extraocular movements intact  Conjunctiva/sclera: Conjunctivae normal       Pupils: Pupils are equal, round, and reactive to light  Cardiovascular:      Rate and Rhythm: Normal rate and regular rhythm  Heart sounds: Normal heart sounds  No murmur heard  No friction rub  No gallop  Pulmonary:      Effort: Pulmonary effort is normal  No respiratory distress  Breath sounds: Normal breath sounds  No stridor  No wheezing, rhonchi or rales  Musculoskeletal:      Cervical back: Normal range of motion and neck supple  No rigidity or tenderness  No muscular tenderness  Lymphadenopathy:      Cervical: No cervical adenopathy  Skin:     General: Skin is warm and dry  Findings: No rash  Neurological:      Mental Status: She is alert and oriented to person, place, and time     Psychiatric:         Mood and Affect: Mood normal          Behavior: Behavior normal

## 2022-05-13 NOTE — PATIENT INSTRUCTIONS
Testing initiated for COVID  Recommend home quarantine while waiting for your results  Covid results may take up to approximately 24-48+  hours to return  (Please note this  time begins once specimen reaches the lab and not from the time of your visit )      There are a number of viral respiratory illnesses that can present similarly  Most are self-limiting  Antibiotics do not help viral illnesses  Utilizing [employer] Chart is the easiest way to get your test results  (If patient does not already have an active account, there are directions on or towards front of clinical summary  to help with establishing personal [employer] Chart account  IF PATIENT RESULTS ARE POSITIVE, please continue home quarantine and contact patient primary care provider as soon as possible to inform of results and to discuss need for any further evaluation / recommendations / treatment options  Return to work / school / regular activities per school / work protocols or patient's PCP's instructions or recommendations  If patient does not have a primary care provider, you may call the [address] [place] [address] for questions and possible arrangement of PCP evaluation  [phone]  Please note - if you have COVID, acute recovery may take up to 4 weeks  Prolonged recovery may take several months or longer based on your age, comorbidities, severity of illness and vaccine status  As with any respiratory illness, transmission precautions are strongly advised  Masking  Isolating  Hand washing  Frequent cleaning of common use surfaces  Symptomatic treatment  as needed  If sore throat:  Warm saltwater gargles every 1-2 hours while awake  Tylenol (or ibuprofen if allowed) as needed for any sore throat, fever, body aches and pains  If sinus pain / pressure:  Nasal saline spray may be helpful  Use every 2-3 hours while awake    Breathing in steamy air from shower and blowing nose to clear maybe helpful and can be done throughout day for comfort  Cold or warm compresses to head for comfort  Decongestant (if not contraindicated) may be helpful  Tylenol or Ibuprofen (if not contraindicated) may be helpful  Expectorant to keep mucous thin may also be helpful  PLEASE NOTE:  Yellow or green mucous does not necessarily mean a bacterial infection  Nasal drainage, congestion and / or cough typically peak around 7-10 days and then slowly resolve over next few weeks  (unless COVID, Influenza or RSV which can last longer)  You may use over the counter cough / cold medication as directed  This provider likes Mucinex D 12 hour formula - 1/2 to 1 tablet twice a day with full glass of fluid for daytime symptom relief (DO NOT TAKE IF YOU HAVE HIGH BLOOD PRESSURE  May take Coricidin HP and / or use plain Mucinex  If cough:  Cough drops, throat lozenges as needed  Vaporizer by the bedside  Warm tea with honey  May use nighttime cough medicine to help with sleep if needed -  Robitussin DM, Delsym or the like  Cough suppressants may cause drowsiness so recommend home use only  Please note that having a cough is not necessarily a bad thing  It's often your body's protective mechanism to help keep airways clear  If headache:  Tylenol (or Ibuprofen if allowed)  Cold compresses to head for comfort as needed  Rest   Fluids  If earache:  Tylenol (or Ibuprofen if allowed)  Warm compresses over ear(s) for comfort as needed  OTC nasal spray such as Flonase may be helpful  Rest   Fluids  If having fever or chills:  Tylenol (or Ibuprofen if allowed)  Recommend no work or outside activities  Rest   Fluids  (If you have a fever, it  typically lasts  approximately 3-5 days (unless influenza or COVID  Fever may last longer)  If diarrhea:  Clear liquids  You may want to avoid any milk products, fried - greasy foods, and / or spicy foods    If you are passing bloody diarrhea, seek further medical care  As a rule, we do not recommend using anti-diarrhea aids for acute diarrhea conditions  If significant worsening of your symptoms (ie  profound weakness, chest pain, shortness of breath, worst headache of your life, persistent vomiting), proceed to ER or call 911 for further evaluation  Follow up with your PCP if not improving over next 5-7 days  Retesting may be warranted if negative Covid test and recommended by your PCP

## 2022-05-14 LAB — SARS-COV-2 RNA RESP QL NAA+PROBE: NEGATIVE

## 2022-05-18 ENCOUNTER — TELEPHONE (OUTPATIENT)
Dept: FAMILY MEDICINE CLINIC | Facility: CLINIC | Age: 62
End: 2022-05-18

## 2022-05-18 ENCOUNTER — OFFICE VISIT (OUTPATIENT)
Dept: FAMILY MEDICINE CLINIC | Facility: CLINIC | Age: 62
End: 2022-05-18
Payer: COMMERCIAL

## 2022-05-18 VITALS
WEIGHT: 167 LBS | SYSTOLIC BLOOD PRESSURE: 146 MMHG | OXYGEN SATURATION: 97 % | HEIGHT: 66 IN | TEMPERATURE: 97.5 F | DIASTOLIC BLOOD PRESSURE: 80 MMHG | BODY MASS INDEX: 26.84 KG/M2 | HEART RATE: 103 BPM

## 2022-05-18 DIAGNOSIS — J20.9 ACUTE BRONCHITIS, UNSPECIFIED ORGANISM: ICD-10-CM

## 2022-05-18 DIAGNOSIS — J22 LOWER RESPIRATORY INFECTION (E.G., BRONCHITIS, PNEUMONIA, PNEUMONITIS, PULMONITIS): Primary | ICD-10-CM

## 2022-05-18 DIAGNOSIS — R06.2 WHEEZING: ICD-10-CM

## 2022-05-18 DIAGNOSIS — E11.9 TYPE 2 DIABETES MELLITUS WITHOUT COMPLICATION, WITHOUT LONG-TERM CURRENT USE OF INSULIN (HCC): ICD-10-CM

## 2022-05-18 PROBLEM — J40 BRONCHITIS: Status: ACTIVE | Noted: 2022-05-18

## 2022-05-18 PROCEDURE — 99214 OFFICE O/P EST MOD 30 MIN: CPT | Performed by: FAMILY MEDICINE

## 2022-05-18 PROCEDURE — 3077F SYST BP >= 140 MM HG: CPT | Performed by: FAMILY MEDICINE

## 2022-05-18 PROCEDURE — 3079F DIAST BP 80-89 MM HG: CPT | Performed by: FAMILY MEDICINE

## 2022-05-18 PROCEDURE — 3008F BODY MASS INDEX DOCD: CPT | Performed by: FAMILY MEDICINE

## 2022-05-18 RX ORDER — PREDNISONE 10 MG/1
TABLET ORAL
Qty: 21 TABLET | Refills: 0 | Status: SHIPPED | OUTPATIENT
Start: 2022-05-18 | End: 2022-07-05 | Stop reason: ALTCHOICE

## 2022-05-18 RX ORDER — DEXAMETHASONE 4 MG/1
2 TABLET ORAL 2 TIMES DAILY
Qty: 12 G | Refills: 0 | Status: SHIPPED | OUTPATIENT
Start: 2022-05-18 | End: 2022-06-15

## 2022-05-18 RX ORDER — AZITHROMYCIN 250 MG/1
TABLET, FILM COATED ORAL
Qty: 6 TABLET | Refills: 0 | Status: SHIPPED | OUTPATIENT
Start: 2022-05-18 | End: 2022-05-23

## 2022-05-18 RX ORDER — PREDNISONE 10 MG/1
TABLET ORAL
Qty: 30 TABLET | Refills: 0 | Status: SHIPPED | OUTPATIENT
Start: 2022-05-18 | End: 2022-05-18

## 2022-05-18 RX ORDER — PROMETHAZINE HYDROCHLORIDE AND CODEINE PHOSPHATE 6.25; 1 MG/5ML; MG/5ML
5 SYRUP ORAL EVERY 8 HOURS PRN
Qty: 120 ML | Refills: 0 | Status: SHIPPED | OUTPATIENT
Start: 2022-05-18 | End: 2022-07-05 | Stop reason: ALTCHOICE

## 2022-05-18 NOTE — PROGRESS NOTES
Diabetic Foot Exam    Patient's shoes and socks removed  Right Foot/Ankle   Right Foot Inspection  Skin Exam: skin normal and skin intact  No dry skin, no warmth, no callus, no erythema, no maceration, no abnormal color, no pre-ulcer, no ulcer and no callus  Sensory   Monofilament testing: intact    Vascular  The right DP pulse is 2+  Left Foot/Ankle  Left Foot Inspection  Skin Exam: skin normal and skin intact  No dry skin, no warmth, no erythema, no maceration, normal color, no pre-ulcer, no ulcer and no callus  Sensory   Monofilament testing: absent    Vascular  The left DP pulse is 2+       Assign Risk Category  No deformity present  Loss of protective sensation  No weak pulses  Risk: 1

## 2022-05-18 NOTE — PATIENT INSTRUCTIONS
Complete course of steroids and antibiotics  Monitor your blood sugars and call if become elevated on steroids >300  Call if no improvement in 1 week

## 2022-05-18 NOTE — PROGRESS NOTES
Assessment/Plan:     1  Lower respiratory infection (e g , bronchitis, pneumonia, pneumonitis, pulmonitis)  Assessment & Plan:  Advised with abx and steroids given smoking hx; add Flovent; advised close monitoring of BS given DM hx; f/u guidance given should sx worsen or not improve    Orders:  -     azithromycin (Zithromax) 250 mg tablet; Take 2 tablets (500 mg total) by mouth daily for 1 day, THEN 1 tablet (250 mg total) daily for 4 days  -     fluticasone (Flovent HFA) 110 MCG/ACT inhaler; Inhale 2 puffs  in the morning and 2 puffs in the evening  Rinse mouth after use       2  Wheezing  -     predniSONE 10 mg tablet; Take 6 tablets By mouth  In the morning Qd  Decrease by  By 1 tablet daily until completed  3  Type 2 diabetes mellitus without complication, without long-term current use of insulin ([facility] )  Assessment & Plan:  Advised to closely monitor BS with steroid use; f/u guidance given  Lab Results   Component Value Date    HGBA1C 7 4 (H) 12/06/2021             Subjective:      Patient ID: Nikole Foley is a 63 y o  female  Subjective    Nikole Foley is a 63 y o  female who presents for evaluation of symptoms of a URI, possible sinusitis  Symptoms include congestion, cough described as productive, shortness of breath, sore throat and wheezing  Onset of symptoms was 1 week ago and has been gradually worsening since that time  Treatment to date: cough suppressants and mouthwash  Was seen in urgent care  Lab Results       Component                Value               Date                       HGBA1C                   7 4 (H)             12/06/2021                  The following portions of the patient's history were reviewed and updated as appropriate: allergies, current medications, past family history, past medical history, past social history, past surgical history, and problem list     Review of Systems   Constitutional: Negative for chills and fever     HENT: Positive for congestion, postnasal drip, rhinorrhea and sore throat  Respiratory: Positive for cough, chest tightness and wheezing  Objective:      /80 (BP Location: Left arm, Patient Position: Sitting, Cuff Size: Standard)   Pulse 103   Temp 97 5 °F (36 4 °C) (Temporal)   Ht 5' 5 5" (1 664 m)   Wt 75 8 kg (167 lb)   SpO2 97%   BMI 27 37 kg/m²          Physical Exam  Vitals reviewed  Constitutional:       General: She is not in acute distress  Appearance: Normal appearance  She is not ill-appearing, toxic-appearing or diaphoretic  HENT:      Head: Normocephalic and atraumatic  Right Ear: Tympanic membrane, ear canal and external ear normal       Left Ear: Tympanic membrane, ear canal and external ear normal       Nose: Congestion present  Mouth/Throat:      Pharynx: No posterior oropharyngeal erythema  Eyes:      General: No scleral icterus  Right eye: No discharge  Left eye: No discharge  Conjunctiva/sclera: Conjunctivae normal    Cardiovascular:      Rate and Rhythm: Normal rate and regular rhythm  Pulses: Normal pulses  Heart sounds: Normal heart sounds  No murmur heard  No gallop  Pulmonary:      Effort: Pulmonary effort is normal  No respiratory distress  Breath sounds: No stridor  Examination of the right-middle field reveals wheezing  Examination of the left-middle field reveals wheezing  Examination of the right-lower field reveals wheezing  Examination of the left-lower field reveals wheezing  Wheezing present  No rhonchi or rales  Musculoskeletal:      Right lower leg: No edema  Left lower leg: No edema  Neurological:      General: No focal deficit present  Mental Status: She is alert and oriented to person, place, and time  Psychiatric:         Mood and Affect: Mood normal          Behavior: Behavior normal          Thought Content:  Thought content normal          Judgment: Judgment normal

## 2022-05-18 NOTE — ASSESSMENT & PLAN NOTE
Advised with abx and steroids given smoking hx; add Flovent; advised close monitoring of BS given DM hx; f/u guidance given should sx worsen or not improve

## 2022-05-18 NOTE — TELEPHONE ENCOUNTER
Pt called asking if a cough medicine is being ordered for her from her visit today      Please advise [phone]

## 2022-05-18 NOTE — ASSESSMENT & PLAN NOTE
Advised to closely monitor BS with steroid use; f/u guidance given  Lab Results   Component Value Date    HGBA1C 7 4 (H) 12/06/2021

## 2022-05-21 DIAGNOSIS — F90.1 ATTENTION DEFICIT HYPERACTIVITY DISORDER (ADHD), PREDOMINANTLY HYPERACTIVE TYPE: ICD-10-CM

## 2022-05-31 DIAGNOSIS — E11.9 TYPE 2 DIABETES MELLITUS WITHOUT COMPLICATION, WITHOUT LONG-TERM CURRENT USE OF INSULIN (HCC): ICD-10-CM

## 2022-06-01 RX ORDER — METFORMIN HYDROCHLORIDE 500 MG/1
500 TABLET, EXTENDED RELEASE ORAL
Qty: 60 TABLET | Refills: 0 | Status: SHIPPED | OUTPATIENT
Start: 2022-06-01 | End: 2022-06-24

## 2022-06-06 DIAGNOSIS — F90.1 ATTENTION DEFICIT HYPERACTIVITY DISORDER (ADHD), PREDOMINANTLY HYPERACTIVE TYPE: ICD-10-CM

## 2022-06-15 DIAGNOSIS — J22 LOWER RESPIRATORY INFECTION (E.G., BRONCHITIS, PNEUMONIA, PNEUMONITIS, PULMONITIS): ICD-10-CM

## 2022-06-15 RX ORDER — DEXAMETHASONE 4 MG/1
2 TABLET ORAL 2 TIMES DAILY
Qty: 12 G | Refills: 1 | Status: SHIPPED | OUTPATIENT
Start: 2022-06-15

## 2022-06-20 ENCOUNTER — RA CDI HCC (OUTPATIENT)
Dept: OTHER | Facility: HOSPITAL | Age: 62
End: 2022-06-20

## 2022-06-20 NOTE — PROGRESS NOTES
[facility]  coding opportunities          Chart Reviewed number of suggestions sent to Provider: 1   [id number]    Patients Insurance        Commercial Insurance: [address] [place] [address]

## 2022-06-22 ENCOUNTER — TELEPHONE (OUTPATIENT)
Dept: FAMILY MEDICINE CLINIC | Facility: CLINIC | Age: 62
End: 2022-06-22

## 2022-06-22 DIAGNOSIS — R94.6 ABNORMAL THYROID FUNCTION TEST: ICD-10-CM

## 2022-06-22 DIAGNOSIS — F41.9 ANXIETY DISORDER, UNSPECIFIED TYPE: ICD-10-CM

## 2022-06-22 DIAGNOSIS — I10 ESSENTIAL HYPERTENSION: ICD-10-CM

## 2022-06-22 DIAGNOSIS — E11.9 TYPE 2 DIABETES MELLITUS WITHOUT COMPLICATION, WITHOUT LONG-TERM CURRENT USE OF INSULIN (HCC): ICD-10-CM

## 2022-06-22 DIAGNOSIS — I25.10 ATHEROSCLEROSIS OF CORONARY ARTERY OF NATIVE HEART WITHOUT ANGINA PECTORIS, UNSPECIFIED VESSEL OR LESION TYPE: ICD-10-CM

## 2022-06-22 DIAGNOSIS — K31.84 GASTROPARESIS: Primary | ICD-10-CM

## 2022-06-22 DIAGNOSIS — I10 BENIGN ESSENTIAL HYPERTENSION: ICD-10-CM

## 2022-06-22 DIAGNOSIS — F90.1 ATTENTION DEFICIT HYPERACTIVITY DISORDER (ADHD), PREDOMINANTLY HYPERACTIVE TYPE: ICD-10-CM

## 2022-06-22 DIAGNOSIS — Z13.0 SCREENING FOR IRON DEFICIENCY ANEMIA: ICD-10-CM

## 2022-06-22 DIAGNOSIS — E78.2 MIXED HYPERLIPIDEMIA: ICD-10-CM

## 2022-06-22 NOTE — TELEPHONE ENCOUNTER
Pt has appt with IA on 6/27, she is asking for [employer] to be put in system and faxed to [employer] in [place]-fax #[phone]

## 2022-06-24 DIAGNOSIS — E11.9 TYPE 2 DIABETES MELLITUS WITHOUT COMPLICATION, WITHOUT LONG-TERM CURRENT USE OF INSULIN (HCC): ICD-10-CM

## 2022-06-24 RX ORDER — METFORMIN HYDROCHLORIDE 500 MG/1
TABLET, EXTENDED RELEASE ORAL
Qty: 90 TABLET | Refills: 1 | Status: SHIPPED | OUTPATIENT
Start: 2022-06-24 | End: 2022-07-26 | Stop reason: SDUPTHER

## 2022-07-05 ENCOUNTER — OFFICE VISIT (OUTPATIENT)
Dept: FAMILY MEDICINE CLINIC | Facility: CLINIC | Age: 62
End: 2022-07-05
Payer: COMMERCIAL

## 2022-07-05 VITALS
WEIGHT: 168 LBS | HEIGHT: 65 IN | HEART RATE: 97 BPM | TEMPERATURE: 98.6 F | SYSTOLIC BLOOD PRESSURE: 140 MMHG | RESPIRATION RATE: 19 BRPM | OXYGEN SATURATION: 96 % | DIASTOLIC BLOOD PRESSURE: 86 MMHG | BODY MASS INDEX: 27.99 KG/M2

## 2022-07-05 DIAGNOSIS — H60.501 ACUTE OTITIS EXTERNA OF RIGHT EAR, UNSPECIFIED TYPE: Primary | ICD-10-CM

## 2022-07-05 DIAGNOSIS — H61.21 IMPACTED CERUMEN OF RIGHT EAR: ICD-10-CM

## 2022-07-05 DIAGNOSIS — H93.8X1 EAR FULLNESS, RIGHT: ICD-10-CM

## 2022-07-05 DIAGNOSIS — R05.9 COUGH: ICD-10-CM

## 2022-07-05 PROCEDURE — 3079F DIAST BP 80-89 MM HG: CPT | Performed by: NURSE PRACTITIONER

## 2022-07-05 PROCEDURE — 69210 REMOVE IMPACTED EAR WAX UNI: CPT | Performed by: NURSE PRACTITIONER

## 2022-07-05 PROCEDURE — 3077F SYST BP >= 140 MM HG: CPT | Performed by: NURSE PRACTITIONER

## 2022-07-05 PROCEDURE — 99213 OFFICE O/P EST LOW 20 MIN: CPT | Performed by: NURSE PRACTITIONER

## 2022-07-05 RX ORDER — DEXTROMETHORPHAN HYDROBROMIDE AND PROMETHAZINE HYDROCHLORIDE 15; 6.25 MG/5ML; MG/5ML
5 SOLUTION ORAL 4 TIMES DAILY PRN
Qty: 118 ML | Refills: 0 | Status: SHIPPED | OUTPATIENT
Start: 2022-07-05

## 2022-07-05 NOTE — PROGRESS NOTES
[facility]    ASSESSMENT AND PLAN     1  Acute otitis externa of right ear, unspecified type  Comments:  Keep ear clean and dry  Treat today with Cortisporin  Dose,use reviewed  F/U with problems/concerns  Orders:  -     neomycin-polymyxin-hydrocortisone (CORTISPORIN) otic solution; Administer 4 drops to the right ear every 8 (eight) hours    2  Ear fullness, right    3  Impacted cerumen of right ear    4  Cough  Comments:  Slowly resolving  Pt requested rx for cough medicine  Phenergan DM sent  F/U with further problems  Orders:  -     Promethazine-DM (PHENERGAN-DM) 6 25-15 mg/5 mL oral syrup; Take 5 mL by mouth 4 (four) times a day as needed for cough           SUBJECTIVE       Patient ID: Bibi Scott is a 63 y o  female  Chief Complaint   Patient presents with    Ear Fullness     R ear feels clogged     Cough       HISTORY OF PRESENT ILLNESS    Started with cold likesymptoms a few days ago  Taking Mucinex/Delsym  Sinus fullness, cough, ST  Started with ear fullness yesterday after swimming  Placed Debrox last night  Now feels clogged        The following portions of the patient's history were reviewed and updated as appropriate: allergies, current medications, past family history, past medical history, past social history, past surgical history, and problem list     REVIEW OF SYSTEMS  Review of Systems   Constitutional: Negative for activity change, appetite change and fever  HENT: Positive for ear pain and rhinorrhea  Negative for ear discharge  Respiratory: Positive for cough  Negative for shortness of breath and wheezing  Cardiovascular: Negative  Psychiatric/Behavioral: Negative          OBJECTIVE      VITAL SIGNS  /86   Pulse 97   Temp 98 6 °F (37 °C) (Tympanic)   Resp 19   Ht 5' 5" (1 651 m)   Wt 76 2 kg (168 lb)   SpO2 96%   BMI 27 96 kg/m²     CURRENT MEDICATIONS    Current Outpatient Medications:     albuterol (PROVENTIL HFA,VENTOLIN HFA) 90 mcg/act inhaler, Inhale 2 puffs every 6 (six) hours as needed for wheezing, Disp: 8 g, Rfl: 0    aspirin (ECOTRIN LOW STRENGTH) 81 mg EC tablet, Take 1 tablet by mouth daily, Disp: , Rfl:     atorvastatin (LIPITOR) 40 mg tablet, TAKE 1 TABLET DAILY, Disp: 90 tablet, Rfl: 3    Blood Glucose Monitoring Suppl (ONE TOUCH ULTRA MINI) w/Device KIT, by Does not apply route, Disp: , Rfl:     cetirizine (ZyrTEC) 10 mg tablet, Take by mouth Daily, Disp: , Rfl:     cholecalciferol (VITAMIN D3) 1,000 units tablet, Take by mouth, Disp: , Rfl:     escitalopram (LEXAPRO) 20 mg tablet, TAKE 1 TABLET BY MOUTH EVERY DAY, Disp: 30 tablet, Rfl: 5    famotidine (PEPCID) 20 mg tablet, Take 1 tablet by mouth daily, Disp: , Rfl:     Flovent  MCG/ACT inhaler, INHALE 2 PUFFS IN THE MORNING AND 2 PUFFS IN THE EVENING  RINSE MOUTH AFTER USE   , Disp: 12 g, Rfl: 1    glucose blood (ONE TOUCH ULTRA TEST) test strip, 1 each by Other route 2 (two) times a day, Disp: 100 each, Rfl: 0    lisdexamfetamine (VYVANSE) 20 MG capsule, Take 1 capsule (20 mg total) by mouth every evening With 40 mg taken in the a m   Max Daily Amount: 20 mg, Disp: 30 capsule, Rfl: 0    lisdexamfetamine (VYVANSE) 30 MG capsule, Take 1 capsule (30 mg total) by mouth every morning Max Daily Amount: 30 mg, Disp: 30 capsule, Rfl: 0    lisinopril (ZESTRIL) 5 mg tablet, TAKE ONE-HALF (1/2) TABLET DAILY, Disp: 45 tablet, Rfl: 3    metFORMIN (GLUCOPHAGE-XR) 500 mg 24 hr tablet, TAKE 1 TABLET BY MOUTH EVERY DAY WITH BREAKFAST, Disp: 90 tablet, Rfl: 1    metoprolol succinate (TOPROL-XL) 25 mg 24 hr tablet, TAKE 1 TABLET DAILY, Disp: 90 tablet, Rfl: 3    Multiple Vitamins-Minerals (MULTIVITAMIN ADULTS 50+ PO), Take 1 tablet by mouth daily, Disp: , Rfl:     neomycin-polymyxin-hydrocortisone (CORTISPORIN) otic solution, Administer 4 drops to the right ear every 8 (eight) hours, Disp: 10 mL, Rfl: 0    ONETOUCH DELICA LANCETS FINE MISC, by Does not apply route daily, Disp: 100 each, Rfl: 1    Promethazine-DM (PHENERGAN-DM) 6 25-15 mg/5 mL oral syrup, Take 5 mL by mouth 4 (four) times a day as needed for cough, Disp: 118 mL, Rfl: 0    White Petrolatum-Mineral Oil (Retaine PM) OINT, Apply to eye, Disp: , Rfl:       PHYSICAL EXAMINATION   Physical Exam  Vitals and nursing note reviewed  Constitutional:       Appearance: Normal appearance  She is well-developed and well-groomed  HENT:      Head: Normocephalic  Right Ear: External ear normal  Tenderness present  There is impacted cerumen  Left Ear: Tympanic membrane, ear canal and external ear normal       Nose: Nose normal       Mouth/Throat:      Mouth: Mucous membranes are moist       Pharynx: Oropharynx is clear  Cardiovascular:      Rate and Rhythm: Normal rate and regular rhythm  Pulmonary:      Effort: Pulmonary effort is normal  No respiratory distress  Breath sounds: Normal breath sounds and air entry  Lymphadenopathy:      Cervical: No cervical adenopathy  Skin:     General: Skin is warm and dry  Neurological:      General: No focal deficit present  Mental Status: She is alert and oriented to person, place, and time  Psychiatric:         Attention and Perception: Attention normal          Mood and Affect: Mood normal          Speech: Speech normal          Behavior: Behavior normal          Thought Content: Thought content normal          Cognition and Memory: Cognition normal          Judgment: Judgment normal            Ear cerumen removal    Date/Time: 7/5/2022 2:30 PM  Performed by: HOA Otto  Authorized by:  HOA Otto   Universal Protocol:  Consent given by: patient  Patient understanding: patient states understanding of the procedure being performed      Procedure details:     Location:  R ear    Procedure type: irrigation with instrumentation      Instrumentation: curette and forceps      Approach:  External  Post-procedure details:     Hearing quality:  Improved Patient tolerance of procedure:   Tolerated well, no immediate complications  Comments:      Moderate amount cerumen removed   Canal red/inflamed  Drops provided  Follow-up with concerns

## 2022-07-07 ENCOUNTER — VBI (OUTPATIENT)
Dept: ADMINISTRATIVE | Facility: OTHER | Age: 62
End: 2022-07-07

## 2022-07-18 DIAGNOSIS — F90.1 ATTENTION DEFICIT HYPERACTIVITY DISORDER (ADHD), PREDOMINANTLY HYPERACTIVE TYPE: ICD-10-CM

## 2022-07-25 ENCOUNTER — HOSPITAL ENCOUNTER (OUTPATIENT)
Dept: MAMMOGRAPHY | Facility: MEDICAL CENTER | Age: 62
Discharge: HOME | End: 2022-07-25
Payer: COMMERCIAL

## 2022-07-25 VITALS — BODY MASS INDEX: 27.99 KG/M2 | WEIGHT: 167.99 LBS | HEIGHT: 65 IN

## 2022-07-25 DIAGNOSIS — Z12.31 ENCOUNTER FOR SCREENING MAMMOGRAM FOR MALIGNANT NEOPLASM OF BREAST: ICD-10-CM

## 2022-07-25 PROCEDURE — 77067 SCR MAMMO BI INCL CAD: CPT

## 2022-07-25 PROCEDURE — 77063 BREAST TOMOSYNTHESIS BI: CPT

## 2022-07-26 ENCOUNTER — OFFICE VISIT (OUTPATIENT)
Dept: FAMILY MEDICINE CLINIC | Facility: CLINIC | Age: 62
End: 2022-07-26
Payer: COMMERCIAL

## 2022-07-26 VITALS
DIASTOLIC BLOOD PRESSURE: 84 MMHG | RESPIRATION RATE: 18 BRPM | HEIGHT: 65 IN | BODY MASS INDEX: 27.26 KG/M2 | TEMPERATURE: 97.1 F | OXYGEN SATURATION: 96 % | HEART RATE: 94 BPM | WEIGHT: 163.6 LBS | SYSTOLIC BLOOD PRESSURE: 120 MMHG

## 2022-07-26 DIAGNOSIS — E11.9 TYPE 2 DIABETES MELLITUS WITHOUT COMPLICATION, WITHOUT LONG-TERM CURRENT USE OF INSULIN (HCC): Primary | ICD-10-CM

## 2022-07-26 DIAGNOSIS — F90.1 ATTENTION DEFICIT HYPERACTIVITY DISORDER (ADHD), PREDOMINANTLY HYPERACTIVE TYPE: ICD-10-CM

## 2022-07-26 DIAGNOSIS — E78.2 MIXED HYPERLIPIDEMIA: ICD-10-CM

## 2022-07-26 DIAGNOSIS — I10 BENIGN ESSENTIAL HYPERTENSION: ICD-10-CM

## 2022-07-26 DIAGNOSIS — I25.10 ATHEROSCLEROSIS OF CORONARY ARTERY OF NATIVE HEART WITHOUT ANGINA PECTORIS, UNSPECIFIED VESSEL OR LESION TYPE: ICD-10-CM

## 2022-07-26 DIAGNOSIS — F41.9 ANXIETY DISORDER, UNSPECIFIED TYPE: ICD-10-CM

## 2022-07-26 DIAGNOSIS — J01.00 ACUTE MAXILLARY SINUSITIS, RECURRENCE NOT SPECIFIED: ICD-10-CM

## 2022-07-26 PROCEDURE — 99215 OFFICE O/P EST HI 40 MIN: CPT | Performed by: FAMILY MEDICINE

## 2022-07-26 RX ORDER — DOXYCYCLINE 100 MG/1
100 TABLET ORAL 2 TIMES DAILY
Qty: 14 TABLET | Refills: 0 | Status: SHIPPED | OUTPATIENT
Start: 2022-07-26 | End: 2022-08-02

## 2022-07-26 RX ORDER — METFORMIN HYDROCHLORIDE 500 MG/1
500 TABLET, EXTENDED RELEASE ORAL 2 TIMES DAILY WITH MEALS
Qty: 90 TABLET | Refills: 1
Start: 2022-07-26 | End: 2022-08-17 | Stop reason: SDUPTHER

## 2022-07-26 NOTE — PROGRESS NOTES
Assessment/Plan:   1  Acute maxillary sinusitis, recurrence not specified  Symptoms appear secondary to acute sinusitis  Start supportive care  Maintain hydration  Take over-the-counter Mucinex  Will start treatment with doxycycline  Follow-up if any symptoms persist   - doxycycline (ADOXA) 100 MG tablet; Take 1 tablet (100 mg total) by mouth 2 (two) times a day for 7 days  Dispense: 14 tablet; Refill: 0    2  Type 2 diabetes mellitus without complication, without long-term current use of insulin (HCC)  Unclear of recent control  At this time, she was advised on importance of completing her fasting blood work  Continue with current dose of metformin  If her A1c is persistently elevated, consider increasing the dose further  She is up-to-date with her foot exam   She she would highly benefit from seeing Ophthalmology  - metFORMIN (GLUCOPHAGE-XR) 500 mg 24 hr tablet; Take 1 tablet (500 mg total) by mouth 2 (two) times a day with meals  Dispense: 90 tablet; Refill: 1    3  Benign essential hypertension  Stable  At this time, continue with routine home monitoring as well as her current treatment with lisinopril, metoprolol  4  Mixed hyperlipidemia  Recheck lipid panel  Continue with a strict low-fat/low-cholesterol as well as her current treatment with atorvastatin  5  Atherosclerosis of coronary artery of native heart without angina pectoris, unspecified vessel or lesion type  Patient denies any chest pain or palpitations  At this time, continue with her current treatment with aspirin, atorvastatin, metoprolol as well as her lisinopril  6  Attention deficit hyperactivity disorder (ADHD), predominantly hyperactive type/generalized anxiety disorder  Patient appears clinically stable  She has been to with her current dose of Vyvanse 30 mg in the morning and 20 mg at bedtime  Will continue with his current dosing as well as her current dose of Lexapro  PDMP reviewed  No abnormality seen today  Follow up with patient in 6 months  Diagnoses and all orders for this visit:    Type 2 diabetes mellitus without complication, without long-term current use of insulin (HCC)    Benign essential hypertension    Attention deficit hyperactivity disorder (ADHD), predominantly hyperactive type    Atherosclerosis of coronary artery of native heart without angina pectoris, unspecified vessel or lesion type    Mixed hyperlipidemia          Subjective:       Chief Complaint   Patient presents with    Follow-up     6 month       Patient ID: Cora Pan is a 63 y o  female presents today for follow-up on her chronic conditions  She has type 2 diabetes, hypertension, dyslipidemia, CAD, ADHD  She has been taking medications regularly  She denies adverse reactions with medications  She does have complaints today of increasing sinus pressure and congestion  She also has a persistent cough  She has adverse the past 3 weeks  Symptoms started gradually  She denies any fevers or chills  She states that she did conduct a COVID test which was negative  HPI    Review of Systems   Constitutional: Negative for activity change, chills, fatigue and fever  HENT: Negative for congestion, ear pain, sinus pressure and sore throat  Eyes: Negative for redness, itching and visual disturbance  Respiratory: Negative for cough and shortness of breath  Cardiovascular: Negative for chest pain and palpitations  Gastrointestinal: Negative for abdominal pain, diarrhea and nausea  Endocrine: Negative for cold intolerance and heat intolerance  Genitourinary: Negative for dysuria, flank pain and frequency  Musculoskeletal: Negative for arthralgias, back pain, gait problem and myalgias  Skin: Negative for color change  Allergic/Immunologic: Negative for environmental allergies  Neurological: Negative for dizziness, numbness and headaches     Psychiatric/Behavioral: Negative for behavioral problems and sleep disturbance  The following portions of the patient's history were reviewed and updated as appropriate : past family history, past medical history, past social history and past surgical history  Current Outpatient Medications:     albuterol (PROVENTIL HFA,VENTOLIN HFA) 90 mcg/act inhaler, Inhale 2 puffs every 6 (six) hours as needed for wheezing, Disp: 8 g, Rfl: 0    aspirin (ECOTRIN LOW STRENGTH) 81 mg EC tablet, Take 1 tablet by mouth daily, Disp: , Rfl:     atorvastatin (LIPITOR) 40 mg tablet, TAKE 1 TABLET DAILY, Disp: 90 tablet, Rfl: 3    Blood Glucose Monitoring Suppl (ONE TOUCH ULTRA MINI) w/Device KIT, by Does not apply route, Disp: , Rfl:     cetirizine (ZyrTEC) 10 mg tablet, Take by mouth Daily, Disp: , Rfl:     cholecalciferol (VITAMIN D3) 1,000 units tablet, Take by mouth, Disp: , Rfl:     escitalopram (LEXAPRO) 20 mg tablet, TAKE 1 TABLET BY MOUTH EVERY DAY, Disp: 30 tablet, Rfl: 5    famotidine (PEPCID) 20 mg tablet, Take 1 tablet by mouth daily, Disp: , Rfl:     Flovent  MCG/ACT inhaler, INHALE 2 PUFFS IN THE MORNING AND 2 PUFFS IN THE EVENING  RINSE MOUTH AFTER USE   , Disp: 12 g, Rfl: 1    glucose blood (ONE TOUCH ULTRA TEST) test strip, 1 each by Other route 2 (two) times a day, Disp: 100 each, Rfl: 0    lisdexamfetamine (VYVANSE) 20 MG capsule, Take 1 capsule (20 mg total) by mouth every evening With 40 mg taken in the a m   Max Daily Amount: 20 mg, Disp: 30 capsule, Rfl: 0    lisdexamfetamine (VYVANSE) 30 MG capsule, Take 1 capsule (30 mg total) by mouth every morning Max Daily Amount: 30 mg, Disp: 30 capsule, Rfl: 0    lisinopril (ZESTRIL) 5 mg tablet, TAKE ONE-HALF (1/2) TABLET DAILY, Disp: 45 tablet, Rfl: 3    metFORMIN (GLUCOPHAGE-XR) 500 mg 24 hr tablet, TAKE 1 TABLET BY MOUTH EVERY DAY WITH BREAKFAST, Disp: 90 tablet, Rfl: 1    metoprolol succinate (TOPROL-XL) 25 mg 24 hr tablet, TAKE 1 TABLET DAILY, Disp: 90 tablet, Rfl: 3    Multiple Vitamins-Minerals (MULTIVITAMIN ADULTS 50+ PO), Take 1 tablet by mouth daily, Disp: , Rfl:     neomycin-polymyxin-hydrocortisone (CORTISPORIN) otic solution, Administer 4 drops to the right ear every 8 (eight) hours, Disp: 10 mL, Rfl: 0    ONETOUCH DELICA LANCETS FINE MISC, by Does not apply route daily, Disp: 100 each, Rfl: 1    White Petrolatum-Mineral Oil (Retaine PM) OINT, Apply to eye, Disp: , Rfl:     Promethazine-DM (PHENERGAN-DM) 6 25-15 mg/5 mL oral syrup, Take 5 mL by mouth 4 (four) times a day as needed for cough (Patient not taking: Reported on 7/26/2022), Disp: 118 mL, Rfl: 0         Objective:         Vitals:    07/26/22 1336   BP: 120/84   Pulse: 94   Resp: 18   Temp: (!) 97 1 °F (36 2 °C)   TempSrc: Tympanic   SpO2: 96%   Weight: 74 2 kg (163 lb 9 6 oz)   Height: 5' 5" (1 651 m)     Physical Exam  Vitals reviewed  Constitutional:       Appearance: She is well-developed  HENT:      Head: Normocephalic and atraumatic  Nose: Nose normal       Mouth/Throat:      Pharynx: No oropharyngeal exudate  Eyes:      General: No scleral icterus  Right eye: No discharge  Left eye: No discharge  Pupils: Pupils are equal, round, and reactive to light  Neck:      Trachea: No tracheal deviation  Cardiovascular:      Rate and Rhythm: Normal rate and regular rhythm  Pulses:           Dorsalis pedis pulses are 2+ on the right side and 2+ on the left side  Posterior tibial pulses are 2+ on the right side and 2+ on the left side  Heart sounds: Normal heart sounds  No murmur heard  No friction rub  No gallop  Pulmonary:      Effort: Pulmonary effort is normal  No respiratory distress  Breath sounds: Normal breath sounds  No wheezing or rales  Abdominal:      General: Bowel sounds are normal  There is no distension  Palpations: Abdomen is soft  Tenderness: There is no abdominal tenderness  There is no guarding or rebound     Musculoskeletal:         General: Normal range of motion  Cervical back: Normal range of motion and neck supple  Lymphadenopathy:      Head:      Right side of head: No submental or submandibular adenopathy  Left side of head: No submental or submandibular adenopathy  Cervical: No cervical adenopathy  Right cervical: No superficial, deep or posterior cervical adenopathy  Left cervical: No superficial, deep or posterior cervical adenopathy  Skin:     General: Skin is warm and dry  Findings: No erythema  Neurological:      Mental Status: She is alert and oriented to person, place, and time  Cranial Nerves: No cranial nerve deficit  Sensory: No sensory deficit  Psychiatric:         Mood and Affect: Mood is not anxious or depressed  Speech: Speech normal          Behavior: Behavior normal          Thought Content:  Thought content normal          Judgment: Judgment normal

## 2022-08-01 DIAGNOSIS — F90.1 ATTENTION DEFICIT HYPERACTIVITY DISORDER (ADHD), PREDOMINANTLY HYPERACTIVE TYPE: ICD-10-CM

## 2022-08-16 DIAGNOSIS — F90.1 ATTENTION DEFICIT HYPERACTIVITY DISORDER (ADHD), PREDOMINANTLY HYPERACTIVE TYPE: ICD-10-CM

## 2022-08-17 DIAGNOSIS — E78.2 MIXED HYPERLIPIDEMIA: Primary | ICD-10-CM

## 2022-08-17 DIAGNOSIS — E11.9 TYPE 2 DIABETES MELLITUS WITHOUT COMPLICATION, WITHOUT LONG-TERM CURRENT USE OF INSULIN (HCC): ICD-10-CM

## 2022-08-17 LAB
ALBUMIN SERPL-MCNC: 4.4 G/DL (ref 3.6–5.1)
ALBUMIN/GLOB SERPL: 1.4 (CALC) (ref 1–2.5)
ALP SERPL-CCNC: 79 U/L (ref 37–153)
ALT SERPL-CCNC: 16 U/L (ref 6–29)
AST SERPL-CCNC: 14 U/L (ref 10–35)
BILIRUB SERPL-MCNC: 0.5 MG/DL (ref 0.2–1.2)
BUN SERPL-MCNC: 14 MG/DL (ref 7–25)
BUN/CREAT SERPL: ABNORMAL (CALC) (ref 6–22)
CALCIUM SERPL-MCNC: 10.3 MG/DL (ref 8.6–10.4)
CHLORIDE SERPL-SCNC: 103 MMOL/L (ref 98–110)
CHOLEST SERPL-MCNC: 215 MG/DL
CHOLEST/HDLC SERPL: 4.9 (CALC)
CO2 SERPL-SCNC: 29 MMOL/L (ref 20–32)
CREAT SERPL-MCNC: 0.77 MG/DL (ref 0.5–1.03)
ERYTHROCYTE [DISTWIDTH] IN BLOOD BY AUTOMATED COUNT: 12.8 % (ref 11–15)
GFR/BSA.PRED SERPLBLD CYS-BASED-ARV: 89 ML/MIN/1.73M2
GLOBULIN SER CALC-MCNC: 3.1 G/DL (CALC) (ref 1.9–3.7)
GLUCOSE SERPL-MCNC: 203 MG/DL (ref 65–99)
HBA1C MFR BLD: 8.1 % OF TOTAL HGB
HCT VFR BLD AUTO: 39.9 % (ref 35–45)
HDLC SERPL-MCNC: 44 MG/DL
HGB BLD-MCNC: 13.3 G/DL (ref 11.7–15.5)
LDLC SERPL CALC-MCNC: 127 MG/DL (CALC)
MCH RBC QN AUTO: 30.2 PG (ref 27–33)
MCHC RBC AUTO-ENTMCNC: 33.3 G/DL (ref 32–36)
MCV RBC AUTO: 90.7 FL (ref 80–100)
NONHDLC SERPL-MCNC: 171 MG/DL (CALC)
PLATELET # BLD AUTO: 541 THOUSAND/UL (ref 140–400)
PMV BLD REES-ECKER: 10.4 FL (ref 7.5–12.5)
POTASSIUM SERPL-SCNC: 5 MMOL/L (ref 3.5–5.3)
PROT SERPL-MCNC: 7.5 G/DL (ref 6.1–8.1)
RBC # BLD AUTO: 4.4 MILLION/UL (ref 3.8–5.1)
SODIUM SERPL-SCNC: 139 MMOL/L (ref 135–146)
TRIGL SERPL-MCNC: 285 MG/DL
TSH SERPL-ACNC: 2.16 MIU/L (ref 0.4–4.5)
WBC # BLD AUTO: 9.7 THOUSAND/UL (ref 3.8–10.8)

## 2022-08-17 PROCEDURE — 3052F HG A1C>EQUAL 8.0%<EQUAL 9.0%: CPT | Performed by: FAMILY MEDICINE

## 2022-08-17 RX ORDER — METFORMIN HYDROCHLORIDE 500 MG/1
1000 TABLET, EXTENDED RELEASE ORAL 2 TIMES DAILY WITH MEALS
Qty: 360 TABLET | Refills: 0 | Status: SHIPPED | OUTPATIENT
Start: 2022-08-17 | End: 2022-11-14

## 2022-08-17 RX ORDER — FENOFIBRATE 54 MG/1
54 TABLET ORAL DAILY
Qty: 90 TABLET | Refills: 0 | Status: SHIPPED | OUTPATIENT
Start: 2022-08-17 | End: 2023-01-03 | Stop reason: SDUPTHER

## 2022-09-01 DIAGNOSIS — F90.1 ATTENTION DEFICIT HYPERACTIVITY DISORDER (ADHD), PREDOMINANTLY HYPERACTIVE TYPE: ICD-10-CM

## 2022-09-12 DIAGNOSIS — F90.1 ATTENTION DEFICIT HYPERACTIVITY DISORDER (ADHD), PREDOMINANTLY HYPERACTIVE TYPE: ICD-10-CM

## 2022-09-14 ENCOUNTER — OFFICE VISIT (OUTPATIENT)
Dept: URGENT CARE | Facility: CLINIC | Age: 62
End: 2022-09-14
Payer: COMMERCIAL

## 2022-09-14 ENCOUNTER — TELEPHONE (OUTPATIENT)
Dept: FAMILY MEDICINE CLINIC | Facility: CLINIC | Age: 62
End: 2022-09-14

## 2022-09-14 VITALS
RESPIRATION RATE: 18 BRPM | SYSTOLIC BLOOD PRESSURE: 102 MMHG | TEMPERATURE: 96.1 F | OXYGEN SATURATION: 98 % | HEART RATE: 86 BPM | DIASTOLIC BLOOD PRESSURE: 68 MMHG

## 2022-09-14 DIAGNOSIS — J06.9 ACUTE URI: Primary | ICD-10-CM

## 2022-09-14 DIAGNOSIS — J20.9 ACUTE BRONCHITIS, UNSPECIFIED ORGANISM: ICD-10-CM

## 2022-09-14 PROCEDURE — G0382 LEV 3 HOSP TYPE B ED VISIT: HCPCS | Performed by: PHYSICIAN ASSISTANT

## 2022-09-14 PROCEDURE — S9083 URGENT CARE CENTER GLOBAL: HCPCS | Performed by: PHYSICIAN ASSISTANT

## 2022-09-14 RX ORDER — AMOXICILLIN 500 MG/1
500 TABLET, FILM COATED ORAL 3 TIMES DAILY
Qty: 21 TABLET | Refills: 0 | Status: SHIPPED | OUTPATIENT
Start: 2022-09-14 | End: 2022-09-21

## 2022-09-14 NOTE — PATIENT INSTRUCTIONS
This appears to be viral illness and no antibiotic is indicated at this time  If not improving over next 5-7 days, may start antibiotic prescribed  This appears to be viral illness and no antibiotic is indicated at this time  Strongly encourage getting plenty of rest over the next few days  Increase your hydration  If you are having sinus pressure, nasal congestion, runny nose, and / or post nasal drip you may try the following to help ease your symptoms:            *Clearing your sinuses in a nice steamy shower may be helpful, especially first thing after waking  *Nasal saline rinses every 1-2 hours while awake may also help decrease nasal congestion, drainage  *Afrin nasal spray if significant nasal congestion at bedtime may use   (Do not use for over 3 days however )       *Due to your history of high blood pressure and / or heart disease, we recommend that you avoid oral decongestants (decongestants can elevate blood pressure)  *Coricidin HPB  may be helpful for your sinus symptoms  Although bothersome, mucous is not necessarily a bad thing  Production of mucous is the body's way of trying to capture and flush irritants from mucosal surfaces  Yellow or green mucous does not necessarily mean you have a bacterial infection  Mucous will become more discolored over time, especially first thing in the morning, as your body's immune system also floods the mucosal surfaces with white bloods cells to try and help fight  infection  This white blood cell debride can also cause mucous to be discolored  Again, using nasal saline spray frequently may help soothe and keep mucous flowing out versus getting dried, thickened and / or stuck leading to more sinus pain and pressure  If you have a cough, please realize that a cough is not necessarily a bad thing but a way that your body tries to keep airways clear  Phlegm may be more discolored in the morning    Please note that discolored phlegm does not necessarily mean a bacterial infection  The following things may help with your cough:         *Warm tea with honey or a teaspoon of honey periodically throughout day and / or before bed  *You may also use plain Mucinex (an expectorant to help keep mucus thin so you can clear it easier) or Mucinex DM (expectorant / cough suyppressant) to help decrease cough if it is bothering your sleep  An expectorant works best if you take with full glass of fluids  Other night time cough medication options include Delsym, Robitussin DM, NyQuil  *Propping with an extra pillow or two may be helpful  *Keep water by your bedside to sip on as needed  *Cough drops  If you are having any sore throat or hoarseness,  you may do warm salt water gargles every 1-2 hours while awake, throat lozenges, Tylenol, voice rest, warm tea with honey  Most upper respiratory symptoms start to improve after 7-10 days but may take a few weeks to completely resolve  Mucus may be more discolored first thing in the morning  Discolored mucous does not necessarily mean bacterial infection but can be dehydrated mucous or mucous filled with white blood cell debride that have been helping you to fight your illness  Follow up with your PCP office if not improving over next 10 days  If significant weakness, chest pain, shortness of breath proceed to ER for immediate further evaluation  Transmission precautions advised

## 2022-09-14 NOTE — TELEPHONE ENCOUNTER
Called [facility] pharmacy and spoke to Perla Joe who stated due to insurance pt can  medication tomorrow  Pt informed

## 2022-09-14 NOTE — TELEPHONE ENCOUNTER
Patient called, she stated she is at [facility] right now to get her Vivance 30mg  They are telling her that it cannot be filled until 10/9 per you  She stated this has never happened before and was wondering if you made a mistake  She claims that she should be able to fill it today       Her number is [phone]

## 2022-09-14 NOTE — PROGRESS NOTES
[facility]    NAME: Yohan Bell is a 63 y o  female  : 1960    MRN: [id number]  DATE: 2022  TIME: 12:14 PM    Assessment and Plan   Acute URI [J06 [id number]]  1  Acute URI     2  Acute bronchitis, unspecified organism  amoxicillin (AMOXIL) 500 MG tablet     Does not want COVID testing at this time  Patient Instructions   Patient Instructions       This appears to be viral illness and no antibiotic is indicated at this time  If not improving over next 5-7 days, may start antibiotic prescribed  This appears to be viral illness and no antibiotic is indicated at this time  Strongly encourage getting plenty of rest over the next few days  Increase your hydration  If you are having sinus pressure, nasal congestion, runny nose, and / or post nasal drip you may try the following to help ease your symptoms:            *Clearing your sinuses in a nice steamy shower may be helpful, especially first thing after waking  *Nasal saline rinses every 1-2 hours while awake may also help decrease nasal congestion, drainage  *Afrin nasal spray if significant nasal congestion at bedtime may use   (Do not use for over 3 days however )       *Due to your history of high blood pressure and / or heart disease, we recommend that you avoid oral decongestants (decongestants can elevate blood pressure)  *Coricidin HPB  may be helpful for your sinus symptoms  Although bothersome, mucous is not necessarily a bad thing  Production of mucous is the body's way of trying to capture and flush irritants from mucosal surfaces  Yellow or green mucous does not necessarily mean you have a bacterial infection  Mucous will become more discolored over time, especially first thing in the morning, as your body's immune system also floods the mucosal surfaces with white bloods cells to try and help fight  infection    This white blood cell debride can also cause mucous to be discolored  Again, using nasal saline spray frequently may help soothe and keep mucous flowing out versus getting dried, thickened and / or stuck leading to more sinus pain and pressure  If you have a cough, please realize that a cough is not necessarily a bad thing but a way that your body tries to keep airways clear  Phlegm may be more discolored in the morning  Please note that discolored phlegm does not necessarily mean a bacterial infection  The following things may help with your cough:         *Warm tea with honey or a teaspoon of honey periodically throughout day and / or before bed  *You may also use plain Mucinex (an expectorant to help keep mucus thin so you can clear it easier) or Mucinex DM (expectorant / cough suyppressant) to help decrease cough if it is bothering your sleep  An expectorant works best if you take with full glass of fluids  Other night time cough medication options include Delsym, Robitussin DM, NyQuil  *Propping with an extra pillow or two may be helpful  *Keep water by your bedside to sip on as needed  *Cough drops  If you are having any sore throat or hoarseness,  you may do warm salt water gargles every 1-2 hours while awake, throat lozenges, Tylenol, voice rest, warm tea with honey  Most upper respiratory symptoms start to improve after 7-10 days but may take a few weeks to completely resolve  Mucus may be more discolored first thing in the morning  Discolored mucous does not necessarily mean bacterial infection but can be dehydrated mucous or mucous filled with white blood cell debride that have been helping you to fight your illness  Follow up with your PCP office if not improving over next 10 days  If significant weakness, chest pain, shortness of breath proceed to ER for immediate further evaluation  Transmission precautions advised           Chief Complaint     Chief Complaint   Patient presents with    Cough Patient with a cough and "sweet " throat for a few days       History of Present Illness   Clif Umana presents to the clinic c/o  63-year-old female comes in with cough, mild chest discomfort, sore throat with cough and postnasal drip that started a few days ago  Doing Mucinex  No fever or chills at this point  She is going to be traveling to [place] leaving tomorrow and then [place] in [place]  No known exposures  She says that in December 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 and she is very excited about that  She said she recently quit smoking because of going to become a g parent  She has been doing a little bit of vaping however  Review of Systems   Review of Systems   Constitutional: Negative  HENT: Positive for congestion, postnasal drip, rhinorrhea and sore throat  Respiratory: Positive for cough  Negative for apnea, choking, chest tightness, shortness of breath, wheezing and stridor  Cardiovascular: Negative  Current Medications     Long-Term Medications   Medication Sig Dispense Refill    aspirin (ECOTRIN LOW STRENGTH) 81 mg EC tablet Take 1 tablet by mouth daily      atorvastatin (LIPITOR) 40 mg tablet TAKE 1 TABLET DAILY 90 tablet 3    Blood Glucose Monitoring Suppl (ONE TOUCH ULTRA MINI) w/Device KIT by Does not apply route      cholecalciferol (VITAMIN D3) 1,000 units tablet Take by mouth      escitalopram (LEXAPRO) 20 mg tablet TAKE 1 TABLET BY MOUTH EVERY DAY 30 tablet 5    famotidine (PEPCID) 20 mg tablet Take 1 tablet by mouth daily      fenofibrate (TRICOR) 54 MG tablet Take 1 tablet (54 mg total) by mouth daily 90 tablet 0    Flovent  MCG/ACT inhaler INHALE 2 PUFFS IN THE MORNING AND 2 PUFFS IN THE EVENING  RINSE MOUTH AFTER USE    12 g 1    lisdexamfetamine (VYVANSE) 20 MG capsule Take 1 capsule (20 mg total) by mouth every evening With 40 mg taken in the a m   Max Daily Amount: 20 mg 30 capsule 0    lisdexamfetamine (VYVANSE) 30 MG capsule Take 1 capsule (30 mg total) by mouth every morning Max Daily Amount: 30 mg 30 capsule 0    lisinopril (ZESTRIL) 5 mg tablet TAKE ONE-HALF (1/2) TABLET DAILY 45 tablet 3    metFORMIN (GLUCOPHAGE-XR) 500 mg 24 hr tablet Take 2 tablets (1,000 mg total) by mouth 2 (two) times a day with meals 360 tablet 0    metoprolol succinate (TOPROL-XL) 25 mg 24 hr tablet TAKE 1 TABLET DAILY 90 tablet 3    neomycin-polymyxin-hydrocortisone (CORTISPORIN) otic solution Administer 4 drops to the right ear every 8 (eight) hours 10 mL 0    ONETOUCH DELICA LANCETS FINE MISC by Does not apply route daily 100 each 1    White Petrolatum-Mineral Oil (Retaine PM) OINT Apply to eye         Current Allergies     Allergies as of 09/14/2022 - Reviewed 09/14/2022   Allergen Reaction Noted    Levaquin [levofloxacin] Anaphylaxis 05/18/2012          The following portions of the patient's history were reviewed and updated as appropriate: allergies, current medications, past family history, past medical history, past social history, past surgical history and problem list   Past Medical History:   Diagnosis Date    Abnormal mammogram     RESOLVED: [id number]    Arthritis     Diabetes mellitus ([facility] [address] )     Eczema of right external ear     RESOLVED: [id number]    Foot drop, left foot     Heart attack ([facility] [address] )     Hematuria     RESOLVED: [id number]    Impetigo     RESOLVED: [id number]    Insomnia related to another mental disorder     AXIS I/II MENTAL DISORDER; RESOLVED: [id number]     Past Surgical History:   Procedure Laterality Date    HYSTERECTOMY  1998    still has 1 ovary not sure which one was removed    OTHER SURGICAL HISTORY      STEND INDICATIONS: RESTENOSIS AT PREVIOUS PTCA LOCATION     SPLENECTOMY      TOTAL HIP ARTHROPLASTY Left      Family History   Problem Relation Age of Onset    Stroke Mother     Osteoporosis Mother     Diabetes type II Father     Heart attack Father     Cancer Father     Heart disease Father  Hypertension Father     Heart disease Sister     Diabetes type II Brother     Heart attack Brother     Heart disease Brother     Heart attack Brother     Heart attack Brother     Heart attack Brother     Diabetes type II Brother     Alcohol abuse Neg Hx     Substance Abuse Neg Hx        Objective   /68   Pulse 86   Temp (!) 96 1 °F (35 6 °C) (Tympanic)   Resp 18   SpO2 98%   No LMP recorded  Patient is postmenopausal        Physical Exam     Physical Exam  Vitals and nursing note reviewed  Constitutional:       General: She is not in acute distress  Appearance: Normal appearance  She is well-developed  She is not ill-appearing, toxic-appearing or diaphoretic  HENT:      Head: Normocephalic and atraumatic  Right Ear: Tympanic membrane, ear canal and external ear normal       Left Ear: Tympanic membrane, ear canal and external ear normal       Nose: Congestion and rhinorrhea present  Mouth/Throat:      Mouth: Mucous membranes are moist       Pharynx: Posterior oropharyngeal erythema present  No oropharyngeal exudate  Eyes:      General: No scleral icterus  Right eye: No discharge  Left eye: No discharge  Extraocular Movements: Extraocular movements intact  Conjunctiva/sclera: Conjunctivae normal       Pupils: Pupils are equal, round, and reactive to light  Cardiovascular:      Rate and Rhythm: Normal rate and regular rhythm  Heart sounds: Normal heart sounds  No murmur heard  No friction rub  No gallop  Pulmonary:      Effort: Pulmonary effort is normal  No respiratory distress  Breath sounds: Normal breath sounds  No stridor  No wheezing, rhonchi or rales  Musculoskeletal:      Cervical back: Normal range of motion and neck supple  No rigidity or tenderness  Lymphadenopathy:      Cervical: No cervical adenopathy  Skin:     General: Skin is warm and dry  Findings: No rash     Neurological:      Mental Status: She is alert and oriented to person, place, and time     Psychiatric:         Mood and Affect: Mood normal          Behavior: Behavior normal

## 2022-09-14 NOTE — TELEPHONE ENCOUNTER
Pt called to see if someone else could fix this issue since IA is not in today      Please advise [phone]

## 2022-09-29 DIAGNOSIS — F41.9 ANXIETY DISORDER, UNSPECIFIED TYPE: ICD-10-CM

## 2022-09-29 RX ORDER — ESCITALOPRAM OXALATE 20 MG/1
TABLET ORAL
Qty: 90 TABLET | Refills: 1 | Status: SHIPPED | OUTPATIENT
Start: 2022-09-29

## 2022-09-30 DIAGNOSIS — F90.1 ATTENTION DEFICIT HYPERACTIVITY DISORDER (ADHD), PREDOMINANTLY HYPERACTIVE TYPE: ICD-10-CM

## 2022-10-11 DIAGNOSIS — F90.1 ATTENTION DEFICIT HYPERACTIVITY DISORDER (ADHD), PREDOMINANTLY HYPERACTIVE TYPE: ICD-10-CM

## 2022-10-11 PROBLEM — H60.501 ACUTE OTITIS EXTERNA OF RIGHT EAR: Status: RESOLVED | Noted: 2022-07-05 | Resolved: 2022-10-11

## 2022-10-28 DIAGNOSIS — F90.1 ATTENTION DEFICIT HYPERACTIVITY DISORDER (ADHD), PREDOMINANTLY HYPERACTIVE TYPE: ICD-10-CM

## 2022-11-08 ENCOUNTER — VBI (OUTPATIENT)
Dept: ADMINISTRATIVE | Facility: OTHER | Age: 62
End: 2022-11-08

## 2022-11-11 ENCOUNTER — TELEPHONE (OUTPATIENT)
Dept: FAMILY MEDICINE CLINIC | Facility: CLINIC | Age: 62
End: 2022-11-11

## 2022-11-11 DIAGNOSIS — F90.1 ATTENTION DEFICIT HYPERACTIVITY DISORDER (ADHD), PREDOMINANTLY HYPERACTIVE TYPE: ICD-10-CM

## 2022-11-11 NOTE — TELEPHONE ENCOUNTER
Pt called is out of town requesting meds be routed [place]  I informed her that medication could not be routed to another state    She would like someone from clinical to call her - [phone]

## 2022-11-12 DIAGNOSIS — E11.9 TYPE 2 DIABETES MELLITUS WITHOUT COMPLICATION, WITHOUT LONG-TERM CURRENT USE OF INSULIN (HCC): ICD-10-CM

## 2022-11-14 RX ORDER — METFORMIN HYDROCHLORIDE 500 MG/1
TABLET, EXTENDED RELEASE ORAL
Qty: 360 TABLET | Refills: 0 | Status: SHIPPED | OUTPATIENT
Start: 2022-11-14

## 2022-11-21 ENCOUNTER — OFFICE VISIT (OUTPATIENT)
Dept: FAMILY MEDICINE CLINIC | Facility: CLINIC | Age: 62
End: 2022-11-21

## 2022-11-21 VITALS
OXYGEN SATURATION: 97 % | HEIGHT: 65 IN | SYSTOLIC BLOOD PRESSURE: 130 MMHG | WEIGHT: 167.4 LBS | TEMPERATURE: 98.4 F | DIASTOLIC BLOOD PRESSURE: 78 MMHG | HEART RATE: 94 BPM | RESPIRATION RATE: 20 BRPM | BODY MASS INDEX: 27.89 KG/M2

## 2022-11-21 DIAGNOSIS — I25.10 ATHEROSCLEROSIS OF CORONARY ARTERY OF NATIVE HEART WITHOUT ANGINA PECTORIS, UNSPECIFIED VESSEL OR LESION TYPE: ICD-10-CM

## 2022-11-21 DIAGNOSIS — J20.9 ACUTE BRONCHITIS, UNSPECIFIED ORGANISM: Primary | ICD-10-CM

## 2022-11-21 DIAGNOSIS — E78.2 MIXED HYPERLIPIDEMIA: ICD-10-CM

## 2022-11-21 DIAGNOSIS — E11.9 TYPE 2 DIABETES MELLITUS WITHOUT COMPLICATION, WITHOUT LONG-TERM CURRENT USE OF INSULIN (HCC): ICD-10-CM

## 2022-11-21 DIAGNOSIS — Z11.59 NEED FOR HEPATITIS C SCREENING TEST: ICD-10-CM

## 2022-11-21 RX ORDER — DOXYCYCLINE 100 MG/1
100 TABLET ORAL 2 TIMES DAILY
Qty: 14 TABLET | Refills: 0 | Status: SHIPPED | OUTPATIENT
Start: 2022-11-21 | End: 2022-11-28

## 2022-11-21 RX ORDER — PROMETHAZINE HYDROCHLORIDE 6.25 MG/5ML
6.25 SYRUP ORAL 4 TIMES DAILY PRN
Qty: 118 ML | Refills: 0 | Status: SHIPPED | OUTPATIENT
Start: 2022-11-21 | End: 2022-11-28

## 2022-11-21 RX ORDER — PREDNISONE 10 MG/1
TABLET ORAL
Qty: 21 TABLET | Refills: 0 | Status: SHIPPED | OUTPATIENT
Start: 2022-11-21

## 2022-11-21 NOTE — PROGRESS NOTES
Assessment/Plan:   1  Acute bronchitis, unspecified organism  Start supportive care  Maintain hydration  Take over-the-counter Mucinex  Start treatment with prednisone as well as doxycycline  Follow-up if any symptoms persist   - predniSONE 10 mg tablet; Take 6 tablets By mouth  In the morning Qd  Decrease by  By 1 tablet daily until completed  Dispense: 21 tablet; Refill: 0  - doxycycline (ADOXA) 100 MG tablet; Take 1 tablet (100 mg total) by mouth 2 (two) times a day for 7 days  Dispense: 14 tablet; Refill: 0  - promethazine (PHENERGAN) 12 5 mg/10 mL syrup; Take 5 mL (6 25 mg total) by mouth 4 (four) times a day as needed for nausea or vomiting for up to 7 days  Dispense: 118 mL; Refill: 0               Diagnoses and all orders for this visit:    Type 2 diabetes mellitus without complication, without long-term current use of insulin (HCC)  -     Comprehensive metabolic panel; Future  -     Hemoglobin A1C W/Refl To Glycomark(R); Future  -     Comprehensive metabolic panel  -     Hemoglobin A1C W/Refl To Glycomark(R)    Need for hepatitis C screening test  -     Hepatitis C Ab W/Refl To HCV RNA, Qn, PCR; Future  -     Hepatitis C Ab W/Refl To HCV RNA, Qn, PCR    Atherosclerosis of coronary artery of native heart without angina pectoris, unspecified vessel or lesion type  -     Lipid Panel with Direct LDL reflex; Future  -     Lipid Panel with Direct LDL reflex    Mixed hyperlipidemia  -     Lipid Panel with Direct LDL reflex; Future  -     Lipid Panel with Direct LDL reflex          Subjective:       Chief Complaint   Patient presents with   • Cold Like Symptoms     x8 days       Patient ID: Matthias Ribera is a 63 y o  female  URI   This is a new problem  Episode onset: 8 days ago  The problem has been unchanged  There has been no fever  Associated symptoms include congestion and coughing   Pertinent negatives include no abdominal pain, chest pain, diarrhea, dysuria, ear pain, headaches, nausea, rhinorrhea, sinus pain or sore throat  She has tried decongestant and acetaminophen (Delsum, Mucinex) for the symptoms  The treatment provided mild relief  Review of Systems   Constitutional: Negative for activity change, chills, fatigue and fever  HENT: Positive for congestion  Negative for ear pain, rhinorrhea, sinus pressure, sinus pain and sore throat  Eyes: Negative for redness, itching and visual disturbance  Respiratory: Positive for cough  Negative for shortness of breath  Cardiovascular: Negative for chest pain and palpitations  Gastrointestinal: Negative for abdominal pain, diarrhea and nausea  Endocrine: Negative for cold intolerance and heat intolerance  Genitourinary: Negative for dysuria, flank pain and frequency  Musculoskeletal: Negative for arthralgias, back pain, gait problem and myalgias  Skin: Negative for color change  Allergic/Immunologic: Negative for environmental allergies  Neurological: Negative for dizziness, numbness and headaches  Psychiatric/Behavioral: Negative for behavioral problems and sleep disturbance  The following portions of the patient's history were reviewed and updated as appropriate : past family history, past medical history, past social history and past surgical history      Current Outpatient Medications:   •  albuterol (PROVENTIL HFA,VENTOLIN HFA) 90 mcg/act inhaler, Inhale 2 puffs every 6 (six) hours as needed for wheezing, Disp: 8 g, Rfl: 0  •  aspirin (ECOTRIN LOW STRENGTH) 81 mg EC tablet, Take 1 tablet by mouth daily, Disp: , Rfl:   •  atorvastatin (LIPITOR) 40 mg tablet, TAKE 1 TABLET DAILY, Disp: 90 tablet, Rfl: 3  •  Blood Glucose Monitoring Suppl (ONE TOUCH ULTRA MINI) w/Device KIT, by Does not apply route, Disp: , Rfl:   •  cetirizine (ZyrTEC) 10 mg tablet, Take by mouth Daily, Disp: , Rfl:   •  cholecalciferol (VITAMIN D3) 1,000 units tablet, Take by mouth, Disp: , Rfl:   •  escitalopram (LEXAPRO) 20 mg tablet, TAKE 1 TABLET BY MOUTH EVERY DAY, Disp: 90 tablet, Rfl: 1  •  famotidine (PEPCID) 20 mg tablet, Take 1 tablet by mouth daily, Disp: , Rfl:   •  Flovent  MCG/ACT inhaler, INHALE 2 PUFFS IN THE MORNING AND 2 PUFFS IN THE EVENING  RINSE MOUTH AFTER USE   , Disp: 12 g, Rfl: 1  •  glucose blood (ONE TOUCH ULTRA TEST) test strip, 1 each by Other route 2 (two) times a day, Disp: 100 each, Rfl: 0  •  lisdexamfetamine (VYVANSE) 20 MG capsule, Take 1 capsule (20 mg total) by mouth every evening With 40 mg taken in the a m  Max Daily Amount: 20 mg, Disp: 30 capsule, Rfl: 0  •  lisdexamfetamine (VYVANSE) 30 MG capsule, Take 1 capsule (30 mg total) by mouth every morning Max Daily Amount: 30 mg, Disp: 30 capsule, Rfl: 0  •  lisinopril (ZESTRIL) 5 mg tablet, TAKE ONE-HALF (1/2) TABLET DAILY, Disp: 45 tablet, Rfl: 3  •  metFORMIN (GLUCOPHAGE-XR) 500 mg 24 hr tablet, TAKE 2 TABLETS BY MOUTH TWICE A DAY WITH FOOD, Disp: 360 tablet, Rfl: 0  •  metoprolol succinate (TOPROL-XL) 25 mg 24 hr tablet, TAKE 1 TABLET DAILY, Disp: 90 tablet, Rfl: 3  •  Multiple Vitamins-Minerals (MULTIVITAMIN ADULTS 50+ PO), Take 1 tablet by mouth daily, Disp: , Rfl:   •  neomycin-polymyxin-hydrocortisone (CORTISPORIN) otic solution, Administer 4 drops to the right ear every 8 (eight) hours, Disp: 10 mL, Rfl: 0  •  ONETOUCH DELICA LANCETS FINE MISC, by Does not apply route daily, Disp: 100 each, Rfl: 1  •  Promethazine-DM (PHENERGAN-DM) 6 25-15 mg/5 mL oral syrup, Take 5 mL by mouth 4 (four) times a day as needed for cough, Disp: 118 mL, Rfl: 0  •  White Petrolatum-Mineral Oil (Retaine PM) OINT, Apply to eye, Disp: , Rfl:   •  fenofibrate (TRICOR) 54 MG tablet, Take 1 tablet (54 mg total) by mouth daily, Disp: 90 tablet, Rfl: 0         Objective:         Vitals:    11/21/22 0921   BP: 130/78   Pulse: 94   Resp: 20   Temp: 98 4 °F (36 9 °C)   TempSrc: Tympanic   SpO2: 97%   Weight: 75 9 kg (167 lb 6 4 oz)   Height: 5' 5" (1 651 m)     Physical Exam  Vitals reviewed  Constitutional:       Appearance: She is well-developed  HENT:      Head: Normocephalic and atraumatic  Nose: Nose normal       Mouth/Throat:      Pharynx: No oropharyngeal exudate  Eyes:      General: No scleral icterus  Right eye: No discharge  Left eye: No discharge  Pupils: Pupils are equal, round, and reactive to light  Neck:      Trachea: No tracheal deviation  Cardiovascular:      Rate and Rhythm: Normal rate and regular rhythm  Pulses:           Dorsalis pedis pulses are 2+ on the right side and 2+ on the left side  Posterior tibial pulses are 2+ on the right side and 2+ on the left side  Heart sounds: Normal heart sounds  No murmur heard  No friction rub  No gallop  Pulmonary:      Effort: Pulmonary effort is normal  No respiratory distress  Breath sounds: Normal breath sounds  No wheezing or rales  Abdominal:      General: Bowel sounds are normal  There is no distension  Palpations: Abdomen is soft  Tenderness: There is no abdominal tenderness  There is no guarding or rebound  Musculoskeletal:         General: Normal range of motion  Cervical back: Normal range of motion and neck supple  Lymphadenopathy:      Head:      Right side of head: No submental or submandibular adenopathy  Left side of head: No submental or submandibular adenopathy  Cervical: No cervical adenopathy  Right cervical: No superficial, deep or posterior cervical adenopathy  Left cervical: No superficial, deep or posterior cervical adenopathy  Skin:     General: Skin is warm and dry  Findings: No erythema  Neurological:      Mental Status: She is alert and oriented to person, place, and time  Cranial Nerves: No cranial nerve deficit  Sensory: No sensory deficit  Psychiatric:         Mood and Affect: Mood is not anxious or depressed           Speech: Speech normal          Behavior: Behavior normal          Thought Content:  Thought content normal          Judgment: Judgment normal

## 2022-11-26 DIAGNOSIS — F90.1 ATTENTION DEFICIT HYPERACTIVITY DISORDER (ADHD), PREDOMINANTLY HYPERACTIVE TYPE: ICD-10-CM

## 2022-12-07 ENCOUNTER — VBI (OUTPATIENT)
Dept: ADMINISTRATIVE | Facility: OTHER | Age: 62
End: 2022-12-07

## 2022-12-09 DIAGNOSIS — F90.1 ATTENTION DEFICIT HYPERACTIVITY DISORDER (ADHD), PREDOMINANTLY HYPERACTIVE TYPE: ICD-10-CM

## 2022-12-25 DIAGNOSIS — F90.1 ATTENTION DEFICIT HYPERACTIVITY DISORDER (ADHD), PREDOMINANTLY HYPERACTIVE TYPE: ICD-10-CM

## 2022-12-26 DIAGNOSIS — E78.2 MIXED HYPERLIPIDEMIA: ICD-10-CM

## 2022-12-27 ENCOUNTER — TELEPHONE (OUTPATIENT)
Dept: FAMILY MEDICINE CLINIC | Facility: CLINIC | Age: 62
End: 2022-12-27

## 2022-12-27 DIAGNOSIS — F90.1 ATTENTION DEFICIT HYPERACTIVITY DISORDER (ADHD), PREDOMINANTLY HYPERACTIVE TYPE: ICD-10-CM

## 2022-12-27 RX ORDER — ATORVASTATIN CALCIUM 40 MG/1
TABLET, FILM COATED ORAL
Qty: 90 TABLET | Refills: 3 | Status: SHIPPED | OUTPATIENT
Start: 2022-12-27

## 2022-12-27 NOTE — TELEPHONE ENCOUNTER
Pt requested for VYVANSE 20mg not LIPITOR 40 mg, please cancel previous refill  Pt will need VYVANSE 20 mg filled today, she will be going out of town tomorrow morning

## 2022-12-27 NOTE — TELEPHONE ENCOUNTER
Disregard note,  Waiting for refill from Dr Sai Degroot was requested by pharmacy and sent through mail order

## 2022-12-28 DIAGNOSIS — F90.1 ATTENTION DEFICIT HYPERACTIVITY DISORDER (ADHD), PREDOMINANTLY HYPERACTIVE TYPE: ICD-10-CM

## 2022-12-28 NOTE — TELEPHONE ENCOUNTER
Patient called again regarding her need for Vyvannse  She is leaving for vacation in 2 hours       [facility]

## 2023-01-03 ENCOUNTER — VBI (OUTPATIENT)
Dept: ADMINISTRATIVE | Facility: OTHER | Age: 63
End: 2023-01-03

## 2023-01-03 DIAGNOSIS — E78.2 MIXED HYPERLIPIDEMIA: ICD-10-CM

## 2023-01-03 RX ORDER — FENOFIBRATE 54 MG/1
54 TABLET ORAL DAILY
Qty: 90 TABLET | Refills: 0 | Status: SHIPPED | OUTPATIENT
Start: 2023-01-03 | End: 2023-04-03

## 2023-01-05 DIAGNOSIS — F90.1 ATTENTION DEFICIT HYPERACTIVITY DISORDER (ADHD), PREDOMINANTLY HYPERACTIVE TYPE: ICD-10-CM

## 2023-01-09 ENCOUNTER — TELEPHONE (OUTPATIENT)
Dept: FAMILY MEDICINE CLINIC | Facility: CLINIC | Age: 63
End: 2023-01-09

## 2023-01-16 DIAGNOSIS — I10 ESSENTIAL HYPERTENSION: ICD-10-CM

## 2023-01-16 RX ORDER — LISINOPRIL 5 MG/1
TABLET ORAL
Qty: 45 TABLET | Refills: 3 | Status: SHIPPED | OUTPATIENT
Start: 2023-01-16

## 2023-01-23 DIAGNOSIS — F90.1 ATTENTION DEFICIT HYPERACTIVITY DISORDER (ADHD), PREDOMINANTLY HYPERACTIVE TYPE: ICD-10-CM

## 2023-02-04 DIAGNOSIS — F90.1 ATTENTION DEFICIT HYPERACTIVITY DISORDER (ADHD), PREDOMINANTLY HYPERACTIVE TYPE: ICD-10-CM

## 2023-02-07 LAB
LEFT EYE DIABETIC RETINOPATHY: NORMAL
RIGHT EYE DIABETIC RETINOPATHY: NORMAL
SEVERITY (EYE EXAM): NORMAL

## 2023-02-10 DIAGNOSIS — E11.9 TYPE 2 DIABETES MELLITUS WITHOUT COMPLICATION, WITHOUT LONG-TERM CURRENT USE OF INSULIN (HCC): ICD-10-CM

## 2023-02-10 RX ORDER — METFORMIN HYDROCHLORIDE 500 MG/1
TABLET, EXTENDED RELEASE ORAL
Qty: 360 TABLET | Refills: 0 | Status: SHIPPED | OUTPATIENT
Start: 2023-02-10

## 2023-02-21 DIAGNOSIS — F90.1 ATTENTION DEFICIT HYPERACTIVITY DISORDER (ADHD), PREDOMINANTLY HYPERACTIVE TYPE: ICD-10-CM

## 2023-02-23 DIAGNOSIS — I10 ESSENTIAL HYPERTENSION: ICD-10-CM

## 2023-02-23 RX ORDER — METOPROLOL SUCCINATE 25 MG/1
TABLET, EXTENDED RELEASE ORAL
Qty: 90 TABLET | Refills: 3 | Status: SHIPPED | OUTPATIENT
Start: 2023-02-23

## 2023-03-06 ENCOUNTER — OFFICE VISIT (OUTPATIENT)
Dept: FAMILY MEDICINE CLINIC | Facility: CLINIC | Age: 63
End: 2023-03-06

## 2023-03-06 VITALS
HEART RATE: 89 BPM | TEMPERATURE: 98 F | BODY MASS INDEX: 26.84 KG/M2 | WEIGHT: 167 LBS | SYSTOLIC BLOOD PRESSURE: 122 MMHG | HEIGHT: 66 IN | OXYGEN SATURATION: 97 % | DIASTOLIC BLOOD PRESSURE: 74 MMHG

## 2023-03-06 DIAGNOSIS — F17.200 TOBACCO DEPENDENCE: ICD-10-CM

## 2023-03-06 DIAGNOSIS — R25.2 MUSCLE CRAMPING: ICD-10-CM

## 2023-03-06 DIAGNOSIS — M25.552 LEFT HIP PAIN: ICD-10-CM

## 2023-03-06 DIAGNOSIS — F90.1 ATTENTION DEFICIT HYPERACTIVITY DISORDER (ADHD), PREDOMINANTLY HYPERACTIVE TYPE: ICD-10-CM

## 2023-03-06 DIAGNOSIS — E11.65 TYPE 2 DIABETES MELLITUS WITH HYPERGLYCEMIA, WITHOUT LONG-TERM CURRENT USE OF INSULIN (HCC): ICD-10-CM

## 2023-03-06 DIAGNOSIS — Z01.818 PRE-OP EXAM: Primary | ICD-10-CM

## 2023-03-06 LAB — SL AMB POCT HEMOGLOBIN AIC: 8.6 (ref ?–6.5)

## 2023-03-06 RX ORDER — NICOTINE 21 MG/24HR
1 PATCH, TRANSDERMAL 24 HOURS TRANSDERMAL EVERY 24 HOURS
Qty: 28 PATCH | Refills: 0 | Status: SHIPPED | OUTPATIENT
Start: 2023-03-06

## 2023-03-06 NOTE — PROGRESS NOTES
Assessment/Plan:   1  Pre-op exam/Left hip pain  Patient appears well today  Her functional biopsy has been stable however limited secondary to her orthopedic complaints  EKG appears to show normal sinus rhythm, no ST or T wave changes  Her recent blood work appeared overall stable except for her blood sugars  Her blood sugar was mildly elevated at 8 6  At this time, she will be starting a new diabetic medication  She will be following up in the next few weeks for a repeat prior to her surgical procedure  - POCT ECG    2  Tobacco dependence  Patient was advised on the great importance of quitting her tobacco use prior to her surgery  We will start her with treatment of nicotine patch 14 mg daily  - nicotine (NICODERM CQ) 14 mg/24hr TD 24 hr patch; Place 1 patch on the skin over 24 hours every 24 hours  Dispense: 28 patch; Refill: 0     3  Type 2 diabetes mellitus with hyperglycemia, without long-term current use of insulin (HCC)  Mildly elevated  At this time, continue with her current treatment of metformin  We will add Jardiance 10 mg daily  At this time, she is due for repeat blood work  No we will recheck her A1c in 2 weeks when she comes back from her trip  - POCT hemoglobin A1c  - Empagliflozin (JARDIANCE) 10 MG TABS tablet; Take 1 tablet (10 mg total) by mouth daily  Dispense: 30 tablet; Refill: 5                 Diagnoses and all orders for this visit:    Pre-op exam  -     POCT ECG          Subjective:       Chief Complaint   Patient presents with   • Pre-op Exam     3/27/23 left hip revision      Patient ID: Deborah Ferro is a 63 y o  female      Deborah Ferro  63 y o   female    SURGEON:Dr Silvana Cheng    SURGERY/PROCEDURE: HIP, REVISIONAL TOTAL ARTHOPLASTY    DATE OF SURGERY: 3/27/23     PRIOR ANESTHESIA:yes    COMPLICATION: no    BLEEDING PROBLEM: no    PERTINENT PMH: no    EXERCISE CAPACITY:   CAN WALK 4 BLOCKS AND OR CLIMB 2 FLIGHTS: If no, explain: 2/2 to hip pain    HOME LIVING SITUATION SAFE AND SECURE: Yes      TOBACCO: yes 5-10 cigarettes per day     ETOH: no     ILLEGAL DRUGS: no        Review of Systems   Constitutional: Negative for activity change, chills, fatigue and fever  HENT: Negative for congestion, ear pain, sinus pressure and sore throat  Eyes: Negative for redness, itching and visual disturbance  Respiratory: Negative for cough and shortness of breath  Cardiovascular: Negative for chest pain and palpitations  Gastrointestinal: Negative for abdominal pain, diarrhea and nausea  Endocrine: Negative for cold intolerance and heat intolerance  Genitourinary: Negative for dysuria, flank pain and frequency  Musculoskeletal: Positive for arthralgias  Negative for back pain, gait problem and myalgias  Skin: Negative for color change  Allergic/Immunologic: Negative for environmental allergies  Neurological: Negative for dizziness, numbness and headaches  Psychiatric/Behavioral: Negative for behavioral problems and sleep disturbance  The following portions of the patient's history were reviewed and updated as appropriate : past family history, past medical history, past social history and past surgical history      Current Outpatient Medications:   •  albuterol (PROVENTIL HFA,VENTOLIN HFA) 90 mcg/act inhaler, Inhale 2 puffs every 6 (six) hours as needed for wheezing, Disp: 8 g, Rfl: 0  •  aspirin (ECOTRIN LOW STRENGTH) 81 mg EC tablet, Take 1 tablet by mouth daily, Disp: , Rfl:   •  atorvastatin (LIPITOR) 40 mg tablet, TAKE 1 TABLET DAILY, Disp: 90 tablet, Rfl: 3  •  cetirizine (ZyrTEC) 10 mg tablet, Take by mouth Daily, Disp: , Rfl:   •  cholecalciferol (VITAMIN D3) 1,000 units tablet, Take by mouth, Disp: , Rfl:   •  escitalopram (LEXAPRO) 20 mg tablet, TAKE 1 TABLET BY MOUTH EVERY DAY, Disp: 90 tablet, Rfl: 1  •  famotidine (PEPCID) 20 mg tablet, Take 1 tablet by mouth daily, Disp: , Rfl:   •  fenofibrate (TRICOR) 54 MG tablet, Take 1 tablet (54 mg total) by mouth daily, Disp: 90 tablet, Rfl: 0  •  glucose blood (ONE TOUCH ULTRA TEST) test strip, 1 each by Other route 2 (two) times a day, Disp: 100 each, Rfl: 0  •  lisinopril (ZESTRIL) 5 mg tablet, TAKE ONE-HALF (1/2) TABLET DAILY, Disp: 45 tablet, Rfl: 3  •  metFORMIN (GLUCOPHAGE-XR) 500 mg 24 hr tablet, TAKE 2 TABLETS BY MOUTH TWICE A DAY WITH FOOD, Disp: 360 tablet, Rfl: 0  •  metoprolol succinate (TOPROL-XL) 25 mg 24 hr tablet, TAKE 1 TABLET DAILY, Disp: 90 tablet, Rfl: 3  •  Multiple Vitamins-Minerals (MULTIVITAMIN ADULTS 50+ PO), Take 1 tablet by mouth daily, Disp: , Rfl:   •  Blood Glucose Monitoring Suppl (ONE TOUCH ULTRA MINI) w/Device KIT, by Does not apply route, Disp: , Rfl:   •  Flovent  MCG/ACT inhaler, INHALE 2 PUFFS IN THE MORNING AND 2 PUFFS IN THE EVENING  RINSE MOUTH AFTER USE    (Patient not taking: Reported on 3/6/2023), Disp: 12 g, Rfl: 1  •  lisdexamfetamine (VYVANSE) 20 MG capsule, Take 1 capsule (20 mg total) by mouth every evening With 40 mg taken in the a m  Max Daily Amount: 20 mg, Disp: 30 capsule, Rfl: 0  •  lisdexamfetamine (VYVANSE) 20 MG capsule, Take 1 capsule (20 mg total) by mouth every evening With 40 mg taken in the a m  Max Daily Amount: 20 mg, Disp: 30 capsule, Rfl: 0  •  lisdexamfetamine (VYVANSE) 30 MG capsule, Take 1 capsule (30 mg total) by mouth every morning Max Daily Amount: 30 mg, Disp: 30 capsule, Rfl: 0  •  neomycin-polymyxin-hydrocortisone (CORTISPORIN) otic solution, Administer 4 drops to the right ear every 8 (eight) hours (Patient not taking: Reported on 3/6/2023), Disp: 10 mL, Rfl: 0  •  ONETOUCH DELICA LANCETS FINE MISC, by Does not apply route daily, Disp: 100 each, Rfl: 1  •  predniSONE 10 mg tablet, Take 6 tablets By mouth  In the morning Qd  Decrease by  By 1 tablet daily until completed  , Disp: 21 tablet, Rfl: 0  •  promethazine (PHENERGAN) 12 5 mg/10 mL syrup, Take 5 mL (6 25 mg total) by mouth 4 (four) times a day as needed for nausea or vomiting for up to 7 days, Disp: 118 mL, Rfl: 0  •  Promethazine-DM (PHENERGAN-DM) 6 25-15 mg/5 mL oral syrup, Take 5 mL by mouth 4 (four) times a day as needed for cough (Patient not taking: Reported on 3/6/2023), Disp: 118 mL, Rfl: 0  •  White Petrolatum-Mineral Oil (Retaine PM) OINT, Apply to eye, Disp: , Rfl:          Objective:         Vitals:    03/06/23 1155   BP: 122/74   BP Location: Left arm   Patient Position: Sitting   Cuff Size: Adult   Pulse: 89   Temp: 98 °F (36 7 °C)   SpO2: 97%   Weight: 75 8 kg (167 lb)   Height: 5' 5 75" (1 67 m)     Physical Exam  Vitals reviewed  Constitutional:       Appearance: She is well-developed  HENT:      Head: Normocephalic and atraumatic  Nose: Nose normal       Mouth/Throat:      Pharynx: No oropharyngeal exudate  Eyes:      General: No scleral icterus  Right eye: No discharge  Left eye: No discharge  Pupils: Pupils are equal, round, and reactive to light  Neck:      Trachea: No tracheal deviation  Cardiovascular:      Rate and Rhythm: Normal rate and regular rhythm  Pulses:           Dorsalis pedis pulses are 2+ on the right side and 2+ on the left side  Posterior tibial pulses are 2+ on the right side and 2+ on the left side  Heart sounds: Normal heart sounds  No murmur heard  No friction rub  No gallop  Pulmonary:      Effort: Pulmonary effort is normal  No respiratory distress  Breath sounds: Normal breath sounds  No wheezing or rales  Abdominal:      General: Bowel sounds are normal  There is no distension  Palpations: Abdomen is soft  Tenderness: There is no abdominal tenderness  There is no guarding or rebound  Musculoskeletal:         General: Normal range of motion  Cervical back: Normal range of motion and neck supple  Lymphadenopathy:      Head:      Right side of head: No submental or submandibular adenopathy  Left side of head: No submental or submandibular adenopathy        Cervical: No cervical adenopathy  Right cervical: No superficial, deep or posterior cervical adenopathy  Left cervical: No superficial, deep or posterior cervical adenopathy  Skin:     General: Skin is warm and dry  Findings: No erythema  Neurological:      Mental Status: She is alert and oriented to person, place, and time  Cranial Nerves: No cranial nerve deficit  Sensory: No sensory deficit  Psychiatric:         Mood and Affect: Mood is not anxious or depressed  Speech: Speech normal          Behavior: Behavior normal          Thought Content:  Thought content normal          Judgment: Judgment normal

## 2023-03-13 ENCOUNTER — RA CDI HCC (OUTPATIENT)
Dept: OTHER | Facility: HOSPITAL | Age: 63
End: 2023-03-13

## 2023-03-13 NOTE — PROGRESS NOTES
[facility]  coding opportunities          Chart Reviewed number of suggestions sent to Provider: 1   [id number]    This is a reminder to address ALL HCC (risk adjustment) codes as found on active problem list for 2023 as patient scores reset to zero CASEY      Patients Insurance        Commercial Insurance: [address] [place] [address]

## 2023-03-20 ENCOUNTER — TELEPHONE (OUTPATIENT)
Dept: FAMILY MEDICINE CLINIC | Facility: CLINIC | Age: 63
End: 2023-03-20

## 2023-03-20 ENCOUNTER — OFFICE VISIT (OUTPATIENT)
Dept: FAMILY MEDICINE CLINIC | Facility: CLINIC | Age: 63
End: 2023-03-20

## 2023-03-20 VITALS
WEIGHT: 166 LBS | DIASTOLIC BLOOD PRESSURE: 60 MMHG | BODY MASS INDEX: 26.68 KG/M2 | HEART RATE: 80 BPM | OXYGEN SATURATION: 97 % | HEIGHT: 66 IN | TEMPERATURE: 97.2 F | SYSTOLIC BLOOD PRESSURE: 100 MMHG

## 2023-03-20 DIAGNOSIS — F90.1 ATTENTION DEFICIT HYPERACTIVITY DISORDER (ADHD), PREDOMINANTLY HYPERACTIVE TYPE: ICD-10-CM

## 2023-03-20 DIAGNOSIS — E11.65 TYPE 2 DIABETES MELLITUS WITH HYPERGLYCEMIA, WITHOUT LONG-TERM CURRENT USE OF INSULIN (HCC): ICD-10-CM

## 2023-03-20 DIAGNOSIS — Z01.818 PRE-OP EXAM: Primary | ICD-10-CM

## 2023-03-20 DIAGNOSIS — M25.552 LEFT HIP PAIN: ICD-10-CM

## 2023-03-20 LAB — SL AMB POCT HEMOGLOBIN AIC: 8.6 (ref ?–6.5)

## 2023-03-20 NOTE — PROGRESS NOTES
Assessment/Plan:   1  Pre-op exam/Left hip pain  Reviewed patient's preop testing as well as her blood work  At this time, her A1c today in the office was unchanged at 8 6  She has been working on further decreasing her blood sugar levels  Her average sugar level at home has been in the 180s  At this time, we will call orthopedics to determine if they will still proceed with the revision   - POCT hemoglobin A1c  - mupirocin (BACTROBAN) 2 % nasal ointment; into each nostril 2 (two) times a day  Dispense: 10 g; Refill: 0    2  Type 2 diabetes mellitus with hyperglycemia, without long-term current use of insulin (HCC)  A1c today  8 6, unchanged  She is been tolerating her Jardiance very well  We will continue with his current treatment  At this time, it likely has not been enough time for her A1c to drop sufficiently  Continue with a strict diabetic diet and exercise plan  Continues well with routine home blood sugar monitoring   - POCT hemoglobin A1c        Addendum: Discussed clearance with orthopedics  At this time, given the severity of her orthopedic problems, they stated that she would need to have her procedure regardless of her blood sugar problems  At this time, her blood sugar is only minimally elevated  At this time, she is cleared with intermediate perioperative cardiovascular risk  Diagnoses and all orders for this visit:    Pre-op exam  -     POCT hemoglobin A1c    Type 2 diabetes mellitus with hyperglycemia, without long-term current use of insulin (HCC)  -     POCT hemoglobin A1c    Left hip pain  -     POCT hemoglobin A1c          Subjective:       Chief Complaint   Patient presents with   • Follow-up      Patient ID: Yohan Bell is a 63 y o  female today for a follow-up on her type 2 diabetes, since her last visit, she has been taking her Jardiance regularly  Denies adverse reactions with this    She has been cutting back on her tobacco use however her diet is increased due to this     HPI    Review of Systems   Constitutional: Negative for activity change, chills, fatigue and fever  HENT: Negative for congestion, ear pain, sinus pressure and sore throat  Eyes: Negative for redness, itching and visual disturbance  Respiratory: Negative for cough and shortness of breath  Cardiovascular: Negative for chest pain and palpitations  Gastrointestinal: Negative for abdominal pain, diarrhea and nausea  Endocrine: Negative for cold intolerance and heat intolerance  Genitourinary: Negative for dysuria, flank pain and frequency  Musculoskeletal: Negative for arthralgias, back pain, gait problem and myalgias  Skin: Negative for color change  Allergic/Immunologic: Negative for environmental allergies  Neurological: Negative for dizziness, numbness and headaches  Psychiatric/Behavioral: Negative for behavioral problems and sleep disturbance  The following portions of the patient's history were reviewed and updated as appropriate : past family history, past medical history, past social history and past surgical history      Current Outpatient Medications:   •  aspirin (ECOTRIN LOW STRENGTH) 81 mg EC tablet, Take 1 tablet by mouth daily, Disp: , Rfl:   •  atorvastatin (LIPITOR) 40 mg tablet, TAKE 1 TABLET DAILY, Disp: 90 tablet, Rfl: 3  •  Blood Glucose Monitoring Suppl (ONE TOUCH ULTRA MINI) w/Device KIT, by Does not apply route, Disp: , Rfl:   •  cholecalciferol (VITAMIN D3) 1,000 units tablet, Take by mouth, Disp: , Rfl:   •  Empagliflozin (JARDIANCE) 10 MG TABS tablet, Take 1 tablet (10 mg total) by mouth daily, Disp: 30 tablet, Rfl: 5  •  escitalopram (LEXAPRO) 20 mg tablet, TAKE 1 TABLET BY MOUTH EVERY DAY, Disp: 90 tablet, Rfl: 1  •  famotidine (PEPCID) 20 mg tablet, Take 1 tablet by mouth daily, Disp: , Rfl:   •  fenofibrate (TRICOR) 54 MG tablet, Take 1 tablet (54 mg total) by mouth daily, Disp: 90 tablet, Rfl: 0  •  glucose blood (ONE TOUCH ULTRA TEST) test strip, 1 each by Other route 2 (two) times a day, Disp: 100 each, Rfl: 0  •  lisdexamfetamine (VYVANSE) 20 MG capsule, Take 1 capsule (20 mg total) by mouth every evening With 40 mg taken in the a m  Max Daily Amount: 20 mg, Disp: 30 capsule, Rfl: 0  •  lisdexamfetamine (VYVANSE) 30 MG capsule, Take 1 capsule (30 mg total) by mouth every morning Max Daily Amount: 30 mg, Disp: 30 capsule, Rfl: 0  •  lisinopril (ZESTRIL) 5 mg tablet, TAKE ONE-HALF (1/2) TABLET DAILY, Disp: 45 tablet, Rfl: 3  •  metFORMIN (GLUCOPHAGE-XR) 500 mg 24 hr tablet, TAKE 2 TABLETS BY MOUTH TWICE A DAY WITH FOOD, Disp: 360 tablet, Rfl: 0  •  metoprolol succinate (TOPROL-XL) 25 mg 24 hr tablet, TAKE 1 TABLET DAILY, Disp: 90 tablet, Rfl: 3  •  Multiple Vitamins-Minerals (MULTIVITAMIN ADULTS 50+ PO), Take 1 tablet by mouth daily, Disp: , Rfl:   •  nicotine (NICODERM CQ) 14 mg/24hr TD 24 hr patch, Place 1 patch on the skin over 24 hours every 24 hours, Disp: 28 patch, Rfl: 0  •  ONETOUCH DELICA LANCETS FINE MISC, by Does not apply route daily, Disp: 100 each, Rfl: 1  •  White Petrolatum-Mineral Oil (Retaine PM) OINT, Apply to eye, Disp: , Rfl:   •  albuterol (PROVENTIL HFA,VENTOLIN HFA) 90 mcg/act inhaler, Inhale 2 puffs every 6 (six) hours as needed for wheezing (Patient not taking: Reported on 3/20/2023), Disp: 8 g, Rfl: 0  •  cetirizine (ZyrTEC) 10 mg tablet, Take by mouth Daily (Patient not taking: Reported on 3/20/2023), Disp: , Rfl:   •  Flovent  MCG/ACT inhaler, INHALE 2 PUFFS IN THE MORNING AND 2 PUFFS IN THE EVENING  RINSE MOUTH AFTER USE    (Patient not taking: Reported on 3/6/2023), Disp: 12 g, Rfl: 1  •  lisdexamfetamine (VYVANSE) 20 MG capsule, Take 1 capsule (20 mg total) by mouth every evening With 40 mg taken in the a m   Max Daily Amount: 20 mg, Disp: 30 capsule, Rfl: 0  •  neomycin-polymyxin-hydrocortisone (CORTISPORIN) otic solution, Administer 4 drops to the right ear every 8 (eight) hours, Disp: 10 mL, Rfl: 0  •  predniSONE 10 mg tablet, Take 6 tablets By mouth  In the morning Qd  Decrease by  By 1 tablet daily until completed  , Disp: 21 tablet, Rfl: 0  •  promethazine (PHENERGAN) 12 5 mg/10 mL syrup, Take 5 mL (6 25 mg total) by mouth 4 (four) times a day as needed for nausea or vomiting for up to 7 days, Disp: 118 mL, Rfl: 0  •  Promethazine-DM (PHENERGAN-DM) 6 25-15 mg/5 mL oral syrup, Take 5 mL by mouth 4 (four) times a day as needed for cough, Disp: 118 mL, Rfl: 0         Objective:         Vitals:    03/20/23 1050   BP: 100/60   BP Location: Left arm   Patient Position: Sitting   Cuff Size: Adult   Pulse: 80   Temp: (!) 97 2 °F (36 2 °C)   TempSrc: Temporal   SpO2: 97%   Weight: 75 3 kg (166 lb)   Height: 5' 5 75" (1 67 m)     Physical Exam  Vitals reviewed  Constitutional:       Appearance: She is well-developed  HENT:      Head: Normocephalic and atraumatic  Nose: Nose normal       Mouth/Throat:      Pharynx: No oropharyngeal exudate  Eyes:      General: No scleral icterus  Right eye: No discharge  Left eye: No discharge  Pupils: Pupils are equal, round, and reactive to light  Neck:      Trachea: No tracheal deviation  Cardiovascular:      Rate and Rhythm: Normal rate and regular rhythm  Pulses:           Dorsalis pedis pulses are 2+ on the right side and 2+ on the left side  Posterior tibial pulses are 2+ on the right side and 2+ on the left side  Heart sounds: Normal heart sounds  No murmur heard  No friction rub  No gallop  Pulmonary:      Effort: Pulmonary effort is normal  No respiratory distress  Breath sounds: Normal breath sounds  No wheezing or rales  Abdominal:      General: Bowel sounds are normal  There is no distension  Palpations: Abdomen is soft  Tenderness: There is no abdominal tenderness  There is no guarding or rebound  Musculoskeletal:         General: Normal range of motion  Cervical back: Normal range of motion and neck supple  Lymphadenopathy:      Head:      Right side of head: No submental or submandibular adenopathy  Left side of head: No submental or submandibular adenopathy  Cervical: No cervical adenopathy  Right cervical: No superficial, deep or posterior cervical adenopathy  Left cervical: No superficial, deep or posterior cervical adenopathy  Skin:     General: Skin is warm and dry  Findings: No erythema  Neurological:      Mental Status: She is alert and oriented to person, place, and time  Cranial Nerves: No cranial nerve deficit  Sensory: No sensory deficit  Psychiatric:         Mood and Affect: Mood is not anxious or depressed  Speech: Speech normal          Behavior: Behavior normal          Thought Content:  Thought content normal          Judgment: Judgment normal

## 2023-03-20 NOTE — TELEPHONE ENCOUNTER
Pma Winter w/ [facility] called  Stated someone called to advise the nurse that pt's A1C was at 8 6 today at visit with PCP  Surgeon advised and is ok to do surgery with A1C at 8 6 because pt needs the surgery

## 2023-03-22 ENCOUNTER — TELEPHONE (OUTPATIENT)
Dept: FAMILY MEDICINE CLINIC | Facility: CLINIC | Age: 63
End: 2023-03-22

## 2023-03-22 NOTE — TELEPHONE ENCOUNTER
Jaycob Huynh from [facility] called regarding pt's PreOp Clearance  Jaycob Huynh stated that the note was missing clearance and requested an addended note clearing pt for surgery      Call back: [phone]

## 2023-03-24 DIAGNOSIS — F41.9 ANXIETY DISORDER, UNSPECIFIED TYPE: ICD-10-CM

## 2023-03-24 RX ORDER — ESCITALOPRAM OXALATE 20 MG/1
TABLET ORAL
Qty: 90 TABLET | Refills: 1 | Status: SHIPPED | OUTPATIENT
Start: 2023-03-24

## 2023-04-02 DIAGNOSIS — F90.1 ATTENTION DEFICIT HYPERACTIVITY DISORDER (ADHD), PREDOMINANTLY HYPERACTIVE TYPE: ICD-10-CM

## 2023-04-04 DIAGNOSIS — E78.2 MIXED HYPERLIPIDEMIA: ICD-10-CM

## 2023-04-04 RX ORDER — FENOFIBRATE 54 MG/1
TABLET ORAL
Qty: 90 TABLET | Refills: 0 | Status: SHIPPED | OUTPATIENT
Start: 2023-04-04

## 2023-05-01 DIAGNOSIS — F90.1 ATTENTION DEFICIT HYPERACTIVITY DISORDER (ADHD), PREDOMINANTLY HYPERACTIVE TYPE: ICD-10-CM

## 2023-05-10 NOTE — PATIENT INSTRUCTIONS
Push fluids  Start antibiotic as instructed  Prescription cough medicine as needed  Please note that cough is not necessarily a bad thing in helps to protect your airways  Would recommend just using prescription cough medicine for rest     During the day would recommend cough expectorant such as Mucinex plain  1 tablet twice a day with full glass of fluid  Warm tea with honey to soothe throat  Honey can also be and expectorant  Albuterol inhaler as needed for chest tightness, wheezing, spastic cough  COVID testing initiated  Those results may take 2-3 days to get back  May get those results off of the [address] [place] [address]  [employer] my chart web site  Transmission precautions advised  Follow-up with your primary care provider if not improving over the next 3-5 days  If significant worsening with chest pain, shortness of breath or profound weakness proceed to emergency room immediately for further evaluation  116

## 2023-05-15 DIAGNOSIS — F90.1 ATTENTION DEFICIT HYPERACTIVITY DISORDER (ADHD), PREDOMINANTLY HYPERACTIVE TYPE: ICD-10-CM

## 2023-05-20 DIAGNOSIS — E11.9 TYPE 2 DIABETES MELLITUS WITHOUT COMPLICATION, WITHOUT LONG-TERM CURRENT USE OF INSULIN (HCC): ICD-10-CM

## 2023-05-22 RX ORDER — METFORMIN HYDROCHLORIDE 500 MG/1
TABLET, EXTENDED RELEASE ORAL
Qty: 360 TABLET | Refills: 0 | Status: SHIPPED | OUTPATIENT
Start: 2023-05-22

## 2023-05-31 DIAGNOSIS — F90.1 ATTENTION DEFICIT HYPERACTIVITY DISORDER (ADHD), PREDOMINANTLY HYPERACTIVE TYPE: ICD-10-CM

## 2023-06-13 DIAGNOSIS — F90.1 ATTENTION DEFICIT HYPERACTIVITY DISORDER (ADHD), PREDOMINANTLY HYPERACTIVE TYPE: ICD-10-CM

## 2023-06-13 NOTE — TELEPHONE ENCOUNTER
Pt called asking for a early fill because they are going out of [place] for vacation on Thursday, 6/15  She says the pharmacy needs a note stating its ok to fill early

## 2023-06-14 DIAGNOSIS — E78.2 MIXED HYPERLIPIDEMIA: ICD-10-CM

## 2023-06-14 RX ORDER — FENOFIBRATE 54 MG/1
54 TABLET ORAL DAILY
Qty: 90 TABLET | Refills: 0 | Status: SHIPPED | OUTPATIENT
Start: 2023-06-14

## 2023-06-21 DIAGNOSIS — F90.1 ATTENTION DEFICIT HYPERACTIVITY DISORDER (ADHD), PREDOMINANTLY HYPERACTIVE TYPE: ICD-10-CM

## 2023-07-03 ENCOUNTER — OFFICE VISIT (OUTPATIENT)
Dept: URGENT CARE | Facility: CLINIC | Age: 63
End: 2023-07-03
Payer: COMMERCIAL

## 2023-07-03 VITALS
BODY MASS INDEX: 26.68 KG/M2 | TEMPERATURE: 98.4 F | OXYGEN SATURATION: 97 % | RESPIRATION RATE: 16 BRPM | WEIGHT: 166 LBS | HEART RATE: 78 BPM | HEIGHT: 66 IN

## 2023-07-03 DIAGNOSIS — J01.90 ACUTE BACTERIAL SINUSITIS: Primary | ICD-10-CM

## 2023-07-03 DIAGNOSIS — J02.9 SORE THROAT: ICD-10-CM

## 2023-07-03 DIAGNOSIS — B96.89 ACUTE BACTERIAL SINUSITIS: Primary | ICD-10-CM

## 2023-07-03 LAB — S PYO AG THROAT QL: NEGATIVE

## 2023-07-03 PROCEDURE — 87880 STREP A ASSAY W/OPTIC: CPT

## 2023-07-03 PROCEDURE — S9083 URGENT CARE CENTER GLOBAL: HCPCS

## 2023-07-03 PROCEDURE — G0382 LEV 3 HOSP TYPE B ED VISIT: HCPCS

## 2023-07-03 RX ORDER — BROMPHENIRAMINE MALEATE, PSEUDOEPHEDRINE HYDROCHLORIDE, AND DEXTROMETHORPHAN HYDROBROMIDE 2; 30; 10 MG/5ML; MG/5ML; MG/5ML
10 SYRUP ORAL 4 TIMES DAILY PRN
Qty: 120 ML | Refills: 0 | Status: SHIPPED | OUTPATIENT
Start: 2023-07-03

## 2023-07-03 RX ORDER — OXYCODONE HYDROCHLORIDE 5 MG/1
TABLET ORAL
COMMUNITY
Start: 2023-03-27

## 2023-07-03 RX ORDER — AMOXICILLIN AND CLAVULANATE POTASSIUM 875; 125 MG/1; MG/1
1 TABLET, FILM COATED ORAL EVERY 12 HOURS SCHEDULED
Qty: 14 TABLET | Refills: 0 | Status: SHIPPED | OUTPATIENT
Start: 2023-07-03 | End: 2023-07-10

## 2023-07-03 NOTE — PROGRESS NOTES
[facility]        NAME: Rene Ricketts is a 63 y.o. female  : 1960    MRN: [id number]  DATE: July 3, 2023  TIME: 11:26 AM    Assessment and Plan   Acute bacterial sinusitis [J01.90, B96.89]  1. Acute bacterial sinusitis  amoxicillin-clavulanate (AUGMENTIN) 875-125 mg per tablet    brompheniramine-pseudoephedrine-DM 30-2-10 MG/5ML syrup      2. Sore throat  POCT rapid strepA    amoxicillin-clavulanate (AUGMENTIN) 875-125 mg per tablet    brompheniramine-pseudoephedrine-DM 30-2-10 MG/5ML syrup        Strep test was negative. Patient Instructions   Start and complete course of antibiotics. Do not take Dayquil while taking cough medication I had prescribed. Follow up with PCP in 3-5 days. Proceed to ER if symptoms worsen. Chief Complaint     Chief Complaint   Patient presents with   • Cold Like Symptoms     Patient states that 1 week ago she started with a hoarse voice and then it is not going away along with a cough         History of Present Illness       Patient was on vacation with family last week. Some were recovering from upper respiratory illnesses. Starting about 7 days ago she had sore throat, and cough. Had improved about 4 days ago but suddenly 3 days ago woke up feeling worsen with congestion in addition to other symptoms. No fevers, chills, NVD. Review of Systems   Review of Systems   Constitutional: Negative for chills and fever. HENT: Positive for congestion, postnasal drip, sinus pressure and sore throat. Negative for ear pain. Eyes: Negative for pain and visual disturbance. Respiratory: Positive for cough. Negative for shortness of breath. Cardiovascular: Negative for chest pain and palpitations. Gastrointestinal: Negative for abdominal pain and vomiting. Genitourinary: Negative for dysuria and hematuria. Musculoskeletal: Negative for arthralgias and back pain. Skin: Negative for color change and rash.    Neurological: Negative for dizziness, seizures, syncope, weakness and numbness. All other systems reviewed and are negative. Current Medications       Current Outpatient Medications:   •  amoxicillin-clavulanate (AUGMENTIN) 875-125 mg per tablet, Take 1 tablet by mouth every 12 (twelve) hours for 7 days, Disp: 14 tablet, Rfl: 0  •  aspirin (ECOTRIN LOW STRENGTH) 81 mg EC tablet, Take 1 tablet by mouth daily, Disp: , Rfl:   •  atorvastatin (LIPITOR) 40 mg tablet, TAKE 1 TABLET DAILY, Disp: 90 tablet, Rfl: 3  •  Blood Glucose Monitoring Suppl (ONE TOUCH ULTRA MINI) w/Device KIT, by Does not apply route, Disp: , Rfl:   •  brompheniramine-pseudoephedrine-DM 30-2-10 MG/5ML syrup, Take 10 mL by mouth 4 (four) times a day as needed for allergies, Disp: 120 mL, Rfl: 0  •  cholecalciferol (VITAMIN D3) 1,000 units tablet, Take by mouth, Disp: , Rfl:   •  Empagliflozin (JARDIANCE) 10 MG TABS tablet, Take 1 tablet (10 mg total) by mouth daily, Disp: 30 tablet, Rfl: 5  •  escitalopram (LEXAPRO) 20 mg tablet, TAKE 1 TABLET BY MOUTH EVERY DAY, Disp: 90 tablet, Rfl: 1  •  famotidine (PEPCID) 20 mg tablet, Take 1 tablet by mouth daily, Disp: , Rfl:   •  fenofibrate (TRICOR) 54 MG tablet, Take 1 tablet (54 mg total) by mouth daily, Disp: 90 tablet, Rfl: 0  •  glucose blood (ONE TOUCH ULTRA TEST) test strip, 1 each by Other route 2 (two) times a day, Disp: 100 each, Rfl: 0  •  lisdexamfetamine (VYVANSE) 20 MG capsule, Take 1 capsule (20 mg total) by mouth every evening With 40 mg taken in the a.m.  Max Daily Amount: 20 mg, Disp: 30 capsule, Rfl: 0  •  lisdexamfetamine (VYVANSE) 30 MG capsule, Take 1 capsule (30 mg total) by mouth every morning Max Daily Amount: 30 mg, Disp: 30 capsule, Rfl: 0  •  lisinopril (ZESTRIL) 5 mg tablet, TAKE ONE-HALF (1/2) TABLET DAILY, Disp: 45 tablet, Rfl: 3  •  metFORMIN (GLUCOPHAGE-XR) 500 mg 24 hr tablet, TAKE 2 TABLETS BY MOUTH TWICE A DAY WITH FOOD, Disp: 360 tablet, Rfl: 0  •  metoprolol succinate (TOPROL-XL) 25 mg 24 hr tablet, TAKE 1 TABLET DAILY, Disp: 90 tablet, Rfl: 3  •  Multiple Vitamins-Minerals (MULTIVITAMIN ADULTS 50+ PO), Take 1 tablet by mouth daily, Disp: , Rfl:   •  ONETOUCH DELICA LANCETS FINE MISC, by Does not apply route daily, Disp: 100 each, Rfl: 1  •  oxyCODONE (ROXICODONE) 5 immediate release tablet, PLEASE SEE ATTACHED FOR DETAILED DIRECTIONS, Disp: , Rfl:   •  albuterol (PROVENTIL HFA,VENTOLIN HFA) 90 mcg/act inhaler, Inhale 2 puffs every 6 (six) hours as needed for wheezing (Patient not taking: Reported on 3/20/2023), Disp: 8 g, Rfl: 0  •  cetirizine (ZyrTEC) 10 mg tablet, Take by mouth Daily (Patient not taking: Reported on 3/20/2023), Disp: , Rfl:   •  Flovent  MCG/ACT inhaler, INHALE 2 PUFFS IN THE MORNING AND 2 PUFFS IN THE EVENING. RINSE MOUTH AFTER USE. . (Patient not taking: Reported on 3/6/2023), Disp: 12 g, Rfl: 1  •  lisdexamfetamine (VYVANSE) 20 MG capsule, Take 1 capsule (20 mg total) by mouth every evening With 40 mg taken in the a.m. Max Daily Amount: 20 mg (Patient not taking: Reported on 7/3/2023), Disp: 30 capsule, Rfl: 0  •  mupirocin (BACTROBAN) 2 % nasal ointment, into each nostril 2 (two) times a day (Patient not taking: Reported on 7/3/2023), Disp: 10 g, Rfl: 0  •  neomycin-polymyxin-hydrocortisone (CORTISPORIN) otic solution, Administer 4 drops to the right ear every 8 (eight) hours (Patient not taking: Reported on 7/3/2023), Disp: 10 mL, Rfl: 0  •  nicotine (NICODERM CQ) 14 mg/24hr TD 24 hr patch, Place 1 patch on the skin over 24 hours every 24 hours (Patient not taking: Reported on 7/3/2023), Disp: 28 patch, Rfl: 0  •  predniSONE 10 mg tablet, Take 6 tablets By mouth  In the morning Qd. Decrease by  By 1 tablet daily until completed.  (Patient not taking: Reported on 7/3/2023), Disp: 21 tablet, Rfl: 0  •  promethazine (PHENERGAN) 12.5 mg/10 mL syrup, Take 5 mL (6.25 mg total) by mouth 4 (four) times a day as needed for nausea or vomiting for up to 7 days, Disp: 118 mL, Rfl: 0  • Promethazine-DM (PHENERGAN-DM) 6.25-15 mg/5 mL oral syrup, Take 5 mL by mouth 4 (four) times a day as needed for cough (Patient not taking: Reported on 7/3/2023), Disp: 118 mL, Rfl: 0  •  White Petrolatum-Mineral Oil (Retaine PM) OINT, Apply to eye (Patient not taking: Reported on 7/3/2023), Disp: , Rfl:     Current Allergies     Allergies as of 07/03/2023 - Reviewed 07/03/2023   Allergen Reaction Noted   • Levaquin [levofloxacin] Anaphylaxis 05/18/2012            The following portions of the patient's history were reviewed and updated as appropriate: allergies, current medications, past family history, past medical history, past social history, past surgical history and problem list.     Past Medical History:   Diagnosis Date   • Abnormal mammogram     RESOLVED: [id number]   • Arthritis    • Diabetes mellitus ([address])    • Eczema of right external ear     RESOLVED: [id number]   • Foot drop, left foot    • Heart attack ([address])    • Hematuria     RESOLVED: [id number]   • Impetigo     RESOLVED: [id number]   • Insomnia related to another mental disorder     AXIS I/II MENTAL DISORDER; RESOLVED: [id number]       Past Surgical History:   Procedure Laterality Date   • HYSTERECTOMY  1998    still has 1 ovary not sure which one was removed   • OTHER SURGICAL HISTORY      STEND INDICATIONS: RESTENOSIS AT PREVIOUS PTCA LOCATION    • SPLENECTOMY     • TOTAL HIP ARTHROPLASTY Left        Family History   Problem Relation Age of Onset   • Stroke Mother    • Osteoporosis Mother    • Diabetes type II Father    • Heart attack Father    • Cancer Father    • Heart disease Father    • Hypertension Father    • Heart disease Sister    • Diabetes type II Brother    • Heart attack Brother    • Heart disease Brother    • Heart attack Brother    • Heart attack Brother    • Heart attack Brother    • Diabetes type II Brother    • Alcohol abuse Neg Hx    • Substance Abuse Neg Hx          Medications have been verified.         Objective   Pulse 78   Temp 98.4 °F (36.9 °C) (Tympanic)   Resp 16   Ht 5' 5.75" (1.67 m)   Wt 75.3 kg (166 lb)   SpO2 97%   BMI 27.00 kg/m²   No LMP recorded. Patient is postmenopausal.       Physical Exam     Physical Exam  Vitals and nursing note reviewed. Constitutional:       Appearance: Normal appearance. HENT:      Nose: Congestion and rhinorrhea present. Mouth/Throat:      Mouth: Mucous membranes are moist.      Pharynx: No oropharyngeal exudate. Eyes:      Extraocular Movements: Extraocular movements intact. Conjunctiva/sclera: Conjunctivae normal.      Pupils: Pupils are equal, round, and reactive to light. Cardiovascular:      Rate and Rhythm: Normal rate. Pulses: Normal pulses. Heart sounds: Normal heart sounds. Pulmonary:      Effort: Pulmonary effort is normal. No respiratory distress. Breath sounds: Normal breath sounds. No stridor. No wheezing, rhonchi or rales. Chest:      Chest wall: No tenderness. Skin:     General: Skin is warm and dry. Capillary Refill: Capillary refill takes less than 2 seconds. Neurological:      General: No focal deficit present. Mental Status: She is alert and oriented to person, place, and time. Mental status is at baseline. Psychiatric:         Mood and Affect: Mood normal.         Behavior: Behavior normal.         Thought Content:  Thought content normal.         Judgment: Judgment normal.

## 2023-07-12 DIAGNOSIS — F90.1 ATTENTION DEFICIT HYPERACTIVITY DISORDER (ADHD), PREDOMINANTLY HYPERACTIVE TYPE: ICD-10-CM

## 2023-07-12 NOTE — TELEPHONE ENCOUNTER
Pt is going on vacation and is asking for this by Friday, 6/14 she states the pharmacy needs a note authorizing that it is early because of her vacation.

## 2023-07-13 ENCOUNTER — TELEPHONE (OUTPATIENT)
Dept: ADMINISTRATIVE | Facility: OTHER | Age: 63
End: 2023-07-13

## 2023-07-13 ENCOUNTER — TELEPHONE (OUTPATIENT)
Dept: FAMILY MEDICINE CLINIC | Facility: CLINIC | Age: 63
End: 2023-07-13

## 2023-07-13 DIAGNOSIS — F90.1 ATTENTION DEFICIT HYPERACTIVITY DISORDER (ADHD), PREDOMINANTLY HYPERACTIVE TYPE: ICD-10-CM

## 2023-07-13 RX ORDER — LISDEXAMFETAMINE DIMESYLATE CAPSULES 20 MG/1
20 CAPSULE ORAL EVERY EVENING
Qty: 30 CAPSULE | Refills: 0 | Status: SHIPPED | OUTPATIENT
Start: 2023-07-13 | End: 2023-07-13 | Stop reason: SDUPTHER

## 2023-07-13 NOTE — LETTER
Diabetic Eye Exam Form    Date Requested: 23  Patient: Osiris Beebe  Patient : 1960   Referring Provider: Laureen Rust DO      DIABETIC Eye Exam Date ______________________________      Type of Exam MUST be documented for Diabetic Eye Exams. Please CHECK ONE. Retinal Exam       Dilated Retinal Exam       OCT       Optomap-Iris Exam      Fundus Photography       Left Eye - Please check Retinopathy or No Retinopathy        Exam did show retinopathy    Exam did not show retinopathy       Right Eye - Please check Retinopathy or No Retinopathy       Exam did show retinopathy    Exam did not show retinopathy       Comments __________________________________________________________    Practice Providing Exam ______________________________________________    Exam Performed By (print name) _______________________________________      Provider Signature ___________________________________________________      These reports are needed for  compliance. Please fax this completed form and a copy of the Diabetic Eye Exam report to our office located at [address] [place] [address] as soon as possible via Fax [phone] attention [place]: Phone [phone]  We thank you for your assistance in treating our mutual patient.

## 2023-07-13 NOTE — TELEPHONE ENCOUNTER
----- Message from Will Ott sent at 7/13/2023  8:11 AM EDT -----  Regarding: Care Gap Request  07/13/23 8:11 AM    Hello, our patient Keanu Corbin has had Diabetic Eye Exam completed/performed. Please assist in updating the patient chart by making an External outreach to Dr. Celio Iglesias facility located in [facility]. The date of service is within past year.     Thank you,  Mohsen Chambers PG [facility]

## 2023-07-13 NOTE — TELEPHONE ENCOUNTER
Pt called stating that the Pharmacy called and told her that her Vyvanse is on hold because it is too early to be filled. Pt is requesting an early refill due to going on vacation.

## 2023-07-13 NOTE — TELEPHONE ENCOUNTER
[facility] is out of Vyvanse and pt called to ask for it to be sent to [facility] needs by 7/14

## 2023-07-13 NOTE — TELEPHONE ENCOUNTER
Upon review of the In Basket request and the patient's chart, initial outreach has been made via fax to facility. Please see Contacts section for details.      Thank you  Prasad Rai

## 2023-07-24 ENCOUNTER — RA CDI HCC (OUTPATIENT)
Dept: OTHER | Facility: HOSPITAL | Age: 63
End: 2023-07-24

## 2023-07-24 DIAGNOSIS — F90.1 ATTENTION DEFICIT HYPERACTIVITY DISORDER (ADHD), PREDOMINANTLY HYPERACTIVE TYPE: ICD-10-CM

## 2023-07-24 NOTE — PROGRESS NOTES
[facility] coding opportunities          Chart Reviewed number of suggestions sent to Provider: 2   E11.36  E11.22      Patients Insurance        Commercial Insurance: Vignesh Clay

## 2023-07-31 ENCOUNTER — OFFICE VISIT (OUTPATIENT)
Dept: FAMILY MEDICINE CLINIC | Facility: CLINIC | Age: 63
End: 2023-07-31
Payer: COMMERCIAL

## 2023-07-31 VITALS
BODY MASS INDEX: 25.46 KG/M2 | SYSTOLIC BLOOD PRESSURE: 142 MMHG | TEMPERATURE: 97.3 F | WEIGHT: 158.4 LBS | OXYGEN SATURATION: 98 % | HEART RATE: 93 BPM | DIASTOLIC BLOOD PRESSURE: 72 MMHG | HEIGHT: 66 IN

## 2023-07-31 DIAGNOSIS — E11.9 TYPE 2 DIABETES MELLITUS WITHOUT COMPLICATION, WITHOUT LONG-TERM CURRENT USE OF INSULIN (HCC): ICD-10-CM

## 2023-07-31 DIAGNOSIS — E11.22 TYPE 2 DIABETES MELLITUS WITH STAGE 3 CHRONIC KIDNEY DISEASE, WITHOUT LONG-TERM CURRENT USE OF INSULIN, UNSPECIFIED WHETHER STAGE 3A OR 3B CKD (HCC): ICD-10-CM

## 2023-07-31 DIAGNOSIS — F90.1 ATTENTION DEFICIT HYPERACTIVITY DISORDER (ADHD), PREDOMINANTLY HYPERACTIVE TYPE: ICD-10-CM

## 2023-07-31 DIAGNOSIS — J20.9 ACUTE BRONCHITIS, UNSPECIFIED ORGANISM: ICD-10-CM

## 2023-07-31 DIAGNOSIS — R94.6 ABNORMAL THYROID FUNCTION TEST: ICD-10-CM

## 2023-07-31 DIAGNOSIS — F41.9 ANXIETY DISORDER, UNSPECIFIED TYPE: ICD-10-CM

## 2023-07-31 DIAGNOSIS — Z13.0 SCREENING FOR IRON DEFICIENCY ANEMIA: ICD-10-CM

## 2023-07-31 DIAGNOSIS — N18.30 TYPE 2 DIABETES MELLITUS WITH STAGE 3 CHRONIC KIDNEY DISEASE, WITHOUT LONG-TERM CURRENT USE OF INSULIN, UNSPECIFIED WHETHER STAGE 3A OR 3B CKD (HCC): ICD-10-CM

## 2023-07-31 DIAGNOSIS — Z01.810 PREOP CARDIOVASCULAR EXAM: ICD-10-CM

## 2023-07-31 DIAGNOSIS — H26.9 CATARACT OF BOTH EYES, UNSPECIFIED CATARACT TYPE: ICD-10-CM

## 2023-07-31 DIAGNOSIS — I10 BENIGN ESSENTIAL HYPERTENSION: ICD-10-CM

## 2023-07-31 DIAGNOSIS — Z12.31 ENCOUNTER FOR SCREENING MAMMOGRAM FOR MALIGNANT NEOPLASM OF BREAST: Primary | ICD-10-CM

## 2023-07-31 DIAGNOSIS — Z11.59 NEED FOR HEPATITIS C SCREENING TEST: ICD-10-CM

## 2023-07-31 DIAGNOSIS — K58.0 IRRITABLE BOWEL SYNDROME WITH DIARRHEA: ICD-10-CM

## 2023-07-31 DIAGNOSIS — E78.2 MIXED HYPERLIPIDEMIA: ICD-10-CM

## 2023-07-31 PROCEDURE — 99244 OFF/OP CNSLTJ NEW/EST MOD 40: CPT | Performed by: FAMILY MEDICINE

## 2023-07-31 RX ORDER — OXYCODONE HYDROCHLORIDE 5 MG/1
TABLET ORAL
COMMUNITY
Start: 2023-03-27 | End: 2023-07-31

## 2023-07-31 RX ORDER — ALBUTEROL SULFATE 90 UG/1
2 AEROSOL, METERED RESPIRATORY (INHALATION) EVERY 6 HOURS PRN
Qty: 8 G | Refills: 5 | Status: SHIPPED | OUTPATIENT
Start: 2023-07-31

## 2023-07-31 RX ORDER — DICYCLOMINE HCL 20 MG
20 TABLET ORAL 3 TIMES DAILY PRN
Qty: 30 TABLET | Refills: 0 | Status: SHIPPED | OUTPATIENT
Start: 2023-07-31

## 2023-07-31 NOTE — PROGRESS NOTES
Assessment/Plan:   1. Preop cardiovascular exam/Cataract of both eyes, unspecified cataract type  Patient appears well today. Her functional capacity has been stable. She does not have any active cardiac conditions. This type of procedure is considered low risk. She is cleared with low perioperative cardiovascular risk. 2. Irritable bowel syndrome with diarrhea  Symptoms appear likely secondary to possible colitis/IBS. At this time, she is advised to start a clear liquid diet slowly advancing as tolerated. She may take dicyclomine for symptom relief. Follow-up if any symptoms worsen. - dicyclomine (BENTYL) 20 mg tablet; Take 1 tablet (20 mg total) by mouth 3 (three) times a day as needed (for abdominal cramping)  Dispense: 30 tablet; Refill: 0    3. Encounter for screening mammogram for malignant neoplasm of breast  - Mammo screening bilateral w 3d & cad; Future             Diagnoses and all orders for this visit:    Encounter for screening mammogram for malignant neoplasm of breast    Type 2 diabetes mellitus without complication, without long-term current use of insulin ([address])    Other orders  -     oxyCODONE (ROXICODONE) 5 immediate release tablet; For moderate to severe pain, take 1 tablet as needed every: 4 hrs days 1-2; 6 hrs days 3-4; 12 hrs days 5-6; then Once on day 7. (Patient not taking: Reported on 7/31/2023)          Subjective:       Chief Complaint   Patient presents with   • Pre-op Exam     DM Eye Exam- Care gap outreached        Patient ID: Joyce Singletary is a 63 y.o. female.     Joyce Singletary  63 y.o.  female    SURGEON:Dr. Meena James    SURGERY/PROCEDURE: B/L cataract    DATE OF SURGERY: 8/8/23 and 8/22/23    PRIOR ANESTHESIA:yes    COMPLICATION: no    BLEEDING PROBLEM: no    PERTINENT PMH: no    EXERCISE CAPACITY:   CAN WALK 4 BLOCKS AND OR CLIMB 2 FLIGHTS: Yes    HOME LIVING SITUATION SAFE AND SECURE: Yes      TOBACCO: yes     ETOH: no     ILLEGAL DRUGS: no        Review of Systems Constitutional: Negative for activity change, chills, fatigue and fever. HENT: Negative for congestion, ear pain, sinus pressure and sore throat. Eyes: Negative for redness, itching and visual disturbance. Respiratory: Negative for cough and shortness of breath. Cardiovascular: Negative for chest pain and palpitations. Gastrointestinal: Negative for abdominal pain, diarrhea and nausea. Endocrine: Negative for cold intolerance and heat intolerance. Genitourinary: Negative for dysuria, flank pain and frequency. Musculoskeletal: Negative for arthralgias, back pain, gait problem and myalgias. Skin: Negative for color change. Allergic/Immunologic: Negative for environmental allergies. Neurological: Negative for dizziness, numbness and headaches. Psychiatric/Behavioral: Negative for behavioral problems and sleep disturbance. The following portions of the patient's history were reviewed and updated as appropriate : past family history, past medical history, past social history and past surgical history.     Current Outpatient Medications:   •  aspirin (ECOTRIN LOW STRENGTH) 81 mg EC tablet, Take 1 tablet by mouth daily, Disp: , Rfl:   •  atorvastatin (LIPITOR) 40 mg tablet, TAKE 1 TABLET DAILY, Disp: 90 tablet, Rfl: 3  •  Blood Glucose Monitoring Suppl (ONE TOUCH ULTRA MINI) w/Device KIT, by Does not apply route, Disp: , Rfl:   •  cetirizine (ZyrTEC) 10 mg tablet, Take by mouth Daily, Disp: , Rfl:   •  cholecalciferol (VITAMIN D3) 1,000 units tablet, Take by mouth, Disp: , Rfl:   •  Empagliflozin (JARDIANCE) 10 MG TABS tablet, Take 1 tablet (10 mg total) by mouth daily, Disp: 30 tablet, Rfl: 5  •  escitalopram (LEXAPRO) 20 mg tablet, TAKE 1 TABLET BY MOUTH EVERY DAY, Disp: 90 tablet, Rfl: 1  •  famotidine (PEPCID) 20 mg tablet, Take 1 tablet by mouth daily, Disp: , Rfl:   •  fenofibrate (TRICOR) 54 MG tablet, Take 1 tablet (54 mg total) by mouth daily, Disp: 90 tablet, Rfl: 0  •  glucose blood (ONE TOUCH ULTRA TEST) test strip, 1 each by Other route 2 (two) times a day, Disp: 100 each, Rfl: 0  •  lisdexamfetamine (VYVANSE) 20 MG capsule, Take 1 capsule (20 mg total) by mouth every evening With 40 mg taken in the a.m. Max Daily Amount: 20 mg, Disp: 30 capsule, Rfl: 0  •  lisdexamfetamine (VYVANSE) 30 MG capsule, Take 1 capsule (30 mg total) by mouth every morning Max Daily Amount: 30 mg, Disp: 30 capsule, Rfl: 0  •  lisinopril (ZESTRIL) 5 mg tablet, TAKE ONE-HALF (1/2) TABLET DAILY, Disp: 45 tablet, Rfl: 3  •  metFORMIN (GLUCOPHAGE-XR) 500 mg 24 hr tablet, TAKE 2 TABLETS BY MOUTH TWICE A DAY WITH FOOD, Disp: 360 tablet, Rfl: 0  •  metoprolol succinate (TOPROL-XL) 25 mg 24 hr tablet, TAKE 1 TABLET DAILY, Disp: 90 tablet, Rfl: 3  •  Multiple Vitamins-Minerals (MULTIVITAMIN ADULTS 50+ PO), Take 1 tablet by mouth daily, Disp: , Rfl:   •  ONETOUCH DELICA LANCETS FINE MISC, by Does not apply route daily, Disp: 100 each, Rfl: 1  •  albuterol (PROVENTIL HFA,VENTOLIN HFA) 90 mcg/act inhaler, Inhale 2 puffs every 6 (six) hours as needed for wheezing (Patient not taking: Reported on 3/20/2023), Disp: 8 g, Rfl: 0  •  brompheniramine-pseudoephedrine-DM 30-2-10 MG/5ML syrup, Take 10 mL by mouth 4 (four) times a day as needed for allergies (Patient not taking: Reported on 7/31/2023), Disp: 120 mL, Rfl: 0  •  Flovent  MCG/ACT inhaler, INHALE 2 PUFFS IN THE MORNING AND 2 PUFFS IN THE EVENING. RINSE MOUTH AFTER USE. . (Patient not taking: Reported on 7/31/2023), Disp: 12 g, Rfl: 1  •  mupirocin (BACTROBAN) 2 % nasal ointment, into each nostril 2 (two) times a day (Patient not taking: Reported on 7/31/2023), Disp: 10 g, Rfl: 0  •  neomycin-polymyxin-hydrocortisone (CORTISPORIN) otic solution, Administer 4 drops to the right ear every 8 (eight) hours (Patient not taking: Reported on 7/3/2023), Disp: 10 mL, Rfl: 0  •  nicotine (NICODERM CQ) 14 mg/24hr TD 24 hr patch, Place 1 patch on the skin over 24 hours every 24 hours (Patient not taking: Reported on 7/3/2023), Disp: 28 patch, Rfl: 0  •  oxyCODONE (ROXICODONE) 5 immediate release tablet, PLEASE SEE ATTACHED FOR DETAILED DIRECTIONS (Patient not taking: Reported on 7/31/2023), Disp: , Rfl:   •  oxyCODONE (ROXICODONE) 5 immediate release tablet, For moderate to severe pain, take 1 tablet as needed every: 4 hrs days 1-2; 6 hrs days 3-4; 12 hrs days 5-6; then Once on day 7. (Patient not taking: Reported on 7/31/2023), Disp: , Rfl:   •  predniSONE 10 mg tablet, Take 6 tablets By mouth  In the morning Qd. Decrease by  By 1 tablet daily until completed. (Patient not taking: Reported on 7/3/2023), Disp: 21 tablet, Rfl: 0  •  promethazine (PHENERGAN) 12.5 mg/10 mL syrup, Take 5 mL (6.25 mg total) by mouth 4 (four) times a day as needed for nausea or vomiting for up to 7 days (Patient not taking: Reported on 7/31/2023), Disp: 118 mL, Rfl: 0  •  Promethazine-DM (PHENERGAN-DM) 6.25-15 mg/5 mL oral syrup, Take 5 mL by mouth 4 (four) times a day as needed for cough (Patient not taking: Reported on 7/3/2023), Disp: 118 mL, Rfl: 0  •  White Petrolatum-Mineral Oil (Retaine PM) OINT, Apply to eye (Patient not taking: Reported on 7/3/2023), Disp: , Rfl:          Objective:         Vitals:    07/31/23 1046   BP: 142/72   BP Location: Left arm   Patient Position: Sitting   Cuff Size: Adult   Pulse: 93   Temp: (!) 97.3 °F (36.3 °C)   SpO2: 98%   Weight: 71.8 kg (158 lb 6.4 oz)   Height: 5' 5.75" (1.67 m)     Physical Exam  Vitals reviewed. Constitutional:       Appearance: She is well-developed. HENT:      Head: Normocephalic and atraumatic. Nose: Nose normal.      Mouth/Throat:      Pharynx: No oropharyngeal exudate. Eyes:      General: No scleral icterus. Right eye: No discharge. Left eye: No discharge. Pupils: Pupils are equal, round, and reactive to light. Neck:      Trachea: No tracheal deviation.    Cardiovascular:      Rate and Rhythm: Normal rate and regular rhythm. Pulses: no weak pulses          Dorsalis pedis pulses are 2+ on the right side and 2+ on the left side. Posterior tibial pulses are 2+ on the right side and 2+ on the left side. Heart sounds: Normal heart sounds. No murmur heard. No friction rub. No gallop. Pulmonary:      Effort: Pulmonary effort is normal. No respiratory distress. Breath sounds: Normal breath sounds. No wheezing or rales. Abdominal:      General: Bowel sounds are normal. There is no distension. Palpations: Abdomen is soft. Tenderness: There is no abdominal tenderness. There is no guarding or rebound. Musculoskeletal:         General: Normal range of motion. Cervical back: Normal range of motion and neck supple. Feet:    Feet:      Right foot:      Skin integrity: No ulcer, skin breakdown, erythema, warmth, callus or dry skin. Left foot:      Skin integrity: No ulcer, skin breakdown, erythema, warmth, callus or dry skin. Lymphadenopathy:      Head:      Right side of head: No submental or submandibular adenopathy. Left side of head: No submental or submandibular adenopathy. Cervical: No cervical adenopathy. Right cervical: No superficial, deep or posterior cervical adenopathy. Left cervical: No superficial, deep or posterior cervical adenopathy. Skin:     General: Skin is warm and dry. Findings: No erythema. Neurological:      Mental Status: She is alert and oriented to person, place, and time. Cranial Nerves: No cranial nerve deficit. Sensory: No sensory deficit. Psychiatric:         Mood and Affect: Mood is not anxious or depressed. Speech: Speech normal.         Behavior: Behavior normal.         Thought Content: Thought content normal.         Judgment: Judgment normal.             Diabetic Foot Exam    Patient's shoes and socks removed. Right Foot/Ankle   Right Foot Inspection  Skin Exam: skin normal and skin intact. No dry skin, no warmth, no callus, no erythema, no maceration, no abnormal color, no pre-ulcer, no ulcer and no callus. Toe Exam: ROM and strength within normal limits. Sensory   Vibration: intact  Proprioception: intact  Monofilament testing: intact    Vascular  Capillary refills: < 3 seconds  The right DP pulse is 2+. The right PT pulse is 2+. Left Foot/Ankle  Left Foot Inspection  Skin Exam: skin normal and skin intact. No dry skin, no warmth, no erythema, no maceration, normal color, no pre-ulcer, no ulcer and no callus. Toe Exam: ROM and strength within normal limits. Sensory   Vibration: intact  Proprioception: intact  Monofilament testing: diminished and intact    Vascular  Capillary refills: < 3 seconds  The left DP pulse is 2+. The left PT pulse is 2+.      Assign Risk Category  No deformity present  No loss of protective sensation  No weak pulses  Risk: 0

## 2023-08-01 ENCOUNTER — TELEPHONE (OUTPATIENT)
Dept: ADMINISTRATIVE | Facility: OTHER | Age: 63
End: 2023-08-01

## 2023-08-01 NOTE — TELEPHONE ENCOUNTER
----- Message from Charles Johnson sent at 7/31/2023 10:43 AM EDT -----  Regarding: QUALITY OUTREACH  07/31/23 10:43 AM    Hello, our patient Demond Bynum has had Diabetic Eye Exam completed/performed. Please assist in updating the patient chart by making an External outreach to [facility] facility located in [facility]. The date of service is 04/20/2023.     Thank you,  Charles Johnson PG [facility]

## 2023-08-01 NOTE — TELEPHONE ENCOUNTER
Upon review of the In Basket request we were able to locate, review, and update the patient chart as requested for Diabetic Eye Exam.    Any additional questions or concerns should be emailed to the Practice Liaisons via the appropriate education email address, please do not reply via In Basket.     Thank you  Miguel Dodge

## 2023-08-01 NOTE — TELEPHONE ENCOUNTER
Upon review of the In Basket request and the patient's chart, initial outreach has been made via fax to facility. Please see Contacts section for details.      Thank you  Thu Corado MA

## 2023-08-01 NOTE — LETTER
Diabetic Eye Exam Form    Date Requested: 23  Patient: Kelsey Omalley  Patient : 1960   Referring Provider: Ashly Cardona DO      DIABETIC Eye Exam Date _______________________________      Type of Exam MUST be documented for Diabetic Eye Exams. Please CHECK ONE. Retinal Exam       Dilated Retinal Exam       OCT       Optomap-Iris Exam      Fundus Photography       Left Eye - Please check Retinopathy or No Retinopathy        Exam did show retinopathy    Exam did not show retinopathy       Right Eye - Please check Retinopathy or No Retinopathy       Exam did show retinopathy    Exam did not show retinopathy       Comments __________________________________________________________    Practice Providing Exam ______________________________________________    Exam Performed By (print name) _______________________________________      Provider Signature ___________________________________________________      These reports are needed for  compliance. Please fax this completed form and a copy of the Diabetic Eye Exam report to our office located at [address] [place] [address] as soon as possible via Fax [phone] shirley Ricci: Phone [phone]  We thank you for your assistance in treating our mutual patient.

## 2023-08-01 NOTE — LETTER
Diabetic Eye Exam Form    Date Requested: 23  Patient: Darío Kimball  Patient : 1960   Referring Provider: Cornelia Conde DO      DIABETIC Eye Exam Date _______________________________      Type of Exam MUST be documented for Diabetic Eye Exams. Please CHECK ONE. Retinal Exam       Dilated Retinal Exam       OCT       Optomap-Iris Exam      Fundus Photography       Left Eye - Please check Retinopathy or No Retinopathy        Exam did show retinopathy    Exam did not show retinopathy       Right Eye - Please check Retinopathy or No Retinopathy       Exam did show retinopathy    Exam did not show retinopathy       Comments __________________________________________________________    Practice Providing Exam ______________________________________________    Exam Performed By (print name) _______________________________________      Provider Signature ___________________________________________________      These reports are needed for  compliance. Please fax this completed form and a copy of the Diabetic Eye Exam report to our office located at [address] [place] [address] as soon as possible via Fax [phone] shirley Garcai Reas: Phone [phone]  We thank you for your assistance in treating our mutual patient.

## 2023-08-03 ENCOUNTER — TELEPHONE (OUTPATIENT)
Dept: FAMILY MEDICINE CLINIC | Facility: CLINIC | Age: 63
End: 2023-08-03

## 2023-08-03 NOTE — TELEPHONE ENCOUNTER
"Hi, my name is Ace Benavidez. I'm a pharmacist with [facility] calling regarding patient of doctor and the law. Patients name is Radha Davalos. Date of birth is [birth year]. It's regarding the finances that was sent to us electronically on 7/31 are needed to clarify the directions. 2 things. Number one, it's for the Vyvanse 20 milligram and it says take one capsule 20 milligrams every evening with 40 milligram taken in the morning. Max daily amount 20 milligram. So there's some confusion there. But then also I do not see an active 40 milligram prescription. So I didn't know if the intent is for the patient to take two of these in the morning and one in the evening or if the patient does have a 40 milligram prescription that she's taking already. And then also Vyvanse isn't typically taken in the evening. And when I say evening, I mean, you know, 6-7, eight o'clock at night. It's typically reserved for afternoon being the latest dose because it does can cause sleep disturbances and it is a longer acting medication. So if you could please give us a call back. Our number is [phone]. Again that's [phone]. When calling back, please provide us with this reference number. Excuse me, that is [id number] dash 5-6 and again that's [id number] dash 5-6. If it's easier and doctor wants to send a new one in electronically, which clarifies it, that would be perfectly fine. Again, we'll go ahead and fill that one and cancel this one. Again, just want to make sure we've got the directions correct. And then as far as the evening goes, I've asked not usually taken in the evening, 3-4. O'clock being the latest. Thank you very much for helping. Have a great day. Bye.  Bye."    Pharmacy is requesting clarification on Vyvanse

## 2023-08-05 DIAGNOSIS — F90.1 ATTENTION DEFICIT HYPERACTIVITY DISORDER (ADHD), PREDOMINANTLY HYPERACTIVE TYPE: ICD-10-CM

## 2023-08-05 RX ORDER — LISDEXAMFETAMINE DIMESYLATE CAPSULES 30 MG/1
30 CAPSULE ORAL EVERY MORNING
Qty: 90 CAPSULE | Refills: 0 | Status: SHIPPED | OUTPATIENT
Start: 2023-08-05 | End: 2023-11-03

## 2023-08-05 RX ORDER — LISDEXAMFETAMINE DIMESYLATE CAPSULES 20 MG/1
20 CAPSULE ORAL
Qty: 90 CAPSULE | Refills: 0 | Status: SHIPPED | OUTPATIENT
Start: 2023-08-05

## 2023-08-07 NOTE — TELEPHONE ENCOUNTER
As a follow-up, a second attempt has been made for outreach via fax to facility. Please see Contacts section for details.     Thank you  Gwen Estrada MA

## 2023-08-09 NOTE — TELEPHONE ENCOUNTER
As a final attempt, a third outreach has been made via telephone call to facility. Please see Contacts section for details. This encounter will be closed and completed by end of day. Should we receive the requested information because of previous outreach attempts, the requested patient's chart will be updated appropriately.    Patient had exam with Dr Clarence Perez sent a new request    Thank you  Uriel Mendoza MA

## 2023-08-14 NOTE — TELEPHONE ENCOUNTER
As a follow-up, a second attempt has been made for outreach via fax to facility. Please see Contacts section for details.     Thank you  Isiah Siddiqui MA

## 2023-08-18 NOTE — TELEPHONE ENCOUNTER
As a final attempt, a third outreach has been made via fax to facility. Please see Contacts section for details. This encounter will be closed and completed by end of day. Should we receive the requested information because of previous outreach attempts, the requested patient's chart will be updated appropriately.      Thank you  Deonte Kelley MA

## 2023-08-23 NOTE — TELEPHONE ENCOUNTER
Upon review of the In Basket request we were able to note that no further action is required. The patient chart is up to date as a result of a previous request.      Any additional questions or concerns should be emailed to the Practice Liaisons via the appropriate education email address, please do not reply via In Basket.     Thank you  Raul Epperson MA

## 2023-08-25 DIAGNOSIS — E11.9 TYPE 2 DIABETES MELLITUS WITHOUT COMPLICATION, WITHOUT LONG-TERM CURRENT USE OF INSULIN (HCC): ICD-10-CM

## 2023-08-25 RX ORDER — METFORMIN HYDROCHLORIDE 500 MG/1
TABLET, EXTENDED RELEASE ORAL
Qty: 360 TABLET | Refills: 0 | Status: SHIPPED | OUTPATIENT
Start: 2023-08-25

## 2023-08-30 ENCOUNTER — HOSPITAL ENCOUNTER (OUTPATIENT)
Dept: MAMMOGRAPHY | Facility: MEDICAL CENTER | Age: 63
Discharge: HOME | End: 2023-08-30
Payer: COMMERCIAL

## 2023-08-30 VITALS — HEIGHT: 66 IN | WEIGHT: 158 LBS | BODY MASS INDEX: 25.39 KG/M2

## 2023-08-30 DIAGNOSIS — Z12.31 ENCOUNTER FOR SCREENING MAMMOGRAM FOR MALIGNANT NEOPLASM OF BREAST: ICD-10-CM

## 2023-08-30 PROCEDURE — 77063 BREAST TOMOSYNTHESIS BI: CPT

## 2023-08-30 PROCEDURE — 77067 SCR MAMMO BI INCL CAD: CPT

## 2023-09-10 DIAGNOSIS — E78.2 MIXED HYPERLIPIDEMIA: ICD-10-CM

## 2023-09-11 RX ORDER — FENOFIBRATE 54 MG/1
54 TABLET ORAL DAILY
Qty: 90 TABLET | Refills: 0 | Status: SHIPPED | OUTPATIENT
Start: 2023-09-11

## 2023-09-20 LAB
LEFT EYE DIABETIC RETINOPATHY: NORMAL
RIGHT EYE DIABETIC RETINOPATHY: NORMAL

## 2023-09-21 ENCOUNTER — TELEPHONE (OUTPATIENT)
Dept: OTHER | Facility: OTHER | Age: 63
End: 2023-09-21

## 2023-09-22 ENCOUNTER — TELEPHONE (OUTPATIENT)
Dept: FAMILY MEDICINE CLINIC | Facility: CLINIC | Age: 63
End: 2023-09-22

## 2023-09-22 NOTE — TELEPHONE ENCOUNTER
Wasn't feeling good yesterday. Congestion, sluggish, her neighbors tested positive for covid. Patient tested positive from home test yesterday.  She would like to speak with  .   Thank you

## 2023-09-22 NOTE — TELEPHONE ENCOUNTER
Called pt at provider request. Advised pt that if she has COVID and would like to consider treatment that she would need to schedule an evaluation that medication could not be called in w/o an evaluation. Pt was requesting a virtual appointment. Due to no appt. availability today, pt was strongly urged to be evaluated at [facility].

## 2023-09-23 ENCOUNTER — OFFICE VISIT (OUTPATIENT)
Dept: URGENT CARE | Facility: MEDICAL CENTER | Age: 63
End: 2023-09-23
Payer: COMMERCIAL

## 2023-09-23 VITALS
RESPIRATION RATE: 20 BRPM | TEMPERATURE: 96.4 F | OXYGEN SATURATION: 97 % | HEIGHT: 66 IN | DIASTOLIC BLOOD PRESSURE: 82 MMHG | WEIGHT: 156 LBS | BODY MASS INDEX: 25.07 KG/M2 | SYSTOLIC BLOOD PRESSURE: 139 MMHG | HEART RATE: 96 BPM

## 2023-09-23 DIAGNOSIS — R05.9 COUGH: ICD-10-CM

## 2023-09-23 DIAGNOSIS — U07.1 COVID: Primary | ICD-10-CM

## 2023-09-23 PROCEDURE — 99213 OFFICE O/P EST LOW 20 MIN: CPT | Performed by: FAMILY MEDICINE

## 2023-09-23 RX ORDER — PHENYLEPHRINE HCL 10 MG/1
10 TABLET, FILM COATED ORAL EVERY 4 HOURS PRN
Qty: 30 TABLET | Refills: 0 | Status: SHIPPED | OUTPATIENT
Start: 2023-09-23

## 2023-09-23 RX ORDER — DEXTROMETHORPHAN HYDROBROMIDE AND PROMETHAZINE HYDROCHLORIDE 15; 6.25 MG/5ML; MG/5ML
5 SYRUP ORAL 4 TIMES DAILY PRN
Qty: 240 ML | Refills: 0 | Status: SHIPPED | OUTPATIENT
Start: 2023-09-23

## 2023-09-23 RX ORDER — NIRMATRELVIR AND RITONAVIR 150-100 MG
2 KIT ORAL 2 TIMES DAILY
Qty: 20 TABLET | Refills: 0 | Status: SHIPPED | OUTPATIENT
Start: 2023-09-23 | End: 2023-09-28

## 2023-09-23 NOTE — PATIENT INSTRUCTIONS
COVID-19 and Chronic Health Conditions   AMBULATORY CARE:   What you need to know about coronavirus disease 2019 (COVID-19) and chronic health conditions: Your chronic condition may increase your risk for COVID-19 or serious problems it can cause. Healthcare providers might need to make changes that affect how you usually manage your chronic health condition. Providers may change hours of operation or not have patients come in to be seen. You may not be able to make appointments to get blood drawn or to have tests or procedures. This may continue until the virus that causes COVID-19 is controlled. Until then, you can take steps to manage your condition. The steps will also lower your risk for COVID-19 or the serious problems it causes. If you do develop COVID-19, healthcare providers will tell you when it is okay to be around others after you recover. This depends on your chronic condition, any symptoms of COVID-19 that developed, and how severe the symptoms were. What you need to know about serious problems from the virus:  You may develop long-term health problems caused by the virus. Your risk is higher if you are 65 or older. A weak immune system, obesity, diabetes, chronic kidney disease, or a heart or lung condition can also increase your risk. Your risk is also higher if you are a current or former cigarette smoker.  COVID-19 can lead to any of the following:  Multisymptom inflammatory syndrome in adults (MIS-A) or in children (MIS-C), causing inflammation in the heart, digestive system, skin, or brain    Shortness of breath, serious lower respiratory conditions, such as pneumonia or acute respiratory distress syndrome (ARDS)    Blood clots or blood vessel damage    Organ damage from a lack of oxygen or from blood clots    Sleep problems    Problems thinking clearly, remembering information, or concentrating    Mood changes, depression, or anxiety    Long-term problems tasting or smelling    Loss of appetite and weight loss    Nerve pain    Fatigue (feeling mentally and physically tired)    Call your local emergency number (911 in the [address]) if:   You have trouble breathing or shortness of breath. You have chest pain or pressure that lasts longer than 5 minutes. You become confused or hard to wake. Seek care immediately if:   The skin on your face, fingers, or toes look blue or darker than usual.      Call your doctor or specialist if:   You have a fever. You have symptoms of COVID-19. You have questions or concerns about COVID-19 or your chronic condition. How the 2019 coronavirus spreads:  Close personal contact with an infected person increases your risk for infection. This means being within 6 feet (2 meters) of the person for at least 15 minutes over 24 hours. The virus travels in droplets that form when a person talks, sings, coughs, or sneezes. The droplets can also float in the air for minutes or hours. Infection happens when you breathe in the droplets or get them in your eyes or nose. Person-to-person contact can spread the virus. For example, a person with the virus on his or her hands can spread it by shaking hands with someone. The virus can stay on objects and surfaces for hours to days. You may become infected by touching the object or surface and then touching your eyes or mouth. What you need to know about the signs and symptoms of COVID-19:  Signs and symptoms usually start about 5 days after infection but can take 2 to 14 days. Signs and symptoms range from mild to severe. You may feel like you have the flu or a bad cold. Your chronic health condition may cause some of the same symptoms COVID-19 causes. This can make it hard to know if a symptom is from COVID-19 or your chronic condition. Keep a record of any new or worsening symptom you have. This is especially important if you have a condition that often causes shortness of breath.  Your provider can tell you if you should be tested for COVID-19. Tell your healthcare provider if you think you were infected but develop signs or symptoms not listed below:  A cough    Shortness of breath or trouble breathing that may become severe    A fever of at least 100.4°F, or 38°C (may be lower in adults 65 or older)    Chills that might include shaking    Muscle pain, body aches, or a headache    A sore throat    Suddenly not being able to taste or smell anything    Feeling very tired (fatigue)    Congestion (stuffy head and nose), or a runny nose    Diarrhea, nausea, or vomiting    Manage your chronic health condition:  If you do not have a regular healthcare provider, experts recommend you contact a local [facility] health center or health department. The following can help you manage your condition and prevent COVID-19 during an active outbreak in your area:  Get emergency care for your condition if needed. Talk to your healthcare providers about symptoms of your chronic condition that need immediate care. Your providers can help you create a plan or add exacerbation management to your plan. The plan will include when to go to an emergency department and when to call your local emergency number. This will depend on where you live and the services that are available. Go to dialysis appointments as scheduled. It is important to stay on schedule. You will need to have enough food to be able to follow the emergency diet plan if you must miss a session. The emergency diet needs to be part of the management plan for your condition. Ask your healthcare provider for other ways to have appointments. Some providers offer phone, video, or other types of appointments. Follow any regular management plan you use. Your healthcare provider will tell you if you need to make any changes to your regular management plan. For example, if you have asthma, continue to follow your asthma action plan.  If you have diabetes, you may need to check your blood sugar level more often. Stress and illness can make blood sugar levels go up. You may need to adjust medicine such as insulin. If you have a heart condition or high blood pressure, you may need to check your blood pressure more often. Stress and illness can also raise your blood pressure. Talk to your healthcare providers about your medicines. You may be able to get more than 1 month of medicine at a time. This will lower the number of times you need to go to a pharmacy to get your medicines. Make sure you have enough medicine if you have a condition that can lead to an emergency. Examples include asthma medicines, insulin, or an epinephrine pen. Check the expiration dates on the medicines you currently have. Ask for refills as soon as possible, if needed. If it is not time to refill prescriptions, you may be able to get an emergency supply of some medicines. Medicine plans vary, so ask your healthcare provider or pharmacist for options. Have supplies available in your home or delivered as needed. If possible, get extra supplies you use regularly. Examples include absorbent pads, syringes, and wound cleaning solutions. This will limit the number of trips out of your home to get supplies. Have food, medicines, and other supplies delivered. You can instead ask someone who does not have COVID-19 to get items you need. What you need to know about COVID-19 vaccines: Your healthcare provider can give you more information about what to expect, depending on your chronic condition. You are considered fully vaccinated against COVID-19 two weeks after the final dose of any COVID-19 vaccine. Let your healthcare provider know when you have received the final dose of the vaccine. Make a copy of your vaccination card. Keep the original with you in case you need to show it. Keep the copy in a safe place. Get a COVID-19 vaccine as directed. Vaccination is recommended for everyone 6 months or older.  COVID-19 vaccines are given as a shot in 1 to 3 doses as a primary series. This depends on the vaccine brand and the age of the person who receives it. A booster dose is recommended for everyone 5 years or older after the primary series is complete. A second booster  is recommended for all adults 48 or older and for immunocompromised adolescents. The second booster is also recommended for anyone who got the 1-dose brand of vaccine for the first dose and a booster. Your provider can give you more information on boosters and help you schedule all needed doses. Continue to protect yourself and others. You can become infected even after you get the vaccine. You may also be able to pass the virus to others without knowing you are infected. After you get the vaccine, check local, national, and international travel rules. You may need to be tested before you travel. Some countries require proof of a negative test before you travel. You may also need to quarantine after you return. Medicine may be given to prevent infection. The medicine can be given if you are at high risk for infection and cannot get the vaccine. It can also be given if your immune system does not respond well to the vaccine. Lower your risk for COVID-19:   Stay home if you are sick or think you may have COVID-19. It is important to stay home if you are waiting for a testing appointment or for test results. Wash your hands often throughout the day. Use soap and water. Rub your soapy hands together, lacing your fingers, for at least 20 seconds. Rinse with warm, running water. Dry your hands with a clean towel or paper towel. Use hand  that contains alcohol if soap and water are not available. Teach children how to wash their hands and use hand . Cover sneezes and coughs. Turn your face away and cover your mouth and nose with a tissue. Throw the tissue away. Use the bend of your arm if a tissue is not available.  Then wash your hands with soap and water or use hand . Teach children how to cover a cough or sneeze. Wear a face covering (mask) when needed. Use a cloth covering with at least 2 layers. You can also create layers by putting a cloth covering over a disposable non-medical mask. Cover your mouth and your nose. Try to keep space between you and others when you are out of the house. Avoid crowds as much as possible. Wear a face covering when you must be around a large group and cannot keep space between you and others. Clean and disinfect high-touch surfaces and objects in your home often. Use disinfecting wipes, or make a solution by mixing 4 teaspoons of bleach with 1 quart (4 cups) of water. Do not  use any cleaning or disinfecting products that can trigger an asthma attack or other breathing problems. Open windows or have circulating air as you clean. Do not  mix ammonia with bleach. This will create toxic fumes. Help strengthen your immune system:   Ask about other vaccines you may need. Get the influenza (flu) vaccine as soon as recommended each year, usually starting in September or October. Get the pneumonia vaccine if recommended. Your healthcare provider can tell you if you should also get other vaccines, and when to get them. Do not smoke. Nicotine and other chemicals in cigarettes and cigars can increase your risk for infection and for serious COVID-19 effects. Ask your healthcare provider for information if you currently smoke and need help to quit. E-cigarettes or smokeless tobacco still contain nicotine. Talk to your healthcare provider before you use these products. Eat a variety of healthy foods. Examples include vegetables, fruits, whole-grain breads and cereals, lean meats and poultry, fish, low-fat dairy products, and cooked beans. Healthy foods contain nutrients that help keep your immune system strong. Find ways to manage stress.   Talk to your healthcare providers about ways to manage stress. Stress can lead to breathing problems or trigger an attack or exacerbation of many health conditions. Do things you enjoy to help you relax. For example, talk to a friend, read a book or magazine, or listen to soothing music. Follow up with your doctor or specialist as directed: Your providers will tell you when you can come in for tests, procedures, or check-ups. Bring your symptom record with you to all appointments. Write down your questions so you remember to ask them during your visits. For more information:   [facility] Prevention  [address]  [place]  Phone: 3- 677 - 2611369  Phone: 3- 335 - 9260796  Web Address: [employer]    © Copyright [employer] 2023 Information is for End User's use only and may not be sold, redistributed or otherwise used for commercial purposes. The above information is an  only. It is not intended as medical advice for individual conditions or treatments. Talk to your doctor, nurse or pharmacist before following any medical regimen to see if it is safe and effective for you.

## 2023-09-23 NOTE — LETTER
September 23, 2023     Patient: Valeriy Escalera   YOB: 1960   Date of Visit: 9/23/2023       To Whom it May Concern:    Ursula Posada was seen in my clinic on 9/23/2023. She may return to work on 9/24/2023 . If you have any questions or concerns, please don't hesitate to call.          Sincerely,          Kylee Mcclelland MD        CC: No Recipients

## 2023-09-23 NOTE — PROGRESS NOTES
[facility]    NAME: Nadya Hoang is a 66 y.o. female  : 1960    MRN: [id number]  DATE: 2023  TIME: 1:52 PM    Assessment and Plan   COVID [U07.1]  1. COVID  nirmatrelvir & ritonavir (Paxlovid, 150/100,) tablet therapy pack    phenylephrine (SUDAFED PE) 10 MG TABS      2. Cough  promethazine-dextromethorphan (PHENERGAN-DM) 6.25-15 mg/5 mL oral syrup            Discussed conservative management for Covid  Start Paxlovid- discontinue statin while taking Paxlovid      Patient Instructions   Use albuterol Q4 prn  May use nasal decongestants and cough syrup as needed  Follow up with PCP in 3-5 days if sx increased or not getting better  Proceed to  ER if symptoms increasing shortness of breath. Chief Complaint     Chief Complaint   Patient presents with   • [phone]     Patient states she tested positive Thursday for covid. She has been feeling fatigue, cough, and weakness, and congestion and expectorating yellow mucous   • COVID-19     Start Paxlovid, directed to stop stain while taking     History of Present Illness   HPI  Nadya Hoang is a 66 y.o. female  who [facility] with a h/o of Covid. Symptoms started 3 days ago and she tested positive at home 2023. It started with fatigue and malaise, and then pt was resting and started to feel the post nasal drip. He neighbor recommended getting testing her self for Covid as they were also positive. Pt then tested positive at home. She did call her PCP and they advised her to be further evaluated. Now she has sx of body aches, nasal congestion, cough, some shortness of breath especially while coughing.  + increased thirst  Denies fever, headaches, dizziness, chest pain, loss of taste or smell. H/o of chronic bronchitis for which she uses Albuterol at home    Review of Systems   Review of Systems   Constitutional: Positive for fatigue. Negative for chills and fever.    HENT: Positive for congestion, postnasal drip, rhinorrhea and sore throat. Respiratory: Positive for cough and shortness of breath. Cardiovascular: Negative for chest pain. Gastrointestinal: Negative for diarrhea, nausea and vomiting. Skin: Negative for rash. Neurological: Negative for dizziness and headaches.          Current Medications       Current Outpatient Medications:   •  albuterol (PROVENTIL HFA,VENTOLIN HFA) 90 mcg/act inhaler, Inhale 2 puffs every 6 (six) hours as needed for wheezing, Disp: 8 g, Rfl: 5  •  aspirin (ECOTRIN LOW STRENGTH) 81 mg EC tablet, Take 1 tablet by mouth daily, Disp: , Rfl:   •  atorvastatin (LIPITOR) 40 mg tablet, TAKE 1 TABLET DAILY, Disp: 90 tablet, Rfl: 3  •  cetirizine (ZyrTEC) 10 mg tablet, Take by mouth Daily, Disp: , Rfl:   •  cholecalciferol (VITAMIN D3) 1,000 units tablet, Take by mouth, Disp: , Rfl:   •  dicyclomine (BENTYL) 20 mg tablet, Take 1 tablet (20 mg total) by mouth 3 (three) times a day as needed (for abdominal cramping), Disp: 30 tablet, Rfl: 0  •  Empagliflozin (JARDIANCE) 10 MG TABS tablet, Take 1 tablet (10 mg total) by mouth daily, Disp: 30 tablet, Rfl: 5  •  escitalopram (LEXAPRO) 20 mg tablet, TAKE 1 TABLET BY MOUTH EVERY DAY, Disp: 90 tablet, Rfl: 1  •  famotidine (PEPCID) 20 mg tablet, Take 1 tablet by mouth daily, Disp: , Rfl:   •  fenofibrate (TRICOR) 54 MG tablet, TAKE 1 TABLET BY MOUTH DAILY, Disp: 90 tablet, Rfl: 0  •  lisdexamfetamine (VYVANSE) 20 MG capsule, Take 1 capsule (20 mg total) by mouth daily after lunch Max Daily Amount: 20 mg, Disp: 90 capsule, Rfl: 0  •  lisdexamfetamine (VYVANSE) 30 MG capsule, Take 1 capsule (30 mg total) by mouth every morning Max Daily Amount: 30 mg, Disp: 90 capsule, Rfl: 0  •  lisinopril (ZESTRIL) 5 mg tablet, TAKE ONE-HALF (1/2) TABLET DAILY, Disp: 45 tablet, Rfl: 3  •  metFORMIN (GLUCOPHAGE-XR) 500 mg 24 hr tablet, TAKE 2 TABLETS BY MOUTH TWICE A DAY WITH FOOD, Disp: 360 tablet, Rfl: 0  •  metoprolol succinate (TOPROL-XL) 25 mg 24 hr tablet, TAKE 1 TABLET DAILY, Disp: 90 tablet, Rfl: 3  •  nirmatrelvir & ritonavir (Paxlovid, 150/100,) tablet therapy pack, Take 2 tablets by mouth 2 (two) times a day for 5 days Take 1 nirmatrelvir tablet + 1 ritonavir tablet together per dose, Disp: 20 tablet, Rfl: 0  •  phenylephrine (SUDAFED PE) 10 MG TABS, Take 1 tablet (10 mg total) by mouth every 4 (four) hours as needed for congestion, Disp: 30 tablet, Rfl: 0  •  promethazine-dextromethorphan (PHENERGAN-DM) 6.25-15 mg/5 mL oral syrup, Take 5 mL by mouth 4 (four) times a day as needed for cough, Disp: 240 mL, Rfl: 0  •  Blood Glucose Monitoring Suppl (ONE TOUCH ULTRA MINI) w/Device KIT, by Does not apply route, Disp: , Rfl:   •  Flovent  MCG/ACT inhaler, INHALE 2 PUFFS IN THE MORNING AND 2 PUFFS IN THE EVENING. RINSE MOUTH AFTER USE. . (Patient not taking: Reported on 7/31/2023), Disp: 12 g, Rfl: 1  •  glucose blood (ONE TOUCH ULTRA TEST) test strip, 1 each by Other route 2 (two) times a day, Disp: 100 each, Rfl: 0  •  Multiple Vitamins-Minerals (MULTIVITAMIN ADULTS 50+ PO), Take 1 tablet by mouth daily, Disp: , Rfl:   •  ONETOUCH DELICA LANCETS FINE MISC, by Does not apply route daily, Disp: 100 each, Rfl: 1    Current Allergies     Allergies as of 09/23/2023 - Reviewed 09/23/2023   Allergen Reaction Noted   • Levaquin [levofloxacin] Anaphylaxis 05/18/2012            The following portions of the patient's history were reviewed and updated as appropriate: allergies, current medications, past family history, past medical history, past social history, past surgical history and problem list.     Past Medical History:   Diagnosis Date   • Abnormal mammogram     RESOLVED: [id number]   • Arthritis    • Diabetes mellitus ([address])    • Eczema of right external ear     RESOLVED: [id number]   • Foot drop, left foot    • Heart attack ([address])    • Hematuria     RESOLVED: [id number]   • Impetigo     RESOLVED: [id number]   • Insomnia related to another mental disorder     AXIS I/II MENTAL DISORDER; RESOLVED: [id number]       Past Surgical History:   Procedure Laterality Date   • HYSTERECTOMY  1998    still has 1 ovary not sure which one was removed   • OTHER SURGICAL HISTORY      STEND INDICATIONS: RESTENOSIS AT PREVIOUS PTCA LOCATION    • SPLENECTOMY     • TOTAL HIP ARTHROPLASTY Left        Family History   Problem Relation Age of Onset   • Stroke Mother    • Osteoporosis Mother    • Diabetes type II Father    • Heart attack Father    • Cancer Father    • Heart disease Father    • Hypertension Father    • Heart disease Sister    • Diabetes type II Brother    • Heart attack Brother    • Heart disease Brother    • Heart attack Brother    • Heart attack Brother    • Heart attack Brother    • Diabetes type II Brother    • Liver cancer Maternal Aunt 76   • Alcohol abuse Neg Hx    • Substance Abuse Neg Hx          Medications have been verified. Objective   /82   Pulse 96   Temp (!) 96.4 °F (35.8 °C)   Resp 20   Ht 5' 6" (1.676 m)   Wt 70.8 kg (156 lb)   SpO2 97%   BMI 25.18 kg/m²   No LMP recorded. Patient is postmenopausal.       Physical Exam     Physical Exam  Vitals and nursing note reviewed. Constitutional:       Appearance: She is well-developed. HENT:      Head: Normocephalic and atraumatic. Right Ear: External ear normal.      Left Ear: External ear normal.      Nose: Congestion present. Mouth/Throat:      Mouth: Mucous membranes are moist.   Eyes:      Conjunctiva/sclera: Conjunctivae normal.   Cardiovascular:      Rate and Rhythm: Normal rate and regular rhythm. Heart sounds: Normal heart sounds. Pulmonary:      Effort: Pulmonary effort is normal. No respiratory distress. Breath sounds: Examination of the right-lower field reveals decreased breath sounds, wheezing and rhonchi. Examination of the left-lower field reveals decreased breath sounds, wheezing and rhonchi.  Decreased breath sounds, wheezing and rhonchi present. Neurological:      Mental Status: She is alert and oriented to person, place, and time.    Psychiatric:         Mood and Affect: Mood normal.         Behavior: Behavior normal.

## 2023-10-04 ENCOUNTER — RA CDI HCC (OUTPATIENT)
Dept: OTHER | Facility: HOSPITAL | Age: 63
End: 2023-10-04

## 2023-10-04 NOTE — PROGRESS NOTES
[facility] [address] [facility] coding opportunities          Chart Reviewed number of suggestions sent to Provider: 1   e11.36    Patients Insurance        Commercial Insurance: Vignesh Clay

## 2023-10-24 ENCOUNTER — OFFICE VISIT (OUTPATIENT)
Dept: FAMILY MEDICINE CLINIC | Facility: CLINIC | Age: 63
End: 2023-10-24
Payer: COMMERCIAL

## 2023-10-24 VITALS
BODY MASS INDEX: 26.68 KG/M2 | SYSTOLIC BLOOD PRESSURE: 126 MMHG | DIASTOLIC BLOOD PRESSURE: 68 MMHG | HEART RATE: 62 BPM | TEMPERATURE: 97.2 F | HEIGHT: 66 IN | OXYGEN SATURATION: 94 % | WEIGHT: 166 LBS

## 2023-10-24 DIAGNOSIS — I10 BENIGN ESSENTIAL HYPERTENSION: ICD-10-CM

## 2023-10-24 DIAGNOSIS — F41.9 ANXIETY DISORDER, UNSPECIFIED TYPE: ICD-10-CM

## 2023-10-24 DIAGNOSIS — F90.1 ATTENTION DEFICIT HYPERACTIVITY DISORDER (ADHD), PREDOMINANTLY HYPERACTIVE TYPE: ICD-10-CM

## 2023-10-24 DIAGNOSIS — E78.2 MIXED HYPERLIPIDEMIA: ICD-10-CM

## 2023-10-24 DIAGNOSIS — Z13.0 SCREENING FOR IRON DEFICIENCY ANEMIA: ICD-10-CM

## 2023-10-24 DIAGNOSIS — R94.6 ABNORMAL THYROID FUNCTION TEST: ICD-10-CM

## 2023-10-24 DIAGNOSIS — U07.1 COVID-19: Primary | ICD-10-CM

## 2023-10-24 DIAGNOSIS — E11.9 TYPE 2 DIABETES MELLITUS WITHOUT COMPLICATION, WITHOUT LONG-TERM CURRENT USE OF INSULIN (HCC): ICD-10-CM

## 2023-10-24 PROCEDURE — 99214 OFFICE O/P EST MOD 30 MIN: CPT | Performed by: FAMILY MEDICINE

## 2023-10-24 PROCEDURE — 99396 PREV VISIT EST AGE 40-64: CPT | Performed by: FAMILY MEDICINE

## 2023-10-24 NOTE — PROGRESS NOTES
Assessment/Plan:   1. COVID-19  Appears clinically stable today. We will continue with routine home monitoring. Maintain proper hydration. Follow-up if any symptoms are persisting. 2. Type 2 diabetes mellitus without complication, without long-term current use of insulin ([address])  Clear to exact control of this problem. Patient has not completed any of her fasting blood work. She must maintain a very strict diabetic diet and exercise plan. Continue with blood sugar monitoring. Continues well with her current treatment of Jardiance as well as metformin. 3. Benign essential hypertension  Stable today. Continue with routine home monitoring as well as her current treatment with metoprolol, as well as her lisinopril. 4. Mixed hyperlipidemia  Unclear control. Recheck lipid panel. Continue with a strict low-fat/low-carb diet as well as her current treatment with atorvastatin. 5. Attention deficit hyperactivity disorder (ADHD), predominantly hyperactive type  Reviewed patient's symptoms today. Its time, she has been taking her Vyvanse regularly. She has been noticing persistent symptoms. She has been taking 30 mg of Vyvanse in the morning and 20 mg at nighttime. Will increase this dose to 30 mg twice daily. 6. Anxiety disorder, unspecified type  Able. Continue with routine home monitoring as well as her current treatment with Lexapro. Follow-up with patient in 4 to 6 months. Diagnoses and all orders for this visit:    COVID-19    Type 2 diabetes mellitus without complication, without long-term current use of insulin (HCC)  -     Hemoglobin A1C; Future  -     Comprehensive metabolic panel; Future  -     Albumin / creatinine urine ratio; Future    Benign essential hypertension    Mixed hyperlipidemia  -     Lipid Panel with Direct LDL reflex;  Future    Attention deficit hyperactivity disorder (ADHD), predominantly hyperactive type    Anxiety disorder, unspecified type  -     TSH, 3rd generation with Free T4 reflex; Future    Abnormal thyroid function test  -     TSH, 3rd generation with Free T4 reflex; Future    Screening for iron deficiency anemia  -     CBC and differential; Future          Subjective:       Chief Complaint   Patient presents with    Physical Exam      Patient ID: Sujata Payne is a 66 y.o. female presents today for a follow-up on her chronic use. She has type 2 diabetes, hypertension, dyslipidemia, ADHD, anxiety disorder. She been taking medications regularly. She denies adverse reactions with medications. She did contract COVID-19 infection over 1 month ago. At this time, she states that her symptoms have over 1 month ago. At this time, she states that her symptoms have slowly been improving. She still has a mild congestion and a cough. She still has been having mild fatigue as well. HPI    Review of Systems   Constitutional:  Negative for activity change, chills, fatigue and fever. HENT:  Negative for congestion, ear pain, sinus pressure and sore throat. Eyes:  Negative for redness, itching and visual disturbance. Respiratory:  Negative for cough and shortness of breath. Cardiovascular:  Negative for chest pain and palpitations. Gastrointestinal:  Negative for abdominal pain, diarrhea and nausea. Endocrine: Negative for cold intolerance and heat intolerance. Genitourinary:  Negative for dysuria, flank pain and frequency. Musculoskeletal:  Negative for arthralgias, back pain, gait problem and myalgias. Skin:  Negative for color change. Allergic/Immunologic: Negative for environmental allergies. Neurological:  Negative for dizziness, numbness and headaches. Psychiatric/Behavioral:  Negative for behavioral problems and sleep disturbance. The following portions of the patient's history were reviewed and updated as appropriate : past family history, past medical history, past social history and past surgical history.     Current Outpatient Medications:     albuterol (PROVENTIL HFA,VENTOLIN HFA) 90 mcg/act inhaler, Inhale 2 puffs every 6 (six) hours as needed for wheezing, Disp: 8 g, Rfl: 5    aspirin (ECOTRIN LOW STRENGTH) 81 mg EC tablet, Take 1 tablet by mouth daily, Disp: , Rfl:     atorvastatin (LIPITOR) 40 mg tablet, TAKE 1 TABLET DAILY, Disp: 90 tablet, Rfl: 3    Blood Glucose Monitoring Suppl (ONE TOUCH ULTRA MINI) w/Device KIT, by Does not apply route, Disp: , Rfl:     cetirizine (ZyrTEC) 10 mg tablet, Take by mouth Daily, Disp: , Rfl:     cholecalciferol (VITAMIN D3) 1,000 units tablet, Take by mouth, Disp: , Rfl:     Empagliflozin (JARDIANCE) 10 MG TABS tablet, Take 1 tablet (10 mg total) by mouth daily, Disp: 30 tablet, Rfl: 5    escitalopram (LEXAPRO) 20 mg tablet, TAKE 1 TABLET BY MOUTH EVERY DAY, Disp: 90 tablet, Rfl: 1    famotidine (PEPCID) 20 mg tablet, Take 1 tablet by mouth daily, Disp: , Rfl:     fenofibrate (TRICOR) 54 MG tablet, TAKE 1 TABLET BY MOUTH DAILY, Disp: 90 tablet, Rfl: 0    glucose blood (ONE TOUCH ULTRA TEST) test strip, 1 each by Other route 2 (two) times a day, Disp: 100 each, Rfl: 0    lisdexamfetamine (VYVANSE) 20 MG capsule, Take 1 capsule (20 mg total) by mouth daily after lunch Max Daily Amount: 20 mg, Disp: 90 capsule, Rfl: 0    lisdexamfetamine (VYVANSE) 30 MG capsule, Take 1 capsule (30 mg total) by mouth every morning Max Daily Amount: 30 mg, Disp: 90 capsule, Rfl: 0    lisinopril (ZESTRIL) 5 mg tablet, TAKE ONE-HALF (1/2) TABLET DAILY, Disp: 45 tablet, Rfl: 3    metFORMIN (GLUCOPHAGE-XR) 500 mg 24 hr tablet, TAKE 2 TABLETS BY MOUTH TWICE A DAY WITH FOOD, Disp: 360 tablet, Rfl: 0    metoprolol succinate (TOPROL-XL) 25 mg 24 hr tablet, TAKE 1 TABLET DAILY, Disp: 90 tablet, Rfl: 3    Multiple Vitamins-Minerals (MULTIVITAMIN ADULTS 50+ PO), Take 1 tablet by mouth daily, Disp: , Rfl:     ONETOUCH DELICA LANCETS FINE MISC, by Does not apply route daily, Disp: 100 each, Rfl: 1    dicyclomine (BENTYL) 20 mg tablet, Take 1 tablet (20 mg total) by mouth 3 (three) times a day as needed (for abdominal cramping) (Patient not taking: Reported on 10/24/2023), Disp: 30 tablet, Rfl: 0    Flovent  MCG/ACT inhaler, INHALE 2 PUFFS IN THE MORNING AND 2 PUFFS IN THE EVENING. RINSE MOUTH AFTER USE. . (Patient not taking: Reported on 7/31/2023), Disp: 12 g, Rfl: 1    phenylephrine (SUDAFED PE) 10 MG TABS, Take 1 tablet (10 mg total) by mouth every 4 (four) hours as needed for congestion (Patient not taking: Reported on 10/24/2023), Disp: 30 tablet, Rfl: 0    promethazine-dextromethorphan (PHENERGAN-DM) 6.25-15 mg/5 mL oral syrup, Take 5 mL by mouth 4 (four) times a day as needed for cough (Patient not taking: Reported on 10/24/2023), Disp: 240 mL, Rfl: 0         Objective:         Vitals:    10/24/23 1011   BP: 126/68   BP Location: Left arm   Patient Position: Sitting   Cuff Size: Adult   Pulse: 62   Temp: (!) 97.2 °F (36.2 °C)   TempSrc: Temporal   SpO2: 94%   Weight: 75.3 kg (166 lb)   Height: 5' 6" (1.676 m)     Physical Exam  Vitals reviewed. Constitutional:       Appearance: She is well-developed. HENT:      Head: Normocephalic and atraumatic. Nose: Nose normal.      Mouth/Throat:      Pharynx: No oropharyngeal exudate. Eyes:      General: No scleral icterus. Right eye: No discharge. Left eye: No discharge. Pupils: Pupils are equal, round, and reactive to light. Neck:      Trachea: No tracheal deviation. Cardiovascular:      Rate and Rhythm: Normal rate and regular rhythm. Pulses:           Dorsalis pedis pulses are 2+ on the right side and 2+ on the left side. Posterior tibial pulses are 2+ on the right side and 2+ on the left side. Heart sounds: Normal heart sounds. No murmur heard. No friction rub. No gallop. Pulmonary:      Effort: Pulmonary effort is normal. No respiratory distress. Breath sounds: Normal breath sounds. No wheezing or rales. Abdominal:      General: Bowel sounds are normal. There is no distension. Palpations: Abdomen is soft. Tenderness: There is no abdominal tenderness. There is no guarding or rebound. Musculoskeletal:         General: Normal range of motion. Cervical back: Normal range of motion and neck supple. Lymphadenopathy:      Head:      Right side of head: No submental or submandibular adenopathy. Left side of head: No submental or submandibular adenopathy. Cervical: No cervical adenopathy. Right cervical: No superficial, deep or posterior cervical adenopathy. Left cervical: No superficial, deep or posterior cervical adenopathy. Skin:     General: Skin is warm and dry. Findings: No erythema. Neurological:      Mental Status: She is alert and oriented to person, place, and time. Cranial Nerves: No cranial nerve deficit. Sensory: No sensory deficit. Psychiatric:         Mood and Affect: Mood is not anxious or depressed. Speech: Speech normal.         Behavior: Behavior normal.         Thought Content:  Thought content normal.         Judgment: Judgment normal.

## 2023-10-24 NOTE — PROGRESS NOTES
123     NAME: Cameron Valentin  AGE: 66 y.o. SEX: female  : 1960     DATE: 10/24/2023     Assessment and Plan:     Problem List Items Addressed This Visit          Cardiovascular and Mediastinum    Benign essential hypertension       Other    Anxiety disorder    Relevant Orders    TSH, 3rd generation with Free T4 reflex    Mixed hyperlipidemia    Relevant Orders    Lipid Panel with Direct LDL reflex    Attention deficit hyperactivity disorder (ADHD), predominantly hyperactive type     Other Visit Diagnoses       COVID-19    -  Primary    Type 2 diabetes mellitus without complication, without long-term current use of insulin (HCC)        Relevant Orders    Hemoglobin A1C    Comprehensive metabolic panel    Albumin / creatinine urine ratio    Abnormal thyroid function test        Relevant Orders    TSH, 3rd generation with Free T4 reflex    Screening for iron deficiency anemia        Relevant Orders    CBC and differential            Immunizations and preventive care screenings were discussed with patient today. Appropriate education was printed on patient's after visit summary. Counseling:  Alcohol/drug use: discussed moderation in alcohol intake, the recommendations for healthy alcohol use, and avoidance of illicit drug use. Dental Health: discussed importance of regular tooth brushing, flossing, and dental visits. Injury prevention: discussed safety/seat belts, safety helmets, smoke detectors, carbon dioxide detectors, and smoking near bedding or upholstery. Sexual health: discussed sexually transmitted diseases, partner selection, use of condoms, avoidance of unintended pregnancy, and contraceptive alternatives. Exercise: the importance of regular exercise/physical activity was discussed. Recommend exercise 3-5 times per week for at least 30 minutes.           Return in 6 months (on 2024) for Diabetes follow-up. Chief Complaint:     Chief Complaint   Patient presents with    Physical Exam      History of Present Illness:     Adult Annual Physical   Patient here for a comprehensive physical exam. The patient reports problems - recent covid 1 mo ago . Diet and Physical Activity  Diet/Nutrition: poor diet. Exercise: no formal exercise. Depression Screening  PHQ-2/9 Depression Screening           General Health  Sleep: sleeps well. Hearing: normal - bilateral.  Vision: no vision problems. Dental: regular dental visits. /GYN Health  LMP:  Post menopausal     Review of Systems:     Review of Systems   Constitutional:  Negative for activity change, chills, fatigue and fever. HENT:  Negative for congestion, ear pain, sinus pressure and sore throat. Eyes:  Negative for redness, itching and visual disturbance. Respiratory:  Negative for cough and shortness of breath. Cardiovascular:  Negative for chest pain and palpitations. Gastrointestinal:  Negative for abdominal pain, diarrhea and nausea. Endocrine: Negative for cold intolerance and heat intolerance. Genitourinary:  Negative for dysuria, flank pain and frequency. Musculoskeletal:  Negative for arthralgias, back pain, gait problem and myalgias. Skin:  Negative for color change. Allergic/Immunologic: Negative for environmental allergies. Neurological:  Negative for dizziness, numbness and headaches. Psychiatric/Behavioral:  Negative for behavioral problems and sleep disturbance.        Past Medical History:     Past Medical History:   Diagnosis Date    Abnormal mammogram     RESOLVED: [id number]    Arthritis     Diabetes mellitus (HCC)     Eczema of right external ear     RESOLVED: [id number]    Foot drop, left foot     Heart attack ([address])     Hematuria     RESOLVED: [id number]    Impetigo     RESOLVED: [id number]    Insomnia related to another mental disorder     AXIS I/II MENTAL DISORDER; RESOLVED: [id number]      Past Surgical History:     Past Surgical History:   Procedure Laterality Date    HYSTERECTOMY  1998    still has 1 ovary not sure which one was removed    OTHER SURGICAL HISTORY      STEND INDICATIONS: RESTENOSIS AT PREVIOUS PTCA LOCATION     SPLENECTOMY      TOTAL HIP ARTHROPLASTY Left       Social History:     Social History     Socioeconomic History    Marital status: /Civil Union     Spouse name: None    Number of children: None    Years of education: None    Highest education level: None   Occupational History    None   Tobacco Use    Smoking status: Every Day     Packs/day: 0.50     Years: 40.00     Total pack years: 20.00     Types: Cigarettes     Start date: 1973    Smokeless tobacco: Never   Vaping Use    Vaping Use: Never used   Substance and Sexual Activity    Alcohol use: No    Drug use: No    Sexual activity: None   Other Topics Concern    None   Social History Narrative    None     Social Determinants of Health     Financial Resource Strain: Not on file   Food Insecurity: Not on file   Transportation Needs: Not on file   Physical Activity: Not on file   Stress: Not on file   Social Connections: Not on file   Intimate Partner Violence: Not on file   Housing Stability: Not on file      Family History:     Family History   Problem Relation Age of Onset    Stroke Mother     Osteoporosis Mother     Diabetes type II Father     Heart attack Father     Cancer Father     Heart disease Father     Hypertension Father     Heart disease Sister     Diabetes type II Brother     Heart attack Brother     Heart disease Brother     Heart attack Brother     Heart attack Brother     Heart attack Brother     Diabetes type II Brother     Liver cancer Maternal Aunt 76    Alcohol abuse Neg Hx     Substance Abuse Neg Hx       Current Medications:     Current Outpatient Medications   Medication Sig Dispense Refill    albuterol (PROVENTIL HFA,VENTOLIN HFA) 90 mcg/act inhaler Inhale 2 puffs every 6 (six) hours as needed for wheezing 8 g 5    aspirin (ECOTRIN LOW STRENGTH) 81 mg EC tablet Take 1 tablet by mouth daily      atorvastatin (LIPITOR) 40 mg tablet TAKE 1 TABLET DAILY 90 tablet 3    Blood Glucose Monitoring Suppl (ONE TOUCH ULTRA MINI) w/Device KIT by Does not apply route      cetirizine (ZyrTEC) 10 mg tablet Take by mouth Daily      cholecalciferol (VITAMIN D3) 1,000 units tablet Take by mouth      Empagliflozin (JARDIANCE) 10 MG TABS tablet Take 1 tablet (10 mg total) by mouth daily 30 tablet 5    escitalopram (LEXAPRO) 20 mg tablet TAKE 1 TABLET BY MOUTH EVERY DAY 90 tablet 1    famotidine (PEPCID) 20 mg tablet Take 1 tablet by mouth daily      fenofibrate (TRICOR) 54 MG tablet TAKE 1 TABLET BY MOUTH DAILY 90 tablet 0    glucose blood (ONE TOUCH ULTRA TEST) test strip 1 each by Other route 2 (two) times a day 100 each 0    lisdexamfetamine (VYVANSE) 20 MG capsule Take 1 capsule (20 mg total) by mouth daily after lunch Max Daily Amount: 20 mg 90 capsule 0    lisdexamfetamine (VYVANSE) 30 MG capsule Take 1 capsule (30 mg total) by mouth every morning Max Daily Amount: 30 mg 90 capsule 0    lisinopril (ZESTRIL) 5 mg tablet TAKE ONE-HALF (1/2) TABLET DAILY 45 tablet 3    metFORMIN (GLUCOPHAGE-XR) 500 mg 24 hr tablet TAKE 2 TABLETS BY MOUTH TWICE A DAY WITH FOOD 360 tablet 0    metoprolol succinate (TOPROL-XL) 25 mg 24 hr tablet TAKE 1 TABLET DAILY 90 tablet 3    Multiple Vitamins-Minerals (MULTIVITAMIN ADULTS 50+ PO) Take 1 tablet by mouth daily      ONETOUCH DELICA LANCETS FINE MISC by Does not apply route daily 100 each 1    dicyclomine (BENTYL) 20 mg tablet Take 1 tablet (20 mg total) by mouth 3 (three) times a day as needed (for abdominal cramping) (Patient not taking: Reported on 10/24/2023) 30 tablet 0    Flovent  MCG/ACT inhaler INHALE 2 PUFFS IN THE MORNING AND 2 PUFFS IN THE EVENING. RINSE MOUTH AFTER USE. . (Patient not taking: Reported on 7/31/2023) 12 g 1    phenylephrine (SUDAFED PE) 10 MG TABS Take 1 tablet (10 mg total) by mouth every 4 (four) hours as needed for congestion (Patient not taking: Reported on 10/24/2023) 30 tablet 0    promethazine-dextromethorphan (PHENERGAN-DM) 6.25-15 mg/5 mL oral syrup Take 5 mL by mouth 4 (four) times a day as needed for cough (Patient not taking: Reported on 10/24/2023) 240 mL 0     No current facility-administered medications for this visit. Allergies: Allergies   Allergen Reactions    Levaquin [Levofloxacin] Anaphylaxis      Physical Exam:     /68 (BP Location: Left arm, Patient Position: Sitting, Cuff Size: Adult)   Pulse 62   Temp (!) 97.2 °F (36.2 °C) (Temporal)   Ht 5' 6" (1.676 m)   Wt 75.3 kg (166 lb)   SpO2 94%   BMI 26.79 kg/m²     Physical Exam  Vitals reviewed. Constitutional:       General: She is not in acute distress. Appearance: She is well-developed. She is not diaphoretic. HENT:      Head: Normocephalic and atraumatic. Right Ear: External ear normal.      Left Ear: External ear normal.      Nose: Nose normal.   Eyes:      General:         Right eye: No discharge. Left eye: No discharge. Pupils: Pupils are equal, round, and reactive to light. Cardiovascular:      Rate and Rhythm: Normal rate and regular rhythm. Heart sounds: Normal heart sounds. No murmur heard. No friction rub. No gallop. Pulmonary:      Effort: Pulmonary effort is normal. No respiratory distress. Breath sounds: Normal breath sounds. No wheezing or rales. Abdominal:      General: Bowel sounds are normal. There is no distension. Palpations: Abdomen is soft. Tenderness: There is no abdominal tenderness. There is no guarding. Musculoskeletal:         General: Normal range of motion. Cervical back: Normal range of motion and neck supple. Lymphadenopathy:      Cervical: No cervical adenopathy. Skin:     General: Skin is warm and dry. Capillary Refill: Capillary refill takes less than 2 seconds.       Findings: No erythema or rash. Neurological:      Mental Status: She is alert and oriented to person, place, and time. Cranial Nerves: No cranial nerve deficit. Psychiatric:         Behavior: Behavior normal.         Thought Content:  Thought content normal.         Judgment: Judgment normal.          DO ALEXY [facility]

## 2023-10-31 ENCOUNTER — VBI (OUTPATIENT)
Dept: ADMINISTRATIVE | Facility: OTHER | Age: 63
End: 2023-10-31

## 2023-10-31 ENCOUNTER — APPOINTMENT (OUTPATIENT)
Dept: LAB | Facility: CLINIC | Age: 63
End: 2023-10-31
Payer: COMMERCIAL

## 2023-10-31 DIAGNOSIS — E11.9 TYPE 2 DIABETES MELLITUS WITHOUT COMPLICATION, WITHOUT LONG-TERM CURRENT USE OF INSULIN (HCC): ICD-10-CM

## 2023-10-31 DIAGNOSIS — R25.2 MUSCLE CRAMPING: ICD-10-CM

## 2023-10-31 DIAGNOSIS — Z13.0 SCREENING FOR IRON DEFICIENCY ANEMIA: ICD-10-CM

## 2023-10-31 DIAGNOSIS — E78.2 MIXED HYPERLIPIDEMIA: ICD-10-CM

## 2023-10-31 DIAGNOSIS — F41.9 ANXIETY DISORDER, UNSPECIFIED TYPE: ICD-10-CM

## 2023-10-31 DIAGNOSIS — D69.6 THROMBOCYTOPENIA (HCC): Primary | ICD-10-CM

## 2023-10-31 DIAGNOSIS — R94.6 ABNORMAL THYROID FUNCTION TEST: ICD-10-CM

## 2023-10-31 LAB
ALBUMIN SERPL BCP-MCNC: 4.5 G/DL (ref 3.5–5)
ALP SERPL-CCNC: 67 U/L (ref 34–104)
ALT SERPL W P-5'-P-CCNC: 31 U/L (ref 7–52)
ANION GAP SERPL CALCULATED.3IONS-SCNC: 8 MMOL/L
AST SERPL W P-5'-P-CCNC: 19 U/L (ref 13–39)
BASOPHILS # BLD AUTO: 0.13 THOUSANDS/ÂΜL (ref 0–0.1)
BASOPHILS NFR BLD AUTO: 1 % (ref 0–1)
BILIRUB SERPL-MCNC: 0.34 MG/DL (ref 0.2–1)
BUN SERPL-MCNC: 20 MG/DL (ref 5–25)
CALCIUM SERPL-MCNC: 9.8 MG/DL (ref 8.4–10.2)
CHLORIDE SERPL-SCNC: 100 MMOL/L (ref 96–108)
CHOLEST SERPL-MCNC: 248 MG/DL
CO2 SERPL-SCNC: 29 MMOL/L (ref 21–32)
CREAT SERPL-MCNC: 0.88 MG/DL (ref 0.6–1.3)
CREAT UR-MCNC: 74.3 MG/DL
EOSINOPHIL # BLD AUTO: 0.34 THOUSAND/ÂΜL (ref 0–0.61)
EOSINOPHIL NFR BLD AUTO: 2 % (ref 0–6)
ERYTHROCYTE [DISTWIDTH] IN BLOOD BY AUTOMATED COUNT: 13.8 % (ref 11.6–15.1)
EST. AVERAGE GLUCOSE BLD GHB EST-MCNC: 180 MG/DL
ESTRADIOL SERPL-MCNC: 15.2 PG/ML
GFR SERPL CREATININE-BSD FRML MDRD: 71 ML/MIN/1.73SQ M
GLUCOSE P FAST SERPL-MCNC: 180 MG/DL (ref 65–99)
HBA1C MFR BLD: 7.9 %
HCT VFR BLD AUTO: 42.7 % (ref 34.8–46.1)
HDLC SERPL-MCNC: 46 MG/DL
HGB BLD-MCNC: 14.2 G/DL (ref 11.5–15.4)
IMM GRANULOCYTES # BLD AUTO: 0.04 THOUSAND/UL (ref 0–0.2)
IMM GRANULOCYTES NFR BLD AUTO: 0 % (ref 0–2)
LDLC SERPL CALC-MCNC: 135 MG/DL (ref 0–100)
LYMPHOCYTES # BLD AUTO: 4.85 THOUSANDS/ÂΜL (ref 0.6–4.47)
LYMPHOCYTES NFR BLD AUTO: 35 % (ref 14–44)
MAGNESIUM SERPL-MCNC: 1.6 MG/DL (ref 1.9–2.7)
MCH RBC QN AUTO: 31 PG (ref 26.8–34.3)
MCHC RBC AUTO-ENTMCNC: 33.3 G/DL (ref 31.4–37.4)
MCV RBC AUTO: 93 FL (ref 82–98)
MICROALBUMIN UR-MCNC: <7 MG/L
MICROALBUMIN/CREAT 24H UR: <9 MG/G CREATININE (ref 0–30)
MONOCYTES # BLD AUTO: 1.26 THOUSAND/ÂΜL (ref 0.17–1.22)
MONOCYTES NFR BLD AUTO: 9 % (ref 4–12)
NEUTROPHILS # BLD AUTO: 7.28 THOUSANDS/ÂΜL (ref 1.85–7.62)
NEUTS SEG NFR BLD AUTO: 53 % (ref 43–75)
NRBC BLD AUTO-RTO: 0 /100 WBCS
PLATELET # BLD AUTO: 578 THOUSANDS/UL (ref 149–390)
PMV BLD AUTO: 10.7 FL (ref 8.9–12.7)
POTASSIUM SERPL-SCNC: 4.7 MMOL/L (ref 3.5–5.3)
PROT SERPL-MCNC: 7.9 G/DL (ref 6.4–8.4)
RBC # BLD AUTO: 4.58 MILLION/UL (ref 3.81–5.12)
SODIUM SERPL-SCNC: 137 MMOL/L (ref 135–147)
TRIGL SERPL-MCNC: 336 MG/DL
TSH SERPL DL<=0.05 MIU/L-ACNC: 3.75 UIU/ML (ref 0.45–4.5)
WBC # BLD AUTO: 13.9 THOUSAND/UL (ref 4.31–10.16)

## 2023-10-31 PROCEDURE — 3051F HG A1C>EQUAL 7.0%<8.0%: CPT | Performed by: FAMILY MEDICINE

## 2023-10-31 PROCEDURE — 85025 COMPLETE CBC W/AUTO DIFF WBC: CPT

## 2023-10-31 PROCEDURE — 84443 ASSAY THYROID STIM HORMONE: CPT

## 2023-10-31 PROCEDURE — 36415 COLL VENOUS BLD VENIPUNCTURE: CPT

## 2023-10-31 PROCEDURE — 82670 ASSAY OF TOTAL ESTRADIOL: CPT

## 2023-10-31 PROCEDURE — 82043 UR ALBUMIN QUANTITATIVE: CPT

## 2023-10-31 PROCEDURE — 80335 ANTIDEPRESSANT TRICYCLIC 1/2: CPT

## 2023-10-31 PROCEDURE — 80053 COMPREHEN METABOLIC PANEL: CPT

## 2023-10-31 PROCEDURE — 83735 ASSAY OF MAGNESIUM: CPT

## 2023-10-31 PROCEDURE — 82570 ASSAY OF URINE CREATININE: CPT

## 2023-10-31 PROCEDURE — 80061 LIPID PANEL: CPT

## 2023-10-31 PROCEDURE — 83036 HEMOGLOBIN GLYCOSYLATED A1C: CPT

## 2023-10-31 PROCEDURE — 87522 HEPATITIS C REVRS TRNSCRPJ: CPT

## 2023-10-31 RX ORDER — FENOFIBRATE 145 MG/1
145 TABLET, COATED ORAL DAILY
Qty: 90 TABLET | Refills: 0 | Status: SHIPPED | OUTPATIENT
Start: 2023-10-31 | End: 2024-01-29

## 2023-11-01 ENCOUNTER — TELEPHONE (OUTPATIENT)
Dept: HEMATOLOGY ONCOLOGY | Facility: CLINIC | Age: 63
End: 2023-11-01

## 2023-11-01 NOTE — TELEPHONE ENCOUNTER
Appointment Schedule   Who are you speaking with? Patient   If it is not the patient, are they listed on an active communication consent form? N/A   Which provider is the appointment scheduled with? Moriah Dyer PA-C   At which location is the appointment scheduled for? [place]   When is the appointment scheduled? Please list date and time 12/7/23 @ 9am   What is the reason for this appointment? NP   Did patient voice understanding of the details of this appointment? Yes   Was the no show policy reviewed with patient?  Yes

## 2023-11-02 LAB
HCV RNA SERPL NAA+PROBE-ACNC: NORMAL IU/ML
TEST INFORMATION: NORMAL

## 2023-11-06 DIAGNOSIS — F90.1 ATTENTION DEFICIT HYPERACTIVITY DISORDER (ADHD), PREDOMINANTLY HYPERACTIVE TYPE: ICD-10-CM

## 2023-11-06 LAB
8-HYDROXYAMOXAPINE [MASS/VOLUME] IN SERUM OR PLASMA: <5 NG/ML
AMOXAPINE [MASS/VOLUME] IN SERUM OR PLASMA: <5 NG/ML
AMOXAPINE+8-HYDROXYAMOXAPINE [MASS/VOLUME] IN SERUM OR PLASMA: <10 NG/ML (ref 200–400)

## 2023-11-06 RX ORDER — LISDEXAMFETAMINE DIMESYLATE CAPSULES 20 MG/1
20 CAPSULE ORAL
Qty: 30 CAPSULE | Refills: 0 | Status: SHIPPED | OUTPATIENT
Start: 2023-11-06

## 2023-11-06 RX ORDER — LISDEXAMFETAMINE DIMESYLATE CAPSULES 30 MG/1
30 CAPSULE ORAL EVERY MORNING
Qty: 30 CAPSULE | Refills: 0 | Status: SHIPPED | OUTPATIENT
Start: 2023-11-06 | End: 2024-02-04

## 2023-11-15 ENCOUNTER — OFFICE VISIT (OUTPATIENT)
Dept: URGENT CARE | Facility: MEDICAL CENTER | Age: 63
End: 2023-11-15
Payer: COMMERCIAL

## 2023-11-15 VITALS
RESPIRATION RATE: 16 BRPM | HEART RATE: 93 BPM | DIASTOLIC BLOOD PRESSURE: 78 MMHG | OXYGEN SATURATION: 99 % | TEMPERATURE: 96.5 F | SYSTOLIC BLOOD PRESSURE: 120 MMHG

## 2023-11-15 DIAGNOSIS — J04.0 ACUTE LARYNGITIS: Primary | ICD-10-CM

## 2023-11-15 DIAGNOSIS — J02.9 SORE THROAT: ICD-10-CM

## 2023-11-15 DIAGNOSIS — J06.9 ACUTE URI: ICD-10-CM

## 2023-11-15 LAB — S PYO AG THROAT QL: NEGATIVE

## 2023-11-15 PROCEDURE — 87880 STREP A ASSAY W/OPTIC: CPT

## 2023-11-15 PROCEDURE — 99213 OFFICE O/P EST LOW 20 MIN: CPT

## 2023-11-15 RX ORDER — BROMPHENIRAMINE MALEATE, PSEUDOEPHEDRINE HYDROCHLORIDE, AND DEXTROMETHORPHAN HYDROBROMIDE 2; 30; 10 MG/5ML; MG/5ML; MG/5ML
SYRUP ORAL
Qty: 120 ML | Refills: 0 | Status: SHIPPED | OUTPATIENT
Start: 2023-11-15

## 2023-11-15 RX ORDER — METHYLPREDNISOLONE 4 MG/1
TABLET ORAL
Qty: 21 TABLET | Refills: 0 | Status: SHIPPED | OUTPATIENT
Start: 2023-11-15 | End: 2023-11-20

## 2023-11-15 RX ORDER — BROMPHENIRAMINE MALEATE, PSEUDOEPHEDRINE HYDROCHLORIDE, AND DEXTROMETHORPHAN HYDROBROMIDE 2; 30; 10 MG/5ML; MG/5ML; MG/5ML
10 SYRUP ORAL 4 TIMES DAILY PRN
Qty: 120 ML | Refills: 0 | Status: SHIPPED | OUTPATIENT
Start: 2023-11-15 | End: 2023-11-15

## 2023-11-15 NOTE — PATIENT INSTRUCTIONS
Rapid strep completed in office today. Negative rapid strep. Take Medrol dose pack as prescribed. Bromfed given for cough and congestion. Recommended melatonin over the counter for sleep difficulty. Follow-up with PCP in the next 3-5 days if no improvement. Go to the ED if symptoms severely worsen. Laryngitis   AMBULATORY CARE:   Laryngitis  is inflammation of your larynx (voice box). The larynx holds your vocal cords. Your vocal cords usually open and close easily to form sounds. With laryngitis, your vocal cords swell and become irritated. This may change how your voice sounds, or you may lose your voice for a short while. Common signs and symptoms:   A weak voice or loss of voice    Hoarse, raspy voice    Sore, dry, raw throat    Clearing your throat often    Dry cough    Call your local emergency number (911 in the [address]) if:   You have sudden trouble breathing. You have severe drooling or trouble swallowing. Seek care immediately if:   You cough up blood. You have severe pain. Call your doctor if:   Your symptoms last longer than 2 weeks. You have questions or concerns about your condition or care. Treatment for laryngitis  is usually not needed. Laryngitis typically gets better on its own within 1 to 2 weeks. Medicines such as steroids or antibiotics may be used in some cases. Self-care:   Rest your voice. Try to talk as little as possible. Use a cool mist humidifier  to increase air moisture in your home. This may soothe your throat and decrease your cough. Keep your mouth and throat moist.  Suck on a throat lozenge or chew sugarless gum. Do not smoke, and avoid secondhand smoke. Nicotine and other chemicals in cigarettes and cigars can cause dryness and irritation in your throat and vocal cords. Ask your healthcare provider for information if you currently smoke and need help to quit. E-cigarettes or smokeless tobacco still contain nicotine.  Talk to your healthcare provider before you use these products. Drink liquids as directed. You may need to drink extra liquids to help soothe your throat. Water or warm tea are good liquids to drink. Avoid spicy and acidic foods. These may irritate your throat. Examples include citrus, salad dressings, and hot sauces. Carbonated drinks may also cause discomfort in your throat. Try not to clear your throat. This can cause more irritation and swelling of your vocal cords. Follow up with your doctor or ear, nose, and throat specialist as directed:  Write down your questions so you remember to ask them during your visits. © Copyright [employer] 2023 Information is for End User's use only and may not be sold, redistributed or otherwise used for commercial purposes. The above information is an  only. It is not intended as medical advice for individual conditions or treatments. Talk to your doctor, nurse or pharmacist before following any medical regimen to see if it is safe and effective for you.

## 2023-11-15 NOTE — PROGRESS NOTES
[facility]        NAME: Saida Brizuela is a 66 y.o. female  : 1960    MRN: [id number]  DATE: November 15, 2023  TIME: 3:09 PM    Assessment and Plan   Acute laryngitis [J04.0]  1. Acute laryngitis  Ambulatory Referral to Otolaryngology      2. Sore throat  POCT rapid strepA      3. Acute URI  brompheniramine-pseudoephedrine-DM 30-2-10 MG/5ML syrup    methylPREDNISolone 4 MG tablet therapy pack        Patient Instructions     Rapid strep completed in office today. Negative rapid strep. Take Medrol dose pack as prescribed. Bromfed given for cough and congestion. Recommended melatonin over the counter for sleep difficulty. Follow-up with PCP in the next 3-5 days if no improvement. Go to the ED if symptoms severely worsen. Chief Complaint     Chief Complaint   Patient presents with    Sore Throat     Pt reports that symptoms stated last Wednesday. Pt reports sore throat, cough, runny nose. Pt states that her neck hurts and that she is having issues sleeping. Pt reports that she recently had covid in September and that she did an at home test that was negative. History of Present Illness     Saida Brizuela is a 66 y.o. female presenting to the office today for sore throat. Symptoms have been present for 7 days, and include sore throat, irritation with swallowing, congestion, rhinorrhea, hoarseness, ear pain, and myalgias. She has tried Delsym, Dayquil, Mucinex,Tylenol for her symptoms, no relief. Sick contacts include: none  Recent travel: none    Review of Systems     Review of Systems   Constitutional:  Negative for chills and fever. HENT:  Positive for ear pain, rhinorrhea and sore throat. Negative for ear discharge, sinus pressure, sinus pain and trouble swallowing. Eyes:  Negative for pain and redness. Respiratory:  Positive for cough and chest tightness. Negative for shortness of breath and wheezing. Cardiovascular:  Negative for chest pain and palpitations. Gastrointestinal:  Positive for diarrhea. Negative for abdominal pain, nausea and vomiting. Genitourinary: Negative. Negative for difficulty urinating. Musculoskeletal:  Positive for myalgias. Skin:  Negative for color change and rash. Neurological:  Positive for headaches. Negative for seizures and syncope. All other systems reviewed and are negative.       Current Medications       Current Outpatient Medications:     albuterol (PROVENTIL HFA,VENTOLIN HFA) 90 mcg/act inhaler, Inhale 2 puffs every 6 (six) hours as needed for wheezing, Disp: 8 g, Rfl: 5    aspirin (ECOTRIN LOW STRENGTH) 81 mg EC tablet, Take 1 tablet by mouth daily, Disp: , Rfl:     atorvastatin (LIPITOR) 40 mg tablet, TAKE 1 TABLET DAILY, Disp: 90 tablet, Rfl: 3    Blood Glucose Monitoring Suppl (ONE TOUCH ULTRA MINI) w/Device KIT, by Does not apply route, Disp: , Rfl:     brompheniramine-pseudoephedrine-DM 30-2-10 MG/5ML syrup, Take 10 mL by mouth 4 (four) times a day as needed for allergies for up to 10 days, Disp: 120 mL, Rfl: 0    cetirizine (ZyrTEC) 10 mg tablet, Take by mouth Daily, Disp: , Rfl:     cholecalciferol (VITAMIN D3) 1,000 units tablet, Take by mouth, Disp: , Rfl:     escitalopram (LEXAPRO) 20 mg tablet, TAKE 1 TABLET BY MOUTH EVERY DAY, Disp: 90 tablet, Rfl: 1    famotidine (PEPCID) 20 mg tablet, Take 1 tablet by mouth daily, Disp: , Rfl:     fenofibrate (TRICOR) 145 mg tablet, Take 1 tablet (145 mg total) by mouth daily, Disp: 90 tablet, Rfl: 0    glucose blood (ONE TOUCH ULTRA TEST) test strip, 1 each by Other route 2 (two) times a day, Disp: 100 each, Rfl: 0    lisdexamfetamine (VYVANSE) 20 MG capsule, Take 1 capsule (20 mg total) by mouth daily after lunch Max Daily Amount: 20 mg, Disp: 30 capsule, Rfl: 0    lisdexamfetamine (VYVANSE) 30 MG capsule, Take 1 capsule (30 mg total) by mouth every morning Max Daily Amount: 30 mg, Disp: 30 capsule, Rfl: 0    lisinopril (ZESTRIL) 5 mg tablet, TAKE ONE-HALF (1/2) TABLET DAILY, Disp: 45 tablet, Rfl: 3    metFORMIN (GLUCOPHAGE-XR) 500 mg 24 hr tablet, TAKE 2 TABLETS BY MOUTH TWICE A DAY WITH FOOD, Disp: 360 tablet, Rfl: 0    methylPREDNISolone 4 MG tablet therapy pack, Use as directed on package, Disp: 21 tablet, Rfl: 0    metoprolol succinate (TOPROL-XL) 25 mg 24 hr tablet, TAKE 1 TABLET DAILY, Disp: 90 tablet, Rfl: 3    Multiple Vitamins-Minerals (MULTIVITAMIN ADULTS 50+ PO), Take 1 tablet by mouth daily, Disp: , Rfl:     ONETOUCH DELICA LANCETS FINE MISC, by Does not apply route daily, Disp: 100 each, Rfl: 1    dicyclomine (BENTYL) 20 mg tablet, Take 1 tablet (20 mg total) by mouth 3 (three) times a day as needed (for abdominal cramping) (Patient not taking: Reported on 10/24/2023), Disp: 30 tablet, Rfl: 0    Empagliflozin (JARDIANCE) 10 MG TABS tablet, Take 1 tablet (10 mg total) by mouth daily, Disp: 30 tablet, Rfl: 5    Flovent  MCG/ACT inhaler, INHALE 2 PUFFS IN THE MORNING AND 2 PUFFS IN THE EVENING. RINSE MOUTH AFTER USE. . (Patient not taking: Reported on 7/31/2023), Disp: 12 g, Rfl: 1    phenylephrine (SUDAFED PE) 10 MG TABS, Take 1 tablet (10 mg total) by mouth every 4 (four) hours as needed for congestion (Patient not taking: Reported on 11/15/2023), Disp: 30 tablet, Rfl: 0    promethazine-dextromethorphan (PHENERGAN-DM) 6.25-15 mg/5 mL oral syrup, Take 5 mL by mouth 4 (four) times a day as needed for cough (Patient not taking: Reported on 10/24/2023), Disp: 240 mL, Rfl: 0    Current Allergies     Allergies as of 11/15/2023 - Reviewed 11/15/2023   Allergen Reaction Noted    Levaquin [levofloxacin] Anaphylaxis 05/18/2012            The following portions of the patient's history were reviewed and updated as appropriate: allergies, current medications, past family history, past medical history, past social history, past surgical history and problem list.     Past Medical History:   Diagnosis Date    Abnormal mammogram     RESOLVED: [id number]    Arthritis     Diabetes mellitus ([address])     Eczema of right external ear     RESOLVED: [id number]    Foot drop, left foot     Heart attack ([address])     Hematuria     RESOLVED: [id number]    Impetigo     RESOLVED: [id number]    Insomnia related to another mental disorder     AXIS I/II MENTAL DISORDER; RESOLVED: [id number]       Past Surgical History:   Procedure Laterality Date    HYSTERECTOMY  1998    still has 1 ovary not sure which one was removed    OTHER SURGICAL HISTORY      STEND INDICATIONS: RESTENOSIS AT PREVIOUS PTCA LOCATION     SPLENECTOMY      TOTAL HIP ARTHROPLASTY Left        Family History   Problem Relation Age of Onset    Stroke Mother     Osteoporosis Mother     Diabetes type II Father     Heart attack Father     Cancer Father     Heart disease Father     Hypertension Father     Heart disease Sister     Diabetes type II Brother     Heart attack Brother     Heart disease Brother     Heart attack Brother     Heart attack Brother     Heart attack Brother     Diabetes type II Brother     Liver cancer Maternal Aunt 76    Alcohol abuse Neg Hx     Substance Abuse Neg Hx      Medications have been verified. Objective     /78 (BP Location: Right arm)   Pulse 93   Temp (!) 96.5 °F (35.8 °C) (Tympanic)   Resp 16   SpO2 99%   No LMP recorded. Patient is postmenopausal.     Physical Exam     Physical Exam  Constitutional:       General: She is not in acute distress. Appearance: Normal appearance. She is well-developed and normal weight. She is not ill-appearing, toxic-appearing or diaphoretic. HENT:      Head: Normocephalic and atraumatic. Right Ear: Tympanic membrane, ear canal and external ear normal. No drainage, swelling or tenderness. No middle ear effusion. There is no impacted cerumen. Tympanic membrane is not erythematous. Left Ear: Tympanic membrane, ear canal and external ear normal. No drainage, swelling or tenderness. No middle ear effusion. There is no impacted cerumen.  Tympanic membrane is not erythematous. Nose: No congestion or rhinorrhea. Mouth/Throat:      Mouth: Mucous membranes are moist. No oral lesions. Pharynx: Uvula midline. Posterior oropharyngeal erythema present. No pharyngeal swelling, oropharyngeal exudate or uvula swelling. Eyes:      General:         Right eye: No discharge. Left eye: No discharge. Conjunctiva/sclera: Conjunctivae normal.   Neck:      Thyroid: No thyromegaly. Cardiovascular:      Rate and Rhythm: Normal rate and regular rhythm. Pulses: Normal pulses. Heart sounds: Normal heart sounds. No murmur heard. No friction rub. No gallop. Pulmonary:      Effort: Pulmonary effort is normal. No respiratory distress. Breath sounds: Normal breath sounds. No stridor. No wheezing, rhonchi or rales. Chest:      Chest wall: No tenderness. Abdominal:      Palpations: Abdomen is soft. Musculoskeletal:         General: Normal range of motion. Cervical back: Normal range of motion and neck supple. No rigidity or tenderness. Lymphadenopathy:      Cervical: No cervical adenopathy. Skin:     General: Skin is warm and dry. Capillary Refill: Capillary refill takes less than 2 seconds. Coloration: Skin is not jaundiced. Findings: No bruising or rash. Neurological:      General: No focal deficit present. Mental Status: She is alert. Mental status is at baseline.    Psychiatric:         Mood and Affect: Mood normal.         Behavior: Behavior normal.

## 2023-11-20 ENCOUNTER — OFFICE VISIT (OUTPATIENT)
Dept: FAMILY MEDICINE CLINIC | Facility: CLINIC | Age: 63
End: 2023-11-20
Payer: COMMERCIAL

## 2023-11-20 VITALS
BODY MASS INDEX: 26.1 KG/M2 | TEMPERATURE: 97.6 F | HEIGHT: 66 IN | DIASTOLIC BLOOD PRESSURE: 80 MMHG | WEIGHT: 162.4 LBS | SYSTOLIC BLOOD PRESSURE: 134 MMHG | OXYGEN SATURATION: 99 % | HEART RATE: 91 BPM

## 2023-11-20 DIAGNOSIS — B96.89 ACUTE BACTERIAL SINUSITIS: Primary | ICD-10-CM

## 2023-11-20 DIAGNOSIS — J01.90 ACUTE BACTERIAL SINUSITIS: Primary | ICD-10-CM

## 2023-11-20 PROCEDURE — 99213 OFFICE O/P EST LOW 20 MIN: CPT | Performed by: FAMILY MEDICINE

## 2023-11-20 RX ORDER — AMOXICILLIN AND CLAVULANATE POTASSIUM 875; 125 MG/1; MG/1
1 TABLET, FILM COATED ORAL EVERY 12 HOURS SCHEDULED
Qty: 14 TABLET | Refills: 0 | Status: SHIPPED | OUTPATIENT
Start: 2023-11-20 | End: 2023-11-27

## 2023-11-20 NOTE — PROGRESS NOTES
Name: Denice Vargas      : 1960      MRN: [id number]  Encounter Provider: Lakesha Diaz DO  Encounter Date: 2023   Encounter department: [facility]    Assessment & Plan     1. Acute bacterial sinusitis  -     amoxicillin-clavulanate (AUGMENTIN) 875-125 mg per tablet; Take 1 tablet by mouth every 12 (twelve) hours for 7 days         Subjective      Chief Complaint   Patient presents with   • Sore Throat     Started next week         Patient was seen 11/15 with sore throat - [facility] - voice is better but still sinus pressure.    Sore Throat       Review of Systems   Constitutional:  Negative for chills and fever.   HENT:  Positive for postnasal drip, sinus pressure and sore throat.        Current Outpatient Medications on File Prior to Visit   Medication Sig   • albuterol (PROVENTIL HFA,VENTOLIN HFA) 90 mcg/act inhaler Inhale 2 puffs every 6 (six) hours as needed for wheezing   • aspirin (ECOTRIN LOW STRENGTH) 81 mg EC tablet Take 1 tablet by mouth daily   • Blood Glucose Monitoring Suppl (ONE TOUCH ULTRA MINI) w/Device KIT by Does not apply route   • cetirizine (ZyrTEC) 10 mg tablet Take by mouth Daily   • cholecalciferol (VITAMIN D3) 1,000 units tablet Take by mouth   • dicyclomine (BENTYL) 20 mg tablet Take 1 tablet (20 mg total) by mouth 3 (three) times a day as needed (for abdominal cramping)   • Empagliflozin (JARDIANCE) 10 MG TABS tablet Take 1 tablet (10 mg total) by mouth daily   • famotidine (PEPCID) 20 mg tablet Take 1 tablet by mouth daily   • fenofibrate (TRICOR) 145 mg tablet Take 1 tablet (145 mg total) by mouth daily   • Flovent  MCG/ACT inhaler INHALE 2 PUFFS IN THE MORNING AND 2 PUFFS IN THE EVENING. RINSE MOUTH AFTER USE..   • glucose blood (ONE TOUCH ULTRA TEST) test strip 1 each by Other route 2 (two) times a day   • lisinopril (ZESTRIL) 5 mg tablet TAKE ONE-HALF (1/2) TABLET DAILY   • metoprolol succinate (TOPROL-XL) 25 mg 24 hr tablet TAKE 1 TABLET  "DAILY   • Multiple Vitamins-Minerals (MULTIVITAMIN ADULTS 50+ PO) Take 1 tablet by mouth daily   • ONETOUCH DELICA LANCETS FINE MISC by Does not apply route daily   • phenylephrine (SUDAFED PE) 10 MG TABS Take 1 tablet (10 mg total) by mouth every 4 (four) hours as needed for congestion   • promethazine-dextromethorphan (PHENERGAN-DM) 6.25-15 mg/5 mL oral syrup Take 5 mL by mouth 4 (four) times a day as needed for cough (Patient not taking: Reported on 11/20/2023)       Objective     /80 (BP Location: Left arm, Patient Position: Sitting, Cuff Size: Adult)   Pulse 91   Temp 97.6 °F (36.4 °C)   Ht 5' 6\" (1.676 m)   Wt 73.7 kg (162 lb 6.4 oz)   SpO2 99%   BMI 26.21 kg/m²     Physical Exam  Vitals and nursing note reviewed.   Constitutional:       General: She is not in acute distress.  HENT:      Head: Normocephalic.      Right Ear: Tympanic membrane normal.      Left Ear: Tympanic membrane normal.      Nose:      Right Sinus: Maxillary sinus tenderness and frontal sinus tenderness present.      Left Sinus: Maxillary sinus tenderness and frontal sinus tenderness present.      Mouth/Throat:      Comments: Postnasal drainage  Cardiovascular:      Rate and Rhythm: Normal rate and regular rhythm.      Heart sounds: Normal heart sounds.   Pulmonary:      Effort: Pulmonary effort is normal.      Breath sounds: Normal breath sounds.   Lymphadenopathy:      Cervical: No cervical adenopathy.   Skin:     General: Skin is warm and dry.   Neurological:      Mental Status: She is alert and oriented to person, place, and time.   Psychiatric:         Mood and Affect: Mood normal.       Lakesha Diaz, DO    "

## 2023-12-03 DIAGNOSIS — F90.1 ATTENTION DEFICIT HYPERACTIVITY DISORDER (ADHD), PREDOMINANTLY HYPERACTIVE TYPE: ICD-10-CM

## 2023-12-04 ENCOUNTER — TELEPHONE (OUTPATIENT)
Dept: FAMILY MEDICINE CLINIC | Facility: CLINIC | Age: 63
End: 2023-12-04

## 2023-12-04 DIAGNOSIS — F90.1 ATTENTION DEFICIT HYPERACTIVITY DISORDER (ADHD), PREDOMINANTLY HYPERACTIVE TYPE: ICD-10-CM

## 2023-12-04 RX ORDER — LISDEXAMFETAMINE DIMESYLATE CAPSULES 20 MG/1
20 CAPSULE ORAL
Qty: 30 CAPSULE | Refills: 0 | Status: SHIPPED | OUTPATIENT
Start: 2023-12-04

## 2023-12-04 RX ORDER — LISDEXAMFETAMINE DIMESYLATE CAPSULES 30 MG/1
30 CAPSULE ORAL EVERY MORNING
Qty: 30 CAPSULE | Refills: 0 | Status: SHIPPED | OUTPATIENT
Start: 2023-12-04 | End: 2024-03-03

## 2023-12-04 RX ORDER — LISDEXAMFETAMINE DIMESYLATE CAPSULES 30 MG/1
30 CAPSULE ORAL EVERY MORNING
Qty: 30 CAPSULE | Refills: 0 | Status: SHIPPED | OUTPATIENT
Start: 2023-12-04 | End: 2023-12-04 | Stop reason: SDUPTHER

## 2023-12-04 NOTE — TELEPHONE ENCOUNTER
Pt called and said they only have the 30 mg at [facility]. She said she will make some more calls tomorrow but so far this is all she has found.

## 2023-12-04 NOTE — TELEPHONE ENCOUNTER
Pt called stating that she called around and the local pharmacies are out of the Vyvanse 30 mg & 20 mg they are on back order. She is asking if there is something that she could substitute for it.     Please advise [phone]

## 2023-12-04 NOTE — TELEPHONE ENCOUNTER
LVM requesting pt call around to pharmacies in an attempt to find a pharmacy that has the medication in stock.

## 2023-12-07 ENCOUNTER — CONSULT (OUTPATIENT)
Dept: HEMATOLOGY ONCOLOGY | Facility: CLINIC | Age: 63
End: 2023-12-07
Payer: COMMERCIAL

## 2023-12-07 VITALS
HEART RATE: 81 BPM | TEMPERATURE: 97.1 F | SYSTOLIC BLOOD PRESSURE: 124 MMHG | BODY MASS INDEX: 27 KG/M2 | WEIGHT: 168 LBS | OXYGEN SATURATION: 99 % | HEIGHT: 66 IN | RESPIRATION RATE: 17 BRPM | DIASTOLIC BLOOD PRESSURE: 60 MMHG

## 2023-12-07 DIAGNOSIS — D69.6 THROMBOCYTOPENIA (HCC): ICD-10-CM

## 2023-12-07 DIAGNOSIS — F17.200 CURRENT EVERY DAY SMOKER: Primary | ICD-10-CM

## 2023-12-07 PROCEDURE — 99244 OFF/OP CNSLTJ NEW/EST MOD 40: CPT | Performed by: PHYSICIAN ASSISTANT
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
## 2023-12-19 ENCOUNTER — OFFICE VISIT (OUTPATIENT)
Dept: URGENT CARE | Facility: CLINIC | Age: 63
End: 2023-12-19
Payer: COMMERCIAL

## 2023-12-19 VITALS
HEART RATE: 97 BPM | DIASTOLIC BLOOD PRESSURE: 64 MMHG | SYSTOLIC BLOOD PRESSURE: 122 MMHG | OXYGEN SATURATION: 100 % | RESPIRATION RATE: 18 BRPM | TEMPERATURE: 97 F

## 2023-12-19 DIAGNOSIS — J06.9 ACUTE URI: ICD-10-CM

## 2023-12-19 DIAGNOSIS — J06.9 VIRAL UPPER RESPIRATORY ILLNESS: Primary | ICD-10-CM

## 2023-12-19 PROCEDURE — 99213 OFFICE O/P EST LOW 20 MIN: CPT | Performed by: PHYSICIAN ASSISTANT

## 2023-12-19 RX ORDER — BROMPHENIRAMINE MALEATE, PSEUDOEPHEDRINE HYDROCHLORIDE, AND DEXTROMETHORPHAN HYDROBROMIDE 2; 30; 10 MG/5ML; MG/5ML; MG/5ML
10 SYRUP ORAL 4 TIMES DAILY PRN
Qty: 120 ML | Refills: 0 | Status: SHIPPED | OUTPATIENT
Start: 2023-12-19 | End: 2023-12-20

## 2023-12-19 NOTE — PATIENT INSTRUCTIONS
Your symptoms are consistent with a viral illness.    For nasal/sinus congestion you can try steam, warm compresses, saline nasal spray, Neti pot, nasal steroid (Flonase, Nasocort) to be used at bedtime after the saline nasal spray, or nasal decongestant (Afrin - for 3 days only).    You can try a decongestant (Sudafed) if > 6 years of age and no history of high blood pressure.    For cough you can take an over-the-counter expectorant such as plain Robitussion or Mucinex. A spoonful of honey at bedtime may also be helpful.    For cold symptoms with high blood pressure take Coricidin cough/cold.     For sore throat you can use Cepacol lozenges, do warm salt water gargles, drink warm water with lemon or herbal teas, or use an over-the-counter throat spray (Chloraseptic).    You can take ibuprofen/Motrin and acetaminophen/Tylenol as needed for pain, fever, body aches. Do not take ibuprofen/Motrin/Advil if you have a history of heart disease, bleeding ulcers, or if you take blood thinners.     Drink plenty of fluids to stay hydrated.    Follow up with your PCP in 5-7 days for persistent symptoms.    Go to the ER if symptoms worsen.    Follow up with PCP in 3-5 days.  Proceed to  ER if symptoms worsen.

## 2023-12-19 NOTE — PROGRESS NOTES
[facility]        NAME: Denice Vargas is a 63 y.o. female  : 1960    MRN: [id number]  DATE: 2023  TIME: 12:10 PM    Assessment and Plan   Viral upper respiratory illness [J06.9]  1. Viral upper respiratory illness        2. Acute URI  brompheniramine-pseudoephedrine-DM 30-2-10 MG/5ML syrup            Patient Instructions     Your symptoms are consistent with a viral illness.    For nasal/sinus congestion you can try steam, warm compresses, saline nasal spray, Neti pot, nasal steroid (Flonase, Nasocort) to be used at bedtime after the saline nasal spray, or nasal decongestant (Afrin - for 3 days only).    You can try a decongestant (Sudafed) if > 6 years of age and no history of high blood pressure.    For cough you can take an over-the-counter expectorant such as plain Robitussion or Mucinex. A spoonful of honey at bedtime may also be helpful.    For cold symptoms with high blood pressure take Coricidin cough/cold.     For sore throat you can use Cepacol lozenges, do warm salt water gargles, drink warm water with lemon or herbal teas, or use an over-the-counter throat spray (Chloraseptic).    You can take ibuprofen/Motrin and acetaminophen/Tylenol as needed for pain, fever, body aches. Do not take ibuprofen/Motrin/Advil if you have a history of heart disease, bleeding ulcers, or if you take blood thinners.     Drink plenty of fluids to stay hydrated.    Follow up with your PCP in 5-7 days for persistent symptoms.    Go to the ER if symptoms worsen.    Follow up with PCP in 3-5 days.  Proceed to  ER if symptoms worsen.    Chief Complaint     Chief Complaint   Patient presents with    Cold Like Symptoms     Patient with c/o coughing, congestion and feeling blah and chills. Had COVID in September         History of Present Illness       URI   This is a new problem. Episode onset: 2 days ago. The problem has been gradually worsening. There has been no fever. Associated symptoms include  congestion and coughing. Pertinent negatives include no abdominal pain, chest pain, dysuria, ear pain, rash, sore throat or vomiting. Associated symptoms comments: + chills, myalgias, chest tightness. Treatments tried: Delsym, Bromfed. The treatment provided no relief.   She had COVID 9/23/23.  She states cough is worse at night when she lies flat and she gets a burning in her chest when coughing.  Denies SOB.     Review of Systems   Review of Systems   Constitutional:  Positive for chills. Negative for fever.   HENT:  Positive for congestion. Negative for ear pain and sore throat.    Eyes:  Negative for pain and visual disturbance.   Respiratory:  Positive for cough and chest tightness. Negative for shortness of breath.    Cardiovascular:  Negative for chest pain and palpitations.   Gastrointestinal:  Negative for abdominal pain and vomiting.   Genitourinary:  Negative for dysuria and hematuria.   Musculoskeletal:  Positive for myalgias. Negative for arthralgias and back pain.   Skin:  Negative for color change and rash.   Neurological:  Negative for seizures and syncope.   All other systems reviewed and are negative.        Current Medications       Current Outpatient Medications:     brompheniramine-pseudoephedrine-DM 30-2-10 MG/5ML syrup, Take 10 mL by mouth 4 (four) times a day as needed for allergies or cough, Disp: 120 mL, Rfl: 0    albuterol (PROVENTIL HFA,VENTOLIN HFA) 90 mcg/act inhaler, Inhale 2 puffs every 6 (six) hours as needed for wheezing, Disp: 8 g, Rfl: 5    aspirin (ECOTRIN LOW STRENGTH) 81 mg EC tablet, Take 1 tablet by mouth daily, Disp: , Rfl:     atorvastatin (LIPITOR) 40 mg tablet, TAKE 1 TABLET DAILY, Disp: 90 tablet, Rfl: 3    Blood Glucose Monitoring Suppl (ONE TOUCH ULTRA MINI) w/Device KIT, by Does not apply route, Disp: , Rfl:     cetirizine (ZyrTEC) 10 mg tablet, Take by mouth Daily, Disp: , Rfl:     cholecalciferol (VITAMIN D3) 1,000 units tablet, Take by mouth, Disp: , Rfl:      dicyclomine (BENTYL) 20 mg tablet, Take 1 tablet (20 mg total) by mouth 3 (three) times a day as needed (for abdominal cramping), Disp: 30 tablet, Rfl: 0    Empagliflozin (JARDIANCE) 10 MG TABS tablet, Take 1 tablet (10 mg total) by mouth daily, Disp: 30 tablet, Rfl: 5    escitalopram (LEXAPRO) 20 mg tablet, TAKE 1 TABLET BY MOUTH EVERY DAY, Disp: 90 tablet, Rfl: 1    famotidine (PEPCID) 20 mg tablet, Take 1 tablet by mouth daily, Disp: , Rfl:     fenofibrate (TRICOR) 145 mg tablet, Take 1 tablet (145 mg total) by mouth daily, Disp: 90 tablet, Rfl: 0    Flovent  MCG/ACT inhaler, INHALE 2 PUFFS IN THE MORNING AND 2 PUFFS IN THE EVENING. RINSE MOUTH AFTER USE.., Disp: 12 g, Rfl: 1    glucose blood (ONE TOUCH ULTRA TEST) test strip, 1 each by Other route 2 (two) times a day, Disp: 100 each, Rfl: 0    lisdexamfetamine (VYVANSE) 20 MG capsule, Take 1 capsule (20 mg total) by mouth daily after lunch Max Daily Amount: 20 mg, Disp: 30 capsule, Rfl: 0    lisdexamfetamine (VYVANSE) 30 MG capsule, Take 1 capsule (30 mg total) by mouth every morning Max Daily Amount: 30 mg, Disp: 30 capsule, Rfl: 0    lisinopril (ZESTRIL) 5 mg tablet, TAKE ONE-HALF (1/2) TABLET DAILY, Disp: 45 tablet, Rfl: 3    metFORMIN (GLUCOPHAGE-XR) 500 mg 24 hr tablet, TAKE 2 TABLETS BY MOUTH TWICE A DAY WITH FOOD, Disp: 360 tablet, Rfl: 0    metoprolol succinate (TOPROL-XL) 25 mg 24 hr tablet, TAKE 1 TABLET DAILY, Disp: 90 tablet, Rfl: 3    Multiple Vitamins-Minerals (MULTIVITAMIN ADULTS 50+ PO), Take 1 tablet by mouth daily, Disp: , Rfl:     ONETOUCH DELICA LANCETS FINE MISC, by Does not apply route daily, Disp: 100 each, Rfl: 1    phenylephrine (SUDAFED PE) 10 MG TABS, Take 1 tablet (10 mg total) by mouth every 4 (four) hours as needed for congestion (Patient not taking: Reported on 11/20/2023), Disp: 30 tablet, Rfl: 0    promethazine-dextromethorphan (PHENERGAN-DM) 6.25-15 mg/5 mL oral syrup, Take 5 mL by mouth 4 (four) times a day as needed for  cough (Patient not taking: Reported on 11/20/2023), Disp: 240 mL, Rfl: 0    Current Allergies     Allergies as of 12/19/2023 - Reviewed 12/19/2023   Allergen Reaction Noted    Levaquin [levofloxacin] Anaphylaxis 05/18/2012            The following portions of the patient's history were reviewed and updated as appropriate: allergies, current medications, past family history, past medical history, past social history, past surgical history and problem list.     Past Medical History:   Diagnosis Date    Abnormal mammogram     RESOLVED: 09MAR2015    Arthritis     Diabetes mellitus (HCC)     Eczema of right external ear     RESOLVED: 05JAN2017    Foot drop, left foot     Heart attack (HCC)     Hematuria     RESOLVED: 09MAR2015    Impetigo     RESOLVED: 09MAR2015    Insomnia related to another mental disorder     AXIS I/II MENTAL DISORDER; RESOLVED: 09MAR2015       Past Surgical History:   Procedure Laterality Date    HYSTERECTOMY  1998    still has 1 ovary not sure which one was removed    OTHER SURGICAL HISTORY      STEND INDICATIONS: RESTENOSIS AT PREVIOUS PTCA LOCATION     SPLENECTOMY      TOTAL HIP ARTHROPLASTY Left        Family History   Problem Relation Age of Onset    Stroke Mother     Osteoporosis Mother     Diabetes type II Father     Heart attack Father     Cancer Father     Heart disease Father     Hypertension Father     Heart disease Sister     Diabetes type II Brother     Heart attack Brother     Heart disease Brother     Heart attack Brother     Heart attack Brother     Heart attack Brother     Diabetes type II Brother     Liver cancer Maternal Aunt 75    Alcohol abuse Neg Hx     Substance Abuse Neg Hx          Medications have been verified.        Objective   /64   Pulse 97   Temp (!) 97 °F (36.1 °C) (Tympanic)   Resp 18   SpO2 100%   No LMP recorded. Patient is postmenopausal.       Physical Exam     Physical Exam  Vitals and nursing note reviewed.   Constitutional:       General: She is not  in acute distress.     Appearance: Normal appearance.   HENT:      Head: Normocephalic and atraumatic.      Right Ear: Tympanic membrane and ear canal normal.      Left Ear: Tympanic membrane and ear canal normal.      Nose: Congestion present. No rhinorrhea.      Mouth/Throat:      Mouth: Mucous membranes are moist.      Pharynx: Posterior oropharyngeal erythema present. No oropharyngeal exudate.      Comments: No tonsillar hypertrophy  Eyes:      Conjunctiva/sclera: Conjunctivae normal.   Cardiovascular:      Rate and Rhythm: Normal rate and regular rhythm.      Pulses: Normal pulses.      Heart sounds: Normal heart sounds.   Pulmonary:      Effort: Pulmonary effort is normal.      Comments: A few scattered ronchi in the right middle lobe, no rales, no wheezing.   Musculoskeletal:      Cervical back: Tenderness present.   Lymphadenopathy:      Cervical: Cervical adenopathy present.   Skin:     General: Skin is warm and dry.   Neurological:      Mental Status: She is alert and oriented to person, place, and time.   Psychiatric:         Mood and Affect: Mood normal.         Behavior: Behavior normal.       Rapid COVID: neg

## 2023-12-20 ENCOUNTER — OFFICE VISIT (OUTPATIENT)
Dept: FAMILY MEDICINE CLINIC | Facility: CLINIC | Age: 63
End: 2023-12-20
Payer: COMMERCIAL

## 2023-12-20 VITALS
BODY MASS INDEX: 28.19 KG/M2 | DIASTOLIC BLOOD PRESSURE: 62 MMHG | HEIGHT: 65 IN | WEIGHT: 169.2 LBS | OXYGEN SATURATION: 95 % | SYSTOLIC BLOOD PRESSURE: 128 MMHG | HEART RATE: 103 BPM

## 2023-12-20 DIAGNOSIS — J20.9 ACUTE BRONCHITIS, UNSPECIFIED ORGANISM: Primary | ICD-10-CM

## 2023-12-20 DIAGNOSIS — F41.9 ANXIETY DISORDER, UNSPECIFIED TYPE: ICD-10-CM

## 2023-12-20 DIAGNOSIS — F90.1 ATTENTION DEFICIT HYPERACTIVITY DISORDER (ADHD), PREDOMINANTLY HYPERACTIVE TYPE: ICD-10-CM

## 2023-12-20 PROCEDURE — 99214 OFFICE O/P EST MOD 30 MIN: CPT | Performed by: FAMILY MEDICINE

## 2023-12-20 RX ORDER — DOXYCYCLINE 100 MG/1
100 TABLET ORAL 2 TIMES DAILY
Qty: 14 TABLET | Refills: 0 | Status: SHIPPED | OUTPATIENT
Start: 2023-12-20 | End: 2023-12-27

## 2023-12-20 RX ORDER — ESCITALOPRAM OXALATE 20 MG/1
20 TABLET ORAL DAILY
Qty: 90 TABLET | Refills: 1 | Status: SHIPPED | OUTPATIENT
Start: 2023-12-20

## 2023-12-20 RX ORDER — PROMETHAZINE HYDROCHLORIDE 6.25 MG/5ML
6.25 SYRUP ORAL 4 TIMES DAILY PRN
Qty: 118 ML | Refills: 0 | Status: SHIPPED | OUTPATIENT
Start: 2023-12-20

## 2023-12-20 RX ORDER — LISDEXAMFETAMINE DIMESYLATE 20 MG/1
20 CAPSULE ORAL EVERY MORNING
Qty: 30 CAPSULE | Refills: 0 | Status: SHIPPED | OUTPATIENT
Start: 2023-12-20

## 2023-12-20 NOTE — PROGRESS NOTES
Assessment/Plan:   1. Acute bronchitis, unspecified organism  Symptoms appear secondary to an acute bronchitis.  Start supportive care.  Maintain hydration.  Quit tobacco use.  Start treatment with doxycycline as well as promethazine.  Follow-up if any symptoms persist.  - promethazine (PHENERGAN) 12.5 mg/10 mL syrup; Take 5 mL (6.25 mg total) by mouth 4 (four) times a day as needed for nausea or vomiting  Dispense: 118 mL; Refill: 0  - doxycycline (ADOXA) 100 MG tablet; Take 1 tablet (100 mg total) by mouth 2 (two) times a day for 7 days  Dispense: 14 tablet; Refill: 0             There are no diagnoses linked to this encounter.      Subjective:       Chief Complaint   Patient presents with    Cold Like Symptoms    Fever      Patient ID: Denice Vargas is a 63 y.o. female.    Fever  This is a new problem. Episode onset: 12/17/23. The problem has been unchanged. Associated symptoms include chills, congestion, coughing, fatigue, a fever, headaches, myalgias and a sore throat. Pertinent negatives include no abdominal pain, arthralgias, chest pain, nausea or numbness. Nothing aggravates the symptoms. Treatments tried: Tylenol, Albuterol.       Review of Systems   Constitutional:  Positive for chills, fatigue and fever. Negative for activity change.   HENT:  Positive for congestion and sore throat. Negative for ear pain and sinus pressure.    Eyes:  Negative for redness, itching and visual disturbance.   Respiratory:  Positive for cough. Negative for shortness of breath.    Cardiovascular:  Negative for chest pain and palpitations.   Gastrointestinal:  Negative for abdominal pain, diarrhea and nausea.   Endocrine: Negative for cold intolerance and heat intolerance.   Genitourinary:  Negative for dysuria, flank pain and frequency.   Musculoskeletal:  Positive for myalgias. Negative for arthralgias, back pain and gait problem.   Skin:  Negative for color change.   Allergic/Immunologic: Negative for environmental allergies.    Neurological:  Positive for headaches. Negative for dizziness and numbness.   Psychiatric/Behavioral:  Negative for behavioral problems and sleep disturbance.        The following portions of the patient's history were reviewed and updated as appropriate : past family history, past medical history, past social history and past surgical history.    Current Outpatient Medications:     albuterol (PROVENTIL HFA,VENTOLIN HFA) 90 mcg/act inhaler, Inhale 2 puffs every 6 (six) hours as needed for wheezing, Disp: 8 g, Rfl: 5    aspirin (ECOTRIN LOW STRENGTH) 81 mg EC tablet, Take 1 tablet by mouth daily, Disp: , Rfl:     atorvastatin (LIPITOR) 40 mg tablet, TAKE 1 TABLET DAILY, Disp: 90 tablet, Rfl: 3    Blood Glucose Monitoring Suppl (ONE TOUCH ULTRA MINI) w/Device KIT, by Does not apply route, Disp: , Rfl:     cetirizine (ZyrTEC) 10 mg tablet, Take by mouth Daily, Disp: , Rfl:     cholecalciferol (VITAMIN D3) 1,000 units tablet, Take by mouth, Disp: , Rfl:     dicyclomine (BENTYL) 20 mg tablet, Take 1 tablet (20 mg total) by mouth 3 (three) times a day as needed (for abdominal cramping), Disp: 30 tablet, Rfl: 0    Empagliflozin (JARDIANCE) 10 MG TABS tablet, Take 1 tablet (10 mg total) by mouth daily, Disp: 30 tablet, Rfl: 5    escitalopram (LEXAPRO) 20 mg tablet, TAKE 1 TABLET BY MOUTH EVERY DAY, Disp: 90 tablet, Rfl: 1    famotidine (PEPCID) 20 mg tablet, Take 1 tablet by mouth daily, Disp: , Rfl:     fenofibrate (TRICOR) 145 mg tablet, Take 1 tablet (145 mg total) by mouth daily, Disp: 90 tablet, Rfl: 0    Flovent  MCG/ACT inhaler, INHALE 2 PUFFS IN THE MORNING AND 2 PUFFS IN THE EVENING. RINSE MOUTH AFTER USE.., Disp: 12 g, Rfl: 1    glucose blood (ONE TOUCH ULTRA TEST) test strip, 1 each by Other route 2 (two) times a day, Disp: 100 each, Rfl: 0    lisdexamfetamine (VYVANSE) 20 MG capsule, Take 1 capsule (20 mg total) by mouth daily after lunch Max Daily Amount: 20 mg, Disp: 30 capsule, Rfl: 0     "lisdexamfetamine (VYVANSE) 30 MG capsule, Take 1 capsule (30 mg total) by mouth every morning Max Daily Amount: 30 mg, Disp: 30 capsule, Rfl: 0    lisinopril (ZESTRIL) 5 mg tablet, TAKE ONE-HALF (1/2) TABLET DAILY, Disp: 45 tablet, Rfl: 3    metFORMIN (GLUCOPHAGE-XR) 500 mg 24 hr tablet, TAKE 2 TABLETS BY MOUTH TWICE A DAY WITH FOOD, Disp: 360 tablet, Rfl: 0    metoprolol succinate (TOPROL-XL) 25 mg 24 hr tablet, TAKE 1 TABLET DAILY, Disp: 90 tablet, Rfl: 3    Multiple Vitamins-Minerals (MULTIVITAMIN ADULTS 50+ PO), Take 1 tablet by mouth daily, Disp: , Rfl:     ONETOUCH DELICA LANCETS FINE MISC, by Does not apply route daily, Disp: 100 each, Rfl: 1    phenylephrine (SUDAFED PE) 10 MG TABS, Take 1 tablet (10 mg total) by mouth every 4 (four) hours as needed for congestion, Disp: 30 tablet, Rfl: 0    brompheniramine-pseudoephedrine-DM 30-2-10 MG/5ML syrup, Take 10 mL by mouth 4 (four) times a day as needed for allergies or cough (Patient not taking: Reported on 12/20/2023), Disp: 120 mL, Rfl: 0    promethazine-dextromethorphan (PHENERGAN-DM) 6.25-15 mg/5 mL oral syrup, Take 5 mL by mouth 4 (four) times a day as needed for cough (Patient not taking: Reported on 11/20/2023), Disp: 240 mL, Rfl: 0         Objective:         Vitals:    12/20/23 1127   BP: 128/62   BP Location: Left arm   Patient Position: Sitting   Cuff Size: Adult   Pulse: 103   SpO2: 95%   Weight: 76.7 kg (169 lb 3.2 oz)   Height: 5' 5\" (1.651 m)     Physical Exam  Vitals reviewed.   Constitutional:       Appearance: She is well-developed.   HENT:      Head: Normocephalic and atraumatic.      Nose: Nose normal.      Mouth/Throat:      Pharynx: No oropharyngeal exudate.   Eyes:      General: No scleral icterus.        Right eye: No discharge.         Left eye: No discharge.      Pupils: Pupils are equal, round, and reactive to light.   Neck:      Trachea: No tracheal deviation.   Cardiovascular:      Rate and Rhythm: Normal rate and regular rhythm.      " Pulses:           Dorsalis pedis pulses are 2+ on the right side and 2+ on the left side.        Posterior tibial pulses are 2+ on the right side and 2+ on the left side.      Heart sounds: Normal heart sounds. No murmur heard.     No friction rub. No gallop.   Pulmonary:      Effort: Pulmonary effort is normal. No respiratory distress.      Breath sounds: Normal breath sounds. No wheezing or rales.   Abdominal:      General: Bowel sounds are normal. There is no distension.      Palpations: Abdomen is soft.      Tenderness: There is no abdominal tenderness. There is no guarding or rebound.   Musculoskeletal:         General: Normal range of motion.      Cervical back: Normal range of motion and neck supple.   Lymphadenopathy:      Head:      Right side of head: No submental or submandibular adenopathy.      Left side of head: No submental or submandibular adenopathy.      Cervical: No cervical adenopathy.      Right cervical: No superficial, deep or posterior cervical adenopathy.     Left cervical: No superficial, deep or posterior cervical adenopathy.   Skin:     General: Skin is warm and dry.      Findings: No erythema.   Neurological:      Mental Status: She is alert and oriented to person, place, and time.      Cranial Nerves: No cranial nerve deficit.      Sensory: No sensory deficit.   Psychiatric:         Mood and Affect: Mood is not anxious or depressed.         Speech: Speech normal.         Behavior: Behavior normal.         Thought Content: Thought content normal.         Judgment: Judgment normal.

## 2023-12-21 ENCOUNTER — TELEPHONE (OUTPATIENT)
Dept: FAMILY MEDICINE CLINIC | Facility: CLINIC | Age: 63
End: 2023-12-21

## 2023-12-21 DIAGNOSIS — E78.2 MIXED HYPERLIPIDEMIA: ICD-10-CM

## 2023-12-21 NOTE — TELEPHONE ENCOUNTER
Pharmacists at [facility] called to state that the Vyvanse is on back order so they will not be able to fill it there. I called and advised patient she would like you to send to the [facility] on [address]     Secondly, the pharmacists stated that checking the PDMP it showed patient has a history of trying to fill earlier then when due.

## 2023-12-22 DIAGNOSIS — F90.1 ATTENTION DEFICIT HYPERACTIVITY DISORDER (ADHD), PREDOMINANTLY HYPERACTIVE TYPE: ICD-10-CM

## 2023-12-22 RX ORDER — LISDEXAMFETAMINE DIMESYLATE CAPSULES 30 MG/1
30 CAPSULE ORAL EVERY MORNING
Qty: 30 CAPSULE | Refills: 0 | Status: SHIPPED | OUTPATIENT
Start: 2023-12-22 | End: 2024-03-21

## 2023-12-22 RX ORDER — LISDEXAMFETAMINE DIMESYLATE CAPSULES 20 MG/1
20 CAPSULE ORAL
Qty: 30 CAPSULE | Refills: 0 | Status: SHIPPED | OUTPATIENT
Start: 2023-12-22

## 2023-12-22 RX ORDER — ATORVASTATIN CALCIUM 40 MG/1
TABLET, FILM COATED ORAL
Qty: 90 TABLET | Refills: 3 | Status: SHIPPED | OUTPATIENT
Start: 2023-12-22

## 2023-12-27 ENCOUNTER — TELEPHONE (OUTPATIENT)
Dept: FAMILY MEDICINE CLINIC | Facility: CLINIC | Age: 63
End: 2023-12-27

## 2023-12-28 DIAGNOSIS — E11.9 TYPE 2 DIABETES MELLITUS WITHOUT COMPLICATION, WITHOUT LONG-TERM CURRENT USE OF INSULIN (HCC): ICD-10-CM

## 2023-12-29 RX ORDER — METFORMIN HYDROCHLORIDE 500 MG/1
1000 TABLET, EXTENDED RELEASE ORAL 2 TIMES DAILY WITH MEALS
Qty: 360 TABLET | Refills: 0 | Status: SHIPPED | OUTPATIENT
Start: 2023-12-29

## 2024-01-11 DIAGNOSIS — I10 ESSENTIAL HYPERTENSION: ICD-10-CM

## 2024-01-11 RX ORDER — LISINOPRIL 5 MG/1
TABLET ORAL
Qty: 45 TABLET | Refills: 1 | Status: SHIPPED | OUTPATIENT
Start: 2024-01-11

## 2024-01-15 ENCOUNTER — HOSPITAL ENCOUNTER (OUTPATIENT)
Dept: CT IMAGING | Facility: HOSPITAL | Age: 64
Discharge: HOME | End: 2024-01-15
Payer: COMMERCIAL

## 2024-01-15 DIAGNOSIS — F17.200 CURRENT EVERY DAY SMOKER: ICD-10-CM

## 2024-01-15 PROCEDURE — 71271 CT THORAX LUNG CANCER SCR C-: CPT

## 2024-01-22 DIAGNOSIS — F90.1 ATTENTION DEFICIT HYPERACTIVITY DISORDER (ADHD), PREDOMINANTLY HYPERACTIVE TYPE: ICD-10-CM

## 2024-01-22 RX ORDER — LISDEXAMFETAMINE DIMESYLATE CAPSULES 30 MG/1
30 CAPSULE ORAL EVERY MORNING
Qty: 30 CAPSULE | Refills: 0 | Status: SHIPPED | OUTPATIENT
Start: 2024-01-22 | End: 2024-04-21

## 2024-01-22 RX ORDER — LISDEXAMFETAMINE DIMESYLATE 20 MG/1
20 CAPSULE ORAL EVERY MORNING
Qty: 30 CAPSULE | Refills: 0 | Status: SHIPPED | OUTPATIENT
Start: 2024-01-22

## 2024-01-22 NOTE — TELEPHONE ENCOUNTER
Pt called that she leaves for vacation tomorrow and is concered about medication being approved. Please advise further

## 2024-01-22 NOTE — TELEPHONE ENCOUNTER
Patient called again and I advised I would reach out to the provider.  She is leaving for vacation tomorrow and wants to see if she can get the Vyvanse 20 mg and the 30 mg ( Brand name only) sent to the pharmacy ( [facility]) for pick out today.  She stated she was not sure if the 30 mg could be sent but knew the 20 mg could go.    Any issues call patient at

## 2024-01-22 NOTE — PROGRESS NOTES
Assessment   1  Acute gastroenteritis (558 9) (K52 9)   2  Acute sinusitis, recurrence not specified, unspecified location (461 9) (J01 90)   3  Acute headache (784 0) (R51)   4  Need for hepatitis A immunization (V05 3) (Z23)    Plan   Acute headache    · Acetaminophen-Codeine #3 300-30 MG Oral Tablet (Tylenol with Codeine #3); take    1 tab PO TID prn severe headaches  Need for hepatitis A immunization    · Hepatitis A    Discussion/Summary      53-year-old female here today for headache and possible sinus pressure in addition to constitutional symptoms and GI symptoms  She just recently got over bronchitis and completed a course of Augmentin  She states that those symptoms have resolved  I do believe a lot of her symptoms can be from viral gastroenteritis  Therefore, I will have her continue supportive care to include clear fluids, BRAT diet and rest  She has a severe headache and Tylenol is not helping and is unable to take NSAIDs  I will give her 10  Tylenol with codeine to help the severe headache for now with side effects and risks discussed  Patient was given a printed prescription  She does have a lot of frontal pressure which I am uncertain if this could be sinusitis, but with her diarrhea and GI symptoms and recent antibiotic use I would prefer to refrain from any further antibiotics being that I do not highly suspect an ongoing bacterial infection  She refuses steroid nasal sprays and unfortunately has been on steroid tapers recently and with her diabetes I do not feel it would be a good idea at this present time  She is due for 2nd hepatitis a vaccination which she states was recommended by her gastroenterologist and I do believe it is okay for her to have this today  It will be administered  She is to call with any persistent or worsening symptoms otherwise monitor symptoms and continue treatment as above  Possible side effects of new medications were reviewed with the patient/guardian today   The Prevention:  1.  Continue the Ajovy 225mg injections, take 3 injections every 3 months    Rescue:   1.  Continue the Ubrelvy 100mg, take 1 tab at onset of headache. Can repeat in 2 hours if needed. Do not use more than 200mg/24hours. May cause somnolence and nausea. DO NOT START TAKING as strong CY inhibitors, such as ketoconazole, clarithromycin, itraconazole while taking Ubrelvy or take with grapefruit juice or with THC/marijuana products.     2.  You can use the Ibuprofen if needed    Keep a Headache Calendar.  Bring it with you to every appointment. Look into Migraine Kirill Denise.   - Day you have a HA/Migraine  - Pain Level   - Medication taken      Lifestyle Goals:    1. Drink 8-10, 8oz glasses of water per day   2. Limit/decrease the amount of caffeine intake    3. Aerobic Exercise at least 4 days per week for 30 minutes   4. Good sleeping habits: Go to bed and wake up at the same time every day, even on weekends.   5. If you are a current smoker, quit smoking.  This can help decrease your headaches.      Call/MyChart with any concerns or worsening headaches    Follow up in 1 year or sooner if worsening problems.    [facility] - [phone]  [facility] - [phone]   treatment plan was reviewed with the patient/guardian  The patient/guardian understands and agrees with the treatment plan      Chief Complaint   Pt c/o cough, congestion, diarrhea, and vomiting x 3 days  History of Present Illness   HPI: 58y/o female here today for URI sxs and GI sxs past 4-5 days  reports body aches, fatigue, sinus pressure/headaches, vomiting, diarrhea yesterday  Was just on augmentin for bronchitis, which has improved  still with slight cough  Review of Systems        Constitutional: as noted in HPI       ENT: as noted in HPI  Cardiovascular: no complaints of slow or fast heart rate, no chest pain, no palpitations, no leg claudication or lower extremity edema  Respiratory: as noted in HPI  Gastrointestinal: as noted in HPI  Genitourinary: no complaints of dysuria, no incontinence, no pelvic pain, no dysmenorrhea, no vaginal discharge or abnormal vaginal bleeding  Active Problems   1  Adjustment reaction with anxiety (309 24) (F43 22)   2  Anxiety disorder (300 00) (F41 9)   3  Benign essential hypertension (401 1) (I10)   4  Coronary atherosclerosis (414 00) (I25 10)   5  Cough (786 2) (R05)   6  Current tobacco use (305 1) (Z72 0)   7  CVA tenderness (724 5) (M54 9)   8  Dacryocystitis of left lacrimal sac (375 30) (H04 302)   9  Depressed (311) (F32 9)   10  Diverticulitis of colon (562 11) (K57 32)   11  DMII (diabetes mellitus, type 2) (250 00) (E11 9)   12  GERD without esophagitis (530 81) (K21 9)   13  IBS (irritable bowel syndrome) (564 1) (K58 9)   14  Inflamed skin tag (701 9,686 9) (L91 8)   15  Insomnia (780 52) (G47 00)   16  Laceration of left lower leg with infection (891 1) (S81 812A,L08 9)   17  Left shoulder pain (719 41) (M25 512)   18  Menopausal disorder (627 9) (N95 9)   19  Menopausal syndrome (627 2) (N95 1)   20  Mixed hyperlipidemia (272 2) (E78 2)   21  Need for vaccination (V05 9) (Z23)   22  Seasonal allergies (477 9) (J30 2)   23  Sore throat (462) (J02 9)   24  Vaginal candidiasis (112 1) (B37 3)   25  Vitamin D deficiency (268 9) (E55 9)    Past Medical History   1  History of Abdominal pain, LLQ (left lower quadrant) (789 04) (R10 32)   2  History of Abnormal mammogram (793 80) (R92 8)   3  History of Acute diverticulitis (562 11) (K57 92)   4  History of Acute sinusitis, recurrence not specified, unspecified location (461 9) (J01 90)   5  History of Acute upper respiratory infection (465 9) (J06 9)   6  History of Acute upper respiratory infection (465 9) (J06 9)   7  History of Acute upper respiratory infection (465 9) (J06 9)   8  History of Cervical cancer screening (V76 2) (Z12 4)   9  History of Cough (786 2) (R05)   10  History of Cough (786 2) (R05)   11  History of Eczema of right external ear (380 22) (H60 541)   12  History of Encounter for other screening for malignant neoplasm of breast (V76 19)      (Z12 39)   13  History of Encounter for screening colonoscopy (V76 51) (Z12 11)   14  History of Encounter for screening mammogram for malignant neoplasm of breast      (V76 12) (Z12 31)   15  History of Facial pain (784 0) (R51)   16  History of Flu vaccine need (V04 81) (Z23)   17  History of Foot Pain (Soft Tissue) (729 5)   18  History of Hip pain, unspecified laterality   19  History of abdominal pain (V13 89) (Z87 898)   20  History of acute bacterial sinusitis (V12 69) (Z87 09)   21  History of acute bronchitis (V12 69) (Z87 09)   22  History of acute bronchitis (V12 69) (Z87 09)   23  History of acute gastritis (V12 70) (Z87 19)   24  History of acute sinusitis (V12 69) (Z87 09)   25  History of acute sinusitis (V12 69) (Z87 09)   26  History of adverse drug reaction (V14 9) (Z88 9)   27  History of chest pain (V13 89) (Z87 898)   28  History of dizziness (V13 89) (Z87 898)   29  History of fever (V13 89) (Z87 898)   30  History of gastroenteritis (V12 79) (Z87 19)   31  History of hematuria (V13 09) (Z87 448)   32  History of hepatitis A vaccination (V49 89) (Z92 29)   33  History of impetigo (V13 3) (Z87 2)   34  History of ingrown nail (V13 3) (Z87 2)   35  History of nausea (V12 79) (Z87 898)   36  History of nausea and vomiting (V12 79) (Z87 898)   37  History of nausea and vomiting (V12 79) (Z87 898)   38  History of other disorder of urinary system (V13 09) (Z87 448)   39  History of sinusitis (V12 69) (Z87 09)   40  History of streptococcal pharyngitis (V12 09) (Z87 09)   41  Denied: History of substance abuse   43  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)   43  History of Left foot pain (729 5) (M79 672)   44  History of Microscopic hematuria (599 72) (R31 29)   45  History of Myalgia And Myositis (729 1)   46  History of Need for pneumococcal vaccination (V03 82) (Z23)   47  History of Need for vaccination for DTP (V06 1) (Z23)   48  History of Oropharyngeal dysphagia (787 22) (R13 12)   49  History of Other acute sinusitis, recurrence not specified (461 8) (J01 80)   50  History of Other screening mammogram (V76 12) (Z12 31)   51  History of Otitis, externa, infective, right (380 10) (H60 391)   52  History of Right shoulder pain (719 41) (M25 511)   53  History of Right upper quadrant abdominal tenderness (789 61) (R10 811)   54  History of Screening for breast cancer (V76 10) (Z12 31)   55  History of Screening for colon cancer (V76 51) (Z12 11)   56  History of Screening for depression (V79 0) (Z13 89)   57  History of Screening for diabetic retinopathy (V80 2) (Z13 5)   58  History of Sore throat (462) (J02 9)   59  History of Symptoms involving urinary system (788 99) (R39 9)   60  History of Trapezius muscle spasm (728 85) (M62 838)   61  History of Visit For:    Family History   Mother    1  Denied: Family history of substance abuse   2  Denied: Family history of Mental health problem   3  Family history of Stroke (434 91) (I63 9)  Father    4   Family history of Cancer (199 1) (C80 1)   5  Family history of Diabetes mellitus (250 00) (E11 9)   6  Denied: Family history of substance abuse   7  Family history of Heart disease (429 9) (I51 9)   8  Family history of HTN (hypertension) (401 9) (I10)   9  Denied: Family history of Mental health problem  Sister    8  Family history of Heart disease (429 9) (I51 9)  Brother    6  Family history of Heart disease (429 9) (I51 9)    Social History    · Current Smoker (305 1)   · Current tobacco use (305 1) (Z72 0)  The social history was reviewed and updated today  Surgical History   1  History of Stent Indications: Restenosis At Previous PTCA Location    Current Meds    1  ALPRAZolam 0 5 MG Oral Tablet; Take 1 tablet twice daily as needed, 2 qhs prn sleep; Last Rx:02Jan2015 Ordered   2  Atorvastatin Calcium 40 MG Oral Tablet; Take 1 tablet daily; Therapy: 58ZCM0321 to (Leo Santacruz)  Requested for: 51VYB7706 Ordered   3  Hiram Contour Test In [employer]; TEST TWICE A DAY; Therapy: 62JII4611 to (Last Rx:89Qvg8832)  Requested for: 71Fqk3550 Ordered   4  CloNIDine HCl - 0 1 MG Oral Tablet; TAKE 1 TABLET AT BEDTIME; Therapy: 58VXS2555 to Recorded   5  Dicyclomine HCl - 10 MG Oral Capsule; Therapy: 09NZT4042 to Recorded   6  Ecotrin Low Strength 81 MG Oral Tablet Delayed Release; TAKE 1 TABLET DAILY; Therapy: 77TGJ6403 to Recorded   7  Januvia 100 MG Oral Tablet; Take 1 tablet daily; Therapy: 81YLM4998 to ([facility] [place])  Requested for: 60Imu0962; Last     Rx:25Yfz4170 Ordered   8  Lexapro 10 MG Oral Tablet; Take 1 tablet daily Recorded   9  Lisinopril 5 MG Oral Tablet; TAKE ONE-HALF (1/2) TABLET DAILY; Therapy: 80SFD9081 to (Last Rx:13Nov2017)  Requested for: 82CTG9552 Ordered   10  MetFORMIN HCl  MG Oral Tablet Extended Release 24 Hour; take 1 tablet twice a      day; Therapy: 31OWN6589 to (Evaluate:68Kki8495)  Requested for: 89AWB2301; Last      Rx:27Nov2017 Ordered   11   Metoprolol Succinate ER 25 MG Oral Tablet Extended Release 24 Hour; Take 1 tablet      daily; Therapy: 87RXY9947 to (Last Rx:06Ryr2784)  Requested for: 27CVO1165 Ordered   12  Modafinil 200 MG Oral Tablet; Therapy: (Titus Barefoot) to Recorded   13  Omeprazole 40 MG Oral Capsule Delayed Release; TAKE 1 CAPSULE TWICE DAILY; Therapy: 01VYK7694 to ([phone]) Recorded   14  OneTouch Lancets Miscellaneous; USE AS DIRECTED TWICE DAILY; Therapy: 12SDW8005 to (Last Rx:27Mar2017)  Requested for: 27Mar2017 Ordered   15  OneTouch Ultra Blue In [employer]; TEST TWICE A DAY; Therapy: 81EXX5700 to (Evaluate:72Oaj5651)  Requested for: 27Mar2017; Last      Rx:27Mar2017 Ordered   16  OneTouch Ultra Mini w/Device Kit; ACCU-CHEK RONI SMART VIEW GLUCOMETER      USE AS DIRECTED TWICE DAILY DX 47202; Therapy: 27JIC1269 to (Last Rx:06Khn9106)  Requested for: 32Fvm6115 Ordered   17  Pepcid 20 MG Oral Tablet; TAKE 1 TABLET DAILY AS DIRECTED; Therapy: 04VQT1852 to Recorded   18  Tobramycin-Dexamethasone 0 3-0 1 % Ophthalmic Suspension; 1 gtt ou qid x 3 days  Then bid x 3 days; Therapy: 49UKO3479 to (Evaluate:07Nov2017)  Requested for: 23GUR0162; Last      Rx:01Nov2017 Ordered   19  Vitamin D 1000 UNIT Oral Tablet; TAKE 1 TABLET DAILY; Therapy: 36VAP4140 to (Evaluate:11Oct2017) Recorded   20  Vyvanse 70 MG Oral Capsule; TAKE 1 CAPSULE DAILY IN THE MORNING Recorded   21  ZyrTEC Allergy 10 MG Oral Tablet; Therapy: 07Apr2017 to Recorded     The medication list was reviewed and updated today  Allergies   1  Levaquin TABS   2   Flexeril TABS    Vitals    Recorded: 16PVY5258 03:17PM   Temperature 96 4 F, Tympanic   Heart Rate 93   Respiration Quality Normal   Respiration 17   Systolic 094, LUE, Sitting   Diastolic 84, LUE, Sitting   Weight 166 lb 8 oz   BMI Calculated 25 32   BSA Calculated 1 89   O2 Saturation 99, RA   Pain Scale 0     Physical Exam        Constitutional      General appearance: Abnormal   acutely ill,-- comfortable,-- appears tired-- and-- appearance reflects stated age  Ears, Nose, Mouth, and Throat      External inspection of ears and nose: Normal        Otoscopic examination: Tympanic membranes translucent with normal light reflex  Canals patent without erythema  Nasal mucosa, septum, and turbinates: Normal without edema or erythema  Oropharynx: Normal with no erythema, edema, exudate or lesions  Pulmonary      Respiratory effort: No increased work of breathing or signs of respiratory distress  Auscultation of lungs: Clear to auscultation  Cardiovascular      Auscultation of heart: Normal rate and rhythm, normal S1 and S2, without murmurs  Abdomen      Abdomen: Abnormal   The abdomen was obese  Bowel sounds were hyperactive  The abdomen was soft and nontender  Lymphatic      Palpation of lymph nodes in neck: No lymphadenopathy  Psychiatric      Orientation to person, place, and time: Normal        Mood and affect: Normal        Additional Exam:  vitals reviewed           Signatures    Electronically signed by : Stacy Vasquez, [facility]; Dec  6 2017  3:50PM EST                       (Author)     Electronically signed by : Quincy Silva DO; Dec 26 2017  2:39AM EST                       (Co-author)

## 2024-01-22 NOTE — TELEPHONE ENCOUNTER
Patient called and stated pharmacy does not have the generic of Vyvanse. They only have the brand name. Patient would like this refilled as the brand name. Patient needs Vyvanse 20mg and also 30 mg.

## 2024-02-19 DIAGNOSIS — I10 ESSENTIAL HYPERTENSION: ICD-10-CM

## 2024-02-19 RX ORDER — METOPROLOL SUCCINATE 25 MG/1
TABLET, EXTENDED RELEASE ORAL
Qty: 90 TABLET | Refills: 1 | Status: SHIPPED | OUTPATIENT
Start: 2024-02-19

## 2024-02-23 DIAGNOSIS — F90.1 ATTENTION DEFICIT HYPERACTIVITY DISORDER (ADHD), PREDOMINANTLY HYPERACTIVE TYPE: ICD-10-CM

## 2024-02-26 RX ORDER — LISDEXAMFETAMINE DIMESYLATE 20 MG/1
20 CAPSULE ORAL EVERY MORNING
Qty: 30 CAPSULE | Refills: 0 | Status: SHIPPED | OUTPATIENT
Start: 2024-02-26

## 2024-03-01 ENCOUNTER — TELEPHONE (OUTPATIENT)
Age: 64
End: 2024-03-01

## 2024-03-01 NOTE — TELEPHONE ENCOUNTER
Medication: lisdexamfetamine (VYVANSE) - must be Vyvanse    Dose/Frequency: 30 mg/1 daily    Quantity: 30    Pharmacy: [facility] [address] [place]    Office:   [x] PCP/Provider - Jimmy Kaur  [] Speciality/Provider -     Does the patient have enough for 3 days?   [x] Yes   [] No - Send as HP to POD

## 2024-03-04 DIAGNOSIS — F90.1 ATTENTION DEFICIT HYPERACTIVITY DISORDER (ADHD), PREDOMINANTLY HYPERACTIVE TYPE: ICD-10-CM

## 2024-03-04 RX ORDER — LISDEXAMFETAMINE DIMESYLATE CAPSULES 30 MG/1
30 CAPSULE ORAL EVERY MORNING
Qty: 30 CAPSULE | Refills: 0 | Status: SHIPPED | OUTPATIENT
Start: 2024-03-04 | End: 2024-06-02

## 2024-03-24 DIAGNOSIS — F90.1 ATTENTION DEFICIT HYPERACTIVITY DISORDER (ADHD), PREDOMINANTLY HYPERACTIVE TYPE: ICD-10-CM

## 2024-03-25 RX ORDER — LISDEXAMFETAMINE DIMESYLATE 20 MG/1
20 CAPSULE ORAL EVERY MORNING
Qty: 30 CAPSULE | Refills: 0 | Status: SHIPPED | OUTPATIENT
Start: 2024-03-25

## 2024-03-31 DIAGNOSIS — F90.1 ATTENTION DEFICIT HYPERACTIVITY DISORDER (ADHD), PREDOMINANTLY HYPERACTIVE TYPE: ICD-10-CM

## 2024-03-31 RX ORDER — LISDEXAMFETAMINE DIMESYLATE CAPSULES 30 MG/1
30 CAPSULE ORAL EVERY MORNING
Qty: 30 CAPSULE | Refills: 0 | Status: CANCELLED | OUTPATIENT
Start: 2024-03-31 | End: 2024-06-29

## 2024-04-01 RX ORDER — LISDEXAMFETAMINE DIMESYLATE 30 MG/1
30 CAPSULE ORAL EVERY MORNING
Qty: 30 CAPSULE | Refills: 0 | Status: SHIPPED | OUTPATIENT
Start: 2024-04-01 | End: 2024-05-01

## 2024-04-17 ENCOUNTER — APPOINTMENT (OUTPATIENT)
Dept: RADIOLOGY | Facility: CLINIC | Age: 64
End: 2024-04-17
Payer: COMMERCIAL

## 2024-04-17 ENCOUNTER — APPOINTMENT (OUTPATIENT)
Dept: LAB | Facility: CLINIC | Age: 64
End: 2024-04-17
Payer: COMMERCIAL

## 2024-04-17 ENCOUNTER — OFFICE VISIT (OUTPATIENT)
Dept: FAMILY MEDICINE CLINIC | Facility: CLINIC | Age: 64
End: 2024-04-17
Payer: COMMERCIAL

## 2024-04-17 VITALS
RESPIRATION RATE: 20 BRPM | HEART RATE: 76 BPM | BODY MASS INDEX: 27.46 KG/M2 | TEMPERATURE: 97.5 F | DIASTOLIC BLOOD PRESSURE: 74 MMHG | WEIGHT: 165 LBS | OXYGEN SATURATION: 96 % | SYSTOLIC BLOOD PRESSURE: 128 MMHG

## 2024-04-17 DIAGNOSIS — N18.30 TYPE 2 DIABETES MELLITUS WITH STAGE 3 CHRONIC KIDNEY DISEASE, WITHOUT LONG-TERM CURRENT USE OF INSULIN, UNSPECIFIED WHETHER STAGE 3A OR 3B CKD (HCC): ICD-10-CM

## 2024-04-17 DIAGNOSIS — D72.829 LEUKOCYTOSIS, UNSPECIFIED TYPE: ICD-10-CM

## 2024-04-17 DIAGNOSIS — E78.2 MIXED HYPERLIPIDEMIA: ICD-10-CM

## 2024-04-17 DIAGNOSIS — F90.1 ATTENTION DEFICIT HYPERACTIVITY DISORDER (ADHD), PREDOMINANTLY HYPERACTIVE TYPE: ICD-10-CM

## 2024-04-17 DIAGNOSIS — E11.22 TYPE 2 DIABETES MELLITUS WITH STAGE 3 CHRONIC KIDNEY DISEASE, WITHOUT LONG-TERM CURRENT USE OF INSULIN, UNSPECIFIED WHETHER STAGE 3A OR 3B CKD (HCC): ICD-10-CM

## 2024-04-17 DIAGNOSIS — M25.511 ACUTE PAIN OF RIGHT SHOULDER: Primary | ICD-10-CM

## 2024-04-17 DIAGNOSIS — R94.6 ABNORMAL THYROID FUNCTION TEST: ICD-10-CM

## 2024-04-17 DIAGNOSIS — M25.511 ACUTE PAIN OF RIGHT SHOULDER: ICD-10-CM

## 2024-04-17 LAB
ALBUMIN SERPL BCP-MCNC: 4.2 G/DL (ref 3.5–5)
ALP SERPL-CCNC: 59 U/L (ref 34–104)
ALT SERPL W P-5'-P-CCNC: 32 U/L (ref 7–52)
ANION GAP SERPL CALCULATED.3IONS-SCNC: 10 MMOL/L (ref 4–13)
AST SERPL W P-5'-P-CCNC: 18 U/L (ref 13–39)
BASOPHILS # BLD AUTO: 0.1 THOUSANDS/ÂΜL (ref 0–0.1)
BASOPHILS NFR BLD AUTO: 1 % (ref 0–1)
BILIRUB SERPL-MCNC: 0.4 MG/DL (ref 0.2–1)
BUN SERPL-MCNC: 18 MG/DL (ref 5–25)
CALCIUM SERPL-MCNC: 9.6 MG/DL (ref 8.4–10.2)
CHLORIDE SERPL-SCNC: 102 MMOL/L (ref 96–108)
CHOLEST SERPL-MCNC: 293 MG/DL
CO2 SERPL-SCNC: 27 MMOL/L (ref 21–32)
CREAT SERPL-MCNC: 0.69 MG/DL (ref 0.6–1.3)
CREAT UR-MCNC: 128.6 MG/DL
EOSINOPHIL # BLD AUTO: 0.2 THOUSAND/ÂΜL (ref 0–0.61)
EOSINOPHIL NFR BLD AUTO: 2 % (ref 0–6)
ERYTHROCYTE [DISTWIDTH] IN BLOOD BY AUTOMATED COUNT: 13.6 % (ref 11.6–15.1)
GFR SERPL CREATININE-BSD FRML MDRD: 94 ML/MIN/1.73SQ M
GLUCOSE P FAST SERPL-MCNC: 232 MG/DL (ref 65–99)
HCT VFR BLD AUTO: 41.7 % (ref 34.8–46.1)
HDLC SERPL-MCNC: 45 MG/DL
HGB BLD-MCNC: 13.6 G/DL (ref 11.5–15.4)
IMM GRANULOCYTES # BLD AUTO: 0.04 THOUSAND/UL (ref 0–0.2)
IMM GRANULOCYTES NFR BLD AUTO: 0 % (ref 0–2)
LDLC SERPL DIRECT ASSAY-MCNC: 184 MG/DL (ref 0–100)
LYMPHOCYTES # BLD AUTO: 4.44 THOUSANDS/ÂΜL (ref 0.6–4.47)
LYMPHOCYTES NFR BLD AUTO: 41 % (ref 14–44)
MCH RBC QN AUTO: 30 PG (ref 26.8–34.3)
MCHC RBC AUTO-ENTMCNC: 32.6 G/DL (ref 31.4–37.4)
MCV RBC AUTO: 92 FL (ref 82–98)
MICROALBUMIN UR-MCNC: 53.8 MG/L
MICROALBUMIN/CREAT 24H UR: 42 MG/G CREATININE (ref 0–30)
MONOCYTES # BLD AUTO: 1.03 THOUSAND/ÂΜL (ref 0.17–1.22)
MONOCYTES NFR BLD AUTO: 10 % (ref 4–12)
NEUTROPHILS # BLD AUTO: 5.04 THOUSANDS/ÂΜL (ref 1.85–7.62)
NEUTS SEG NFR BLD AUTO: 46 % (ref 43–75)
NRBC BLD AUTO-RTO: 0 /100 WBCS
PLATELET # BLD AUTO: 565 THOUSANDS/UL (ref 149–390)
PMV BLD AUTO: 10.6 FL (ref 8.9–12.7)
POTASSIUM SERPL-SCNC: 4.7 MMOL/L (ref 3.5–5.3)
PROT SERPL-MCNC: 7.2 G/DL (ref 6.4–8.4)
RBC # BLD AUTO: 4.53 MILLION/UL (ref 3.81–5.12)
SODIUM SERPL-SCNC: 139 MMOL/L (ref 135–147)
TRIGL SERPL-MCNC: 408 MG/DL
TSH SERPL DL<=0.05 MIU/L-ACNC: 2.86 UIU/ML (ref 0.45–4.5)
WBC # BLD AUTO: 10.85 THOUSAND/UL (ref 4.31–10.16)

## 2024-04-17 PROCEDURE — 73030 X-RAY EXAM OF SHOULDER: CPT

## 2024-04-17 PROCEDURE — 99214 OFFICE O/P EST MOD 30 MIN: CPT | Performed by: FAMILY MEDICINE

## 2024-04-17 RX ORDER — TRAMADOL HYDROCHLORIDE 50 MG/1
50 TABLET ORAL EVERY 8 HOURS PRN
Qty: 21 TABLET | Refills: 0 | Status: SHIPPED | OUTPATIENT
Start: 2024-04-17 | End: 2024-04-17 | Stop reason: SDUPTHER

## 2024-04-17 RX ORDER — LISDEXAMFETAMINE DIMESYLATE 20 MG/1
20 CAPSULE ORAL EVERY MORNING
Qty: 30 CAPSULE | Refills: 0 | Status: SHIPPED | OUTPATIENT
Start: 2024-04-17

## 2024-04-17 RX ORDER — CYCLOBENZAPRINE HCL 10 MG
10 TABLET ORAL
Qty: 30 TABLET | Refills: 0 | Status: SHIPPED | OUTPATIENT
Start: 2024-04-17 | End: 2024-04-17 | Stop reason: SDUPTHER

## 2024-04-17 RX ORDER — CYCLOBENZAPRINE HCL 10 MG
10 TABLET ORAL
Qty: 30 TABLET | Refills: 0 | Status: SHIPPED | OUTPATIENT
Start: 2024-04-17

## 2024-04-17 RX ORDER — PREDNISONE 10 MG/1
TABLET ORAL
Qty: 21 TABLET | Refills: 0 | Status: SHIPPED | OUTPATIENT
Start: 2024-04-17

## 2024-04-17 RX ORDER — TRAMADOL HYDROCHLORIDE 50 MG/1
50 TABLET ORAL EVERY 8 HOURS PRN
Qty: 21 TABLET | Refills: 0 | Status: SHIPPED | OUTPATIENT
Start: 2024-04-17

## 2024-04-17 NOTE — PROGRESS NOTES
Assessment/Plan:   1. Acute pain of right shoulder  Reviewed patient symptoms today.  This time, symptoms appear likely secondary to RTC tendinitis.  Will check x-ray to rule out possible cause.  She may highly benefit from seeing physical therapy.  Will start her with a prednisone taper.  She may take cyclobenzaprine as well as tramadol as needed for symptom relief.  PDMP reviewed, no intervally seen today.  Follow-up if any symptoms persist.  - XR shoulder 2+ vw right; Future  - Ambulatory Referral to Physical Therapy; Future  - cyclobenzaprine (FLEXERIL) 10 mg tablet; Take 1 tablet (10 mg total) by mouth daily at bedtime as needed for muscle spasms  Dispense: 30 tablet; Refill: 0  - traMADol (Ultram) 50 mg tablet; Take 1 tablet (50 mg total) by mouth every 8 (eight) hours as needed for moderate pain  Dispense: 21 tablet; Refill: 0  - predniSONE 10 mg tablet; Take 6 tablets By mouth  In the morning Qd.   Decrease by  By 1 tablet daily until completed.  Dispense: 21 tablet; Refill: 0               There are no diagnoses linked to this encounter.      Subjective:       Chief Complaint   Patient presents with    Shoulder Pain     Right, started a few months ago  Pt       Patient ID: Denice Vargas is a 63 y.o. female.    Shoulder Pain   The pain is present in the right shoulder and right arm. This is a new problem. Episode onset: 2 mo ago. The problem occurs intermittently. The problem has been unchanged. The quality of the pain is described as aching. The pain is at a severity of 6/10. The pain is mild. Associated symptoms include a limited range of motion and stiffness. Pertinent negatives include no fever, numbness or tingling. The symptoms are aggravated by activity. She has tried NSAIDS for the symptoms. The treatment provided mild relief.       Review of Systems   Constitutional:  Negative for activity change, chills, fatigue and fever.   HENT:  Negative for congestion, ear pain, sinus pressure and sore throat.     Eyes:  Negative for redness, itching and visual disturbance.   Respiratory:  Negative for cough and shortness of breath.    Cardiovascular:  Negative for chest pain and palpitations.   Gastrointestinal:  Negative for abdominal pain, diarrhea and nausea.   Endocrine: Negative for cold intolerance and heat intolerance.   Genitourinary:  Negative for dysuria, flank pain and frequency.   Musculoskeletal:  Positive for arthralgias and stiffness. Negative for back pain, gait problem and myalgias.   Skin:  Negative for color change.   Allergic/Immunologic: Negative for environmental allergies.   Neurological:  Negative for dizziness, tingling, numbness and headaches.   Psychiatric/Behavioral:  Negative for behavioral problems and sleep disturbance.        The following portions of the patient's history were reviewed and updated as appropriate : past family history, past medical history, past social history and past surgical history.    Current Outpatient Medications:     albuterol (PROVENTIL HFA,VENTOLIN HFA) 90 mcg/act inhaler, Inhale 2 puffs every 6 (six) hours as needed for wheezing, Disp: 8 g, Rfl: 5    aspirin (ECOTRIN LOW STRENGTH) 81 mg EC tablet, Take 1 tablet by mouth daily, Disp: , Rfl:     atorvastatin (LIPITOR) 40 mg tablet, TAKE 1 TABLET DAILY, Disp: 90 tablet, Rfl: 3    Blood Glucose Monitoring Suppl (ONE TOUCH ULTRA MINI) w/Device KIT, by Does not apply route, Disp: , Rfl:     cetirizine (ZyrTEC) 10 mg tablet, Take by mouth Daily, Disp: , Rfl:     cholecalciferol (VITAMIN D3) 1,000 units tablet, Take by mouth, Disp: , Rfl:     dicyclomine (BENTYL) 20 mg tablet, Take 1 tablet (20 mg total) by mouth 3 (three) times a day as needed (for abdominal cramping), Disp: 30 tablet, Rfl: 0    escitalopram (LEXAPRO) 20 mg tablet, Take 1 tablet (20 mg total) by mouth daily, Disp: 90 tablet, Rfl: 1    famotidine (PEPCID) 20 mg tablet, Take 1 tablet by mouth daily, Disp: , Rfl:     Flovent  MCG/ACT inhaler, INHALE  2 PUFFS IN THE MORNING AND 2 PUFFS IN THE EVENING. RINSE MOUTH AFTER USE.., Disp: 12 g, Rfl: 1    glucose blood (ONE TOUCH ULTRA TEST) test strip, 1 each by Other route 2 (two) times a day, Disp: 100 each, Rfl: 0    lisdexamfetamine (VYVANSE) 20 MG capsule, Take 1 capsule (20 mg total) by mouth daily after lunch Max Daily Amount: 20 mg, Disp: 30 capsule, Rfl: 0    lisdexamfetamine (VYVANSE) 30 MG capsule, Take 1 capsule (30 mg total) by mouth every morning Max Daily Amount: 30 mg, Disp: 30 capsule, Rfl: 0    lisinopril (ZESTRIL) 5 mg tablet, TAKE ONE-HALF (1/2) TABLET DAILY, Disp: 45 tablet, Rfl: 1    metFORMIN (GLUCOPHAGE-XR) 500 mg 24 hr tablet, Take 2 tablets (1,000 mg total) by mouth 2 (two) times a day with meals, Disp: 360 tablet, Rfl: 0    metoprolol succinate (TOPROL-XL) 25 mg 24 hr tablet, TAKE 1 TABLET DAILY, Disp: 90 tablet, Rfl: 1    Multiple Vitamins-Minerals (MULTIVITAMIN ADULTS 50+ PO), Take 1 tablet by mouth daily, Disp: , Rfl:     ONETOUCH DELICA LANCETS FINE MISC, by Does not apply route daily, Disp: 100 each, Rfl: 1    Empagliflozin (JARDIANCE) 10 MG TABS tablet, Take 1 tablet (10 mg total) by mouth daily, Disp: 30 tablet, Rfl: 5    fenofibrate (TRICOR) 145 mg tablet, Take 1 tablet (145 mg total) by mouth daily, Disp: 90 tablet, Rfl: 0    phenylephrine (SUDAFED PE) 10 MG TABS, Take 1 tablet (10 mg total) by mouth every 4 (four) hours as needed for congestion, Disp: 30 tablet, Rfl: 0    promethazine (PHENERGAN) 12.5 mg/10 mL syrup, Take 5 mL (6.25 mg total) by mouth 4 (four) times a day as needed for nausea or vomiting, Disp: 118 mL, Rfl: 0    promethazine-dextromethorphan (PHENERGAN-DM) 6.25-15 mg/5 mL oral syrup, Take 5 mL by mouth 4 (four) times a day as needed for cough (Patient not taking: Reported on 11/20/2023), Disp: 240 mL, Rfl: 0    Vyvanse 20 MG capsule, Take 1 capsule (20 mg total) by mouth every morning Max Daily Amount: 20 mg, Disp: 30 capsule, Rfl: 0    Vyvanse 30 MG capsule, Take 1  capsule (30 mg total) by mouth every morning Max Daily Amount: 30 mg, Disp: 30 capsule, Rfl: 0         Objective:         Vitals:    04/17/24 0852   BP: 128/74   BP Location: Left arm   Patient Position: Sitting   Cuff Size: Standard   Pulse: 76   Resp: 20   Temp: 97.5 °F (36.4 °C)   TempSrc: Temporal   SpO2: 96%   Weight: 74.8 kg (165 lb)     Physical Exam  Vitals reviewed.   Constitutional:       General: She is not in acute distress.     Appearance: Normal appearance. She is well-developed. She is not ill-appearing.   HENT:      Head: Normocephalic and atraumatic.      Right Ear: Hearing and external ear normal.      Left Ear: Hearing and external ear normal.      Nose: Nose normal. No nasal deformity or septal deviation.      Mouth/Throat:      Mouth: Mucous membranes are moist.      Pharynx: Oropharynx is clear.   Eyes:      General: Lids are normal.      Extraocular Movements: Extraocular movements intact.      Conjunctiva/sclera: Conjunctivae normal.      Pupils: Pupils are equal, round, and reactive to light.   Neck:      Thyroid: No thyromegaly.      Trachea: Trachea normal.   Cardiovascular:      Rate and Rhythm: Normal rate.   Pulmonary:      Effort: Pulmonary effort is normal. No respiratory distress.   Abdominal:      General: There is no distension.      Tenderness: There is no guarding.   Musculoskeletal:      Cervical back: Normal range of motion and neck supple. No edema or erythema.   Skin:     General: Skin is warm and dry.   Neurological:      Mental Status: She is alert.      Cranial Nerves: No cranial nerve deficit.      Sensory: No sensory deficit.   Psychiatric:         Speech: Speech normal.         Behavior: Behavior normal. Behavior is cooperative.         Thought Content: Thought content normal.         Judgment: Judgment normal.

## 2024-04-18 LAB
EST. AVERAGE GLUCOSE BLD GHB EST-MCNC: 212 MG/DL
HBA1C MFR BLD: 9 %

## 2024-04-23 ENCOUNTER — TELEPHONE (OUTPATIENT)
Dept: FAMILY MEDICINE CLINIC | Facility: CLINIC | Age: 64
End: 2024-04-23

## 2024-04-23 ENCOUNTER — EVALUATION (OUTPATIENT)
Dept: PHYSICAL THERAPY | Facility: CLINIC | Age: 64
End: 2024-04-23
Payer: COMMERCIAL

## 2024-04-23 DIAGNOSIS — M25.511 ACUTE PAIN OF RIGHT SHOULDER: Primary | ICD-10-CM

## 2024-04-23 PROCEDURE — 97161 PT EVAL LOW COMPLEX 20 MIN: CPT

## 2024-04-23 PROCEDURE — 97140 MANUAL THERAPY 1/> REGIONS: CPT

## 2024-04-23 NOTE — PROGRESS NOTES
PT Evaluation     Today's date: 2024  Patient name: Denice Vargas  : 1960  MRN: [id number]  Referring provider: Jimmy Kaur DO Dx:   Encounter Diagnosis     ICD-10-CM    1. Acute pain of right shoulder  M25.511 Ambulatory Referral to Physical Therapy                     Assessment  Assessment details: Pt is a 63 y.o. year old female that presents to outpatient physical therapy with acute shoulder pain without HCANDNI.  Pt demonstrates signs and symptoms that point to possible rotator cuff injury/tendonitis. Pt exhibited limited AROM with shoulder elevation when reaching over head. Pt also expressed decreased pain and greater motion with PROM. Pt demonstrates increased pain, decreased ROM, decreased strength, decreased activity tolerance, and decreased functional activity such as reaching, lifting, and carrying due to pain. Pt appears motivated; HEP was reviewed and given to patient. Pt would benefit from skilled physical therapy to address noted impairments, meet patient's goals, and to return to PLOF.   Thank you for this referral.    Impairments: abnormal muscle firing, abnormal or restricted ROM, activity intolerance, impaired balance, impaired physical strength, lacks appropriate home exercise program, pain with function, scapular dyskinesis and poor posture     Symptom irritability: lowUnderstanding of Dx/Px/POC: good   Prognosis: good    Goals  STGs:   1. Pt will be able to demonstrate an increase of strength by at least 1/2 grade within 4 weeks   2. Pt will be able to achieve increased ROM by at least 25% within 4 weeks.   3. Pt will be able to report pain less than 4/10 at worse within 4 weeks.  4. Pt will be able to reach overhead without increase of shoulder elevation without cueing within 4 weeks.     LTGs:   1. Pt will be independent with all IADLs without pain or discomfort upon discharge.   2. Pt will be independent with HEP upon discharge   3. Pt will be able to report no pain or  discomfort with all work duties and recreational activities for a full day upon discharge   4. Pt will be able to lift at least 10 lbs overhead at least one time without increase of symptoms or compensations upon discharge       Plan  Patient would benefit from: PT eval and skilled physical therapy  Planned modality interventions: low level laser therapy, TENS, thermotherapy: hydrocollator packs, ultrasound, iontophoresis, cryotherapy and electrical stimulation/Russian stimulation  Planned therapy interventions: abdominal trunk stabilization, breathing training, flexibility, functional ROM exercises, home exercise program, therapeutic training, therapeutic exercise, therapeutic activities, stretching, strengthening, postural training, patient education, neuromuscular re-education, muscle pump exercises, massage, manual therapy, IADL retraining, joint mobilization and Huerta taping  Frequency: 2x week  Duration in visits: 8  Duration in weeks: 8  Treatment plan discussed with: patient        Subjective Evaluation    History of Present Illness  Mechanism of injury: Pt states that she has been having pain/discomfort for the past few months. Reports that she was getting worse over the last 2 weeks without any CHANDNI. States that the medication prescribed by her PCP has been helping.   Pain mostly occurs when reaching overhead, lifting, reaching, and reaching behind her back. Pain is located to the anterior shoulder at this point. ROM is limited by pain. Woke up today with elbow and wrist pain.     Denies changes in bowel/bladder. Denies saddle anesthesia. Denies numbness/tingling. Denies neck pain.     EASES: rest, medication, avoiding aggs  Goals: increase motion, be able to  grandchild, decrease pain       Imaging findings: R shoulder Xray on 4/17/2024 - No significant degenerative narrowing of the acromioclavicular joint or glenohumeral space. Small marginal osteophytes of the inferior glenoid and margin of  the humeral head and neck.    Patient Goals  Patient goals for therapy: decreased pain, improved balance, increased strength, independence with ADLs/IADLs and return to sport/leisure activities    Pain  Current pain ratin  At best pain ratin  At worst pain rating: 3  Quality: pressure and discomfort    Social Support  Lives with: significant other    Hand dominance: right    Treatments  Current treatment: medication        Objective     Static Posture     Head  Forward.    Shoulders  Asymmetric shoulders, depressed, elevated and rounded.    Scapulae  Right winging, left anteriorly tipped, right elevated, left retracted and right retracted.    Palpation     Right   Hypertonic in the levator scapulae, pectoralis major, pectoralis minor and upper trapezius.   Tenderness of the infraspinatus, latissimus, levator scapulae, lower trapezius, pectoralis major, pectoralis minor, rhomboids, subclavius, supraspinatus and upper trapezius.   Trigger point to infraspinatus, subclavius and supraspinatus.     Neurological Testing     Sensation     Shoulder   Left Shoulder   Intact: light touch    Right Shoulder   Intact: Light touch    Reflexes   Left   Biceps (C5/C6): normal (2+)  Brachioradialis (C6): normal (2+)  Olivas's reflex: negative    Right   Biceps (C5/C6): normal (2+)  Brachioradialis (C6): normal (2+)  Olivas's reflex: negative    Active Range of Motion     Right Shoulder   Flexion: 68 degrees with pain  Abduction: 71 degrees with pain  External rotation BTH: C2 with pain  Internal rotation BTB: sacrum with pain    Passive Range of Motion     Right Shoulder   Flexion: 139 degrees with pain  Abduction: 110 degrees with pain  External rotation 90°: 79 degrees   Internal rotation 90°: 52 degrees     Strength/Myotome Testing     Right Shoulder     Planes of Motion   Flexion: 3+ (with severe pain)   Abduction: 3+ (with severe pain)   External rotation at 0°: 4-   Internal rotation at 0°: 4-     Isolated Muscles  "  Biceps: 4   Triceps: 4     Tests     Right Shoulder   Positive AC shear, active compression ([place]), belly press, crossover, empty can, full can, Hawkin's, Neer's and painful arc.   Negative Speed's, ULTT1, ULTT2, ULTT3, ULTT4 and bicep load test positive.          Precautions:   Benign essential hypertension   Coronary atherosclerosis   Midline cystocele   Type 2 diabetes mellitus with stage 3 chronic kidney disease, without long-term current use of insulin, unspecified whether stage 3a or 3b CKD (HCC)  GERD without esophagitis   Hiatal hernia   Current tobacco use      Daily Treatment Diary    Date 4/23            FOTO IE -             Re-Eval IE               Manuals    Shoulder PROM JM            Post/Inf GH mob                                                                 Neuro Re-Ed     Seated Scap retraction 2x15                                                                                          Ther Ex    Arm bike              Pulleys             Shoulder ISO  Flex and abd 5\" hold 10x            Seated shoulder flex and abd 5\" hold 10x ea            Supine shoulder flexion AAROM with stick             Standing shoulder abduction AAROM with stick              Standing shoulder ext and row with TB                                                                 Ther Activity                              Gait Training                              Modalities    Ice vs Heat PRN                                   "

## 2024-04-29 ENCOUNTER — TELEPHONE (OUTPATIENT)
Age: 64
End: 2024-04-29

## 2024-04-29 DIAGNOSIS — F90.1 ATTENTION DEFICIT HYPERACTIVITY DISORDER (ADHD), PREDOMINANTLY HYPERACTIVE TYPE: ICD-10-CM

## 2024-04-29 RX ORDER — LISDEXAMFETAMINE DIMESYLATE 30 MG/1
30 CAPSULE ORAL EVERY MORNING
Qty: 30 CAPSULE | Refills: 0 | Status: SHIPPED | OUTPATIENT
Start: 2024-04-29 | End: 2024-05-09

## 2024-04-29 NOTE — TELEPHONE ENCOUNTER
Appointment scheduled with provider.    Reason: Office Visit    Symptoms: 3 month follow up    Provider: Dr. Jimmy Kaur    Date/Time: Thursday 5/9/2024 at 11:45 am

## 2024-04-30 ENCOUNTER — APPOINTMENT (OUTPATIENT)
Dept: PHYSICAL THERAPY | Facility: CLINIC | Age: 64
End: 2024-04-30
Payer: COMMERCIAL

## 2024-05-08 ENCOUNTER — TELEPHONE (OUTPATIENT)
Dept: FAMILY MEDICINE CLINIC | Facility: CLINIC | Age: 64
End: 2024-05-08

## 2024-05-09 ENCOUNTER — OFFICE VISIT (OUTPATIENT)
Dept: FAMILY MEDICINE CLINIC | Facility: CLINIC | Age: 64
End: 2024-05-09
Payer: COMMERCIAL

## 2024-05-09 VITALS
HEIGHT: 65 IN | TEMPERATURE: 98.2 F | OXYGEN SATURATION: 97 % | BODY MASS INDEX: 26.76 KG/M2 | HEART RATE: 100 BPM | DIASTOLIC BLOOD PRESSURE: 70 MMHG | SYSTOLIC BLOOD PRESSURE: 126 MMHG | WEIGHT: 160.6 LBS

## 2024-05-09 DIAGNOSIS — F90.1 ATTENTION DEFICIT HYPERACTIVITY DISORDER (ADHD), PREDOMINANTLY HYPERACTIVE TYPE: ICD-10-CM

## 2024-05-09 DIAGNOSIS — N18.30 TYPE 2 DIABETES MELLITUS WITH STAGE 3 CHRONIC KIDNEY DISEASE, WITHOUT LONG-TERM CURRENT USE OF INSULIN, UNSPECIFIED WHETHER STAGE 3A OR 3B CKD (HCC): Primary | ICD-10-CM

## 2024-05-09 DIAGNOSIS — I10 ESSENTIAL HYPERTENSION: ICD-10-CM

## 2024-05-09 DIAGNOSIS — G47.00 INSOMNIA, UNSPECIFIED TYPE: ICD-10-CM

## 2024-05-09 DIAGNOSIS — F41.9 ANXIETY DISORDER, UNSPECIFIED TYPE: ICD-10-CM

## 2024-05-09 DIAGNOSIS — E11.22 TYPE 2 DIABETES MELLITUS WITH STAGE 3 CHRONIC KIDNEY DISEASE, WITHOUT LONG-TERM CURRENT USE OF INSULIN, UNSPECIFIED WHETHER STAGE 3A OR 3B CKD (HCC): Primary | ICD-10-CM

## 2024-05-09 DIAGNOSIS — J41.1 MUCOPURULENT CHRONIC BRONCHITIS (HCC): ICD-10-CM

## 2024-05-09 DIAGNOSIS — E78.2 MIXED HYPERLIPIDEMIA: ICD-10-CM

## 2024-05-09 PROCEDURE — 99215 OFFICE O/P EST HI 40 MIN: CPT | Performed by: FAMILY MEDICINE

## 2024-05-09 RX ORDER — LISDEXAMFETAMINE DIMESYLATE 20 MG/1
20 CAPSULE ORAL EVERY MORNING
Start: 2024-05-09

## 2024-05-09 RX ORDER — FLUTICASONE PROPIONATE 110 UG/1
2 AEROSOL, METERED RESPIRATORY (INHALATION) 2 TIMES DAILY
Qty: 12 G | Refills: 1 | Status: SHIPPED | OUTPATIENT
Start: 2024-05-09 | End: 2024-05-09

## 2024-05-09 RX ORDER — LISDEXAMFETAMINE DIMESYLATE 20 MG/1
20 TABLET, CHEWABLE ORAL
Qty: 30 TABLET | Refills: 0 | Status: SHIPPED | OUTPATIENT
Start: 2024-05-09

## 2024-05-09 RX ORDER — ALBUTEROL SULFATE 90 UG/1
2 AEROSOL, METERED RESPIRATORY (INHALATION) EVERY 6 HOURS PRN
Qty: 8 G | Refills: 5 | Status: SHIPPED | OUTPATIENT
Start: 2024-05-09

## 2024-05-09 RX ORDER — TRAZODONE HYDROCHLORIDE 50 MG/1
50 TABLET ORAL
Qty: 30 TABLET | Refills: 5 | Status: SHIPPED | OUTPATIENT
Start: 2024-05-09

## 2024-05-09 RX ORDER — BLOOD-GLUCOSE SENSOR
1 EACH MISCELLANEOUS
Qty: 6 EACH | Refills: 3 | Status: SHIPPED | OUTPATIENT
Start: 2024-05-09

## 2024-05-09 RX ORDER — KETOROLAC TROMETHAMINE 30 MG/ML
1 INJECTION, SOLUTION INTRAMUSCULAR; INTRAVENOUS ONCE
Qty: 1 EACH | Refills: 0 | Status: SHIPPED | OUTPATIENT
Start: 2024-05-09 | End: 2024-05-09

## 2024-05-09 RX ORDER — MOMETASONE FUROATE 50 UG/1
2 AEROSOL RESPIRATORY (INHALATION) 2 TIMES DAILY
Qty: 13 G | Refills: 5 | Status: SHIPPED | OUTPATIENT
Start: 2024-05-09

## 2024-05-09 NOTE — PROGRESS NOTES
Assessment/Plan:   1. Attention deficit hyperactivity disorder (ADHD), predominantly hyperactive type  Patient symptoms appear very well-controlled.  At this time, we will decrease her Vyvanse to 20 mg twice daily.  PDMP reviewed, no abnormality seen today.  - Vyvanse 20 MG capsule; Take 1 capsule (20 mg total) by mouth every morning Max Daily Amount: 20 mg  - Vyvanse 20 MG CHEW; Chew 20 mg daily after lunch Max Daily Amount: 20 mg  Dispense: 30 tablet; Refill: 0    2. Type 2 diabetes mellitus with stage 3 chronic kidney disease, without long-term current use of insulin, unspecified whether stage 3a or 3b CKD (HCC)  A1c appears elevated at 9.0.  At this time, we will continue with her current treatment of metformin.  She must maintain a very strict diabetic diet and exercise plan.  Will start treatment with Mounjaro.  Will recheck her blood work in 4 months.  At her request, prescription given for CGM  - Continuous Glucose  (FreeStyle Saige 3 [place]) JAVAN; Use 1 each once for 1 dose  Dispense: 1 each; Refill: 0  - Continuous Glucose Sensor (FreeStyle Saige 3 Sensor) MISC; Use 1 each every 14 (fourteen) days  Dispense: 6 each; Refill: 3  - tirzepatide (Mounjaro) 2.5 MG/0.5ML; Inject 0.5 mL (2.5 mg total) under the skin every 7 days  Dispense: 2 mL; Refill: 0    3. Essential hypertension  Blood pressure appears stable.  Continue with routine home monitoring as well as her current treatment with lisinopril as well as metoprolol.    4. Mixed hyperlipidemia  Lipid panel appears elevated.  Direct LDL as well as her triglycerides are significantly elevated.  She must continue to maintain a strict diet and exercise.  Will continue with her current treatment of atorvastatin as well as fenofibrate.  If persisting and elevation, will consider increasing her atorvastatin further.    5. Anxiety disorder, unspecified type  Stable.  Continue with her current treatment of Lexapro    6. Insomnia, unspecified type  Patient  has been having problems with insomnia.  At this time, we will start treatment with trazodone.  - traZODone (DESYREL) 50 mg tablet; Take 1 tablet (50 mg total) by mouth daily at bedtime  Dispense: 30 tablet; Refill: 5    7. Mucopurulent chronic bronchitis (HCC)  Patient with persisting symptoms.  At this time, it appears that seasonal changes or trigger.  Will continue with her albuterol however we will restart Flovent.  Follow-up if any symptoms persist.  - albuterol (PROVENTIL HFA,VENTOLIN HFA) 90 mcg/act inhaler; Inhale 2 puffs every 6 (six) hours as needed for wheezing  Dispense: 8 g; Refill: 5  - fluticasone (Flovent HFA) 110 MCG/ACT inhaler; Inhale 2 puffs 2 (two) times a day Rinse mouth after use.  Dispense: 12 g; Refill: 1           Diagnoses and all orders for this visit:    Type 2 diabetes mellitus with stage 3 chronic kidney disease, without long-term current use of insulin, unspecified whether stage 3a or 3b CKD ([facility])  -     Continuous Glucose  (FreeStyle Saige 3 [place]) JAVAN; Use 1 each once for 1 dose  -     Continuous Glucose Sensor (FreeStyle Saige 3 Sensor) MISC; Use 1 each every 14 (fourteen) days  -     tirzepatide (Mounjaro) 2.5 MG/0.5ML; Inject 0.5 mL (2.5 mg total) under the skin every 7 days    Attention deficit hyperactivity disorder (ADHD), predominantly hyperactive type  -     Vyvanse 20 MG capsule; Take 1 capsule (20 mg total) by mouth every morning Max Daily Amount: 20 mg  -     Vyvanse 20 MG CHEW; Chew 20 mg daily after lunch Max Daily Amount: 20 mg    Essential hypertension    Mixed hyperlipidemia    Anxiety disorder, unspecified type    Insomnia, unspecified type  -     traZODone (DESYREL) 50 mg tablet; Take 1 tablet (50 mg total) by mouth daily at bedtime    Mucopurulent chronic bronchitis (HCC)  -     albuterol (PROVENTIL HFA,VENTOLIN HFA) 90 mcg/act inhaler; Inhale 2 puffs every 6 (six) hours as needed for wheezing  -     fluticasone (Flovent HFA) 110 MCG/ACT inhaler; Inhale  2 puffs 2 (two) times a day Rinse mouth after use.          Subjective:       Chief Complaint   Patient presents with    Rash    Follow-up      Patient ID: Denice Vargas is a 63 y.o. female presents today for a follow-up on her chronic condition.  She has ADHD, type 2 diabetes, hypertension, dyslipidemia, anxiety disorder, insomnia as well as chronic bronchitis.  She is been taking her medications regularly.  She denies adverse reactions with her medications.  She states that she has been taking her Vyvanse however she does not believe that she needs to be on a 30 mg dose.  She would like to switch this to take 20 in the morning and 20 at lunchtime.  Patient has been having problems with insomnia.  She has been having great difficulty with sleeping at bedtime.  She believes that her anxiety has been mildly elevated as well.   she has not been taking anything for symptom relief.    Rash  Pertinent negatives include no congestion, cough, diarrhea, fatigue, fever, shortness of breath or sore throat.       Review of Systems   Constitutional:  Negative for activity change, chills, fatigue and fever.   HENT:  Negative for congestion, ear pain, sinus pressure and sore throat.    Eyes:  Negative for redness, itching and visual disturbance.   Respiratory:  Negative for cough and shortness of breath.    Cardiovascular:  Negative for chest pain and palpitations.   Gastrointestinal:  Negative for abdominal pain, diarrhea and nausea.   Endocrine: Negative for cold intolerance and heat intolerance.   Genitourinary:  Negative for dysuria, flank pain and frequency.   Musculoskeletal:  Negative for arthralgias, back pain, gait problem and myalgias.   Skin:  Positive for rash. Negative for color change.   Allergic/Immunologic: Negative for environmental allergies.   Neurological:  Negative for dizziness, numbness and headaches.   Psychiatric/Behavioral:  Negative for behavioral problems and sleep disturbance.        The following  portions of the patient's history were reviewed and updated as appropriate : past family history, past medical history, past social history and past surgical history.    Current Outpatient Medications:     albuterol (PROVENTIL HFA,VENTOLIN HFA) 90 mcg/act inhaler, Inhale 2 puffs every 6 (six) hours as needed for wheezing, Disp: 8 g, Rfl: 5    aspirin (ECOTRIN LOW STRENGTH) 81 mg EC tablet, Take 1 tablet by mouth daily, Disp: , Rfl:     atorvastatin (LIPITOR) 40 mg tablet, TAKE 1 TABLET DAILY, Disp: 90 tablet, Rfl: 3    Blood Glucose Monitoring Suppl (ONE TOUCH ULTRA MINI) w/Device KIT, by Does not apply route, Disp: , Rfl:     cetirizine (ZyrTEC) 10 mg tablet, Take by mouth Daily, Disp: , Rfl:     cholecalciferol (VITAMIN D3) 1,000 units tablet, Take by mouth, Disp: , Rfl:     Continuous Glucose  (FreeStyle Saige 3 [place]) JAVAN, Use 1 each once for 1 dose, Disp: 1 each, Rfl: 0    Continuous Glucose Sensor (FreeStyle Saige 3 Sensor) MISC, Use 1 each every 14 (fourteen) days, Disp: 6 each, Rfl: 3    cyclobenzaprine (FLEXERIL) 10 mg tablet, Take 1 tablet (10 mg total) by mouth daily at bedtime as needed for muscle spasms, Disp: 30 tablet, Rfl: 0    Empagliflozin (JARDIANCE) 10 MG TABS tablet, Take 1 tablet (10 mg total) by mouth daily, Disp: 30 tablet, Rfl: 5    escitalopram (LEXAPRO) 20 mg tablet, Take 1 tablet (20 mg total) by mouth daily, Disp: 90 tablet, Rfl: 1    famotidine (PEPCID) 20 mg tablet, Take 1 tablet by mouth daily, Disp: , Rfl:     fenofibrate (TRICOR) 145 mg tablet, Take 1 tablet (145 mg total) by mouth daily, Disp: 90 tablet, Rfl: 0    fluticasone (Flovent HFA) 110 MCG/ACT inhaler, Inhale 2 puffs 2 (two) times a day Rinse mouth after use., Disp: 12 g, Rfl: 1    glucose blood (ONE TOUCH ULTRA TEST) test strip, 1 each by Other route 2 (two) times a day, Disp: 100 each, Rfl: 0    lisinopril (ZESTRIL) 5 mg tablet, TAKE ONE-HALF (1/2) TABLET DAILY, Disp: 45 tablet, Rfl: 1    metFORMIN  "(GLUCOPHAGE-XR) 500 mg 24 hr tablet, Take 2 tablets (1,000 mg total) by mouth 2 (two) times a day with meals, Disp: 360 tablet, Rfl: 0    metoprolol succinate (TOPROL-XL) 25 mg 24 hr tablet, TAKE 1 TABLET DAILY, Disp: 90 tablet, Rfl: 1    Multiple Vitamins-Minerals (MULTIVITAMIN ADULTS 50+ PO), Take 1 tablet by mouth daily, Disp: , Rfl:     ONETOUCH DELICA LANCETS FINE MISC, by Does not apply route daily, Disp: 100 each, Rfl: 1    tirzepatide (Mounjaro) 2.5 MG/0.5ML, Inject 0.5 mL (2.5 mg total) under the skin every 7 days, Disp: 2 mL, Rfl: 0    traZODone (DESYREL) 50 mg tablet, Take 1 tablet (50 mg total) by mouth daily at bedtime, Disp: 30 tablet, Rfl: 5    Vyvanse 20 MG capsule, Take 1 capsule (20 mg total) by mouth every morning Max Daily Amount: 20 mg, Disp: , Rfl:     Vyvanse 20 MG CHEW, Chew 20 mg daily after lunch Max Daily Amount: 20 mg, Disp: 30 tablet, Rfl: 0    dicyclomine (BENTYL) 20 mg tablet, Take 1 tablet (20 mg total) by mouth 3 (three) times a day as needed (for abdominal cramping) (Patient not taking: Reported on 5/9/2024), Disp: 30 tablet, Rfl: 0    predniSONE 10 mg tablet, Take 6 tablets By mouth  In the morning Qd.   Decrease by  By 1 tablet daily until completed. (Patient not taking: Reported on 5/9/2024), Disp: 21 tablet, Rfl: 0         Objective:         Vitals:    05/09/24 1152   BP: 126/70   BP Location: Left arm   Patient Position: Sitting   Cuff Size: Adult   Pulse: 100   Temp: 98.2 °F (36.8 °C)   TempSrc: Temporal   SpO2: 97%   Weight: 72.8 kg (160 lb 9.6 oz)   Height: 5' 5\" (1.651 m)     Physical Exam  Vitals reviewed.   Constitutional:       General: She is not in acute distress.     Appearance: Normal appearance. She is well-developed.   HENT:      Head: Normocephalic and atraumatic.      Right Ear: Tympanic membrane, ear canal and external ear normal. There is no impacted cerumen.      Left Ear: Tympanic membrane, ear canal and external ear normal. There is no impacted cerumen.      " Nose: Nose normal. No congestion or rhinorrhea.      Mouth/Throat:      Mouth: Mucous membranes are moist.      Pharynx: No oropharyngeal exudate or posterior oropharyngeal erythema.   Eyes:      General: No scleral icterus.        Right eye: No discharge.         Left eye: No discharge.      Extraocular Movements: Extraocular movements intact.      Conjunctiva/sclera: Conjunctivae normal.      Pupils: Pupils are equal, round, and reactive to light.   Neck:      Trachea: No tracheal deviation.   Cardiovascular:      Rate and Rhythm: Normal rate and regular rhythm.      Pulses: Normal pulses.           Dorsalis pedis pulses are 2+ on the right side and 2+ on the left side.        Posterior tibial pulses are 2+ on the right side and 2+ on the left side.      Heart sounds: Normal heart sounds. No murmur heard.     No friction rub. No gallop.   Pulmonary:      Effort: Pulmonary effort is normal. No respiratory distress.      Breath sounds: Normal breath sounds. No wheezing, rhonchi or rales.   Abdominal:      General: Bowel sounds are normal. There is no distension.      Palpations: Abdomen is soft.      Tenderness: There is no abdominal tenderness. There is no guarding or rebound.   Musculoskeletal:         General: Normal range of motion.      Cervical back: Normal range of motion and neck supple.      Right lower leg: No edema.      Left lower leg: No edema.   Lymphadenopathy:      Head:      Right side of head: No submental or submandibular adenopathy.      Left side of head: No submental or submandibular adenopathy.      Cervical: No cervical adenopathy.      Right cervical: No superficial, deep or posterior cervical adenopathy.     Left cervical: No superficial, deep or posterior cervical adenopathy.   Skin:     General: Skin is warm and dry.      Findings: No erythema.   Neurological:      General: No focal deficit present.      Mental Status: She is alert and oriented to person, place, and time.      Cranial  Nerves: No cranial nerve deficit.      Sensory: Sensation is intact. No sensory deficit.      Motor: Motor function is intact.   Psychiatric:         Attention and Perception: Attention and perception normal.         Mood and Affect: Mood is not anxious or depressed.         Speech: Speech normal.         Behavior: Behavior normal.         Thought Content: Thought content normal.         Judgment: Judgment normal.

## 2024-05-13 ENCOUNTER — TELEPHONE (OUTPATIENT)
Dept: FAMILY MEDICINE CLINIC | Facility: CLINIC | Age: 64
End: 2024-05-13

## 2024-05-13 NOTE — TELEPHONE ENCOUNTER
Received paperwork thru [employer] for Freestyle Saige 3 for [place] * [employer] answered questions and faxed back to [employer] [phone]

## 2024-05-21 DIAGNOSIS — F90.1 ATTENTION DEFICIT HYPERACTIVITY DISORDER (ADHD), PREDOMINANTLY HYPERACTIVE TYPE: ICD-10-CM

## 2024-05-21 DIAGNOSIS — E78.2 MIXED HYPERLIPIDEMIA: ICD-10-CM

## 2024-05-21 RX ORDER — LISDEXAMFETAMINE DIMESYLATE 20 MG/1
20 CAPSULE ORAL EVERY MORNING
Qty: 30 CAPSULE | Refills: 0 | Status: SHIPPED | OUTPATIENT
Start: 2024-05-21

## 2024-05-21 NOTE — TELEPHONE ENCOUNTER
Occupational Therapy Initial Evaluation and Plan of Care    Patient: Shirley Polanco           : 2017  Today's Date: 2022  Referring practitioner: Ivonne Mason MD  Date of Initial Visit: 2022  Patient seen for 1 sessions    Visit Diagnoses:    ICD-10-CM ICD-9-CM   1. Mild developmental delay  R62.50 783.40   2. Decreased activities of daily living (ADL)  Z78.9 V49.89     Past Medical History:   Diagnosis Date   • Allergic rhinitis    • Chronic otitis media    • ETD (Eustachian tube dysfunction), bilateral    • Optic nerve hypoplasia, left eye    • Reactive airway disease      Past Surgical History:   Procedure Laterality Date   • MYRINGOTOMY W/ TUBES Bilateral 2021    Procedure: myringotomy tube insertion;  Surgeon: Suresh Rowley MD;  Location: [facility];  Service: ENT;  Laterality: Bilateral;   • NO PAST SURGERIES         SUBJECTIVE    HISTORY OF PRESENT CONDITION  The primary concern for this patient is difficulty with ADL's and delayed speech. Present during assessment is mother.   The birth history includes full term pregnancy. Complicating factors during pregnancy and around birth include drug use by the mother. Child was adopted shortly after birth.  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 Surgery is planned for when child is closer to age 7. Medical testing includes vision testing which revealed concerns for the L eye. Child will not wear her glasses. Family has been told to take care of the R eye to keep it healthy due to L eye deficits.   The child's chronological age is 5 years. The child's developmental history includes a speech delay and some difficulty tolerating ADL, but no other concerns per Mother. .  The child lives with their Parents. The patient's nutrition is from an adult diet. The preferred sleeping position is  Pt called asking if the provider can send in her Vyvanse 20 mg Caps to her pharmacy. As per pt she is suppose to take Vyvanse 20 mg capsule in the morning and Vyvanse 20 mg Chew tablets in the Afternoon. Pt got the chewable tablets already. Please review and advise    supine.  Daily activities include  5 days per week while parents work. Hobbies/sports include swimming. Social concerns include none other than articulation concerns for speech.    Outcome Measure:  Short Sensory Profile completed by Mother. Typical performance for all sections. Total score of 177/190 Typical performance category.          OBJECTIVE    GENERAL OBSERVATIONS/BEHAVIORS  Information was gathered through clinical observation, parent/caregiver interview, records review, questionaire and objective testing. General observations shows tolerated handling well. Communication shows impaired oral expression. The skin assessment shows normal appearance. Attention and arousal is WFL. Visual track is impaired with L eye. Strabismus noted. . The skull shows normal appearance. The face shows normal appearance.     POSTURE  Sitting: Prefers W sitting in the floor. Needed consistent cues to correct. Mother reports she does this at home often.   Standing: WFL.     Motor Control/Motor Learning  Motor Control: WFL for age  Hand Dominance: Not established.  Pencil Grasp: Palmar  Bilateral Motor Control: does use both hands symmetrically and does cross midline to either side    REFLEXES AND REACTIONS  No concerns noted for age.     GROSS MOTOR SKILLS  WFL for age.     TODDLER MMT  No strength concerns noted.     BALANCE/COORDINATION SKILLS  Stoop and recovers in play: able  Runs on level ground: able  Kicks ball: able    FINE MOTOR SKILLS  Pencil grasp delayed for age. Difficulty with bilateral tasks including stringing beads.   Independent with building a 6 block tower. Min difficulty with age appropriate puzzles.     SENSORY PROCESSING  Resistive to hair washing, brushing and oral care. Covers ears with loud sounds. No other concerns reported.     ADL ASSESSMENT  Feeding: Independent with utensils but prefers to finger feed. No concerns for diet per Mother.   Bathing: Dislikes hair washing, but tolerates other  aspects of bathing fine per Mother.  Dressing: Tries to assist, but struggles with orientation. Resistive to wearing glasses.   Grooming: Resistive to hair brushing and oral care.   Toileting: Is beginning toilet training. Will assist with clothing management.         Objective   Therapy Education/Self Care [id number]   Details: Educated Mother on mild FM delays noted. Benefits of OT and techniques to improve tolerance of grooming tasks.    Given Home Exercise Program  ADL management   Progress: New   Education provided to:  Parent/Caregiver   Level of understanding Verbalized and Demonstrated   Timed Minutes      Self-Care/IADL Training    [id number] Comments   Educated and discussed specific AE options to improve oral care including a 3 sided toothbrush. Demonstrated use of a visual timer to improve tolerance of tasks that child dislikes.     Discussed and demonstrated age appropriate play and bilateral coordination tasks to add to HEP. Provided education on techniques to improve wear of glasses.                 Timed Minutes 10       Total Timed Treatment:     10   mins  Total Time of Visit:            40   mins    ASSESSMENT/PLAN     Goals                                          Progress Note due by 9/3/22                                                      Recert due by 11/1/22   STG by: 9/3/22 Comments Date Status   Caregiver will be independent with daily completion of a HEP to address developmental delays and self-care concerns.      New   Child will display no W sitting for 3 tx sessions in a row during play.    New   Child will independently string 10 one inch beads to display age appropriate bilateral coordination skills.    New         LTG by: 9/3/22      Child will tolerate hair washing, brushing and oral care with min resistance to improve tolerance of ADL routine.    New   Child will wear her glasses for entire OT session with no resistance.    New   Child will consistently use a digital pronate grasp with  drawing lines and circles independently.    New   Child will complete 6-8 piece puzzles with independence.    New                   Assessment & Plan     Assessment  Impairments: abnormal coordination and lacks appropriate home exercise program  Other impairment: self-care concerns    Assessment details: This child was referred to OT with concerns for a mild developmental delay. Slight delays noted in FM skills for age, but anticipate this to improve quickly with a home exercise program. She struggles with tolerating hair washing, brushing and oral care. She is also very resistive to wearing glasses which are necessary due to multiple diagnoses related to her L eye. OT will focus on these areas and continue with caregiver education to allow this child to meet age appropriate developmental milestones and tolerate ADL routine. A speech delay is noted and child has orders for a speech therapy evaluation.   Prognosis: good    Plan  Planned therapy interventions: home exercise program, fine motor coordination training, ADL retraining, motor coordination training and therapeutic activities  Frequency: 1x week  Duration in weeks: 12  Treatment plan discussed with: family  Plan details: Will focus on improving tolerance of ADL routine and address FM delays for child to meet age appropriate developmental milestones.         SIGNATURE: WILFREDO Saleem/L, [place] License #: [id number]    Electronically Signed on 8/4/2022    Initial Certification  Certification Period: 8/4/2022 through 11/1/2022  I certify that the therapy services are furnished while this patient is under my care.  The services outlined above are required by this patient, and will be reviewed every 90 days.     PHYSICIAN: Ivonne Mason MD (NPI: [id number])    Signature:________________________________________DATE: _________    Please sign and return via fax to [phone].   Thank you so much for letting us work with Shirley. I appreciate your letting  us work with your patients. If you have any questions or concerns, please don't hesitate to contact me.          [address] [id number]  [phone]

## 2024-05-22 RX ORDER — FENOFIBRATE 145 MG/1
145 TABLET, COATED ORAL DAILY
Qty: 90 TABLET | Refills: 1 | Status: SHIPPED | OUTPATIENT
Start: 2024-05-22 | End: 2024-11-18

## 2024-05-31 DIAGNOSIS — G47.00 INSOMNIA, UNSPECIFIED TYPE: ICD-10-CM

## 2024-05-31 RX ORDER — TRAZODONE HYDROCHLORIDE 50 MG/1
50 TABLET ORAL
Qty: 90 TABLET | Refills: 2 | Status: SHIPPED | OUTPATIENT
Start: 2024-05-31

## 2024-06-14 ENCOUNTER — OFFICE VISIT (OUTPATIENT)
Dept: FAMILY MEDICINE CLINIC | Facility: CLINIC | Age: 64
End: 2024-06-14
Payer: COMMERCIAL

## 2024-06-14 VITALS
BODY MASS INDEX: 26.69 KG/M2 | TEMPERATURE: 97.8 F | SYSTOLIC BLOOD PRESSURE: 114 MMHG | HEART RATE: 95 BPM | DIASTOLIC BLOOD PRESSURE: 60 MMHG | HEIGHT: 65 IN | WEIGHT: 160.2 LBS | OXYGEN SATURATION: 98 %

## 2024-06-14 DIAGNOSIS — G89.29 CHRONIC RIGHT SHOULDER PAIN: ICD-10-CM

## 2024-06-14 DIAGNOSIS — E11.22 TYPE 2 DIABETES MELLITUS WITH STAGE 3 CHRONIC KIDNEY DISEASE, WITHOUT LONG-TERM CURRENT USE OF INSULIN, UNSPECIFIED WHETHER STAGE 3A OR 3B CKD (HCC): ICD-10-CM

## 2024-06-14 DIAGNOSIS — M25.511 CHRONIC RIGHT SHOULDER PAIN: ICD-10-CM

## 2024-06-14 DIAGNOSIS — Z12.31 ENCOUNTER FOR SCREENING MAMMOGRAM FOR MALIGNANT NEOPLASM OF BREAST: Primary | ICD-10-CM

## 2024-06-14 DIAGNOSIS — N18.30 TYPE 2 DIABETES MELLITUS WITH STAGE 3 CHRONIC KIDNEY DISEASE, WITHOUT LONG-TERM CURRENT USE OF INSULIN, UNSPECIFIED WHETHER STAGE 3A OR 3B CKD (HCC): ICD-10-CM

## 2024-06-14 DIAGNOSIS — F90.1 ATTENTION DEFICIT HYPERACTIVITY DISORDER (ADHD), PREDOMINANTLY HYPERACTIVE TYPE: ICD-10-CM

## 2024-06-14 DIAGNOSIS — E11.9 TYPE 2 DIABETES MELLITUS WITHOUT COMPLICATION, WITHOUT LONG-TERM CURRENT USE OF INSULIN (HCC): ICD-10-CM

## 2024-06-14 PROCEDURE — 99214 OFFICE O/P EST MOD 30 MIN: CPT | Performed by: FAMILY MEDICINE

## 2024-06-14 RX ORDER — NABUMETONE 750 MG/1
750 TABLET, FILM COATED ORAL 2 TIMES DAILY
Qty: 60 TABLET | Refills: 1 | Status: SHIPPED | OUTPATIENT
Start: 2024-06-14

## 2024-06-14 RX ORDER — KETOROLAC TROMETHAMINE 30 MG/ML
INJECTION, SOLUTION INTRAMUSCULAR; INTRAVENOUS
COMMUNITY
Start: 2024-05-09

## 2024-06-14 RX ORDER — METFORMIN HYDROCHLORIDE 500 MG/1
1000 TABLET, EXTENDED RELEASE ORAL 2 TIMES DAILY WITH MEALS
Qty: 360 TABLET | Refills: 0 | Status: SHIPPED | OUTPATIENT
Start: 2024-06-14

## 2024-06-14 RX ORDER — LISDEXAMFETAMINE DIMESYLATE 20 MG/1
20 CAPSULE ORAL EVERY MORNING
Qty: 30 CAPSULE | Refills: 0 | Status: SHIPPED | OUTPATIENT
Start: 2024-06-14

## 2024-06-14 RX ORDER — LISDEXAMFETAMINE DIMESYLATE 20 MG/1
20 TABLET, CHEWABLE ORAL
Qty: 30 TABLET | Refills: 0 | Status: SHIPPED | OUTPATIENT
Start: 2024-06-14

## 2024-06-14 NOTE — PROGRESS NOTES
Assessment/Plan:   1. Chronic right shoulder pain  Patient symptoms appear persisting.  At this time, we will check MRI of her right shoulder to further rule out gross abnormalities.  She has been seen by PT regularly however she has not had any relief.  She has been performing her home therapy exercises over the past few weeks.  At this time, she may take nabumetone for pain relief.  - MRI shoulder right wo contrast; Future  - nabumetone (RELAFEN) 750 mg tablet; Take 1 tablet (750 mg total) by mouth 2 (two) times a day  Dispense: 60 tablet; Refill: 1    2. Type 2 diabetes mellitus without complication, without long-term current use of insulin ([facility])  A1c previously elevated.  At this time, she was unable to  her Mounjaro.  This was resent to her pharmacy.  Will start treatment at 2.5 mg, will further titrate dose as needed.  Will check A1c level again next month.  - metFORMIN (GLUCOPHAGE-XR) 500 mg 24 hr tablet; Take 2 tablets (1,000 mg total) by mouth 2 (two) times a day with meals  Dispense: 360 tablet; Refill: 0  - tirzepatide (Mounjaro) 2.5 MG/0.5ML; Inject 0.5 mL (2.5 mg total) under the skin every 7 days  Dispense: 2 mL; Refill: 0    3. Attention deficit hyperactivity disorder (ADHD), predominantly hyperactive type  Stable.  Continue with current dose of Vyvanse.  Follow-up with patient in 6 months.  - Vyvanse 20 MG capsule; Take 1 capsule (20 mg total) by mouth every morning Max Daily Amount: 20 mg  Dispense: 30 capsule; Refill: 0  - Vyvanse 20 MG CHEW; Chew 20 mg daily after lunch Max Daily Amount: 20 mg  Dispense: 30 tablet; Refill: 0    4. Encounter for screening mammogram for malignant neoplasm of breast  - Mammo screening bilateral w 3d & cad               Diagnoses and all orders for this visit:    Encounter for screening mammogram for malignant neoplasm of breast  -     Mammo screening bilateral w 3d & cad    Chronic right shoulder pain  -     MRI shoulder right wo contrast; Future  -      nabumetone (RELAFEN) 750 mg tablet; Take 1 tablet (750 mg total) by mouth 2 (two) times a day    Type 2 diabetes mellitus without complication, without long-term current use of insulin ([facility])  -     metFORMIN (GLUCOPHAGE-XR) 500 mg 24 hr tablet; Take 2 tablets (1,000 mg total) by mouth 2 (two) times a day with meals  -     tirzepatide (Mounjaro) 2.5 MG/0.5ML; Inject 0.5 mL (2.5 mg total) under the skin every 7 days    Attention deficit hyperactivity disorder (ADHD), predominantly hyperactive type  -     Vyvanse 20 MG capsule; Take 1 capsule (20 mg total) by mouth every morning Max Daily Amount: 20 mg  -     Vyvanse 20 MG CHEW; Chew 20 mg daily after lunch Max Daily Amount: 20 mg    Type 2 diabetes mellitus with stage 3 chronic kidney disease, without long-term current use of insulin, unspecified whether stage 3a or 3b CKD ([facility])  -     tirzepatide (Mounjaro) 2.5 MG/0.5ML; Inject 0.5 mL (2.5 mg total) under the skin every 7 days    Other orders  -     Continuous Glucose  (FreeStyle Saige 3 Vendor) JAVAN          Subjective:       Chief Complaint   Patient presents with    Shoulder Pain     Right shoulder pain- pt would like to discuss mri    Earache      Patient ID: Denice Vargas is a 63 y.o. female presents today for a follow-up on her persistent right shoulder pain.  Since her last visit, she has not had any improvement in her discomfort.  She has limited range of motion.  She has gone through physical therapy however this has been ineffective for her.  She would like to proceed with further imaging for this problem.    Shoulder Pain   Pertinent negatives include no fever or numbness.   Earache   Pertinent negatives include no abdominal pain, coughing, diarrhea, headaches or sore throat.       Review of Systems   Constitutional:  Negative for activity change, chills, fatigue and fever.   HENT:  Positive for ear pain. Negative for congestion, sinus pressure and sore throat.    Eyes:  Negative for redness,  itching and visual disturbance.   Respiratory:  Negative for cough and shortness of breath.    Cardiovascular:  Negative for chest pain and palpitations.   Gastrointestinal:  Negative for abdominal pain, diarrhea and nausea.   Endocrine: Negative for cold intolerance and heat intolerance.   Genitourinary:  Negative for dysuria, flank pain and frequency.   Musculoskeletal:  Negative for arthralgias, back pain, gait problem and myalgias.   Skin:  Negative for color change.   Allergic/Immunologic: Negative for environmental allergies.   Neurological:  Negative for dizziness, numbness and headaches.   Psychiatric/Behavioral:  Negative for behavioral problems and sleep disturbance.        The following portions of the patient's history were reviewed and updated as appropriate : past family history, past medical history, past social history and past surgical history.    Current Outpatient Medications:     albuterol (PROVENTIL HFA,VENTOLIN HFA) 90 mcg/act inhaler, Inhale 2 puffs every 6 (six) hours as needed for wheezing, Disp: 8 g, Rfl: 5    aspirin (ECOTRIN LOW STRENGTH) 81 mg EC tablet, Take 1 tablet by mouth daily, Disp: , Rfl:     atorvastatin (LIPITOR) 40 mg tablet, TAKE 1 TABLET DAILY, Disp: 90 tablet, Rfl: 3    Blood Glucose Monitoring Suppl (ONE TOUCH ULTRA MINI) w/Device KIT, by Does not apply route, Disp: , Rfl:     cetirizine (ZyrTEC) 10 mg tablet, Take by mouth Daily, Disp: , Rfl:     cholecalciferol (VITAMIN D3) 1,000 units tablet, Take by mouth, Disp: , Rfl:     Continuous Glucose  (FreeStyle Saige 3 Kinder) JAVAN, , Disp: , Rfl:     Continuous Glucose Sensor (FreeStyle Saige 3 Sensor) MISC, Use 1 each every 14 (fourteen) days, Disp: 6 each, Rfl: 3    Empagliflozin (JARDIANCE) 10 MG TABS tablet, Take 1 tablet (10 mg total) by mouth daily, Disp: 30 tablet, Rfl: 5    escitalopram (LEXAPRO) 20 mg tablet, Take 1 tablet (20 mg total) by mouth daily, Disp: 90 tablet, Rfl: 1    famotidine (PEPCID) 20 mg  tablet, Take 1 tablet by mouth daily, Disp: , Rfl:     fenofibrate (TRICOR) 145 mg tablet, Take 1 tablet (145 mg total) by mouth daily, Disp: 90 tablet, Rfl: 1    glucose blood (ONE TOUCH ULTRA TEST) test strip, 1 each by Other route 2 (two) times a day, Disp: 100 each, Rfl: 0    lisinopril (ZESTRIL) 5 mg tablet, TAKE ONE-HALF (1/2) TABLET DAILY, Disp: 45 tablet, Rfl: 1    metFORMIN (GLUCOPHAGE-XR) 500 mg 24 hr tablet, Take 2 tablets (1,000 mg total) by mouth 2 (two) times a day with meals, Disp: 360 tablet, Rfl: 0    metoprolol succinate (TOPROL-XL) 25 mg 24 hr tablet, TAKE 1 TABLET DAILY, Disp: 90 tablet, Rfl: 1    Multiple Vitamins-Minerals (MULTIVITAMIN ADULTS 50+ PO), Take 1 tablet by mouth daily, Disp: , Rfl:     nabumetone (RELAFEN) 750 mg tablet, Take 1 tablet (750 mg total) by mouth 2 (two) times a day, Disp: 60 tablet, Rfl: 1    ONETOUCH DELICA LANCETS FINE MISC, by Does not apply route daily, Disp: 100 each, Rfl: 1    tirzepatide (Mounjaro) 2.5 MG/0.5ML, Inject 0.5 mL (2.5 mg total) under the skin every 7 days, Disp: 2 mL, Rfl: 0    traZODone (DESYREL) 50 mg tablet, TAKE 1 TABLET BY MOUTH DAILY AT BEDTIME, Disp: 90 tablet, Rfl: 2    Vyvanse 20 MG capsule, Take 1 capsule (20 mg total) by mouth every morning Max Daily Amount: 20 mg, Disp: 30 capsule, Rfl: 0    Vyvanse 20 MG CHEW, Chew 20 mg daily after lunch Max Daily Amount: 20 mg, Disp: 30 tablet, Rfl: 0    cyclobenzaprine (FLEXERIL) 10 mg tablet, Take 1 tablet (10 mg total) by mouth daily at bedtime as needed for muscle spasms (Patient not taking: Reported on 6/14/2024), Disp: 30 tablet, Rfl: 0    dicyclomine (BENTYL) 20 mg tablet, Take 1 tablet (20 mg total) by mouth 3 (three) times a day as needed (for abdominal cramping) (Patient not taking: Reported on 5/9/2024), Disp: 30 tablet, Rfl: 0    Mometasone Furoate (Asmanex HFA) 50 MCG/ACT AERO, Inhale 2 puffs 2 (two) times a day (Patient not taking: Reported on 6/14/2024), Disp: 13 g, Rfl: 5    predniSONE  "10 mg tablet, Take 6 tablets By mouth  In the morning Qd.   Decrease by  By 1 tablet daily until completed. (Patient not taking: Reported on 5/9/2024), Disp: 21 tablet, Rfl: 0         Objective:         Vitals:    06/14/24 1031   BP: 114/60   BP Location: Left arm   Patient Position: Sitting   Cuff Size: Adult   Pulse: 95   Temp: 97.8 °F (36.6 °C)   TempSrc: Temporal   SpO2: 98%   Weight: 72.7 kg (160 lb 3.2 oz)   Height: 5' 5\" (1.651 m)     Physical Exam  Vitals reviewed.   Constitutional:       General: She is not in acute distress.     Appearance: Normal appearance. She is well-developed.   HENT:      Head: Normocephalic and atraumatic.      Right Ear: Tympanic membrane, ear canal and external ear normal. There is no impacted cerumen.      Left Ear: Tympanic membrane, ear canal and external ear normal. There is no impacted cerumen.      Nose: Nose normal. No congestion or rhinorrhea.      Mouth/Throat:      Mouth: Mucous membranes are moist.      Pharynx: No oropharyngeal exudate or posterior oropharyngeal erythema.   Eyes:      General: No scleral icterus.        Right eye: No discharge.         Left eye: No discharge.      Extraocular Movements: Extraocular movements intact.      Conjunctiva/sclera: Conjunctivae normal.      Pupils: Pupils are equal, round, and reactive to light.   Neck:      Trachea: No tracheal deviation.   Cardiovascular:      Rate and Rhythm: Normal rate and regular rhythm.      Pulses: Normal pulses. no weak pulses.           Dorsalis pedis pulses are 2+ on the right side and 2+ on the left side.        Posterior tibial pulses are 2+ on the right side and 2+ on the left side.      Heart sounds: Normal heart sounds. No murmur heard.     No friction rub. No gallop.   Pulmonary:      Effort: Pulmonary effort is normal. No respiratory distress.      Breath sounds: Normal breath sounds. No wheezing, rhonchi or rales.   Abdominal:      General: Bowel sounds are normal. There is no distension.    "   Palpations: Abdomen is soft.      Tenderness: There is no abdominal tenderness. There is no guarding or rebound.   Musculoskeletal:      Right shoulder: Tenderness and bony tenderness present. No swelling. Decreased range of motion. Decreased strength.      Cervical back: Normal range of motion and neck supple.      Right lower leg: No edema.      Left lower leg: No edema.        Feet:    Feet:      Right foot:      Skin integrity: No ulcer, skin breakdown, erythema, warmth, callus or dry skin.      Left foot:      Skin integrity: No ulcer, skin breakdown, erythema, warmth, callus or dry skin.   Lymphadenopathy:      Head:      Right side of head: No submental or submandibular adenopathy.      Left side of head: No submental or submandibular adenopathy.      Cervical: No cervical adenopathy.      Right cervical: No superficial, deep or posterior cervical adenopathy.     Left cervical: No superficial, deep or posterior cervical adenopathy.   Skin:     General: Skin is warm and dry.      Findings: No erythema.   Neurological:      General: No focal deficit present.      Mental Status: She is alert and oriented to person, place, and time.      Cranial Nerves: No cranial nerve deficit.      Sensory: Sensation is intact. No sensory deficit.      Motor: Motor function is intact.   Psychiatric:         Attention and Perception: Attention and perception normal.         Mood and Affect: Mood is not anxious or depressed.         Speech: Speech normal.         Behavior: Behavior normal.         Thought Content: Thought content normal.         Judgment: Judgment normal.                   Diabetic Foot Exam    Patient's shoes and socks removed.    Right Foot/Ankle   Right Foot Inspection  Skin Exam: skin normal and skin intact. No dry skin, no warmth, no callus, no erythema, no maceration, no abnormal color, no pre-ulcer, no ulcer and no callus.     Toe Exam: ROM and strength within normal limits.     Sensory   Vibration:  intact  Proprioception: intact  Monofilament testing: intact    Vascular  Capillary refills: < 3 seconds  The right DP pulse is 2+. The right PT pulse is 2+.     Left Foot/Ankle  Left Foot Inspection  Skin Exam: skin normal and skin intact. No dry skin, no warmth, no erythema, no maceration, normal color, no pre-ulcer, no ulcer and no callus.     Toe Exam: ROM and strength within normal limits.     Sensory   Vibration: intact  Proprioception: intact  Monofilament testing: diminished    Vascular  Capillary refills: < 3 seconds  The left DP pulse is 2+. The left PT pulse is 2+.     Assign Risk Category  No deformity present  No loss of protective sensation  No weak pulses  Risk: 0

## 2024-06-18 ENCOUNTER — PATIENT MESSAGE (OUTPATIENT)
Dept: FAMILY MEDICINE CLINIC | Facility: CLINIC | Age: 64
End: 2024-06-18

## 2024-06-19 DIAGNOSIS — E11.9 TYPE 2 DIABETES MELLITUS WITHOUT COMPLICATION, WITHOUT LONG-TERM CURRENT USE OF INSULIN (HCC): ICD-10-CM

## 2024-06-19 DIAGNOSIS — N18.30 TYPE 2 DIABETES MELLITUS WITH STAGE 3 CHRONIC KIDNEY DISEASE, WITHOUT LONG-TERM CURRENT USE OF INSULIN, UNSPECIFIED WHETHER STAGE 3A OR 3B CKD (HCC): ICD-10-CM

## 2024-06-19 DIAGNOSIS — E11.22 TYPE 2 DIABETES MELLITUS WITH STAGE 3 CHRONIC KIDNEY DISEASE, WITHOUT LONG-TERM CURRENT USE OF INSULIN, UNSPECIFIED WHETHER STAGE 3A OR 3B CKD (HCC): ICD-10-CM

## 2024-06-20 NOTE — PATIENT COMMUNICATION
Spoke to patient. She stated that [facility] and [facility] in [place] are out of the Mounjaro. She is going to call around to find out who has the medication and she will message you to let you know where to send it.

## 2024-06-24 ENCOUNTER — HOSPITAL ENCOUNTER (OUTPATIENT)
Dept: MRI IMAGING | Facility: HOSPITAL | Age: 64
Discharge: HOME | End: 2024-06-24
Payer: COMMERCIAL

## 2024-06-24 DIAGNOSIS — M25.511 CHRONIC RIGHT SHOULDER PAIN: ICD-10-CM

## 2024-06-24 DIAGNOSIS — G89.29 CHRONIC RIGHT SHOULDER PAIN: ICD-10-CM

## 2024-06-24 PROCEDURE — 73221 MRI JOINT UPR EXTREM W/O DYE: CPT

## 2024-06-26 DIAGNOSIS — M25.511 CHRONIC RIGHT SHOULDER PAIN: Primary | ICD-10-CM

## 2024-06-26 DIAGNOSIS — M75.120 COMPLETE TEAR OF ROTATOR CUFF, UNSPECIFIED LATERALITY, UNSPECIFIED WHETHER TRAUMATIC: ICD-10-CM

## 2024-06-26 DIAGNOSIS — G89.29 CHRONIC RIGHT SHOULDER PAIN: Primary | ICD-10-CM

## 2024-07-01 ENCOUNTER — OFFICE VISIT (OUTPATIENT)
Dept: OBGYN CLINIC | Facility: MEDICAL CENTER | Age: 64
End: 2024-07-01
Payer: COMMERCIAL

## 2024-07-01 VITALS
BODY MASS INDEX: 25.99 KG/M2 | HEART RATE: 80 BPM | WEIGHT: 156 LBS | HEIGHT: 65 IN | SYSTOLIC BLOOD PRESSURE: 116 MMHG | DIASTOLIC BLOOD PRESSURE: 69 MMHG

## 2024-07-01 DIAGNOSIS — M75.121 NONTRAUMATIC COMPLETE TEAR OF RIGHT ROTATOR CUFF: Primary | ICD-10-CM

## 2024-07-01 DIAGNOSIS — M25.511 ACUTE PAIN OF RIGHT SHOULDER: ICD-10-CM

## 2024-07-01 PROCEDURE — 20610 DRAIN/INJ JOINT/BURSA W/O US: CPT | Performed by: ORTHOPAEDIC SURGERY

## 2024-07-01 PROCEDURE — 99244 OFF/OP CNSLTJ NEW/EST MOD 40: CPT | Performed by: ORTHOPAEDIC SURGERY

## 2024-07-01 RX ORDER — BUPIVACAINE HYDROCHLORIDE 2.5 MG/ML
4 INJECTION, SOLUTION INFILTRATION; PERINEURAL
Status: COMPLETED | OUTPATIENT
Start: 2024-07-01 | End: 2024-07-01

## 2024-07-01 RX ORDER — METHYLPREDNISOLONE ACETATE 40 MG/ML
1 INJECTION, SUSPENSION INTRA-ARTICULAR; INTRALESIONAL; INTRAMUSCULAR; SOFT TISSUE
Status: COMPLETED | OUTPATIENT
Start: 2024-07-01 | End: 2024-07-01

## 2024-07-01 RX ADMIN — BUPIVACAINE HYDROCHLORIDE 4 ML: 2.5 INJECTION, SOLUTION INFILTRATION; PERINEURAL at 11:00

## 2024-07-01 RX ADMIN — METHYLPREDNISOLONE ACETATE 1 ML: 40 INJECTION, SUSPENSION INTRA-ARTICULAR; INTRALESIONAL; INTRAMUSCULAR; SOFT TISSUE at 11:00

## 2024-07-01 NOTE — PROGRESS NOTES
Ortho Sports Medicine Shoulder New Patient Visit     Assesment:   63 y.o. female right shoulder rotator cuff tear    Plan:    Conservative treatment:    Ice to shoulder 1-2 times daily, for 20 minutes at a time.  PT for ROM and strengthening to shoulder, rotator cuff, scapular stabilizers.  Let pain guide return to activities.  Cortisone injection performed today as documented below.  Recommend nonoperative treatment as she has good motion and fairly decent strength  Counseled on smoking cessation.       Imaging:    All imaging from today was reviewed by myself and explained to the patient.       Injection:    The risks and benefits of the injection (which include but are not limited to: infection, bleeding,damage to nerve/artery, need for further intervention), as well as the risks and benefits of all alternative treatments were explained and understood.  The patient elected to proceed with injection. The procedure was done with aseptic technique, and the patient tolerated the procedure well with no complications.  A corticosteroid injection of the subacromial space was performed.  The patient should take 1-2 days off of activity, with gradual return to activity as tolerated.  Ice to the shoulder 1-2 times daily for 20 minutes, for next 24-48 hrs.      Surgery:     No surgery is recommended at this point, continue with conservative treatment plan as noted.  If shoulder symptoms persist despite non-operative modalities, may consider rotator cuff repair surgery in the future. She was educated that being a smoke and diabetic does run the risk of higher post-operative complications      Follow up:    Return in about 4 weeks (around 7/29/2024) for re-check.        Chief Complaint   Patient presents with    Right Shoulder - Pain       History of Present Illness:    The patient is a 63 y.o., right hand dominant female whose occupation is unemployed, referred to me by their primary care physician, seen in clinic for  consultation of right shoulder pain.      The patient has a history of diabetes.  The patient denies a history of thyroid disorder.      Pain is located lateral upper arm.  The patient rates the pain as a 7/10, but can be 9/10 at times.  The pain has been present for 1 months.      The patient did not sustain an injury, but felt her shoulder pain after folding bed sheets. The pain is characterized as sharp, stabbing, dull.  The pain is present daily.      Pain is improved by rest, ice, and NSAIDS.  Pain is aggravated by overhead activity, reaching back, and lifting .       The patient has weakness.  The patient denies numbness and tingling.     The patient has tried rest, ice, and occasional use of NSAIDS (800mg IBU once a day PRN)    She is a smoker and will very on the amount day-to-day.      Shoulder Surgical History:  None    Past Medical, Social and Family History:  Past Medical History:   Diagnosis Date    Abnormal mammogram     RESOLVED: 09MAR2015    Arthritis     Diabetes mellitus (HCC)     Eczema of right external ear     RESOLVED: 05JAN2017    Foot drop, left foot     Heart attack (HCC)     Hematuria     RESOLVED: 09MAR2015    Impetigo     RESOLVED: 09MAR2015    Insomnia related to another mental disorder     AXIS I/II MENTAL DISORDER; RESOLVED: 09MAR2015     Past Surgical History:   Procedure Laterality Date    HYSTERECTOMY  1998    still has 1 ovary not sure which one was removed    OTHER SURGICAL HISTORY      STEND INDICATIONS: RESTENOSIS AT PREVIOUS PTCA LOCATION     SPLENECTOMY      TOTAL HIP ARTHROPLASTY Left      Allergies   Allergen Reactions    Levaquin [Levofloxacin] Anaphylaxis     Current Outpatient Medications on File Prior to Visit   Medication Sig Dispense Refill    albuterol (PROVENTIL HFA,VENTOLIN HFA) 90 mcg/act inhaler Inhale 2 puffs every 6 (six) hours as needed for wheezing 8 g 5    aspirin (ECOTRIN LOW STRENGTH) 81 mg EC tablet Take 1 tablet by mouth daily      atorvastatin (LIPITOR)  40 mg tablet TAKE 1 TABLET DAILY 90 tablet 3    cetirizine (ZyrTEC) 10 mg tablet Take by mouth Daily      cholecalciferol (VITAMIN D3) 1,000 units tablet Take by mouth      escitalopram (LEXAPRO) 20 mg tablet Take 1 tablet (20 mg total) by mouth daily 90 tablet 1    famotidine (PEPCID) 20 mg tablet Take 1 tablet by mouth daily      fenofibrate (TRICOR) 145 mg tablet Take 1 tablet (145 mg total) by mouth daily 90 tablet 1    lisinopril (ZESTRIL) 5 mg tablet TAKE ONE-HALF (1/2) TABLET DAILY 45 tablet 1    metFORMIN (GLUCOPHAGE-XR) 500 mg 24 hr tablet Take 2 tablets (1,000 mg total) by mouth 2 (two) times a day with meals 360 tablet 0    metoprolol succinate (TOPROL-XL) 25 mg 24 hr tablet TAKE 1 TABLET DAILY 90 tablet 1    Multiple Vitamins-Minerals (MULTIVITAMIN ADULTS 50+ PO) Take 1 tablet by mouth daily      nabumetone (RELAFEN) 750 mg tablet Take 1 tablet (750 mg total) by mouth 2 (two) times a day 60 tablet 1    tirzepatide (Mounjaro) 2.5 MG/0.5ML Inject 0.5 mL (2.5 mg total) under the skin every 7 days 6 mL 0    traZODone (DESYREL) 50 mg tablet TAKE 1 TABLET BY MOUTH DAILY AT BEDTIME 90 tablet 2    Vyvanse 20 MG capsule Take 1 capsule (20 mg total) by mouth every morning Max Daily Amount: 20 mg 30 capsule 0    Vyvanse 20 MG CHEW Chew 20 mg daily after lunch Max Daily Amount: 20 mg 30 tablet 0    Blood Glucose Monitoring Suppl (ONE TOUCH ULTRA MINI) w/Device KIT by Does not apply route (Patient not taking: Reported on 7/1/2024)      Continuous Glucose  (FreeStyle Saige 3 [place]) JAVAN  (Patient not taking: Reported on 7/1/2024)      Continuous Glucose Sensor (FreeStyle Saige 3 Sensor) MISC Use 1 each every 14 (fourteen) days (Patient not taking: Reported on 7/1/2024) 6 each 3    cyclobenzaprine (FLEXERIL) 10 mg tablet Take 1 tablet (10 mg total) by mouth daily at bedtime as needed for muscle spasms (Patient not taking: Reported on 6/14/2024) 30 tablet 0    dicyclomine (BENTYL) 20 mg tablet Take 1 tablet (20  mg total) by mouth 3 (three) times a day as needed (for abdominal cramping) (Patient not taking: Reported on 5/9/2024) 30 tablet 0    Empagliflozin (JARDIANCE) 10 MG TABS tablet Take 1 tablet (10 mg total) by mouth daily 30 tablet 5    glucose blood (ONE TOUCH ULTRA TEST) test strip 1 each by Other route 2 (two) times a day (Patient not taking: Reported on 7/1/2024) 100 each 0    Mometasone Furoate (Asmanex HFA) 50 MCG/ACT AERO Inhale 2 puffs 2 (two) times a day (Patient not taking: Reported on 6/14/2024) 13 g 5    ONETOUCH DELICA LANCETS FINE MISC by Does not apply route daily (Patient not taking: Reported on 7/1/2024) 100 each 1    predniSONE 10 mg tablet Take 6 tablets By mouth  In the morning Qd.   Decrease by  By 1 tablet daily until completed. (Patient not taking: Reported on 5/9/2024) 21 tablet 0     No current facility-administered medications on file prior to visit.     Social History     Socioeconomic History    Marital status: /Civil Union     Spouse name: Not on file    Number of children: Not on file    Years of education: Not on file    Highest education level: Not on file   Occupational History    Not on file   Tobacco Use    Smoking status: Every Day     Current packs/day: 0.50     Average packs/day: 0.5 packs/day for 51.5 years (25.7 ttl pk-yrs)     Types: Cigarettes     Start date: 1973    Smokeless tobacco: Never   Vaping Use    Vaping status: Never Used   Substance and Sexual Activity    Alcohol use: No    Drug use: No    Sexual activity: Not on file   Other Topics Concern    Not on file   Social History Narrative    Not on file     Social Determinants of Health     Financial Resource Strain: Not on file   Food Insecurity: Not on file   Transportation Needs: Not on file   Physical Activity: Not on file   Stress: Not on file   Social Connections: Not on file   Intimate Partner Violence: Not on file   Housing Stability: Not on file         I have reviewed the past medical, surgical, social  "and family history, medications and allergies as documented in the EMR.    Review of systems: ROS is negative other than that noted in the HPI.  Constitutional: Negative for fatigue and fever.   HENT: Negative for sore throat.    Respiratory: Negative for shortness of breath.    Cardiovascular: Negative for chest pain.   Gastrointestinal: Negative for abdominal pain.   Endocrine: Negative for cold intolerance and heat intolerance.   Genitourinary: Negative for flank pain.   Musculoskeletal: Negative for back pain.   Skin: Negative for rash.   Allergic/Immunologic: Negative for immunocompromised state.   Neurological: Negative for dizziness.   Psychiatric/Behavioral: Negative for agitation.      Physical Exam:    Blood pressure 116/69, pulse 80, height 5' 5\" (1.651 m), weight 70.8 kg (156 lb), not currently breastfeeding.    General/Constitutional: NAD, well developed, well nourished  HENT: Normocephalic, atraumatic  CV: Intact distal pulses, regular rate  Resp: No respiratory distress or labored breathing  GI: Soft and non-tender   Lymphatic: No lymphadenopathy palpated  Neuro: Alert and Oriented x 3, no focal deficits  Psych: Normal mood, normal affect, normal judgement, normal behavior  Skin: Warm, dry, no rashes, no erythema      Shoulder focused exam:       RIGHT LEFT    Scapula Atrophy Negative Negative     Winging Negative Negative     Protraction Negative Negative    Rotator cuff SS 4/5 5/5     IS 5/5 5/5     SubS 4/5 5/5    ROM FF 80, passively 160    170     ER0 30 60     ER90          IR90 Guarded due to pain         IRb buttock    T6    TTP: AC Negative Negative     Biceps Positive Negative     Coracoid Negative Negative    Special Tests: O'Briens Negative Negative     Berry-shear Negative Negative     Cross body Adduction Negative Negative     Speeds  Negative Negative     Rigo's Negative Negative     Whipple Negative        Neer Not Applicable      Libia Not Applicable     Instability: Apprehension & " "relocation not tested      Load & shift not tested     Other: Crank Negative                  UE NV Exam: +2 Radial pulses bilaterally  Sensation intact to light touch C5-T1 bilaterally, Radial/median/ulnar nerve motor intact      Bilateral elbow, wrist, and and forearm ROM full, painless with passive ROM, no ttp or crepitance throughout extremities below shoulder joint    Cervical ROM is full without pain, numbness or tingling      Shoulder Imaging    X-rays of the right shoulder were reviewed, which demonstrate : Mild degenerative changes otherwise well located glenohumeral joint.  I have reviewed the radiology report and agree with their impression.    MRI of the right shoulder were reviewed, which demonstrate : Full-thickness supraspinatus tendon tear without retraction or atrophy.  I have reviewed the radiology report and agree with their impression.    Large joint arthrocentesis: R subacromial bursa  Universal Protocol:  Consent: Verbal consent obtained.  Risks and benefits: risks, benefits and alternatives were discussed  Consent given by: patient  Time out: Immediately prior to procedure a \"time out\" was called to verify the correct patient, procedure, equipment, support staff and site/side marked as required.  Timeout called at: 7/1/2024 11:06 AM.  Patient understanding: patient states understanding of the procedure being performed  Site marked: the operative site was marked  Patient identity confirmed: verbally with patient  Supporting Documentation  Indications: pain and diagnostic evaluation   Procedure Details  Location: shoulder - R subacromial bursa  Preparation: Patient was prepped and draped in the usual sterile fashion  Needle size: 22 G  Ultrasound guidance: no  Approach: posterolateral  Medications administered: 4 mL bupivacaine 0.25 %; 1 mL methylPREDNISolone acetate 40 mg/mL    Patient tolerance: patient tolerated the procedure well with no immediate complications  Dressing:  Sterile dressing " applied              Scribe Attestation      I,:  Louis Mcgowan am acting as a scribe while in the presence of the attending physician.:       I,:  Freddy Lawrence, DO personally performed the services described in this documentation    as scribed in my presence.:

## 2024-07-08 DIAGNOSIS — F90.1 ATTENTION DEFICIT HYPERACTIVITY DISORDER (ADHD), PREDOMINANTLY HYPERACTIVE TYPE: ICD-10-CM

## 2024-07-08 DIAGNOSIS — N18.30 TYPE 2 DIABETES MELLITUS WITH STAGE 3 CHRONIC KIDNEY DISEASE, WITHOUT LONG-TERM CURRENT USE OF INSULIN, UNSPECIFIED WHETHER STAGE 3A OR 3B CKD (HCC): ICD-10-CM

## 2024-07-08 DIAGNOSIS — E11.22 TYPE 2 DIABETES MELLITUS WITH STAGE 3 CHRONIC KIDNEY DISEASE, WITHOUT LONG-TERM CURRENT USE OF INSULIN, UNSPECIFIED WHETHER STAGE 3A OR 3B CKD (HCC): ICD-10-CM

## 2024-07-08 PROCEDURE — 3066F NEPHROPATHY DOC TX: CPT | Performed by: ORTHOPAEDIC SURGERY

## 2024-07-08 RX ORDER — LISDEXAMFETAMINE DIMESYLATE 20 MG/1
20 TABLET, CHEWABLE ORAL
Qty: 30 TABLET | Refills: 0 | Status: SHIPPED | OUTPATIENT
Start: 2024-07-08 | End: 2024-07-09 | Stop reason: SDUPTHER

## 2024-07-08 RX ORDER — BLOOD-GLUCOSE SENSOR
1 EACH MISCELLANEOUS
Qty: 6 EACH | Refills: 0 | Status: SHIPPED | OUTPATIENT
Start: 2024-07-08

## 2024-07-09 ENCOUNTER — PATIENT MESSAGE (OUTPATIENT)
Dept: FAMILY MEDICINE CLINIC | Facility: CLINIC | Age: 64
End: 2024-07-09

## 2024-07-09 DIAGNOSIS — F90.1 ATTENTION DEFICIT HYPERACTIVITY DISORDER (ADHD), PREDOMINANTLY HYPERACTIVE TYPE: ICD-10-CM

## 2024-07-09 DIAGNOSIS — I10 ESSENTIAL HYPERTENSION: ICD-10-CM

## 2024-07-09 PROCEDURE — 4010F ACE/ARB THERAPY RXD/TAKEN: CPT | Performed by: ORTHOPAEDIC SURGERY

## 2024-07-09 RX ORDER — LISDEXAMFETAMINE DIMESYLATE 20 MG/1
20 TABLET, CHEWABLE ORAL
Qty: 30 TABLET | Refills: 0 | Status: SHIPPED | OUTPATIENT
Start: 2024-07-09

## 2024-07-09 RX ORDER — LISINOPRIL 5 MG/1
TABLET ORAL
Qty: 45 TABLET | Refills: 1 | Status: SHIPPED | OUTPATIENT
Start: 2024-07-09

## 2024-07-09 NOTE — TELEPHONE ENCOUNTER
Patient called and requested an early refill for the medication below. She is leaving for vacation at 4 am tomorrow 7/10/2024 to [place].    Medication: Vyvanse 20 MG CHEW       Dose/Frequency: Chew 20 mg daily after lunch Max Daily Amount: 20 mg     Quantity: 30 tablet     Pharmacy:   [facility]/pharmacy #[id number] [address] [address]  Phone: [phone]    Fax: [phone]    Office:   [x] PCP/Provider - Dr. Jimmy Kaur  [] Speciality/Provider -     Does the patient have enough for 3 days?   [x] Yes   [] No - Send as HP to POD

## 2024-07-16 DIAGNOSIS — F90.1 ATTENTION DEFICIT HYPERACTIVITY DISORDER (ADHD), PREDOMINANTLY HYPERACTIVE TYPE: ICD-10-CM

## 2024-07-17 RX ORDER — LISDEXAMFETAMINE DIMESYLATE 20 MG/1
20 CAPSULE ORAL EVERY MORNING
Qty: 30 CAPSULE | Refills: 0 | Status: SHIPPED | OUTPATIENT
Start: 2024-07-17

## 2024-07-20 ENCOUNTER — PATIENT MESSAGE (OUTPATIENT)
Dept: FAMILY MEDICINE CLINIC | Facility: CLINIC | Age: 64
End: 2024-07-20

## 2024-07-22 ENCOUNTER — EVALUATION (OUTPATIENT)
Dept: PHYSICAL THERAPY | Facility: CLINIC | Age: 64
End: 2024-07-22
Payer: COMMERCIAL

## 2024-07-22 DIAGNOSIS — M75.120 COMPLETE TEAR OF ROTATOR CUFF, UNSPECIFIED LATERALITY, UNSPECIFIED WHETHER TRAUMATIC: Primary | ICD-10-CM

## 2024-07-22 DIAGNOSIS — M25.511 ACUTE PAIN OF RIGHT SHOULDER: Primary | ICD-10-CM

## 2024-07-22 DIAGNOSIS — M75.121 NONTRAUMATIC COMPLETE TEAR OF RIGHT ROTATOR CUFF: ICD-10-CM

## 2024-07-22 PROCEDURE — 97140 MANUAL THERAPY 1/> REGIONS: CPT

## 2024-07-22 PROCEDURE — 97161 PT EVAL LOW COMPLEX 20 MIN: CPT

## 2024-07-22 NOTE — PROGRESS NOTES
PT Evaluation     Today's date: 2024  Patient name: Denice Vargas  : 1960  MRN: [id number]  Referring provider: Freddy Lawrence DO  Dx:   Encounter Diagnosis     ICD-10-CM    1. Acute pain of right shoulder  M25.511 Ambulatory Referral to Physical Therapy      2. Nontraumatic complete tear of right rotator cuff  M75.121 Ambulatory Referral to Physical Therapy                   Assessment  Impairments: abnormal muscle firing, abnormal or restricted ROM, activity intolerance, impaired balance, impaired physical strength, lacks appropriate home exercise program, pain with function, scapular dyskinesis and poor posture   Symptom irritability: low    Assessment details: Pt is a 63 y.o. year old female that presents to outpatient physical therapy with chronic shoulder pain without CHANDNI.  Pt demonstrates signs and symptoms that point to possible rotator cuff injury/tendonitis (confirmed by recent MRI). Pt exhibited limited AROM with shoulder elevation when reaching over head. Significant pain with active and passive ROM testing in all planes, with special tests, and with MMTing. Pt demonstrates increased pain, decreased ROM, decreased strength, decreased activity tolerance, and decreased functional activity such as reaching, lifting, and carrying due to pain. Pt appears motivated; HEP was reviewed and given to patient. Pt would benefit from skilled physical therapy to address noted impairments, meet patient's goals, and to return to PLOF. Pt may required further care due to the level of pain with all activities, testing, and with static positions.   Thank you for this referral.    Understanding of Dx/Px/POC: good     Prognosis: poor    Goals  STGs:   1. Pt will be able to demonstrate an increase of strength by at least 1/2 grade within 4 weeks   2. Pt will be able to achieve increased ROM by at least 25% within 4 weeks.   3. Pt will be able to report pain less than 4/10 at worse within 4 weeks.  4. Pt will be  able to reach overhead without increase of shoulder elevation without cueing within 4 weeks.     LTGs:   1. Pt will be independent with all IADLs without pain or discomfort upon discharge.   2. Pt will be independent with HEP upon discharge   3. Pt will be able to report no pain or discomfort with all work duties and recreational activities for a full day upon discharge   4. Pt will be able to lift at least 10 lbs overhead at least one time without increase of symptoms or compensations upon discharge       Plan  Patient would benefit from: PT eval and skilled physical therapy  Planned modality interventions: low level laser therapy, TENS, thermotherapy: hydrocollator packs, ultrasound, iontophoresis, cryotherapy and electrical stimulation/Russian stimulation    Planned therapy interventions: abdominal trunk stabilization, breathing training, flexibility, functional ROM exercises, home exercise program, therapeutic training, therapeutic exercise, therapeutic activities, stretching, strengthening, postural training, patient education, neuromuscular re-education, muscle pump exercises, massage, manual therapy, IADL retraining, joint mobilization and Huerta taping    Frequency: 1-2x week  Duration in weeks: 8  Treatment plan discussed with: patient        Subjective Evaluation    History of Present Illness  Mechanism of injury: Pt states that she has been having pain/discomfort for the past few months. Reports that she was getting worse over the last few weeks without any CHANDNI. States that the medication prescribed by her PCP has been helping.   Pain mostly occurs when reaching overhead, lifting, reaching, and reaching behind her back. Pain is located to the anterior shoulder at this point. ROM is limited by pain. Woke up today with elbow and wrist pain.   Did see the orthopedic a few weeks ago and received an injection which did not help at all. Indicated that the orthopedic was not planning on performing surgery due  to her medical history and current smoking history.     Denies changes in bowel/bladder. Denies saddle anesthesia. Denies numbness/tingling. Denies neck pain.     EASES: rest, Tylenol, avoiding aggs  Goals: increase motion, be able to  grandchild, decrease pain         Imaging findings:     R shoulder Xray on 2024 - No significant degenerative narrowing of the acromioclavicular joint or glenohumeral space. Small marginal osteophytes of the inferior glenoid and margin of the humeral head and neck.      R shoulder MRI on 2024 IMPRESSION:     1. Full-thickness insertional tear of the anterior supraspinatus tendon without significant retraction or muscle atrophy.  2. Partial-thickness (2/3) articular-surface insertional tear of the superior bundle of the subscapularis tendon without significant retraction or muscle atrophy.  3. Severe long head biceps tendinosis and partial tearing.  4. Infraspinatus tendinosis.  5. Mild glenohumeral osteoarthritis and associated circumferential degeneration of the glenoid labrum.  Patient Goals  Patient goals for therapy: decreased pain, improved balance, increased strength, independence with ADLs/IADLs and return to sport/leisure activities    Pain  Current pain ratin  At best pain rating: 3  At worst pain ratin  Quality: pressure and discomfort    Social Support  Lives with: significant other    Hand dominance: right    Treatments  Current treatment: medication        Objective     Static Posture     Head  Forward.    Shoulders  Asymmetric shoulders, depressed, elevated and rounded.    Scapulae  Right winging, left anteriorly tipped, right elevated, left retracted and right retracted.    Palpation     Right   Hypertonic in the levator scapulae, pectoralis major, pectoralis minor and upper trapezius.   Tenderness of the infraspinatus, latissimus, levator scapulae, lower trapezius, pectoralis major, pectoralis minor, rhomboids, subclavius, supraspinatus and upper  trapezius.   Trigger point to infraspinatus, levator scapulae, pectoralis major, pectoralis minor, subclavius, supraspinatus and upper trapezius.     Neurological Testing     Sensation   Cervical/Thoracic   Left   Intact: light touch    Right   Intact: light touch    Shoulder   Left Shoulder   Intact: light touch    Right Shoulder   Intact: Light touch    Reflexes   Left   Biceps (C5/C6): normal (2+)  Brachioradialis (C6): normal (2+)  Olivas's reflex: negative    Right   Biceps (C5/C6): normal (2+)  Brachioradialis (C6): normal (2+)  Olivas's reflex: negative    Active Range of Motion   Cervical/Thoracic Spine       Cervical    Flexion:  with pain Restriction level: maximal  Extension:  with pain Restriction level: minimal  Left lateral flexion: 21 degrees     with pain  Right lateral flexion: 35 degrees     with pain  Left rotation: 65 degrees with pain  Right rotation: 56 degrees    with pain    Right Shoulder   Flexion: 10 degrees with pain  Abduction: 5 degrees with pain  External rotation BTH: Active external rotation behind the head: unable to perform due to pain.   Internal rotation BTB: Active internal rotation behind the back: unable to perform due to pain.     Passive Range of Motion     Right Shoulder   Flexion: 26 degrees with pain  Abduction: 13 degrees with pain  External rotation 0°: 3 degrees with pain  Internal rotation 0°: with pain    Strength/Myotome Testing     Right Shoulder     Planes of Motion   Flexion: 3- (with severe pain)   Abduction: 3- (with severe pain)   External rotation at 0°: 3   Internal rotation at 0°: 3     Isolated Muscles   Biceps: 4   Triceps: 4     Tests     Right Shoulder   Positive AC shear, active compression ([place]), belly press, crossover, empty can, full can, Hawkin's, Neer's, painful arc, Speed's and bicep load .   Negative ULTT1, ULTT2, ULTT3 and ULTT4.          Precautions:   Benign essential hypertension   Coronary atherosclerosis   Midline cystocele   Type 2  "diabetes mellitus with stage 3 chronic kidney disease, without long-term current use of insulin, unspecified whether stage 3a or 3b CKD (HCC)  GERD without esophagitis   Hiatal hernia   Current tobacco use      Daily Treatment Diary    Date 4/23            FOTO IE -             Re-Eval IE               Manuals    Shoulder PROM JM            Post/Inf GH mob                                                                 Neuro Re-Ed     Seated Scap retraction 2x15                                                                                          Ther Ex    Arm bike              Pulleys             Shoulder ISO  Flex and abd 5\" hold 10x            Seated shoulder flex and abd 5\" hold 10x ea            Supine shoulder flexion AAROM with stick             Standing shoulder abduction AAROM with stick              Standing shoulder ext and row with TB                                                                 Ther Activity                              Gait Training                              Modalities    Ice vs Heat PRN                                   "

## 2024-07-29 ENCOUNTER — OFFICE VISIT (OUTPATIENT)
Dept: OBGYN CLINIC | Facility: MEDICAL CENTER | Age: 64
End: 2024-07-29
Payer: COMMERCIAL

## 2024-07-29 VITALS
SYSTOLIC BLOOD PRESSURE: 124 MMHG | BODY MASS INDEX: 25.33 KG/M2 | DIASTOLIC BLOOD PRESSURE: 76 MMHG | WEIGHT: 152 LBS | HEIGHT: 65 IN | HEART RATE: 78 BPM

## 2024-07-29 DIAGNOSIS — M75.121 NONTRAUMATIC COMPLETE TEAR OF RIGHT ROTATOR CUFF: Primary | ICD-10-CM

## 2024-07-29 DIAGNOSIS — M54.12 RADICULOPATHY, CERVICAL REGION: ICD-10-CM

## 2024-07-29 PROCEDURE — 99214 OFFICE O/P EST MOD 30 MIN: CPT | Performed by: ORTHOPAEDIC SURGERY

## 2024-07-29 PROCEDURE — 3078F DIAST BP <80 MM HG: CPT | Performed by: ORTHOPAEDIC SURGERY

## 2024-07-29 PROCEDURE — 3074F SYST BP LT 130 MM HG: CPT | Performed by: ORTHOPAEDIC SURGERY

## 2024-07-29 RX ORDER — METHYLPREDNISOLONE 4 MG/1
TABLET ORAL
Qty: 1 EACH | Refills: 0 | Status: SHIPPED | OUTPATIENT
Start: 2024-07-29

## 2024-07-29 NOTE — PROGRESS NOTES
Ortho Sports Medicine Shoulder Follow Up Visit     Assesment:   63 y.o. female right shoulder rotator cuff tear with possible cervical radiculopathy.    Plan:    Conservative treatment:    Ice to shoulder 1-2 times daily, for 20 minutes at a time.  Medrol dose pack sent to the patient's pharmacy.  MRI cervical spine and referral to spine and pain placed due to radicular symptoms and pain presentation.  Patient was instructed to begin PT after starting medrol dose pack and MRI.  Counseled on smoking cessation.  Re-assess in 6 weeks.      Imaging:    All imaging from today was reviewed by myself and explained to the patient.       Injection:    No Injection planned at this time.      Surgery:     No surgery is recommended at this point, continue with conservative treatment plan as noted.      Follow up:    No follow-ups on file.      Chief Complaint   Patient presents with    Right Shoulder - Follow-up         History of Present Illness:    The patient is returns for follow up of right shoulder rotator cuff tear.  Since the prior visit, She reports no improvement.     Pain is improved by rest.  Pain is aggravated by shoulder movement, sleeping. Patient reports she started PT but stopped due to increased pain and limited ability to perform the exercises provided by PT.    The patient has weakness.  Patient reports distal numbness and tingling. Patient reports pain past the elbow.     The patient has tried rest, physical therapy, and CSI performed on 07/01/2024.        Shoulder Surgical History:  None    Past Medical, Social and Family History:  Past Medical History:   Diagnosis Date    Abnormal mammogram     RESOLVED: 09MAR2015    Arthritis     Diabetes mellitus (HCC)     Eczema of right external ear     RESOLVED: 05JAN2017    Foot drop, left foot     Heart attack (HCC)     Hematuria     RESOLVED: 09MAR2015    Impetigo     RESOLVED: 09MAR2015    Insomnia related to another mental disorder     AXIS I/II MENTAL DISORDER;  RESOLVED: 09MAR2015     Past Surgical History:   Procedure Laterality Date    HYSTERECTOMY  1998    still has 1 ovary not sure which one was removed    OTHER SURGICAL HISTORY      STEND INDICATIONS: RESTENOSIS AT PREVIOUS PTCA LOCATION     SPLENECTOMY      TOTAL HIP ARTHROPLASTY Left      Allergies   Allergen Reactions    Levaquin [Levofloxacin] Anaphylaxis     Current Outpatient Medications on File Prior to Visit   Medication Sig Dispense Refill    albuterol (PROVENTIL HFA,VENTOLIN HFA) 90 mcg/act inhaler Inhale 2 puffs every 6 (six) hours as needed for wheezing 8 g 5    aspirin (ECOTRIN LOW STRENGTH) 81 mg EC tablet Take 1 tablet by mouth daily      atorvastatin (LIPITOR) 40 mg tablet TAKE 1 TABLET DAILY 90 tablet 3    cetirizine (ZyrTEC) 10 mg tablet Take by mouth Daily      cholecalciferol (VITAMIN D3) 1,000 units tablet Take by mouth      Continuous Glucose Sensor (FreeStyle Saige 3 Sensor) [facility] Use 1 each every 14 (fourteen) days 6 each 0    escitalopram (LEXAPRO) 20 mg tablet Take 1 tablet (20 mg total) by mouth daily 90 tablet 1    famotidine (PEPCID) 20 mg tablet Take 1 tablet by mouth daily      fenofibrate (TRICOR) 145 mg tablet Take 1 tablet (145 mg total) by mouth daily 90 tablet 1    lisinopril (ZESTRIL) 5 mg tablet TAKE ONE-HALF (1/2) TABLET DAILY 45 tablet 1    metFORMIN (GLUCOPHAGE-XR) 500 mg 24 hr tablet Take 2 tablets (1,000 mg total) by mouth 2 (two) times a day with meals 360 tablet 0    metoprolol succinate (TOPROL-XL) 25 mg 24 hr tablet TAKE 1 TABLET DAILY 90 tablet 1    Multiple Vitamins-Minerals (MULTIVITAMIN ADULTS 50+ PO) Take 1 tablet by mouth daily      nabumetone (RELAFEN) 750 mg tablet Take 1 tablet (750 mg total) by mouth 2 (two) times a day 60 tablet 1    tirzepatide (Mounjaro) 2.5 MG/0.5ML Inject 0.5 mL (2.5 mg total) under the skin every 7 days 6 mL 0    traZODone (DESYREL) 50 mg tablet TAKE 1 TABLET BY MOUTH DAILY AT BEDTIME 90 tablet 2    Vyvanse 20 MG capsule Take 1 capsule (20  mg total) by mouth every morning Max Daily Amount: 20 mg 30 capsule 0    Vyvanse 20 MG CHEW Chew 20 mg daily after lunch Max Daily Amount: 20 mg 30 tablet 0    Blood Glucose Monitoring Suppl (ONE TOUCH ULTRA MINI) w/Device KIT by Does not apply route (Patient not taking: Reported on 7/1/2024)      Continuous Glucose  (FreeStyle Saige 3 [place]) JAVAN  (Patient not taking: Reported on 7/1/2024)      cyclobenzaprine (FLEXERIL) 10 mg tablet Take 1 tablet (10 mg total) by mouth daily at bedtime as needed for muscle spasms (Patient not taking: Reported on 6/14/2024) 30 tablet 0    dicyclomine (BENTYL) 20 mg tablet Take 1 tablet (20 mg total) by mouth 3 (three) times a day as needed (for abdominal cramping) (Patient not taking: Reported on 5/9/2024) 30 tablet 0    Empagliflozin (JARDIANCE) 10 MG TABS tablet Take 1 tablet (10 mg total) by mouth daily 30 tablet 5    glucose blood (ONE TOUCH ULTRA TEST) test strip 1 each by Other route 2 (two) times a day (Patient not taking: Reported on 7/1/2024) 100 each 0    Mometasone Furoate (Asmanex HFA) 50 MCG/ACT AERO Inhale 2 puffs 2 (two) times a day (Patient not taking: Reported on 6/14/2024) 13 g 5    ONETOUCH DELICA LANCETS FINE MISC by Does not apply route daily (Patient not taking: Reported on 7/1/2024) 100 each 1    predniSONE 10 mg tablet Take 6 tablets By mouth  In the morning Qd.   Decrease by  By 1 tablet daily until completed. (Patient not taking: Reported on 5/9/2024) 21 tablet 0     No current facility-administered medications on file prior to visit.     Social History     Socioeconomic History    Marital status: /Civil Union     Spouse name: Not on file    Number of children: Not on file    Years of education: Not on file    Highest education level: Not on file   Occupational History    Not on file   Tobacco Use    Smoking status: Every Day     Current packs/day: 0.50     Average packs/day: 0.5 packs/day for 51.6 years (25.8 ttl pk-yrs)     Types:  "Cigarettes     Start date: 1973    Smokeless tobacco: Never   Vaping Use    Vaping status: Never Used   Substance and Sexual Activity    Alcohol use: No    Drug use: No    Sexual activity: Not on file   Other Topics Concern    Not on file   Social History Narrative    Not on file     Social Determinants of Health     Financial Resource Strain: Not on file   Food Insecurity: Not on file   Transportation Needs: Not on file   Physical Activity: Not on file   Stress: Not on file   Social Connections: Not on file   Intimate Partner Violence: Not on file   Housing Stability: Not on file       I have reviewed the past medical, surgical, social and family history, medications and allergies as documented in the EMR.    Review of systems: ROS is negative other than that noted in the HPI.  Constitutional: Negative for fatigue and fever.      Physical Exam:    Blood pressure 124/76, pulse 78, height 5' 5\" (1.651 m), weight 68.9 kg (152 lb), not currently breastfeeding.    General/Constitutional: NAD, well developed, well nourished  HENT: Normocephalic, atraumatic  CV: Intact distal pulses, regular rate  Resp: No respiratory distress or labored breathing  GI: Soft and non-tender   Lymphatic: No lymphadenopathy palpated  Neuro: Alert and Oriented x 3, no focal deficits  Psych: Normal mood, normal affect, normal judgement, normal behavior  Skin: Warm, dry, no rashes, no erythema      Shoulder focused exam:       RIGHT LEFT    Scapula Atrophy Negative Negative     Winging Negative Negative     Protraction Negative Negative    Rotator cuff SS 4/5 5/5     IS 5/5 5/5     SubS 4/5 5/5    ROM FF AROM 80  PROM 160    170     ER0 AROM neutral  PROM 60 60     ER90 Deferred due to pain    Deferred due to pain     IR90 Deferred due to pain    Deferred due to pain     IRb sacrum    T6    TTP: AC Negative Negative     Biceps Positive Negative     Coracoid Negative Negative    Special Tests: O'Briens Negative Negative     Berry-shear Negative " Negative     Cross body Adduction Negative Negative     Speeds  Negative Negative     Rigo's Negative Negative     Whipple Negative Negative       Neer Negative Negative     Reza Negative Negative    Instability: Apprehension & relocation not tested not tested     Load & shift not tested not tested    Other: Crank Negative Negative               UE NV Exam: +2 Radial pulses bilaterally  Sensation intact to light touch C5-T1 bilaterally, Radial/median/ulnar nerve motor intact    Cervical ROM is full without pain, numbness or tingling      Shoulder Imaging    Xrays and MRI of the RIGHT shoulder from the prior visit were reviewed again with the patient today      Scribe Attestation      I,:   am acting as a scribe while in the presence of the attending physician.:       I,:   personally performed the services described in this documentation    as scribed in my presence.:

## 2024-07-30 ENCOUNTER — TELEPHONE (OUTPATIENT)
Age: 64
End: 2024-07-30

## 2024-07-30 NOTE — TELEPHONE ENCOUNTER
PA for Vyvanse 20MG CHEW SUBMITTED    via    [x]CMM-KEY: [id number]  [][facility]-Case ID #      []Faxed to plan   []Other website   []Phone call Case ID #     Office notes sent, clinical questions answered. Awaiting determination    Turnaround time for your insurance to make a decision on your Prior Authorization can take 7-21 business days.

## 2024-07-30 NOTE — TELEPHONE ENCOUNTER
PA for Vyvanse 20MG CHEW NOT REQUIRED    Reason:        Pharmacy advised by    [x]Fax  []Phone call

## 2024-08-08 DIAGNOSIS — F90.1 ATTENTION DEFICIT HYPERACTIVITY DISORDER (ADHD), PREDOMINANTLY HYPERACTIVE TYPE: ICD-10-CM

## 2024-08-08 RX ORDER — LISDEXAMFETAMINE DIMESYLATE 20 MG/1
20 TABLET, CHEWABLE ORAL
Qty: 30 TABLET | Refills: 0 | Status: SHIPPED | OUTPATIENT
Start: 2024-08-08

## 2024-08-16 DIAGNOSIS — F90.1 ATTENTION DEFICIT HYPERACTIVITY DISORDER (ADHD), PREDOMINANTLY HYPERACTIVE TYPE: ICD-10-CM

## 2024-08-16 DIAGNOSIS — I10 ESSENTIAL HYPERTENSION: ICD-10-CM

## 2024-08-16 RX ORDER — METOPROLOL SUCCINATE 25 MG/1
TABLET, EXTENDED RELEASE ORAL
Qty: 90 TABLET | Refills: 1 | Status: SHIPPED | OUTPATIENT
Start: 2024-08-16

## 2024-08-19 RX ORDER — LISDEXAMFETAMINE DIMESYLATE 20 MG/1
20 CAPSULE ORAL EVERY MORNING
Qty: 30 CAPSULE | Refills: 0 | Status: SHIPPED | OUTPATIENT
Start: 2024-08-19

## 2024-08-22 ENCOUNTER — TELEPHONE (OUTPATIENT)
Dept: FAMILY MEDICINE CLINIC | Facility: CLINIC | Age: 64
End: 2024-08-22

## 2024-08-22 DIAGNOSIS — Z01.818 PREOPERATIVE CLEARANCE: ICD-10-CM

## 2024-08-22 DIAGNOSIS — E11.22 TYPE 2 DIABETES MELLITUS WITH STAGE 3 CHRONIC KIDNEY DISEASE, WITHOUT LONG-TERM CURRENT USE OF INSULIN, UNSPECIFIED WHETHER STAGE 3A OR 3B CKD (HCC): Primary | ICD-10-CM

## 2024-08-22 DIAGNOSIS — N18.30 TYPE 2 DIABETES MELLITUS WITH STAGE 3 CHRONIC KIDNEY DISEASE, WITHOUT LONG-TERM CURRENT USE OF INSULIN, UNSPECIFIED WHETHER STAGE 3A OR 3B CKD (HCC): Primary | ICD-10-CM

## 2024-08-22 NOTE — TELEPHONE ENCOUNTER
It is recommended she have a CBC and BMP within 30 days of surgery. These tests are covered very well by insurance and she should not have issues with coverage.

## 2024-08-22 NOTE — TELEPHONE ENCOUNTER
----- Message from Rylee Erickson PA-C sent at 8/22/2024 12:57 PM EDT -----  Regarding: preop labs  Please let patient know that I will need her to get lab work completed prior to her preop clearance on Thursday. I am placing orders for her, if she can go tomorrow, that would be great so we get results back. Should be fasting for at least 10 hours.

## 2024-08-22 NOTE — TELEPHONE ENCOUNTER
Patient came into the office. She states she is not sure which blood work you want because she had all bloodwork done in April. If she gets it done again it will not be covered by insurance.   Patient realizes she needs her A1C done and an EKG done which can be done in office.

## 2024-08-23 ENCOUNTER — APPOINTMENT (OUTPATIENT)
Dept: LAB | Facility: CLINIC | Age: 64
End: 2024-08-23
Payer: COMMERCIAL

## 2024-08-23 DIAGNOSIS — Z01.818 PREOPERATIVE CLEARANCE: ICD-10-CM

## 2024-08-23 DIAGNOSIS — N18.30 TYPE 2 DIABETES MELLITUS WITH STAGE 3 CHRONIC KIDNEY DISEASE, WITHOUT LONG-TERM CURRENT USE OF INSULIN, UNSPECIFIED WHETHER STAGE 3A OR 3B CKD (HCC): ICD-10-CM

## 2024-08-23 DIAGNOSIS — E11.22 TYPE 2 DIABETES MELLITUS WITH STAGE 3 CHRONIC KIDNEY DISEASE, WITHOUT LONG-TERM CURRENT USE OF INSULIN, UNSPECIFIED WHETHER STAGE 3A OR 3B CKD (HCC): ICD-10-CM

## 2024-08-23 LAB
ANION GAP SERPL CALCULATED.3IONS-SCNC: 10 MMOL/L (ref 4–13)
BASOPHILS # BLD AUTO: 0.08 THOUSANDS/ÂΜL (ref 0–0.1)
BASOPHILS NFR BLD AUTO: 1 % (ref 0–1)
BUN SERPL-MCNC: 28 MG/DL (ref 5–25)
CALCIUM SERPL-MCNC: 9.2 MG/DL (ref 8.4–10.2)
CHLORIDE SERPL-SCNC: 102 MMOL/L (ref 96–108)
CO2 SERPL-SCNC: 26 MMOL/L (ref 21–32)
CREAT SERPL-MCNC: 1.12 MG/DL (ref 0.6–1.3)
EOSINOPHIL # BLD AUTO: 0.17 THOUSAND/ÂΜL (ref 0–0.61)
EOSINOPHIL NFR BLD AUTO: 2 % (ref 0–6)
ERYTHROCYTE [DISTWIDTH] IN BLOOD BY AUTOMATED COUNT: 14.3 % (ref 11.6–15.1)
EST. AVERAGE GLUCOSE BLD GHB EST-MCNC: 157 MG/DL
GFR SERPL CREATININE-BSD FRML MDRD: 53 ML/MIN/1.73SQ M
GLUCOSE P FAST SERPL-MCNC: 136 MG/DL (ref 65–99)
HBA1C MFR BLD: 7.1 %
HCT VFR BLD AUTO: 36.3 % (ref 34.8–46.1)
HGB BLD-MCNC: 11.6 G/DL (ref 11.5–15.4)
IMM GRANULOCYTES # BLD AUTO: 0.04 THOUSAND/UL (ref 0–0.2)
IMM GRANULOCYTES NFR BLD AUTO: 0 % (ref 0–2)
LYMPHOCYTES # BLD AUTO: 5.37 THOUSANDS/ÂΜL (ref 0.6–4.47)
LYMPHOCYTES NFR BLD AUTO: 49 % (ref 14–44)
MCH RBC QN AUTO: 30.1 PG (ref 26.8–34.3)
MCHC RBC AUTO-ENTMCNC: 32 G/DL (ref 31.4–37.4)
MCV RBC AUTO: 94 FL (ref 82–98)
MONOCYTES # BLD AUTO: 0.94 THOUSAND/ÂΜL (ref 0.17–1.22)
MONOCYTES NFR BLD AUTO: 9 % (ref 4–12)
NEUTROPHILS # BLD AUTO: 4.22 THOUSANDS/ÂΜL (ref 1.85–7.62)
NEUTS SEG NFR BLD AUTO: 39 % (ref 43–75)
NRBC BLD AUTO-RTO: 0 /100 WBCS
PLATELET # BLD AUTO: 651 THOUSANDS/UL (ref 149–390)
PMV BLD AUTO: 9.8 FL (ref 8.9–12.7)
POTASSIUM SERPL-SCNC: 4.1 MMOL/L (ref 3.5–5.3)
RBC # BLD AUTO: 3.85 MILLION/UL (ref 3.81–5.12)
SODIUM SERPL-SCNC: 138 MMOL/L (ref 135–147)
WBC # BLD AUTO: 10.82 THOUSAND/UL (ref 4.31–10.16)

## 2024-08-23 PROCEDURE — 85025 COMPLETE CBC W/AUTO DIFF WBC: CPT

## 2024-08-23 PROCEDURE — 36415 COLL VENOUS BLD VENIPUNCTURE: CPT

## 2024-08-23 PROCEDURE — 83036 HEMOGLOBIN GLYCOSYLATED A1C: CPT

## 2024-08-23 PROCEDURE — 80048 BASIC METABOLIC PNL TOTAL CA: CPT

## 2024-08-27 NOTE — TELEPHONE ENCOUNTER
Hawa from the [facility] reports they need patient's most recent EKG/Echo/Stress test results faxed to them at [phone]. They were informed patient is scheduled for an EKG this week as well.

## 2024-08-29 ENCOUNTER — CONSULT (OUTPATIENT)
Dept: FAMILY MEDICINE CLINIC | Facility: CLINIC | Age: 64
End: 2024-08-29
Payer: COMMERCIAL

## 2024-08-29 VITALS
SYSTOLIC BLOOD PRESSURE: 124 MMHG | BODY MASS INDEX: 25.49 KG/M2 | HEIGHT: 65 IN | WEIGHT: 153 LBS | HEART RATE: 80 BPM | TEMPERATURE: 98 F | DIASTOLIC BLOOD PRESSURE: 86 MMHG | OXYGEN SATURATION: 98 %

## 2024-08-29 DIAGNOSIS — Z01.818 PRE-OPERATIVE CLEARANCE: Primary | ICD-10-CM

## 2024-08-29 DIAGNOSIS — M75.101 TEAR OF RIGHT ROTATOR CUFF, UNSPECIFIED TEAR EXTENT, UNSPECIFIED WHETHER TRAUMATIC: ICD-10-CM

## 2024-08-29 PROBLEM — J22 LOWER RESPIRATORY INFECTION (E.G., BRONCHITIS, PNEUMONIA, PNEUMONITIS, PULMONITIS): Status: RESOLVED | Noted: 2021-06-24 | Resolved: 2024-08-29

## 2024-08-29 PROBLEM — J06.9 UPPER RESPIRATORY TRACT INFECTION: Status: RESOLVED | Noted: 2021-05-12 | Resolved: 2024-08-29

## 2024-08-29 PROCEDURE — 99243 OFF/OP CNSLTJ NEW/EST LOW 30: CPT

## 2024-08-29 PROCEDURE — 93000 ELECTROCARDIOGRAM COMPLETE: CPT

## 2024-08-29 NOTE — PROGRESS NOTES
Ambulatory Visit  Name: Denice Vargas      : 1960      MRN: [id number]  Encounter Provider: Rylee Erickson PA-C  Encounter Date: 2024   Encounter department: [facility]    Assessment & Plan   1. Pre-operative clearance  Assessment & Plan:  Patient presents today for preoperative clearance for upcoming surgery. Patient's medical history and medication list were reviewed and updated accordingly. Patient feels in their usual state of health with no acute concerns.     EKG completed today - NSR, rate normal.   Labs - reviewed, WNL, A1c well controlled     Current smoker - has stopped smoking prior to surgery per recommendations, typically smokes 1 pack per week. Has been slowly decreasing amount over time.     Medical conditions are stable. BP well controlled. DM is well controlled, A1c currently 7.1 which is decreased from 9.0. No recent acute changes with her medical conditions. Physical examination unremarkable today, cardiopulmonary exam WNL. Vitals are stable. Patient appears to be in good health considering her medical conditions.     Detsky Cardiac Risk Index assesses perioperative risk of cardiovascular events in patients about to undergo noncardiac surgery.  This scale considers cardiac history, functional status, age, and overall medical condition to determine risk prior to surgical procedures.  I reviewed with patient today that all surgeries have risk as discussed with their surgeon, however this risk index is an accurate predictor of perioperative complications. Patient has low risk for surgery according to Detsky Cardiac Risk Index.      Patient is cleared for surgery, may follow up with us PRN.   Orders:  -     POCT ECG  2. Tear of right rotator cuff, unspecified tear extent, unspecified whether traumatic  Assessment & Plan:  Patient with a tear of her right rotator cuff. Has discussed risks and benefits of surgery with her surgeon and would like to proceed with  surgery.          History of Present Illness     Patient presents today for preoperative clearance for upcoming surgery. Surgery information is as follows:     Type of Surgery: right rotator cuff repair  Diagnosis: right rotator cuff tear  Surgeon: Dr. Rodriguez  Date of Surgery: 8/30/2024  Previous Anesthesia: Yes  Complications from Anesthesia: No  Bleeding and Clotting Disorders: No   Blood Thinners: No  Pertinent PMH: ADHD, DMII, GERD, anxiety, depression, PTSD   Cardiac Hx: HTN, arteriosclerosis   Pulmonary Hx: chronic bronchitis  Activity Tolerance (walk 4 blocks without issues, can walk up 2 flights of stairs): Yes  Home Safety (patient feels safe at home post-op): Yes    Smoking: Yes, 1 pack per week   Alcohol Use: No  Illicit Drug Use: No    Patient feels in their usual state of health with no acute concerns.       Review of Systems   Constitutional:  Negative for chills, fever and unexpected weight change.   Respiratory:  Negative for cough, chest tightness and shortness of breath.    Cardiovascular:  Negative for chest pain and palpitations.   Gastrointestinal:  Negative for abdominal pain, blood in stool and vomiting.   Genitourinary:  Negative for dysuria, hematuria and menstrual problem.   Musculoskeletal:  Negative for arthralgias, back pain and myalgias.   Neurological:  Negative for dizziness, seizures, syncope and headaches.   Hematological:  Does not bruise/bleed easily.   Psychiatric/Behavioral:  Negative for behavioral problems and confusion.    All other systems reviewed and are negative.    Past Medical History:   Diagnosis Date    Abnormal mammogram     RESOLVED: 09MAR2015    Arthritis     Diabetes mellitus (HCC)     Eczema of right external ear     RESOLVED: 05JAN2017    Foot drop, left foot     Heart attack (HCC)     Hematuria     RESOLVED: 09MAR2015    Impetigo     RESOLVED: 09MAR2015    Insomnia related to another mental disorder     AXIS I/II MENTAL DISORDER; RESOLVED: 09MAR2015     Past  Surgical History:   Procedure Laterality Date    HYSTERECTOMY  1998    still has 1 ovary not sure which one was removed    OTHER SURGICAL HISTORY      STEND INDICATIONS: RESTENOSIS AT PREVIOUS PTCA LOCATION     SPLENECTOMY      TOTAL HIP ARTHROPLASTY Left      Family History   Problem Relation Age of Onset    Stroke Mother     Osteoporosis Mother     Diabetes type II Father     Heart attack Father     Cancer Father     Heart disease Father     Hypertension Father     Heart disease Sister     Diabetes type II Brother     Heart attack Brother     Heart disease Brother     Heart attack Brother     Heart attack Brother     Heart attack Brother     Diabetes type II Brother     Liver cancer Maternal Aunt 75    Alcohol abuse Neg Hx     Substance Abuse Neg Hx      Social History     Tobacco Use    Smoking status: Every Day     Current packs/day: 0.50     Average packs/day: 0.5 packs/day for 51.7 years (25.8 ttl pk-yrs)     Types: Cigarettes     Start date: 1973    Smokeless tobacco: Never   Vaping Use    Vaping status: Never Used   Substance and Sexual Activity    Alcohol use: No    Drug use: No    Sexual activity: Not on file     Current Outpatient Medications on File Prior to Visit   Medication Sig    albuterol (PROVENTIL HFA,VENTOLIN HFA) 90 mcg/act inhaler Inhale 2 puffs every 6 (six) hours as needed for wheezing    aspirin (ECOTRIN LOW STRENGTH) 81 mg EC tablet Take 1 tablet by mouth daily    atorvastatin (LIPITOR) 40 mg tablet TAKE 1 TABLET DAILY    Blood Glucose Monitoring Suppl (ONE TOUCH ULTRA MINI) w/Device KIT by Does not apply route (Patient not taking: Reported on 7/1/2024)    cetirizine (ZyrTEC) 10 mg tablet Take by mouth Daily    cholecalciferol (VITAMIN D3) 1,000 units tablet Take by mouth    Continuous Glucose  (FreeStyle Saige 3 [place]) JAVAN  (Patient not taking: Reported on 7/1/2024)    Continuous Glucose Sensor (FreeStyle Saige 3 Sensor) [facility] Use 1 each every 14 (fourteen) days    Empagliflozin  (JARDIANCE) 10 MG TABS tablet Take 1 tablet (10 mg total) by mouth daily    escitalopram (LEXAPRO) 20 mg tablet Take 1 tablet (20 mg total) by mouth daily    famotidine (PEPCID) 20 mg tablet Take 1 tablet by mouth daily    fenofibrate (TRICOR) 145 mg tablet Take 1 tablet (145 mg total) by mouth daily    glucose blood (ONE TOUCH ULTRA TEST) test strip 1 each by Other route 2 (two) times a day (Patient not taking: Reported on 7/1/2024)    lisinopril (ZESTRIL) 5 mg tablet TAKE ONE-HALF (1/2) TABLET DAILY    metFORMIN (GLUCOPHAGE-XR) 500 mg 24 hr tablet Take 2 tablets (1,000 mg total) by mouth 2 (two) times a day with meals    methylPREDNISolone 4 MG tablet therapy pack Use as directed on package    metoprolol succinate (TOPROL-XL) 25 mg 24 hr tablet TAKE 1 TABLET DAILY    Mometasone Furoate (Asmanex HFA) 50 MCG/ACT AERO Inhale 2 puffs 2 (two) times a day (Patient not taking: Reported on 6/14/2024)    Multiple Vitamins-Minerals (MULTIVITAMIN ADULTS 50+ PO) Take 1 tablet by mouth daily    nabumetone (RELAFEN) 750 mg tablet Take 1 tablet (750 mg total) by mouth 2 (two) times a day    ONETOUCH DELICA LANCETS FINE MISC by Does not apply route daily (Patient not taking: Reported on 7/1/2024)    tirzepatide (Mounjaro) 2.5 MG/0.5ML Inject 0.5 mL (2.5 mg total) under the skin every 7 days    traZODone (DESYREL) 50 mg tablet TAKE 1 TABLET BY MOUTH DAILY AT BEDTIME    Vyvanse 20 MG capsule Take 1 capsule (20 mg total) by mouth every morning Max Daily Amount: 20 mg    Vyvanse 20 MG CHEW Chew 20 mg daily after lunch Max Daily Amount: 20 mg    [DISCONTINUED] cyclobenzaprine (FLEXERIL) 10 mg tablet Take 1 tablet (10 mg total) by mouth daily at bedtime as needed for muscle spasms (Patient not taking: Reported on 6/14/2024)    [DISCONTINUED] dicyclomine (BENTYL) 20 mg tablet Take 1 tablet (20 mg total) by mouth 3 (three) times a day as needed (for abdominal cramping) (Patient not taking: Reported on 5/9/2024)    [DISCONTINUED]  "predniSONE 10 mg tablet Take 6 tablets By mouth  In the morning Qd.   Decrease by  By 1 tablet daily until completed. (Patient not taking: Reported on 5/9/2024)     Allergies   Allergen Reactions    Levaquin [Levofloxacin] Anaphylaxis     Immunization History   Administered Date(s) Administered    COVID-19 MODERNA VACC 0.5 ML IM 03/24/2021, 04/23/2021    Fluzone Split Quad 0.5 mL 11/06/2015    Hep A, adult 05/31/2017, 12/06/2017    INFLUENZA 11/06/2015, 12/22/2015, 11/01/2017    Influenza, recombinant, quadrivalent,injectable, preservative free 10/05/2018, 10/10/2019, 10/20/2020    Pneumococcal Conjugate 13-Valent 12/22/2015    Pneumococcal Polysaccharide PPV23 08/07/2014, 01/15/2021    Tdap 08/07/2014     Objective     /86 (BP Location: Left arm, Patient Position: Sitting, Cuff Size: Standard)   Pulse 80   Temp 98 °F (36.7 °C) (Temporal)   Ht 5' 5\" (1.651 m)   Wt 69.4 kg (153 lb)   SpO2 98%   BMI 25.46 kg/m²     Physical Exam  Vitals and nursing note reviewed.   Constitutional:       General: She is not in acute distress.     Appearance: Normal appearance. She is well-developed. She is not ill-appearing.   HENT:      Head: Normocephalic and atraumatic.      Right Ear: Tympanic membrane normal.      Left Ear: Tympanic membrane normal.      Nose: Nose normal.      Mouth/Throat:      Mouth: Mucous membranes are moist.      Pharynx: Oropharynx is clear. No posterior oropharyngeal erythema.   Eyes:      Pupils: Pupils are equal, round, and reactive to light.   Neck:      Vascular: No carotid bruit.   Cardiovascular:      Rate and Rhythm: Normal rate and regular rhythm.      Pulses: Normal pulses.      Heart sounds: No murmur heard.  Pulmonary:      Effort: Pulmonary effort is normal. No respiratory distress.      Breath sounds: Normal breath sounds.   Abdominal:      General: Bowel sounds are normal. There is no distension.      Palpations: Abdomen is soft.      Tenderness: There is no abdominal tenderness. "   Musculoskeletal:         General: No swelling.      Cervical back: Normal range of motion.      Right lower leg: No edema.      Left lower leg: No edema.   Lymphadenopathy:      Cervical: No cervical adenopathy.   Skin:     General: Skin is warm and dry.   Neurological:      General: No focal deficit present.      Mental Status: She is alert and oriented to person, place, and time. Mental status is at baseline.   Psychiatric:         Mood and Affect: Mood normal.         Behavior: Behavior normal.         Cognition and Memory: Cognition normal.         Judgment: Judgment normal.

## 2024-08-29 NOTE — ASSESSMENT & PLAN NOTE
Patient with a tear of her right rotator cuff. Has discussed risks and benefits of surgery with her surgeon and would like to proceed with surgery.

## 2024-08-29 NOTE — ASSESSMENT & PLAN NOTE
Patient presents today for preoperative clearance for upcoming surgery. Patient's medical history and medication list were reviewed and updated accordingly. Patient feels in their usual state of health with no acute concerns.     EKG completed today - NSR, rate normal.   Labs - reviewed, WNL, A1c well controlled     Current smoker - has stopped smoking prior to surgery per recommendations, typically smokes 1 pack per week. Has been slowly decreasing amount over time.     Medical conditions are stable. BP well controlled. DM is well controlled, A1c currently 7.1 which is decreased from 9.0. No recent acute changes with her medical conditions. Physical examination unremarkable today, cardiopulmonary exam WNL. Vitals are stable. Patient appears to be in good health considering her medical conditions.     Detsky Cardiac Risk Index assesses perioperative risk of cardiovascular events in patients about to undergo noncardiac surgery.  This scale considers cardiac history, functional status, age, and overall medical condition to determine risk prior to surgical procedures.  I reviewed with patient today that all surgeries have risk as discussed with their surgeon, however this risk index is an accurate predictor of perioperative complications. Patient has low risk for surgery according to Detsky Cardiac Risk Index.      Patient is cleared for surgery, may follow up with us PRN.

## 2024-08-29 NOTE — TELEPHONE ENCOUNTER
Voicemail left on [facility] pre-op line to have any paper work that needs to be completed faxed to the office and to call with any questions.

## 2024-08-29 NOTE — TELEPHONE ENCOUNTER
[facility] calling to request we send patient's pre-op forms/labs/etc to [facility] who will be performing the patient's upcoming surgery.  Fax to [phone]

## 2024-09-03 ENCOUNTER — EVALUATION (OUTPATIENT)
Dept: PHYSICAL THERAPY | Facility: CLINIC | Age: 64
End: 2024-09-03
Payer: COMMERCIAL

## 2024-09-03 DIAGNOSIS — Z98.890 S/P RIGHT ROTATOR CUFF REPAIR: Primary | ICD-10-CM

## 2024-09-03 PROCEDURE — 97110 THERAPEUTIC EXERCISES: CPT | Performed by: PHYSICAL THERAPIST

## 2024-09-03 PROCEDURE — 97162 PT EVAL MOD COMPLEX 30 MIN: CPT | Performed by: PHYSICAL THERAPIST

## 2024-09-03 NOTE — LETTER
2024    Arhtur Willis MD  [address] [facility] [address]    Patient: Denice Vargas   YOB: 1960   Date of Visit: 9/3/2024     Encounter Diagnosis     ICD-10-CM    1. S/P right rotator cuff repair  Z98.890           Dear Dr. Willis:    Thank you for your recent referral of Denice Vargas. Please review the attached evaluation summary from Denice's recent visit.     Please verify that you agree with the plan of care by signing the attached order.     If you have any questions or concerns, please do not hesitate to call.     I sincerely appreciate the opportunity to share in the care of one of your patients and hope to have another opportunity to work with you in the near future.       Sincerely,    Andreas Rangel, PT      Referring Provider:      I certify that I have read the below Plan of Care and certify the need for these services furnished under this plan of treatment while under my care.                    Arthur Willis MD  [address] [facility] [address]  Via Fax: [phone]          PT Evaluation     Today's date: 2024  Patient name: Denice Vargas  : 1960  MRN: [id number]  Referring provider: Arthur Willis MD  Dx:   Encounter Diagnosis     ICD-10-CM    1. S/P right rotator cuff repair  Z98.890           Start Time: 1030  Stop Time: 1115  Total time in clinic (min): 45 minutes    Assessment  Impairments: abnormal or restricted ROM, abnormal movement, activity intolerance, impaired physical strength and pain with function    Assessment details: Denice Vargas is a 64 y.o. female presenting to physical therapy s/p right shoulder distal clavicular excision and RC repair on 2024 and presents with pain, decreased range of motion, decreased strength, and decreased activity tolerance.  Assessment reveals PROM and strength impairment consistent with 3 days post-operative without post op complications.  Secondary to these impairments, patient  has increased difficulty performing ADL's, household chores, and  work related tasks.  Denice would benefit from skilled PT to address these issues and maximize function.  Thank you for the referral.    Understanding of Dx/Px/POC: excellent     Prognosis: excellent    Goals  STG (8 weeks)  1. Patient will be independent with HEP  2. Decrease pain at worst by 2 points on NPRS  3. Increase right shoulder PROM to WFL in all planes  4. Patient will demonstrate ability to d/c sling and use arm below shoulder height without pain  LTG (16-20 weeks)  1. Decrease pain at worst from 4 points on NPRS  2. Increase right shoulder AROM to WFL in all planes  3. Increase RC strength to > or equal to 4/5 per MMT for improved ADL and overhead function  4. Patient will demonstrate ability to lift shoulder overhead without normal GH rhythm  5. Increase FOTO score > or equal to expected outcome    Plan  Patient would benefit from: skilled PT    Planned therapy interventions: joint mobilization, manual therapy, neuromuscular re-education, patient education, strengthening, stretching, therapeutic exercise, therapeutic activities, home exercise program, functional ROM exercises and ADL retraining    Frequency: 2x week  Duration in weeks: 16  Treatment plan discussed with: patient        Subjective Evaluation    History of Present Illness  Mechanism of injury: Patient reports to outpatient PT s/p right shoulder distal clavicular excision, RC repair and biceps tenodesis on 8/30/2024.  Post-operative pain described as  which is worse with accidental movement and improved with supported rest, taking off the sling and medications.  Patient is expected to wear the sing x4 weeks.  Patient is retired but notes ADL's and leisure functions as a result.  Patients goals for PT are to decrease the pain and return to PLOF (including taking care of her dogs).    Quality of life: good    Patient Goals  Patient goals for therapy: increased strength,  independence with ADLs/IADLs, return to sport/leisure activities, increased motion and decreased pain    Pain  Current pain ratin  At best pain ratin  At worst pain rating: 10  Quality: dull ache and throbbing  Relieving factors: rest, relaxation, medications and ice  Aggravating factors: lifting    Social Support    Employment status: working  Hand dominance: right      Diagnostic Tests  MRI studies: abnormal  Treatments  Current treatment: medication        Objective     Neurological Testing     Sensation     Shoulder     Right Shoulder   Intact: Light touch    Additional Neurological Details  Incisions are clean and dry with steri-strips in tact    Active Range of Motion     Additional Active Range of Motion Details  *AROM deferred per post-op restrictions    Passive Range of Motion     Right Shoulder   Flexion: 45 degrees with pain  Abduction: 30 degrees with pain  External rotation 0°: 15 degrees with pain    Strength/Myotome Testing     Additional Strength Details  *MMT deferred per post-op restrictions             Precautions:   Patient Active Problem List   Diagnosis   • Anxiety disorder   • Atrophic vaginitis   • Benign essential hypertension   • Coronary atherosclerosis   • Midline cystocele   • Dacryocystitis of left lacrimal sac   • Diverticulitis of colon   • Gastroparesis   • GERD without esophagitis   • Hiatal hernia   • IBS (irritable bowel syndrome)   • Insomnia   • Left shoulder pain   • Low back pain   • Menopausal syndrome   • Microscopic hematuria   • Mixed hyperlipidemia   • Mixed stress and urge urinary incontinence   • Post-menopausal bleeding   • Status post percutaneous transluminal coronary angioplasty   • Right knee pain   • Seasonal allergies   • Type 2 diabetes mellitus with stage 3 chronic kidney disease, without long-term current use of insulin, unspecified whether stage 3a or 3b CKD (HCC)   • Vitamin deficiency   • Attention deficit hyperactivity disorder (ADHD),  predominantly hyperactive type   • PTSD (post-traumatic stress disorder)   • Depressed   • Adjustment reaction with anxiety   • Age-related nuclear cataract of both eyes   • 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   • Current tobacco use   • 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   • Need for vaccination   • P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   • Bronchitis   • Mucopurulent chronic bronchitis (HCC)   • Tear of right rotator cuff   • Pre-operative clearance           Manuals 9/3            R shoulder PROM: Flexion to 120 deg, abd to 90 deg, ER to 30 deg and IR to 45 deg             R shoulder capsule JM for pain modulation                                       Neuro Re-Ed                                                                                                        Ther Ex             Elbow flexion/extension passively 3x10            Pendulums 3x10            Putty/ball squeeze 3x10            Scapular retractions 3x10                                                                Ther Activity                                       Gait Training                                       Modalities

## 2024-09-03 NOTE — LETTER
September 3, 2024      No Recipients    Patient: Denice Vargas   YOB: 1960   Date of Visit: 9/3/2024     Encounter Diagnosis     ICD-10-CM    1. S/P right rotator cuff repair  Z98.890           Dear Dr. Boudreaux Recipients:    Thank you for your recent referral of Denice Vargas. Please review the attached evaluation summary from Denice's recent visit.     Please verify that you agree with the plan of care by signing the attached order.     If you have any questions or concerns, please do not hesitate to call.     I sincerely appreciate the opportunity to share in the care of one of your patients and hope to have another opportunity to work with you in the near future.       Sincerely,    Andreas Rangel, PT      Referring Provider:      I certify that I have read the below Plan of Care and certify the need for these services furnished under this plan of treatment while under my care.                      No Recipients          PT Evaluation     Today's date: 9/3/2024  Patient name: Denice Vargas  : 1960  MRN: [id number]  Referring provider: Arthur Willis MD  Dx:   Encounter Diagnosis     ICD-10-CM    1. S/P right rotator cuff repair  Z98.890                      Assessment  Impairments: abnormal or restricted ROM, abnormal movement, activity intolerance, impaired physical strength and pain with function    Assessment details: Denice Vargas is a 64 y.o. female presenting to physical therapy s/p right shoulder distal clavicular excision and RC repair on 2024 and presents with pain, decreased range of motion, decreased strength, and decreased activity tolerance.  Assessment reveals PROM and strength impairment consistent with 3 days post-operative without post op complications.  Secondary to these impairments, patient has increased difficulty performing ADL's, household chores, and  work related tasks.  Denice would benefit from skilled PT to address these issues and maximize function.  Thank you for  the referral.    Understanding of Dx/Px/POC: excellent     Prognosis: excellent    Goals  STG (8 weeks)  1. Patient will be independent with HEP  2. Decrease pain at worst by 2 points on NPRS  3. Increase right shoulder PROM to WFL in all planes  4. Patient will demonstrate ability to d/c sling and use arm below shoulder height without pain  LTG (16-20 weeks)  1. Decrease pain at worst from 4 points on NPRS  2. Increase right shoulder AROM to WFL in all planes  3. Increase RC strength to > or equal to 4/5 per MMT for improved ADL and overhead function  4. Patient will demonstrate ability to lift shoulder overhead without normal GH rhythm  5. Increase FOTO score > or equal to expected outcome    Plan  Patient would benefit from: skilled PT    Planned therapy interventions: joint mobilization, manual therapy, neuromuscular re-education, patient education, strengthening, stretching, therapeutic exercise, therapeutic activities, home exercise program, functional ROM exercises and ADL retraining    Frequency: 2x week  Duration in weeks: 16  Treatment plan discussed with: patient        Subjective Evaluation    History of Present Illness  Mechanism of injury: Patient reports to outpatient PT s/p right shoulder distal clavicular excision, RC repair and biceps tenodesis on 2024.  Post-operative pain described as  which is worse with accidental movement and improved with supported rest, taking off the sling and medications.  Patient is expected to wear the sing x4 weeks.  Patient is retired but notes ADL's and leisure functions as a result.  Patients goals for PT are to decrease the pain and return to PLOF (including taking care of her dogs).    Quality of life: good    Patient Goals  Patient goals for therapy: increased strength, independence with ADLs/IADLs, return to sport/leisure activities, increased motion and decreased pain    Pain  Current pain ratin  At best pain ratin  At worst pain rating:  10  Quality: dull ache and throbbing  Relieving factors: rest, relaxation, medications and ice  Aggravating factors: lifting    Social Support    Employment status: working  Hand dominance: right      Diagnostic Tests  MRI studies: abnormal  Treatments  Current treatment: medication        Objective     Neurological Testing     Sensation     Shoulder     Right Shoulder   Intact: Light touch    Additional Neurological Details  Incisions are clean and dry with steri-strips in tact    Active Range of Motion     Additional Active Range of Motion Details  *AROM deferred per post-op restrictions    Passive Range of Motion     Right Shoulder   Flexion: 45 degrees with pain  Abduction: 30 degrees with pain  External rotation 0°: 15 degrees with pain    Strength/Myotome Testing     Additional Strength Details  *MMT deferred per post-op restrictions             Precautions:   Patient Active Problem List   Diagnosis    Anxiety disorder    Atrophic vaginitis    Benign essential hypertension    Coronary atherosclerosis    Midline cystocele    Dacryocystitis of left lacrimal sac    Diverticulitis of colon    Gastroparesis    GERD without esophagitis    Hiatal hernia    IBS (irritable bowel syndrome)    Insomnia    Left shoulder pain    Low back pain    Menopausal syndrome    Microscopic hematuria    Mixed hyperlipidemia    Mixed stress and urge urinary incontinence    Post-menopausal bleeding    Status post percutaneous transluminal coronary angioplasty    Right knee pain    Seasonal allergies    Type 2 diabetes mellitus with stage 3 chronic kidney disease, without long-term current use of insulin, unspecified whether stage 3a or 3b CKD (HCC)    Vitamin deficiency    Attention deficit hyperactivity disorder (ADHD), predominantly hyperactive type    PTSD (post-traumatic stress disorder)    Depressed    Adjustment reaction with anxiety    Age-related nuclear cataract of both eyes    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     Current tobacco use    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    Need for vaccination    P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    Bronchitis    Mucopurulent chronic bronchitis (HCC)    Tear of right rotator cuff    Pre-operative clearance           Manuals 9/3            R shoulder PROM: Flexion to 120 deg, abd to 90 deg, ER to 30 deg and IR to 45 deg             R shoulder capsule JM for pain modulation                                       Neuro Re-Ed                                                                                                        Ther Ex             Elbow flexion/extension passively 3x10            Pendulums 3x10            Putty/ball squeeze 3x10            Scapular retractions 3x10                                                                Ther Activity                                       Gait Training                                       Modalities

## 2024-09-03 NOTE — PROGRESS NOTES
PT Evaluation     Today's date: 2024  Patient name: Denice Vargas  : 1960  MRN: [id number]  Referring provider: Arthur Willis MD  Dx:   Encounter Diagnosis     ICD-10-CM    1. S/P right rotator cuff repair  Z98.890           Start Time: 1030  Stop Time: 1115  Total time in clinic (min): 45 minutes    Assessment  Impairments: abnormal or restricted ROM, abnormal movement, activity intolerance, impaired physical strength and pain with function    Assessment details: Denice Vargas is a 64 y.o. female presenting to physical therapy s/p right shoulder distal clavicular excision and RC repair on 2024 and presents with pain, decreased range of motion, decreased strength, and decreased activity tolerance.  Assessment reveals PROM and strength impairment consistent with 3 days post-operative without post op complications.  Secondary to these impairments, patient has increased difficulty performing ADL's, household chores, and  work related tasks.  Denice would benefit from skilled PT to address these issues and maximize function.  Thank you for the referral.    Understanding of Dx/Px/POC: excellent     Prognosis: excellent    Goals  STG (8 weeks)  1. Patient will be independent with HEP  2. Decrease pain at worst by 2 points on NPRS  3. Increase right shoulder PROM to WFL in all planes  4. Patient will demonstrate ability to d/c sling and use arm below shoulder height without pain  LTG (16-20 weeks)  1. Decrease pain at worst from 4 points on NPRS  2. Increase right shoulder AROM to WFL in all planes  3. Increase RC strength to > or equal to 4/5 per MMT for improved ADL and overhead function  4. Patient will demonstrate ability to lift shoulder overhead without normal GH rhythm  5. Increase FOTO score > or equal to expected outcome    Plan  Patient would benefit from: skilled PT    Planned therapy interventions: joint mobilization, manual therapy, neuromuscular re-education, patient education, strengthening,  stretching, therapeutic exercise, therapeutic activities, home exercise program, functional ROM exercises and ADL retraining    Frequency: 2x week  Duration in weeks: 16  Treatment plan discussed with: patient        Subjective Evaluation    History of Present Illness  Mechanism of injury: Patient reports to outpatient PT s/p right shoulder distal clavicular excision, RC repair and biceps tenodesis on 2024.  Post-operative pain described as  which is worse with accidental movement and improved with supported rest, taking off the sling and medications.  Patient is expected to wear the sing x4 weeks.  Patient is retired but notes ADL's and leisure functions as a result.  Patients goals for PT are to decrease the pain and return to PLOF (including taking care of her dogs).    Quality of life: good    Patient Goals  Patient goals for therapy: increased strength, independence with ADLs/IADLs, return to sport/leisure activities, increased motion and decreased pain    Pain  Current pain ratin  At best pain ratin  At worst pain rating: 10  Quality: dull ache and throbbing  Relieving factors: rest, relaxation, medications and ice  Aggravating factors: lifting    Social Support    Employment status: working  Hand dominance: right      Diagnostic Tests  MRI studies: abnormal  Treatments  Current treatment: medication        Objective     Neurological Testing     Sensation     Shoulder     Right Shoulder   Intact: Light touch    Additional Neurological Details  Incisions are clean and dry with steri-strips in tact    Active Range of Motion     Additional Active Range of Motion Details  *AROM deferred per post-op restrictions    Passive Range of Motion     Right Shoulder   Flexion: 45 degrees with pain  Abduction: 30 degrees with pain  External rotation 0°: 15 degrees with pain    Strength/Myotome Testing     Additional Strength Details  *MMT deferred per post-op restrictions             Precautions:   Patient  Active Problem List   Diagnosis    Anxiety disorder    Atrophic vaginitis    Benign essential hypertension    Coronary atherosclerosis    Midline cystocele    Dacryocystitis of left lacrimal sac    Diverticulitis of colon    Gastroparesis    GERD without esophagitis    Hiatal hernia    IBS (irritable bowel syndrome)    Insomnia    Left shoulder pain    Low back pain    Menopausal syndrome    Microscopic hematuria    Mixed hyperlipidemia    Mixed stress and urge urinary incontinence    Post-menopausal bleeding    Status post percutaneous transluminal coronary angioplasty    Right knee pain    Seasonal allergies    Type 2 diabetes mellitus with stage 3 chronic kidney disease, without long-term current use of insulin, unspecified whether stage 3a or 3b CKD (HCC)    Vitamin deficiency    Attention deficit hyperactivity disorder (ADHD), predominantly hyperactive type    PTSD (post-traumatic stress disorder)    Depressed    Adjustment reaction with anxiety    Age-related nuclear cataract of both eyes    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    Current tobacco use    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    Need for vaccination    P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    Bronchitis    Mucopurulent chronic bronchitis (HCC)    Tear of right rotator cuff    Pre-operative clearance           Manuals 9/3            R shoulder PROM: Flexion to 120 deg, abd to 90 deg, ER to 30 deg and IR to 45 deg             R shoulder capsule JM for pain modulation                                       Neuro Re-Ed                                                                                                        Ther Ex             Elbow flexion/extension passively 3x10            Pendulums 3x10            Putty/ball squeeze 3x10            Scapular retractions 3x10                                                                Ther Activity                                       Gait Training                                        Modalities

## 2024-09-04 ENCOUNTER — RA CDI HCC (OUTPATIENT)
Dept: OTHER | Facility: HOSPITAL | Age: 64
End: 2024-09-04

## 2024-09-04 NOTE — PROGRESS NOTES
HCC coding opportunities          Chart Reviewed number of suggestions sent to Provider: 1   E11.36    Patients Insurance        Commercial Insurance: [employer]

## 2024-09-05 DIAGNOSIS — F90.1 ATTENTION DEFICIT HYPERACTIVITY DISORDER (ADHD), PREDOMINANTLY HYPERACTIVE TYPE: ICD-10-CM

## 2024-09-05 RX ORDER — LISDEXAMFETAMINE DIMESYLATE 20 MG/1
20 TABLET, CHEWABLE ORAL
Qty: 30 TABLET | Refills: 0 | Status: SHIPPED | OUTPATIENT
Start: 2024-09-05

## 2024-09-06 ENCOUNTER — APPOINTMENT (OUTPATIENT)
Dept: PHYSICAL THERAPY | Facility: CLINIC | Age: 64
End: 2024-09-06
Payer: COMMERCIAL

## 2024-09-09 ENCOUNTER — OFFICE VISIT (OUTPATIENT)
Dept: PHYSICAL THERAPY | Facility: CLINIC | Age: 64
End: 2024-09-09
Payer: COMMERCIAL

## 2024-09-09 DIAGNOSIS — E11.9 TYPE 2 DIABETES MELLITUS WITHOUT COMPLICATION, WITHOUT LONG-TERM CURRENT USE OF INSULIN (HCC): ICD-10-CM

## 2024-09-09 DIAGNOSIS — Z98.890 S/P RIGHT ROTATOR CUFF REPAIR: Primary | ICD-10-CM

## 2024-09-09 PROCEDURE — 97110 THERAPEUTIC EXERCISES: CPT | Performed by: PHYSICAL THERAPIST

## 2024-09-09 PROCEDURE — 97140 MANUAL THERAPY 1/> REGIONS: CPT | Performed by: PHYSICAL THERAPIST

## 2024-09-09 RX ORDER — METFORMIN HCL 500 MG
1000 TABLET, EXTENDED RELEASE 24 HR ORAL 2 TIMES DAILY WITH MEALS
Qty: 360 TABLET | Refills: 0 | Status: SHIPPED | OUTPATIENT
Start: 2024-09-09

## 2024-09-09 NOTE — PROGRESS NOTES
Daily Note     Today's date: 2024  Patient name: Denice Vargas  : 1960  MRN: [id number]  Referring provider: Arthur Willis MD  Dx:   Encounter Diagnosis     ICD-10-CM    1. S/P right rotator cuff repair  Z98.890                      Subjective: Patient reports HEP compliance.  Notes nausea from medications and switched pain medications.        Objective: See treatment diary below      Assessment: Patients PROM progressing well within protocol limits.  End range pain noted into flexion/abduction and return to neutral but did not push manuals through discomfort given early healing phase.  Reviewed HEP and sling donning/doffing.       Plan: Continue per plan of care.      Precautions:   Patient Active Problem List   Diagnosis    Anxiety disorder    Atrophic vaginitis    Benign essential hypertension    Coronary atherosclerosis    Midline cystocele    Dacryocystitis of left lacrimal sac    Diverticulitis of colon    Gastroparesis    GERD without esophagitis    Hiatal hernia    IBS (irritable bowel syndrome)    Insomnia    Left shoulder pain    Low back pain    Menopausal syndrome    Microscopic hematuria    Mixed hyperlipidemia    Mixed stress and urge urinary incontinence    Post-menopausal bleeding    Status post percutaneous transluminal coronary angioplasty    Right knee pain    Seasonal allergies    Type 2 diabetes mellitus with stage 3 chronic kidney disease, without long-term current use of insulin, unspecified whether stage 3a or 3b CKD (HCC)    Vitamin deficiency    Attention deficit hyperactivity disorder (ADHD), predominantly hyperactive type    PTSD (post-traumatic stress disorder)    Depressed    Adjustment reaction with anxiety    Age-related nuclear cataract of both eyes    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    Current tobacco use    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    Need for vaccination    P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    Bronchitis    Mucopurulent chronic  bronchitis (HCC)    Tear of right rotator cuff    Pre-operative clearance       R RC Repair on 8/30/2024    Manuals 9/3 9/9           R shoulder PROM: Flexion to 120 deg, abd to 90 deg, ER to 30 deg and IR to 45 deg  18'           R shoulder capsule JM for pain modulation  G2           R scar mobs  3'                        Neuro Re-Ed                                                                                                        Ther Ex             Elbow flexion/extension passively 3x10 3x10           Pendulums 3x10 3x30           Putty/ball squeeze 3x10 HEP           Scapular retractions 3x10 3x10                                                               Ther Activity                                       Gait Training                                       Modalities

## 2024-09-11 NOTE — PROGRESS NOTES
Assessment/Plan:      Diagnoses and all orders for this visit:    Upper respiratory tract infection, unspecified type  -     cefuroxime (CEFTIN) 500 mg tablet; Take 1 tablet (500 mg total) by mouth every 12 (twelve) hours for 10 days  -     promethazine-codeine (PHENERGAN WITH CODEINE) 6 25-10 mg/5 mL syrup; Take 5 mL by mouth daily at bedtime as needed for cough    Other orders  -     ALPRAZolam (XANAX) 0 25 mg tablet;   -     Discontinue: promethazine-codeine (PHENERGAN WITH CODEINE) 6 25-10 mg/5 mL syrup; Take 5 mL by mouth daily at bedtime as needed for cough        Patient is a 53-year-old female presenting today for a few days of respiratory symptoms consistent with an upper respiratory infection  Patient is traveling to [place] for the weekend holiday and is concerned, so I will place her on antibiotics  She will complete a 10 day course of Ceftin b i d  Times 10 days  She requested a printed prescription for promethazine with codeine so she can take at night to help her sleep, that was provided to her  She was advised course seen HBP over-the-counter to help the her symptoms in addition to rest and fluids for hydration  We will see how she does through the course of the next week or so and was advised to call should symptoms persist or worsen despite treatment, certainly seek medical attention at urgent care while she is away  Patient also stating that her Januvia as extremely expensive  I gave her some samples but advised that she contact her insurance or her mail order company and determine why and if the medication needs to be switched to something different  She does have an upcoming appointment in a few months with Dr Neena Bowden as well         Chief Complaint   Patient presents with    Cold Like Symptoms     cough, congestion started tuesday      Subjective:     Patient ID: Edyta Cartagena is a 56 y o  female     58y/o female here today for Magali Pacini sxs past 4 days, concerned because traveling to [place]  Reports earache, glands in neck sore, laryngitis, sore throat, PND, chest congestion, barky cough  Not SOB  No fevers  Taking mucinex, not helping  Review of Systems   Constitutional: Positive for activity change and fatigue  HENT: Positive for ear pain, rhinorrhea and sore throat  Respiratory: Positive for cough, chest tightness and wheezing  Cardiovascular: Negative  Neurological: Negative  Psychiatric/Behavioral: Negative            Past Medical History:   Diagnosis Date    Abnormal mammogram     RESOLVED: [id number]    Arthritis     Diabetes mellitus ([address] )     Eczema of right external ear     RESOLVED: [id number]    Foot drop, left foot     Heart attack     Hematuria     RESOLVED: [id number]    Impetigo     RESOLVED: [id number]    Insomnia related to another mental disorder     AXIS I/II MENTAL DISORDER; RESOLVED: [id number]       Past Surgical History:   Procedure Laterality Date    OTHER SURGICAL HISTORY      STEND INDICATIONS: RESTENOSIS AT PREVIOUS PTCA LOCATION     SPLENECTOMY      TOTAL HIP ARTHROPLASTY Left        Family History   Problem Relation Age of Onset    Stroke Mother     Osteoporosis Mother     Diabetes type II Father     Heart attack Father     Cancer Father     Heart disease Father     Hypertension Father     Heart disease Sister     Diabetes type II Brother     Heart attack Brother     Heart disease Brother     Heart attack Brother     Heart attack Brother     Heart attack Brother     Diabetes type II Brother     Alcohol abuse Neg Hx     Substance Abuse Neg Hx          Current Outpatient Prescriptions:     ALPRAZolam (XANAX) 0 25 mg tablet, , Disp: , Rfl:     aspirin (ECOTRIN LOW STRENGTH) 81 mg EC tablet, Take 1 tablet by mouth daily, Disp: , Rfl:     atorvastatin (LIPITOR) 40 mg tablet, Take 1 tablet by mouth daily, Disp: , Rfl:     Blood Glucose Monitoring Suppl (ONE TOUCH ULTRA MINI) w/Device KIT, by Does not apply route, Disp: , Rfl:     Cetirizine HCl (ZYRTEC ALLERGY) 10 MG CAPS, Take by mouth, Disp: , Rfl:     cholecalciferol (VITAMIN D3) 1,000 units tablet, Take by mouth, Disp: , Rfl:     escitalopram (LEXAPRO) 10 mg tablet, Take 10 mg by mouth, Disp: , Rfl:     famotidine (PEPCID) 20 mg tablet, Take 1 tablet by mouth daily, Disp: , Rfl:     glucose blood (ONE TOUCH ULTRA TEST) test strip, 1 each by Other route 2 (two) times a day, Disp: 100 each, Rfl: 3    ibuprofen (MOTRIN) 800 mg tablet, Take 1 tablet (800 mg total) by mouth every 12 (twelve) hours as needed for mild pain, Disp: 30 tablet, Rfl: 0    JANUVIA 100 MG tablet, TAKE 1 TABLET DAILY, Disp: 90 tablet, Rfl: 0    lisdexamfetamine (VYVANSE) 70 MG capsule, Take 60 mg by mouth, Disp: , Rfl:     lisinopril (ZESTRIL) 5 mg tablet, TAKE ONE-HALF (1/2) TABLET DAILY, Disp: 45 tablet, Rfl: 0    metFORMIN (GLUCOPHAGE-XR) 500 mg 24 hr tablet, TAKE 1 TABLET TWICE A DAY, Disp: 60 tablet, Rfl: 2    metoprolol succinate (TOPROL-XL) 25 mg 24 hr tablet, Take 1 tablet by mouth daily, Disp: , Rfl:     phentermine 30 MG capsule, Take 30 mg by mouth daily, Disp: , Rfl: 1    Probiotic Product (PROBIOTIC-10) CAPS, Take by mouth, Disp: , Rfl:     cefuroxime (CEFTIN) 500 mg tablet, Take 1 tablet (500 mg total) by mouth every 12 (twelve) hours for 10 days, Disp: 20 tablet, Rfl: 0    promethazine-codeine (PHENERGAN WITH CODEINE) 6 25-10 mg/5 mL syrup, Take 5 mL by mouth daily at bedtime as needed for cough, Disp: 120 mL, Rfl: 0    Allergies   Allergen Reactions    Levaquin [Levofloxacin] Anaphylaxis    Cyclobenzaprine        Objective:     Physical Exam   Constitutional: She is oriented to person, place, and time  She appears well-developed  She appears ill  HENT:   Right Ear: Tympanic membrane normal    Left Ear: Tympanic membrane normal    Nose: Mucosal edema and rhinorrhea present  Mouth/Throat: Posterior oropharyngeal erythema (PND) present  Neck: Neck supple  Cardiovascular: Normal rate, regular rhythm and normal heart sounds  Pulmonary/Chest: Effort normal and breath sounds normal    Occasional dry, hacking nonproductive cough   Lymphadenopathy:   Mild submandibular LAD, slightly tender   Neurological: She is alert and oriented to person, place, and time  Psychiatric: She has a normal mood and affect  Vitals reviewed        Vitals:    03/30/18 1416   BP: 110/64   BP Location: Left arm   Patient Position: Sitting   Cuff Size: Adult   Pulse: 94   Resp: 15   Temp: 97 8 °F (36 6 °C)   TempSrc: Tympanic   SpO2: 98%   Weight: 74 3 kg (163 lb 12 8 oz)   Height: 5' 4" (1 626 m) VIEW ALL

## 2024-09-12 ENCOUNTER — APPOINTMENT (OUTPATIENT)
Dept: PHYSICAL THERAPY | Facility: CLINIC | Age: 64
End: 2024-09-12
Payer: COMMERCIAL

## 2024-09-13 DIAGNOSIS — F90.1 ATTENTION DEFICIT HYPERACTIVITY DISORDER (ADHD), PREDOMINANTLY HYPERACTIVE TYPE: ICD-10-CM

## 2024-09-16 ENCOUNTER — OFFICE VISIT (OUTPATIENT)
Dept: PHYSICAL THERAPY | Facility: CLINIC | Age: 64
End: 2024-09-16
Payer: COMMERCIAL

## 2024-09-16 DIAGNOSIS — Z98.890 S/P RIGHT ROTATOR CUFF REPAIR: Primary | ICD-10-CM

## 2024-09-16 PROCEDURE — 97110 THERAPEUTIC EXERCISES: CPT | Performed by: PHYSICAL THERAPIST

## 2024-09-16 PROCEDURE — 97140 MANUAL THERAPY 1/> REGIONS: CPT | Performed by: PHYSICAL THERAPIST

## 2024-09-16 RX ORDER — LISDEXAMFETAMINE DIMESYLATE 20 MG/1
20 CAPSULE ORAL EVERY MORNING
Qty: 30 CAPSULE | Refills: 0 | Status: SHIPPED | OUTPATIENT
Start: 2024-09-16

## 2024-09-16 NOTE — PROGRESS NOTES
Daily Note     Today's date: 2024  Patient name: Denice Vargas  : 1960  MRN: [id number]  Referring provider: Arthur Willis MD  Dx:   Encounter Diagnosis     ICD-10-CM    1. S/P right rotator cuff repair  Z98.890                      Subjective: Patient states that on Saturday she accidentally reached behind herself and has been in increased pain since.  Notes compliance with her HEP but increased difficulty straightening her arm.        Objective: See treatment diary below      Assessment: Patients right shoulder ROM continues to be within protocol limits.  No sign of significant pathology from injury, but unable to perform special tests or MMT per post-op precautions.  Cues required to maintain pendulums as passive.        Plan: Continue per plan of care.      Precautions:   Patient Active Problem List   Diagnosis    Anxiety disorder    Atrophic vaginitis    Benign essential hypertension    Coronary atherosclerosis    Midline cystocele    Dacryocystitis of left lacrimal sac    Diverticulitis of colon    Gastroparesis    GERD without esophagitis    Hiatal hernia    IBS (irritable bowel syndrome)    Insomnia    Left shoulder pain    Low back pain    Menopausal syndrome    Microscopic hematuria    Mixed hyperlipidemia    Mixed stress and urge urinary incontinence    Post-menopausal bleeding    Status post percutaneous transluminal coronary angioplasty    Right knee pain    Seasonal allergies    Type 2 diabetes mellitus with stage 3 chronic kidney disease, without long-term current use of insulin, unspecified whether stage 3a or 3b CKD (HCC)    Vitamin deficiency    Attention deficit hyperactivity disorder (ADHD), predominantly hyperactive type    PTSD (post-traumatic stress disorder)    Depressed    Adjustment reaction with anxiety    Age-related nuclear cataract of both eyes    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    Current tobacco use    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    Need  "for vaccination    P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    Bronchitis    Mucopurulent chronic bronchitis (HCC)    Tear of right rotator cuff    Pre-operative clearance       R RC Repair on 8/30/2024    Manuals 9/3 9/9 9/16          R shoulder PROM: Flexion to 120 deg, abd to 90 deg, ER to 30 deg and IR to 45 deg  18' 18'          R shoulder capsule JM for pain modulation  G2 G2          R scar mobs  3' 3'                       Neuro Re-Ed                                                                                                        Ther Ex             Elbow flexion/extension passively 3x10 3x10 3x10          Pendulums 3x10 3x30 3x30          Putty/ball squeeze 3x10 HEP           Scapular retractions 3x10 3x10 3x10          Gentle cane ER stretch in sitting   10 x10\"                                                 Ther Activity                                       Gait Training                                       Modalities                                            "

## 2024-09-18 DIAGNOSIS — E11.9 TYPE 2 DIABETES MELLITUS WITHOUT COMPLICATION, WITHOUT LONG-TERM CURRENT USE OF INSULIN (HCC): ICD-10-CM

## 2024-09-18 DIAGNOSIS — E11.22 TYPE 2 DIABETES MELLITUS WITH STAGE 3 CHRONIC KIDNEY DISEASE, WITHOUT LONG-TERM CURRENT USE OF INSULIN, UNSPECIFIED WHETHER STAGE 3A OR 3B CKD (HCC): ICD-10-CM

## 2024-09-18 DIAGNOSIS — N18.30 TYPE 2 DIABETES MELLITUS WITH STAGE 3 CHRONIC KIDNEY DISEASE, WITHOUT LONG-TERM CURRENT USE OF INSULIN, UNSPECIFIED WHETHER STAGE 3A OR 3B CKD (HCC): ICD-10-CM

## 2024-09-18 RX ORDER — BLOOD-GLUCOSE SENSOR
1 EACH MISCELLANEOUS
Qty: 6 EACH | Refills: 0 | Status: SHIPPED | OUTPATIENT
Start: 2024-09-18

## 2024-09-18 NOTE — TELEPHONE ENCOUNTER
Reason for call:   [x] Refill   [] Prior Auth  [] Other:     Office:   [x] PCP/Provider - [facility]  [] Specialty/Provider -     Medication:  tirzepatide (Mounjaro) 2.5 MG/0.5ML    Dose/Frequency: Inject 0.5 mL (2.5 mg total) under the skin every 7 days     Quantity: 6 mL    Pharmacy: [id number] [place] [place] [address] [place] [phone]    Does the patient have enough for 3 days?   [] Yes   [x] No - Send as HP to POD

## 2024-09-19 ENCOUNTER — OFFICE VISIT (OUTPATIENT)
Dept: PHYSICAL THERAPY | Facility: CLINIC | Age: 64
End: 2024-09-19
Payer: COMMERCIAL

## 2024-09-19 DIAGNOSIS — Z98.890 S/P RIGHT ROTATOR CUFF REPAIR: Primary | ICD-10-CM

## 2024-09-19 PROCEDURE — 97110 THERAPEUTIC EXERCISES: CPT | Performed by: PHYSICAL THERAPIST

## 2024-09-19 PROCEDURE — 97140 MANUAL THERAPY 1/> REGIONS: CPT | Performed by: PHYSICAL THERAPIST

## 2024-09-19 NOTE — PROGRESS NOTES
Daily Note     Today's date: 2024  Patient name: Denice Vargas  : 1960  MRN: [id number]  Referring provider: Arthur Willis MD  Dx:   No diagnosis found.                 Subjective: Patient reports less discomfort following last session and that her pain has been more manageable.        Objective: See treatment diary below      Assessment: Right shoulder PROM is within protocol limits again following manuals.  Discussed sling d/c and expectations upon 4 weeks post-op.  Proper sling donning/doffing remains and appropriate HEP independence.       Plan: Continue per plan of care.      Precautions:   Patient Active Problem List   Diagnosis    Anxiety disorder    Atrophic vaginitis    Benign essential hypertension    Coronary atherosclerosis    Midline cystocele    Dacryocystitis of left lacrimal sac    Diverticulitis of colon    Gastroparesis    GERD without esophagitis    Hiatal hernia    IBS (irritable bowel syndrome)    Insomnia    Left shoulder pain    Low back pain    Menopausal syndrome    Microscopic hematuria    Mixed hyperlipidemia    Mixed stress and urge urinary incontinence    Post-menopausal bleeding    Status post percutaneous transluminal coronary angioplasty    Right knee pain    Seasonal allergies    Type 2 diabetes mellitus with stage 3 chronic kidney disease, without long-term current use of insulin, unspecified whether stage 3a or 3b CKD (HCC)    Vitamin deficiency    Attention deficit hyperactivity disorder (ADHD), predominantly hyperactive type    PTSD (post-traumatic stress disorder)    Depressed    Adjustment reaction with anxiety    Age-related nuclear cataract of both eyes    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    Current tobacco use    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    Need for vaccination    P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    Bronchitis    Mucopurulent chronic bronchitis (HCC)    Tear of right rotator cuff    Pre-operative clearance       R RC Repair on  "8/30/2024    Manuals 9/3 9/9 9/16 9/19         R shoulder PROM: Flexion to 120 deg, abd to 90 deg, ER to 30 deg and IR to 45 deg  18' 18' 12'         R shoulder capsule JM for pain modulation  G2 G2 G2         R scar mobs  3' 3' 3'                      Neuro Re-Ed                                                                                                        Ther Ex             Elbow flexion/extension passively 3x10 3x10 3x10 3x10         Pendulums 3x10 3x30 3x30 3x30         Putty/ball squeeze 3x10 HEP           Scapular retractions 3x10 3x10 3x10 3x10         Gentle cane ER stretch in sitting   10 x10\" 10 x10\"                                                Ther Activity                                       Gait Training                                       Modalities                                            "

## 2024-09-23 ENCOUNTER — OFFICE VISIT (OUTPATIENT)
Dept: PHYSICAL THERAPY | Facility: CLINIC | Age: 64
End: 2024-09-23
Payer: COMMERCIAL

## 2024-09-23 DIAGNOSIS — Z98.890 S/P RIGHT ROTATOR CUFF REPAIR: Primary | ICD-10-CM

## 2024-09-23 PROCEDURE — 97140 MANUAL THERAPY 1/> REGIONS: CPT | Performed by: PHYSICAL THERAPIST

## 2024-09-23 PROCEDURE — 97110 THERAPEUTIC EXERCISES: CPT | Performed by: PHYSICAL THERAPIST

## 2024-09-23 NOTE — PROGRESS NOTES
Daily Note     Today's date: 2024  Patient name: Denice Vargas  : 1960  MRN: [id number]  Referring provider: Arthur Willis MD  Dx:   Encounter Diagnosis     ICD-10-CM    1. S/P right rotator cuff repair  Z98.890                        Subjective: Patient states that her sleep quality continues to improve along with pain control.      Objective: See treatment diary below      Assessment: Right shoulder PROM demonstrated limitations into flexion but improved to protocol limits and was pain free following manuals.  Appropriate scapular activation present and patient demonstrates pain free HEP.        Plan: Continue per plan of care. Progress to AAROM exercises per 4 week progressions next visit.     Precautions:   Patient Active Problem List   Diagnosis    Anxiety disorder    Atrophic vaginitis    Benign essential hypertension    Coronary atherosclerosis    Midline cystocele    Dacryocystitis of left lacrimal sac    Diverticulitis of colon    Gastroparesis    GERD without esophagitis    Hiatal hernia    IBS (irritable bowel syndrome)    Insomnia    Left shoulder pain    Low back pain    Menopausal syndrome    Microscopic hematuria    Mixed hyperlipidemia    Mixed stress and urge urinary incontinence    Post-menopausal bleeding    Status post percutaneous transluminal coronary angioplasty    Right knee pain    Seasonal allergies    Type 2 diabetes mellitus with stage 3 chronic kidney disease, without long-term current use of insulin, unspecified whether stage 3a or 3b CKD (HCC)    Vitamin deficiency    Attention deficit hyperactivity disorder (ADHD), predominantly hyperactive type    PTSD (post-traumatic stress disorder)    Depressed    Adjustment reaction with anxiety    Age-related nuclear cataract of both eyes    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    Current tobacco use    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    Need for vaccination    P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    Bronchitis  "   Mucopurulent chronic bronchitis (HCC)    Tear of right rotator cuff    Pre-operative clearance       R RC Repair on 8/30/2024    Manuals 9/3 9/9 9/16 9/19 9/23 9/23       R shoulder PROM: Flexion to 140 deg, abd to 90 deg, ER to 75 deg and IR to 35 deg  18' 18' 12' 12'        R shoulder capsule JM for pain modulation  G2 G2 G2 G2        R scar mobs  3' 3' 3' 3'                     Neuro Re-Ed                                                                                                        Ther Ex             Elbow flexion/extension passively 3x10 3x10 3x10 3x10 3x10 AROM       Pendulums 3x10 3x30 3x30 3x30 3x30 3x30       Putty/ball squeeze 3x10 HEP           Scapular retractions 3x10 3x10 3x10 3x10 3x10 3x10       Gentle cane ER stretch in sitting   10 x10\" 10 x10\" 10 x10\" 10 x10\"       Pulleys AAROM     NV        Table slides scaption and abduction     NV        Triceps extension with yellow TB     NV        Ther Activity                                       Gait Training                                       Modalities                                            "

## 2024-09-26 ENCOUNTER — OFFICE VISIT (OUTPATIENT)
Dept: PHYSICAL THERAPY | Facility: CLINIC | Age: 64
End: 2024-09-26
Payer: COMMERCIAL

## 2024-09-26 DIAGNOSIS — Z98.890 S/P RIGHT ROTATOR CUFF REPAIR: Primary | ICD-10-CM

## 2024-09-26 PROCEDURE — 97110 THERAPEUTIC EXERCISES: CPT | Performed by: PHYSICAL THERAPIST

## 2024-09-26 PROCEDURE — 97140 MANUAL THERAPY 1/> REGIONS: CPT | Performed by: PHYSICAL THERAPIST

## 2024-09-26 NOTE — PROGRESS NOTES
Daily Note     Today's date: 2024  Patient name: Denice Vargsa  : 1960  MRN: [id number]  Referring provider: Arthur Willis MD  Dx:   Encounter Diagnosis     ICD-10-CM    1. S/P right rotator cuff repair  Z98.890                          Subjective: Patient reports continued pain control and overall is doing better with sleep and ADL's.  D/C of sling scheduled for 4 weeks post-op which is tomorrow.      Objective: See treatment diary below      Assessment: Right shoulder PROM continues to make progress.  Began AAROM progressions starting mostly passive which patient tolerated well and maintained in a pain free range.  Steri strips continue to fall off and removed today with wedding coming this weekend and incisions were closed without drainage.       Plan: Continue per plan of care.      Precautions:   Patient Active Problem List   Diagnosis    Anxiety disorder    Atrophic vaginitis    Benign essential hypertension    Coronary atherosclerosis    Midline cystocele    Dacryocystitis of left lacrimal sac    Diverticulitis of colon    Gastroparesis    GERD without esophagitis    Hiatal hernia    IBS (irritable bowel syndrome)    Insomnia    Left shoulder pain    Low back pain    Menopausal syndrome    Microscopic hematuria    Mixed hyperlipidemia    Mixed stress and urge urinary incontinence    Post-menopausal bleeding    Status post percutaneous transluminal coronary angioplasty    Right knee pain    Seasonal allergies    Type 2 diabetes mellitus with stage 3 chronic kidney disease, without long-term current use of insulin, unspecified whether stage 3a or 3b CKD (HCC)    Vitamin deficiency    Attention deficit hyperactivity disorder (ADHD), predominantly hyperactive type    PTSD (post-traumatic stress disorder)    Depressed    Adjustment reaction with anxiety    Age-related nuclear cataract of both eyes    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    Current tobacco use    Degeneration of posterior vitreous  "body of both eyes    Need for vaccination    P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    Bronchitis    Mucopurulent chronic bronchitis (HCC)    Tear of right rotator cuff    Pre-operative clearance       R RC Repair on 8/30/2024    Manuals 9/3 9/9 9/16 9/19 9/23 9/26       R shoulder PROM: Flexion to 140 deg, abd to 90 deg, ER to 75 deg and IR to 35 deg  18' 18' 12' 12' 12'       R shoulder capsule JM for pain modulation  G2 G2 G2 G2        R scar mobs  3' 3' 3' 3' 3'                    Neuro Re-Ed                                                                                                        Ther Ex             Elbow flexion/extension AROM 3x10 3x10 3x10 3x10 3x10 3x10       Pendulums 3x10 3x30 3x30 3x30 3x30 DC       Putty/ball squeeze 3x10 HEP           Scapular retractions 3x10 3x10 3x10 3x10 3x10 3x10       Gentle cane ER stretch in sitting   10 x10\" 10 x10\" 10 x10\" 10 x10\"       Pulleys AAROM     NV 2x10       Table slides scaption and abduction     NV 2x10 ea.       Triceps extension with yellow TB     NV 2x10       Ther Activity                                       Gait Training                                       Modalities                                            "

## 2024-09-30 ENCOUNTER — APPOINTMENT (OUTPATIENT)
Dept: PHYSICAL THERAPY | Facility: CLINIC | Age: 64
End: 2024-09-30
Payer: COMMERCIAL

## 2024-10-01 DIAGNOSIS — F90.1 ATTENTION DEFICIT HYPERACTIVITY DISORDER (ADHD), PREDOMINANTLY HYPERACTIVE TYPE: ICD-10-CM

## 2024-10-02 RX ORDER — LISDEXAMFETAMINE DIMESYLATE 20 MG/1
20 TABLET, CHEWABLE ORAL
Qty: 30 TABLET | Refills: 0 | Status: SHIPPED | OUTPATIENT
Start: 2024-10-02

## 2024-10-03 ENCOUNTER — OFFICE VISIT (OUTPATIENT)
Dept: PHYSICAL THERAPY | Facility: CLINIC | Age: 64
End: 2024-10-03
Payer: COMMERCIAL

## 2024-10-03 DIAGNOSIS — Z98.890 S/P RIGHT ROTATOR CUFF REPAIR: Primary | ICD-10-CM

## 2024-10-03 PROCEDURE — 97110 THERAPEUTIC EXERCISES: CPT | Performed by: PHYSICAL THERAPIST

## 2024-10-03 PROCEDURE — 97140 MANUAL THERAPY 1/> REGIONS: CPT | Performed by: PHYSICAL THERAPIST

## 2024-10-03 NOTE — PROGRESS NOTES
Daily Note     Today's date: 10/3/2024  Patient name: Denice Vargas  : 1960  MRN: [id number]  Referring provider: Arthur Willis MD  Dx:   Encounter Diagnosis     ICD-10-CM    1. S/P right rotator cuff repair  Z98.890                            Subjective: Patient reports feeling sick for the past few days including GI today.  Notes that she has been compliant with her HEP.       Objective: See treatment diary below      Assessment: Patient requests hold on pulleys and most standing TE secondary to increased nausea.  Performed stationary TE noted below with appropriate tolerance.  Patients PROM progressing well overall in all planes.      Plan: Continue per plan of care.      Precautions:   Patient Active Problem List   Diagnosis    Anxiety disorder    Atrophic vaginitis    Benign essential hypertension    Coronary atherosclerosis    Midline cystocele    Dacryocystitis of left lacrimal sac    Diverticulitis of colon    Gastroparesis    GERD without esophagitis    Hiatal hernia    IBS (irritable bowel syndrome)    Insomnia    Left shoulder pain    Low back pain    Menopausal syndrome    Microscopic hematuria    Mixed hyperlipidemia    Mixed stress and urge urinary incontinence    Post-menopausal bleeding    Status post percutaneous transluminal coronary angioplasty    Right knee pain    Seasonal allergies    Type 2 diabetes mellitus with stage 3 chronic kidney disease, without long-term current use of insulin, unspecified whether stage 3a or 3b CKD (HCC)    Vitamin deficiency    Attention deficit hyperactivity disorder (ADHD), predominantly hyperactive type    PTSD (post-traumatic stress disorder)    Depressed    Adjustment reaction with anxiety    Age-related nuclear cataract of both eyes    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    Current tobacco use    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    Need for vaccination    P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    Bronchitis    Mucopurulent chronic  "bronchitis (HCC)    Tear of right rotator cuff    Pre-operative clearance       R RC Repair on 8/30/2024    Manuals 9/3 9/9 9/16 9/19 9/23 9/26 10/3      R shoulder PROM: Flexion to 140 deg, abd to 90 deg, ER to 75 deg and IR to 35 deg  18' 18' 12' 12' 12' 12'      R shoulder capsule JM for pain modulation  G2 G2 G2 G2        R scar mobs  3' 3' 3' 3' 3' 3'                   Neuro Re-Ed                                                                                                        Ther Ex             Elbow flexion/extension AROM 3x10 3x10 3x10 3x10 3x10 3x10 3x10      Pendulums 3x10 3x30 3x30 3x30 3x30 DC       Putty/ball squeeze 3x10 HEP           Scapular retractions 3x10 3x10 3x10 3x10 3x10 3x10 3x10      Gentle cane ER stretch in sitting   10 x10\" 10 x10\" 10 x10\" 10 x10\" 10 x10\"      Pulleys AAROM     NV 2x10 NV      Table slides scaption and abduction     NV 2x10 ea. 2x10 ea.      Triceps extension with yellow TB     NV 2x10 NV      Ther Activity                                       Gait Training                                       Modalities                                            "

## 2024-10-06 DIAGNOSIS — F41.9 ANXIETY DISORDER, UNSPECIFIED TYPE: ICD-10-CM

## 2024-10-07 ENCOUNTER — APPOINTMENT (OUTPATIENT)
Dept: PHYSICAL THERAPY | Facility: CLINIC | Age: 64
End: 2024-10-07
Payer: COMMERCIAL

## 2024-10-07 ENCOUNTER — OFFICE VISIT (OUTPATIENT)
Dept: URGENT CARE | Facility: CLINIC | Age: 64
End: 2024-10-07
Payer: COMMERCIAL

## 2024-10-07 VITALS
SYSTOLIC BLOOD PRESSURE: 120 MMHG | HEART RATE: 104 BPM | OXYGEN SATURATION: 99 % | RESPIRATION RATE: 18 BRPM | TEMPERATURE: 97.4 F | DIASTOLIC BLOOD PRESSURE: 74 MMHG

## 2024-10-07 DIAGNOSIS — R53.83 OTHER FATIGUE: Primary | ICD-10-CM

## 2024-10-07 DIAGNOSIS — Z98.890 S/P RIGHT ROTATOR CUFF REPAIR: Primary | ICD-10-CM

## 2024-10-07 PROCEDURE — S9083 URGENT CARE CENTER GLOBAL: HCPCS | Performed by: NURSE PRACTITIONER

## 2024-10-07 PROCEDURE — G0382 LEV 3 HOSP TYPE B ED VISIT: HCPCS | Performed by: NURSE PRACTITIONER

## 2024-10-07 RX ORDER — TRAMADOL HYDROCHLORIDE 50 MG/1
50 TABLET ORAL
COMMUNITY
Start: 2024-09-05

## 2024-10-07 RX ORDER — ESCITALOPRAM OXALATE 20 MG/1
20 TABLET ORAL DAILY
Qty: 90 TABLET | Refills: 1 | Status: SHIPPED | OUTPATIENT
Start: 2024-10-07

## 2024-10-07 RX ORDER — OXYCODONE HYDROCHLORIDE 5 MG/1
TABLET ORAL
COMMUNITY

## 2024-10-07 RX ORDER — HYDROCODONE BITARTRATE AND ACETAMINOPHEN 5; 325 MG/1; MG/1
TABLET ORAL
COMMUNITY
Start: 2024-09-25

## 2024-10-07 RX ORDER — CYCLOBENZAPRINE HCL 10 MG
10 TABLET ORAL
COMMUNITY

## 2024-10-07 NOTE — PROGRESS NOTES
[facility]        NAME: Denice Vargas is a 64 y.o. female  : 1960    MRN: [id number]  DATE: 2024  TIME: 2:19 PM    Assessment and Plan   Other fatigue [R53.83]  1. Other fatigue          Rapid covid negative   Discussed viral syndrome   Restart her asmanex   Continue albuterol   Pt in agreement with plan    Patient Instructions     Follow up with PCP in 3-5 days.  Proceed to  ER if symptoms worsen.    Chief Complaint     Chief Complaint   Patient presents with    Cold Like Symptoms     Patient with fatigue since Saturday and was around someone with COVID          History of Present Illness   Denice Vargas presents to the clinic c/o    Cold Like Symptoms (Patient with fatigue since Saturday and was around someone with COVID )  Feeling run down, some chest tightness, neck/gland soreness  Did not covid test at home    with upcoming surgery   Wants to be safe   Started her albuterol - has not used her asmanex         Review of Systems   Review of Systems   All other systems reviewed and are negative.        Current Medications     Long-Term Medications   Medication Sig Dispense Refill    aspirin (ECOTRIN LOW STRENGTH) 81 mg EC tablet Take 1 tablet by mouth daily      atorvastatin (LIPITOR) 40 mg tablet TAKE 1 TABLET DAILY 90 tablet 3    Blood Glucose Monitoring Suppl (ONE TOUCH ULTRA MINI) w/Device KIT by Does not apply route (Patient not taking: Reported on 2024)      cholecalciferol (VITAMIN D3) 1,000 units tablet Take by mouth      Empagliflozin (JARDIANCE) 10 MG TABS tablet Take 1 tablet (10 mg total) by mouth daily 30 tablet 5    escitalopram (LEXAPRO) 20 mg tablet Take 1 tablet (20 mg total) by mouth daily 90 tablet 1    famotidine (PEPCID) 20 mg tablet Take 1 tablet by mouth daily      fenofibrate (TRICOR) 145 mg tablet Take 1 tablet (145 mg total) by mouth daily 90 tablet 1    lisinopril (ZESTRIL) 5 mg tablet TAKE ONE-HALF (1/2) TABLET DAILY 45 tablet 1    metFORMIN  (GLUCOPHAGE-XR) 500 mg 24 hr tablet TAKE 2 TABLETS BY MOUTH TWICE A DAY WITH FOOD 360 tablet 0    metoprolol succinate (TOPROL-XL) 25 mg 24 hr tablet TAKE 1 TABLET DAILY 90 tablet 1    nabumetone (RELAFEN) 750 mg tablet Take 1 tablet (750 mg total) by mouth 2 (two) times a day 60 tablet 1    ONETOUCH DELICA LANCETS FINE MISC by Does not apply route daily (Patient not taking: Reported on 7/1/2024) 100 each 1    traZODone (DESYREL) 50 mg tablet TAKE 1 TABLET BY MOUTH DAILY AT BEDTIME 90 tablet 2    Vyvanse 20 MG capsule Take 1 capsule (20 mg total) by mouth every morning Max Daily Amount: 20 mg 30 capsule 0    Vyvanse 20 MG CHEW Chew 20 mg daily after lunch Max Daily Amount: 20 mg 30 tablet 0       Current Allergies     Allergies as of 10/07/2024 - Reviewed 10/07/2024   Allergen Reaction Noted    Levaquin [levofloxacin] Anaphylaxis 05/18/2012            The following portions of the patient's history were reviewed and updated as appropriate: allergies, current medications, past family history, past medical history, past social history, past surgical history and problem list.    Objective   /74   Pulse 104   Temp (!) 97.4 °F (36.3 °C) (Tympanic)   Resp 18   SpO2 99%        Physical Exam     Physical Exam  Vitals and nursing note reviewed.   Constitutional:       Appearance: Normal appearance. She is well-developed.   HENT:      Head: Normocephalic and atraumatic.      Right Ear: Hearing, tympanic membrane, ear canal and external ear normal.      Left Ear: Hearing, tympanic membrane, ear canal and external ear normal.      Nose: Nose normal.      Mouth/Throat:      Lips: Pink.      Mouth: Mucous membranes are moist.      Pharynx: Oropharynx is clear.   Eyes:      General: Lids are normal.      Conjunctiva/sclera: Conjunctivae normal.      Pupils: Pupils are equal, round, and reactive to light.   Cardiovascular:      Rate and Rhythm: Normal rate and regular rhythm.      Heart sounds: Normal heart sounds, S1  normal and S2 normal.   Pulmonary:      Effort: Pulmonary effort is normal.      Breath sounds: Rhonchi present.   Abdominal:      General: Abdomen is flat. Bowel sounds are normal.      Palpations: Abdomen is soft.   Musculoskeletal:         General: Normal range of motion.      Cervical back: Full passive range of motion without pain, normal range of motion and neck supple.   Skin:     General: Skin is warm and dry.   Neurological:      General: No focal deficit present.      Mental Status: She is alert and oriented to person, place, and time.   Psychiatric:         Mood and Affect: Mood normal.         Speech: Speech normal.         Behavior: Behavior normal. Behavior is cooperative.         Thought Content: Thought content normal.         Judgment: Judgment normal.

## 2024-10-10 ENCOUNTER — OFFICE VISIT (OUTPATIENT)
Dept: PHYSICAL THERAPY | Facility: CLINIC | Age: 64
End: 2024-10-10
Payer: COMMERCIAL

## 2024-10-10 DIAGNOSIS — Z98.890 S/P RIGHT ROTATOR CUFF REPAIR: Primary | ICD-10-CM

## 2024-10-10 PROCEDURE — 97110 THERAPEUTIC EXERCISES: CPT | Performed by: PHYSICAL THERAPIST

## 2024-10-10 PROCEDURE — 97140 MANUAL THERAPY 1/> REGIONS: CPT | Performed by: PHYSICAL THERAPIST

## 2024-10-10 NOTE — PROGRESS NOTES
Daily Note     Today's date: 10/10/2024  Patient name: Denice Vargas  : 1960  MRN: [id number]  Referring provider: Arthur Willis MD  Dx:   Encounter Diagnosis     ICD-10-CM    1. S/P right rotator cuff repair  Z98.890                              Subjective: Patient reports continued compliance with her HEP.  States that overall the shoulder is making progress with less overall pain.       Objective: See treatment diary below      Assessment: Patients right shoulder PROM steadily progressing.  Fair tolerance to AAROM progressions and encouraged to maintain pain free mobility.  Overall patient is progressing per protocol without setback and will advance to AROM next visit being over 6 weeks post-op.       Plan: Continue per plan of care.      Precautions:   Patient Active Problem List   Diagnosis    Anxiety disorder    Atrophic vaginitis    Benign essential hypertension    Coronary atherosclerosis    Midline cystocele    Dacryocystitis of left lacrimal sac    Diverticulitis of colon    Gastroparesis    GERD without esophagitis    Hiatal hernia    IBS (irritable bowel syndrome)    Insomnia    Left shoulder pain    Low back pain    Menopausal syndrome    Microscopic hematuria    Mixed hyperlipidemia    Mixed stress and urge urinary incontinence    Post-menopausal bleeding    Status post percutaneous transluminal coronary angioplasty    Right knee pain    Seasonal allergies    Type 2 diabetes mellitus with stage 3 chronic kidney disease, without long-term current use of insulin, unspecified whether stage 3a or 3b CKD (HCC)    Vitamin deficiency    Attention deficit hyperactivity disorder (ADHD), predominantly hyperactive type    PTSD (post-traumatic stress disorder)    Depressed    Adjustment reaction with anxiety    Age-related nuclear cataract of both eyes    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    Current tobacco use    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    Need for vaccination    Paving  "stone retinal degeneration    Bronchitis    Mucopurulent chronic bronchitis (HCC)    Tear of right rotator cuff    Pre-operative clearance       R RC Repair on 8/30/2024    Manuals 9/3 9/9 9/16 9/19 9/23 9/26 10/3 10/10     R shoulder PROM: Flexion to 160 deg, abd to 160 deg, ER to 90 deg and IR to 40 deg  18' 18' 12' 12' 12' 12' 12'     R shoulder capsule JM for pain modulation  G2 G2 G2 G2        R scar mobs  3' 3' 3' 3' 3' 3' 3'                  Neuro Re-Ed                                                                                                        Ther Ex             Elbow flexion/extension AROM 3x10 3x10 3x10 3x10 3x10 3x10 3x10 3x10 2#    Scapular retractions 3x10 3x10 3x10 3x10 3x10 3x10 3x10 3x10     Gentle cane ER stretch in sitting   10 x10\" 10 x10\" 10 x10\" 10 x10\" 10 x10\" 10 x10\"     Pulleys AAROM     NV 2x10 NV 3x10     Table slides scaption and abduction     NV 2x10 ea. 2x10 ea. 3x10  ea.     Triceps extension with yellow TB     NV 2x10 NV 3x10     Side lying ER        NV     TB rows        NV     Prone shoulder flexion/abduction - palm down        NV     Supine cane press        NV     Ther Activity                                       Gait Training                                       Modalities                                            "

## 2024-10-14 ENCOUNTER — OFFICE VISIT (OUTPATIENT)
Dept: PHYSICAL THERAPY | Facility: CLINIC | Age: 64
End: 2024-10-14
Payer: COMMERCIAL

## 2024-10-14 ENCOUNTER — TELEPHONE (OUTPATIENT)
Age: 64
End: 2024-10-14

## 2024-10-14 DIAGNOSIS — Z98.890 S/P RIGHT ROTATOR CUFF REPAIR: Primary | ICD-10-CM

## 2024-10-14 DIAGNOSIS — F90.1 ATTENTION DEFICIT HYPERACTIVITY DISORDER (ADHD), PREDOMINANTLY HYPERACTIVE TYPE: ICD-10-CM

## 2024-10-14 PROCEDURE — 97110 THERAPEUTIC EXERCISES: CPT | Performed by: PHYSICAL THERAPIST

## 2024-10-14 PROCEDURE — 97140 MANUAL THERAPY 1/> REGIONS: CPT | Performed by: PHYSICAL THERAPIST

## 2024-10-14 RX ORDER — LISDEXAMFETAMINE DIMESYLATE 20 MG/1
20 CAPSULE ORAL EVERY MORNING
Qty: 30 CAPSULE | Refills: 0 | Status: SHIPPED | OUTPATIENT
Start: 2024-10-14

## 2024-10-14 NOTE — PROGRESS NOTES
Daily Note     Today's date: 10/14/2024  Patient name: Denice Vargas  : 1960  MRN: [id number]  Referring provider: Arthur Willis MD  Dx:   Encounter Diagnosis     ICD-10-CM    1. S/P right rotator cuff repair  Z98.890                              Subjective: Patient reports the shoulder has been find but complains of tingling throughout her hand.        Objective: See treatment diary below      Assessment: Right shoulder PROM continues to advance with greatest restriction into abduction and ER.  Began AROM progressions as noted with fair tolerance and improved activation with increased reps.  Will update HEP next visit pending progress.      Plan: Continue per plan of care.      Precautions:   Patient Active Problem List   Diagnosis    Anxiety disorder    Atrophic vaginitis    Benign essential hypertension    Coronary atherosclerosis    Midline cystocele    Dacryocystitis of left lacrimal sac    Diverticulitis of colon    Gastroparesis    GERD without esophagitis    Hiatal hernia    IBS (irritable bowel syndrome)    Insomnia    Left shoulder pain    Low back pain    Menopausal syndrome    Microscopic hematuria    Mixed hyperlipidemia    Mixed stress and urge urinary incontinence    Post-menopausal bleeding    Status post percutaneous transluminal coronary angioplasty    Right knee pain    Seasonal allergies    Type 2 diabetes mellitus with stage 3 chronic kidney disease, without long-term current use of insulin, unspecified whether stage 3a or 3b CKD (HCC)    Vitamin deficiency    Attention deficit hyperactivity disorder (ADHD), predominantly hyperactive type    PTSD (post-traumatic stress disorder)    Depressed    Adjustment reaction with anxiety    Age-related nuclear cataract of both eyes    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    Current tobacco use    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    Need for vaccination    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    Bronchitis    Mucopurulent chronic  "bronchitis (HCC)    Tear of right rotator cuff    Pre-operative clearance       R RC Repair on 8/30/2024    Manuals 9/3 9/9 9/16 9/19 9/23 9/26 10/3 10/10 10/14    R shoulder PROM: Flexion to 160 deg, abd to 160 deg, ER to 90 deg and IR to 40 deg  18' 18' 12' 12' 12' 12' 12' 12'    R shoulder capsule JM for pain modulation  G2 G2 G2 G2        R scar mobs  3' 3' 3' 3' 3' 3' 3' 3'                 Neuro Re-Ed                                                                                                        Ther Ex             Elbow flexion/extension AROM 3x10 3x10 3x10 3x10 3x10 3x10 3x10 3x10 2#    Scapular retractions 3x10 3x10 3x10 3x10 3x10 3x10 3x10 3x10 DC    Gentle cane ER stretch in sitting   10 x10\" 10 x10\" 10 x10\" 10 x10\" 10 x10\" 10 x10\" 10 x10\"    Pulleys AAROM     NV 2x10 NV 3x10 3x10    Table slides scaption and abduction     NV 2x10 ea. 2x10 ea. 3x10  ea. 2x1- ea.    Triceps extension with yellow TB     NV 2x10 NV 3x10 3x10    Side lying ER        NV 2x10    TB rows - yellow        NV 3x10    Prone shoulder flexion/abduction - palm down        NV 2x10 ea.    Supine cane press        NV 2x10    Ther Activity                                       Gait Training                                       Modalities                                            "

## 2024-10-14 NOTE — TELEPHONE ENCOUNTER
Pt called she said she put in a refill for the Vyanse 20 mg capsule and she only has one left. Please review and advise [phone]. I understand Dr. Kaur will be in tomorrow. She said if its early than it will be okay. Thank you.

## 2024-10-16 ENCOUNTER — OFFICE VISIT (OUTPATIENT)
Dept: PHYSICAL THERAPY | Facility: CLINIC | Age: 64
End: 2024-10-16
Payer: COMMERCIAL

## 2024-10-16 DIAGNOSIS — Z98.890 S/P RIGHT ROTATOR CUFF REPAIR: Primary | ICD-10-CM

## 2024-10-16 PROCEDURE — 97140 MANUAL THERAPY 1/> REGIONS: CPT | Performed by: PHYSICAL THERAPIST

## 2024-10-16 PROCEDURE — 97110 THERAPEUTIC EXERCISES: CPT | Performed by: PHYSICAL THERAPIST

## 2024-10-16 NOTE — PROGRESS NOTES
Daily Note     Today's date: 10/16/2024  Patient name: Denice Vargas  : 1960  MRN: [id number]  Referring provider: Arthur Willis MD  Dx:   Encounter Diagnosis     ICD-10-CM    1. S/P right rotator cuff repair  Z98.890                              Subjective: Patient reports improving sleep quality and continued reduction in overall shoulder discomfort.  Remains compliant with her HEP.      Objective: See treatment diary below      Assessment: Right shoulder PROM making steady progress.  Right shoulder ER AROM progressing greatly and without pain.  No discomfort present with current AAROM and will begin advancing.      Plan: Continue per plan of care.      Precautions:   Patient Active Problem List   Diagnosis    Anxiety disorder    Atrophic vaginitis    Benign essential hypertension    Coronary atherosclerosis    Midline cystocele    Dacryocystitis of left lacrimal sac    Diverticulitis of colon    Gastroparesis    GERD without esophagitis    Hiatal hernia    IBS (irritable bowel syndrome)    Insomnia    Left shoulder pain    Low back pain    Menopausal syndrome    Microscopic hematuria    Mixed hyperlipidemia    Mixed stress and urge urinary incontinence    Post-menopausal bleeding    Status post percutaneous transluminal coronary angioplasty    Right knee pain    Seasonal allergies    Type 2 diabetes mellitus with stage 3 chronic kidney disease, without long-term current use of insulin, unspecified whether stage 3a or 3b CKD (HCC)    Vitamin deficiency    Attention deficit hyperactivity disorder (ADHD), predominantly hyperactive type    PTSD (post-traumatic stress disorder)    Depressed    Adjustment reaction with anxiety    Age-related nuclear cataract of both eyes    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    Current tobacco use    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    Need for vaccination    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    Bronchitis    Mucopurulent chronic bronchitis (HCC)    Tear  "of right rotator cuff    Pre-operative clearance       R RC Repair on 8/30/2024    Manuals 9/3 9/9 9/16 9/19 9/23 9/26 10/3 10/10 10/14 10/16   R shoulder PROM: Flexion to 160 deg, abd to 160 deg, ER to 90 deg and IR to 40 deg  18' 18' 12' 12' 12' 12' 12' 12' 12'   R shoulder capsule JM for pain modulation  G2 G2 G2 G2        R scar mobs  3' 3' 3' 3' 3' 3' 3' 3' 3'                Neuro Re-Ed                                                                                                        Ther Ex             Elbow flexion/extension AROM 3x10 3x10 3x10 3x10 3x10 3x10 3x10 3x10 2# 2# 3x10   Scapular retractions 3x10 3x10 3x10 3x10 3x10 3x10 3x10 3x10 DC    Gentle cane ER stretch in sitting   10 x10\" 10 x10\" 10 x10\" 10 x10\" 10 x10\" 10 x10\" 10 x10\" 10 x10\"   Pulleys AAROM     NV 2x10 NV 3x10 3x10 3x10   Table slides scaption and abduction     NV 2x10 ea. 2x10 ea. 3x10  ea. 2x1- ea. 2x10 ea.   Triceps extension with yellow TB     NV 2x10 NV 3x10 3x10 3x10   Side lying ER        NV 2x10 3x10   TB rows - yellow        NV 3x10    Prone shoulder flexion/abduction - palm down        NV 2x10 ea. 2x10 ea.   Supine cane press        NV 2x10 2x10   Ther Activity                                       Gait Training                                       Modalities                                            "

## 2024-10-17 ENCOUNTER — APPOINTMENT (OUTPATIENT)
Dept: PHYSICAL THERAPY | Facility: CLINIC | Age: 64
End: 2024-10-17
Payer: COMMERCIAL

## 2024-10-21 ENCOUNTER — OFFICE VISIT (OUTPATIENT)
Dept: PHYSICAL THERAPY | Facility: CLINIC | Age: 64
End: 2024-10-21
Payer: COMMERCIAL

## 2024-10-21 DIAGNOSIS — Z98.890 S/P RIGHT ROTATOR CUFF REPAIR: Primary | ICD-10-CM

## 2024-10-21 PROCEDURE — 97110 THERAPEUTIC EXERCISES: CPT | Performed by: PHYSICAL THERAPIST

## 2024-10-21 PROCEDURE — 97140 MANUAL THERAPY 1/> REGIONS: CPT | Performed by: PHYSICAL THERAPIST

## 2024-10-21 NOTE — PROGRESS NOTES
Daily Note     Today's date: 10/21/2024  Patient name: Denice Vargas  : 1960  MRN: [id number]  Referring provider: Arthur Willis MD  Dx:   Encounter Diagnosis     ICD-10-CM    1. S/P right rotator cuff repair  Z98.890                              Subjective: Patient notes right scapular discomfort today, lingering from a drive to and from [place].        Objective: See treatment diary below      Assessment: Right shoulder PROM continues to make progress and is being held within pain free range.  AAROM initially with discomfort overhead in supine but decreased and resolved with cues for scapular retraction and depression.  Overall patient continues to progress according to protocol.       Plan: Continue per plan of care.      Precautions:   Patient Active Problem List   Diagnosis    Anxiety disorder    Atrophic vaginitis    Benign essential hypertension    Coronary atherosclerosis    Midline cystocele    Dacryocystitis of left lacrimal sac    Diverticulitis of colon    Gastroparesis    GERD without esophagitis    Hiatal hernia    IBS (irritable bowel syndrome)    Insomnia    Left shoulder pain    Low back pain    Menopausal syndrome    Microscopic hematuria    Mixed hyperlipidemia    Mixed stress and urge urinary incontinence    Post-menopausal bleeding    Status post percutaneous transluminal coronary angioplasty    Right knee pain    Seasonal allergies    Type 2 diabetes mellitus with stage 3 chronic kidney disease, without long-term current use of insulin, unspecified whether stage 3a or 3b CKD (HCC)    Vitamin deficiency    Attention deficit hyperactivity disorder (ADHD), predominantly hyperactive type    PTSD (post-traumatic stress disorder)    Depressed    Adjustment reaction with anxiety    Age-related nuclear cataract of both eyes    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    Current tobacco use    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    Need for vaccination    [place] stone retinal  "degeneration    Bronchitis    Mucopurulent chronic bronchitis (HCC)    Tear of right rotator cuff    Pre-operative clearance       R RC Repair on 8/30/2024    Manuals 10/21         10/16   R shoulder PROM: Flexion to 160 deg, abd to 160 deg, ER to 90 deg and IR to 40 deg 12'         12'   R shoulder capsule JM for pain modulation             R scar mobs 3'         3'                Neuro Re-Ed                                                                                                        Ther Ex             Elbow flexion/extension AROM 2# 3x10         2# 3x10   Scapular retractions             Gentle cane ER stretch in sitting 10 x10\"         10 x10\"   Pulleys AAROM 3x10         3x10   Table slides scaption and abduction 2x10 ea.         2x10 ea.   Triceps extension with yellow TB 3x10         3x10   Side lying ER 3x10         3x10   TB rows - yellow 3x10            Prone shoulder flexion/abduction - palm down 2x10 ea.         2x10 ea.   Supine cane press 2x10         2x10   Ther Activity                                       Gait Training                                       Modalities                                            "

## 2024-10-23 ENCOUNTER — OFFICE VISIT (OUTPATIENT)
Dept: PHYSICAL THERAPY | Facility: CLINIC | Age: 64
End: 2024-10-23
Payer: COMMERCIAL

## 2024-10-23 DIAGNOSIS — Z98.890 S/P RIGHT ROTATOR CUFF REPAIR: Primary | ICD-10-CM

## 2024-10-23 PROCEDURE — 97140 MANUAL THERAPY 1/> REGIONS: CPT | Performed by: PHYSICAL THERAPIST

## 2024-10-23 PROCEDURE — 97110 THERAPEUTIC EXERCISES: CPT | Performed by: PHYSICAL THERAPIST

## 2024-10-23 NOTE — PROGRESS NOTES
Daily Note     Today's date: 10/23/2024  Patient name: Denice Vargas  : 1960  MRN: [id number]  Referring provider: Arthur Willis MD  Dx:   Encounter Diagnosis     ICD-10-CM    1. S/P right rotator cuff repair  Z98.890                              Subjective: Patient reports less scapular tightness today.  States that she has remains compliant with her HEP.       Objective: See treatment diary below      Assessment: Right shoulder ER PROM remains limited by discomfort but all other ranges are WNL.  Progressed shoulder AAROM to AROM with fair tolerance and reduction in impingement symptoms with scapular retraction.  Plan to progress resistance next visit as allowed by post-op protocol.       Plan: Continue per plan of care.      Precautions:   Patient Active Problem List   Diagnosis    Anxiety disorder    Atrophic vaginitis    Benign essential hypertension    Coronary atherosclerosis    Midline cystocele    Dacryocystitis of left lacrimal sac    Diverticulitis of colon    Gastroparesis    GERD without esophagitis    Hiatal hernia    IBS (irritable bowel syndrome)    Insomnia    Left shoulder pain    Low back pain    Menopausal syndrome    Microscopic hematuria    Mixed hyperlipidemia    Mixed stress and urge urinary incontinence    Post-menopausal bleeding    Status post percutaneous transluminal coronary angioplasty    Right knee pain    Seasonal allergies    Type 2 diabetes mellitus with stage 3 chronic kidney disease, without long-term current use of insulin, unspecified whether stage 3a or 3b CKD (HCC)    Vitamin deficiency    Attention deficit hyperactivity disorder (ADHD), predominantly hyperactive type    PTSD (post-traumatic stress disorder)    Depressed    Adjustment reaction with anxiety    Age-related nuclear cataract of both eyes    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    Current tobacco use    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    Need for vaccination    [place] stone retinal  "degeneration    Bronchitis    Mucopurulent chronic bronchitis (HCC)    Tear of right rotator cuff    Pre-operative clearance       R RC Repair on 8/30/2024    Manuals 10/21 10/23        10/16   R shoulder PROM: Flexion to 160 deg, abd to 160 deg, ER to 90 deg and IR to 40 deg 12' 12'        12'   R shoulder capsule JM for pain modulation             R scar mobs 3'         3'   Supine rhythmic stabilizations  NV           Neuro Re-Ed                                                                                                        Ther Ex             Elbow flexion/extension AROM 2# 3x10 3# 3x10        2# 3x10   Gentle cane ER stretch in sitting 10 x10\" 10 x10\"        10 x10\"   Pulleys AAROM 3x10 3x10        3x10   Triceps extension with yellow TB 3x10 3x10        3x10   Side lying ER 3x10 3x10 1#       3x10   TB rows - yellow 3x10 3x10           Prone shoulder flexion/abduction - palm down 2x10 ea. 2x10 ea.        2x10 ea.   Supine cane press 2x10 2x10        2x10   TB ER/IR  NV           Qped with arm lifts  NV                                     Ther Activity                                       Gait Training                                       Modalities                                            "

## 2024-10-24 ENCOUNTER — APPOINTMENT (OUTPATIENT)
Dept: PHYSICAL THERAPY | Facility: CLINIC | Age: 64
End: 2024-10-24
Payer: COMMERCIAL

## 2024-10-28 ENCOUNTER — OFFICE VISIT (OUTPATIENT)
Dept: PHYSICAL THERAPY | Facility: CLINIC | Age: 64
End: 2024-10-28
Payer: COMMERCIAL

## 2024-10-28 DIAGNOSIS — Z98.890 S/P RIGHT ROTATOR CUFF REPAIR: Primary | ICD-10-CM

## 2024-10-28 PROCEDURE — 97110 THERAPEUTIC EXERCISES: CPT | Performed by: PHYSICAL THERAPIST

## 2024-10-28 PROCEDURE — 97140 MANUAL THERAPY 1/> REGIONS: CPT | Performed by: PHYSICAL THERAPIST

## 2024-10-28 NOTE — PROGRESS NOTES
Daily Note     Today's date: 10/28/2024  Patient name: Denice Vargas  : 1960  MRN: [id number]  Referring provider: Arthur Willis MD  Dx:   Encounter Diagnosis     ICD-10-CM    1. S/P right rotator cuff repair  Z98.890                                Subjective: Patient reports continued stiffness in the morning and evenings.  Reports continued compliance with her HEP.       Objective: See treatment diary below      Assessment: Right shoulder ER PROM limited today by pain.  Educated to perform ER stretching with greater frequency per stiffness but not indicative of adhesive capsulitis given ER is WFL by 0 degrees of shoulder abduction.  Progressed strengthening as noted below which patient tolerated well and maintained pain free.  AROM initially pain free from 100-120 degrees of GH flexion and held on this progression.     Plan: Continue per plan of care. Update HEP.      Precautions:   Patient Active Problem List   Diagnosis    Anxiety disorder    Atrophic vaginitis    Benign essential hypertension    Coronary atherosclerosis    Midline cystocele    Dacryocystitis of left lacrimal sac    Diverticulitis of colon    Gastroparesis    GERD without esophagitis    Hiatal hernia    IBS (irritable bowel syndrome)    Insomnia    Left shoulder pain    Low back pain    Menopausal syndrome    Microscopic hematuria    Mixed hyperlipidemia    Mixed stress and urge urinary incontinence    Post-menopausal bleeding    Status post percutaneous transluminal coronary angioplasty    Right knee pain    Seasonal allergies    Type 2 diabetes mellitus with stage 3 chronic kidney disease, without long-term current use of insulin, unspecified whether stage 3a or 3b CKD (HCC)    Vitamin deficiency    Attention deficit hyperactivity disorder (ADHD), predominantly hyperactive type    PTSD (post-traumatic stress disorder)    Depressed    Adjustment reaction with anxiety    Age-related nuclear cataract of both eyes    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    Current tobacco use    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    Need for vaccination    P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    Bronchitis    Mucopurulent chronic bronchitis (HCC)    Tear of right rotator cuff    Pre-operative clearance       R RC Repair on 8/30/2024    Manuals 10/21 10/23 10/28       10/16   R shoulder PROM: Flexion to 160 deg, abd to 160 deg, ER to 90 deg and IR to 40 deg 12' 12' 12'       12'   R shoulder capsule JM for pain modulation             R scar mobs 3'         3'   Supine rhythmic stabilizations  NV 3x20\"          Neuro Re-Ed                                                                                                        Ther Ex             UBE warm up - reverse   L1 4'          Elbow flexion/extension AROM 2# 3x10 3# 3x10 DC       2# 3x10   Gentle cane ER stretch in sitting 10 x10\" 10 x10\" 10 x10\"       10 x10\"   Pulleys AAROM 3x10 3x10 3x10       3x10   Triceps extension with yellow TB 3x10 3x10 DC       3x10   Side lying ER 3x10 3x10 1# 3x10       3x10   TB rows - green 3x10 3x10 3x10          Prone shoulder flexion/abduction - palm down 2x10 ea. 2x10 ea. 2x10 ea.       2x10 ea.   Supine cane press 2x10 2x10 DC       2x10   TB ER/IR - orange TB  NV 2x10 ea.          Qped with arm lifts  NV 2x10 B/L          Standing shoulder AROM  NV Pain held          Wall slide   2x5          Ther Activity                                       Gait Training                                       Modalities                                            "

## 2024-10-29 ENCOUNTER — TELEPHONE (OUTPATIENT)
Age: 64
End: 2024-10-29

## 2024-10-29 ENCOUNTER — OFFICE VISIT (OUTPATIENT)
Dept: FAMILY MEDICINE CLINIC | Facility: CLINIC | Age: 64
End: 2024-10-29
Payer: COMMERCIAL

## 2024-10-29 VITALS
HEIGHT: 66 IN | SYSTOLIC BLOOD PRESSURE: 130 MMHG | DIASTOLIC BLOOD PRESSURE: 84 MMHG | OXYGEN SATURATION: 98 % | HEART RATE: 88 BPM | BODY MASS INDEX: 23.88 KG/M2 | WEIGHT: 148.6 LBS | TEMPERATURE: 98.1 F

## 2024-10-29 DIAGNOSIS — I10 ESSENTIAL HYPERTENSION: ICD-10-CM

## 2024-10-29 DIAGNOSIS — N18.30 TYPE 2 DIABETES MELLITUS WITH STAGE 3 CHRONIC KIDNEY DISEASE, WITHOUT LONG-TERM CURRENT USE OF INSULIN, UNSPECIFIED WHETHER STAGE 3A OR 3B CKD (HCC): ICD-10-CM

## 2024-10-29 DIAGNOSIS — E11.22 TYPE 2 DIABETES MELLITUS WITH STAGE 3 CHRONIC KIDNEY DISEASE, WITHOUT LONG-TERM CURRENT USE OF INSULIN, UNSPECIFIED WHETHER STAGE 3A OR 3B CKD (HCC): ICD-10-CM

## 2024-10-29 DIAGNOSIS — F90.1 ATTENTION DEFICIT HYPERACTIVITY DISORDER (ADHD), PREDOMINANTLY HYPERACTIVE TYPE: Primary | ICD-10-CM

## 2024-10-29 DIAGNOSIS — E78.2 MIXED HYPERLIPIDEMIA: ICD-10-CM

## 2024-10-29 DIAGNOSIS — R94.6 ABNORMAL THYROID FUNCTION TEST: ICD-10-CM

## 2024-10-29 DIAGNOSIS — Z23 ENCOUNTER FOR IMMUNIZATION: Primary | ICD-10-CM

## 2024-10-29 DIAGNOSIS — Z00.00 ANNUAL PHYSICAL EXAM: ICD-10-CM

## 2024-10-29 DIAGNOSIS — F90.1 ATTENTION DEFICIT HYPERACTIVITY DISORDER (ADHD), PREDOMINANTLY HYPERACTIVE TYPE: ICD-10-CM

## 2024-10-29 DIAGNOSIS — I25.10 ATHEROSCLEROSIS OF CORONARY ARTERY OF NATIVE HEART WITHOUT ANGINA PECTORIS, UNSPECIFIED VESSEL OR LESION TYPE: ICD-10-CM

## 2024-10-29 DIAGNOSIS — F41.9 ANXIETY DISORDER, UNSPECIFIED TYPE: ICD-10-CM

## 2024-10-29 PROCEDURE — 99214 OFFICE O/P EST MOD 30 MIN: CPT | Performed by: FAMILY MEDICINE

## 2024-10-29 PROCEDURE — 99396 PREV VISIT EST AGE 40-64: CPT | Performed by: FAMILY MEDICINE

## 2024-10-29 PROCEDURE — 90673 RIV3 VACCINE NO PRESERV IM: CPT

## 2024-10-29 PROCEDURE — 90471 IMMUNIZATION ADMIN: CPT

## 2024-10-29 RX ORDER — LISDEXAMFETAMINE DIMESYLATE 30 MG/1
30 CAPSULE ORAL EVERY MORNING
Qty: 30 CAPSULE | Refills: 0 | Status: SHIPPED | OUTPATIENT
Start: 2024-10-29

## 2024-10-29 RX ORDER — LISDEXAMFETAMINE DIMESYLATE 30 MG/1
30 TABLET, CHEWABLE ORAL
Qty: 30 TABLET | Refills: 0 | Status: SHIPPED | OUTPATIENT
Start: 2024-10-29 | End: 2024-10-29 | Stop reason: SDUPTHER

## 2024-10-29 RX ORDER — LISDEXAMFETAMINE DIMESYLATE 30 MG/1
30 TABLET, CHEWABLE ORAL
Qty: 30 TABLET | Refills: 0 | Status: SHIPPED | OUTPATIENT
Start: 2024-10-29 | End: 2024-10-29

## 2024-10-29 NOTE — ASSESSMENT & PLAN NOTE
Orders:    Lisdexamfetamine Dimesylate (Vyvanse) 30 MG CHEW; Chew 30 mg daily after lunch Max Daily Amount: 30 mg

## 2024-10-29 NOTE — ASSESSMENT & PLAN NOTE
Patient will be completing fasting blood work this week.  Continue with a strict low-fat/low-cholesterol as well as her current treatment with atorvastatin.  Orders:    Lipid Panel with Direct LDL reflex; Future

## 2024-10-29 NOTE — TELEPHONE ENCOUNTER
Last visit today 10/29/2024  Next appt 04/29/2025    Patient stated that the [facility] pharmacy in [place] does not have the medication in stock. Patient is requesting for the script to be changed into capsules and sent to [facility] in [place]. Patient stated that she needs the medication for today. Please advise.

## 2024-10-29 NOTE — ASSESSMENT & PLAN NOTE
Last A1c appears very well-controlled at 7.1.  Her A1c may likely be much lower.  At this time, continue with her current treatment of metformin, Jardiance as well as her Mounjaro.  She will be completing blood work this week.  Lab Results   Component Value Date    HGBA1C 7.1 (H) 08/23/2024       Orders:    Comprehensive metabolic panel; Future    Hemoglobin A1c (w/out EAG) ([facility] ONLY); Future    Microalbumin, Random Urine (W/Creatinine) ([facility] ONLY); Future    Hemoglobin A1c (w/out EAG) ([facility] ONLY)    Microalbumin, Random Urine (W/Creatinine) ([facility] ONLY)

## 2024-10-29 NOTE — PROGRESS NOTES
Ambulatory Visit  Name: Denice Vargas      : 1960      MRN: [id number]  Encounter Provider: Jimmy Kaur DO  Encounter Date: 10/29/2024   Encounter department: [facility]    Assessment & Plan  Attention deficit hyperactivity disorder (ADHD), predominantly hyperactive type  Symptoms appear uncontrolled.  At this time, we will continue with her Vyvanse 20 mg in the morning however increase her Vyvanse chewable to 30 mg in the afternoon.  PDMP reviewed, no abnormality seen today.  Orders:  •  Lisdexamfetamine Dimesylate (Vyvanse) 30 MG CHEW; Chew 30 mg daily after lunch Max Daily Amount: 30 mg    Type 2 diabetes mellitus with stage 3 chronic kidney disease, without long-term current use of insulin, unspecified whether stage 3a or 3b CKD (HCC)  Last A1c appears very well-controlled at 7.1.  Her A1c may likely be much lower.  At this time, continue with her current treatment of metformin, Jardiance as well as her Mounjaro.  She will be completing blood work this week.  Lab Results   Component Value Date    HGBA1C 7.1 (H) 2024       Orders:  •  Comprehensive metabolic panel; Future  •  Hemoglobin A1c (w/out EAG) ([facility] ONLY); Future  •  Microalbumin, Random Urine (W/Creatinine) ([facility] ONLY); Future  •  Hemoglobin A1c (w/out EAG) ([facility] ONLY)  •  Microalbumin, Random Urine (W/Creatinine) ([facility] ONLY)    Essential hypertension  Stable today.  Current treatment of metoprolol, lisinopril, aspirin as well as atorvastatin.       Anxiety disorder, unspecified type  Stable.  Continue with her current treatment of Lexapro.       Mixed hyperlipidemia  Patient will be completing fasting blood work this week.  Continue with a strict low-fat/low-cholesterol as well as her current treatment with atorvastatin.  Orders:  •  Lipid Panel with Direct LDL reflex; Future    Abnormal thyroid function test    Orders:  •  TSH, 3rd generation with Free T4 reflex; Future    Annual physical exam          Encounter for immunization    Orders:  •  influenza vaccine, recombinant, PF, 0.5 mL IM (Flublok)    Atherosclerosis of coronary artery of native heart without angina pectoris, unspecified vessel or lesion type            History of Present Illness     BLANCA  Is a 64-year-old female presents today for a follow-up on her chronic conditions.  She has ADHD, type 2 diabetes, hypertension, anxiety disorder, dyslipidemia, coronary arthrosclerosis.  She has been taking medications regularly.  She denies adverse reactions with her medications.  She states that she does believe that her Vyvanse has been as effective for her especially after her rotator cuff repair.  She been noticing that she is more jittery.  She would like to discuss further adjustments to her medication.  She has been on her CGM.  She is been noticing blood sugar readings that have been very well-controlled.  The estimated A1c level on her CGM was 6.3.      History obtained from : patient  Review of Systems   Constitutional:  Negative for activity change, chills, fatigue and fever.   HENT:  Negative for congestion, ear pain, sinus pressure and sore throat.    Eyes:  Negative for redness, itching and visual disturbance.   Respiratory:  Negative for cough and shortness of breath.    Cardiovascular:  Negative for chest pain and palpitations.   Gastrointestinal:  Negative for abdominal pain, diarrhea and nausea.   Endocrine: Negative for cold intolerance and heat intolerance.   Genitourinary:  Negative for dysuria, flank pain and frequency.   Musculoskeletal:  Negative for arthralgias, back pain, gait problem and myalgias.   Skin:  Negative for color change.   Allergic/Immunologic: Negative for environmental allergies.   Neurological:  Negative for dizziness, numbness and headaches.   Psychiatric/Behavioral:  Negative for behavioral problems and sleep disturbance.      Medical History Reviewed by provider this encounter:  Tobacco  Allergies  Meds   Problems  Med Hx  Surg Hx  Fam Hx       Current Outpatient Medications on File Prior to Visit   Medication Sig Dispense Refill   • albuterol (PROVENTIL HFA,VENTOLIN HFA) 90 mcg/act inhaler Inhale 2 puffs every 6 (six) hours as needed for wheezing 8 g 5   • aspirin (ECOTRIN LOW STRENGTH) 81 mg EC tablet Take 1 tablet by mouth daily     • atorvastatin (LIPITOR) 40 mg tablet TAKE 1 TABLET DAILY 90 tablet 3   • cetirizine (ZyrTEC) 10 mg tablet Take by mouth Daily     • cholecalciferol (VITAMIN D3) 1,000 units tablet Take by mouth     • Continuous Glucose  (FreeStyle Saige 3 [place]) JAVAN      • Continuous Glucose Sensor (FreeStyle Saige 3 Sensor) MISC Use 1 each every 14 (fourteen) days 6 each 0   • Empagliflozin (JARDIANCE) 10 MG TABS tablet Take 1 tablet (10 mg total) by mouth daily 30 tablet 5   • escitalopram (LEXAPRO) 20 mg tablet Take 1 tablet (20 mg total) by mouth daily 90 tablet 1   • famotidine (PEPCID) 20 mg tablet Take 1 tablet by mouth daily     • fenofibrate (TRICOR) 145 mg tablet Take 1 tablet (145 mg total) by mouth daily 90 tablet 1   • lisinopril (ZESTRIL) 5 mg tablet TAKE ONE-HALF (1/2) TABLET DAILY 45 tablet 1   • metFORMIN (GLUCOPHAGE-XR) 500 mg 24 hr tablet TAKE 2 TABLETS BY MOUTH TWICE A DAY WITH FOOD 360 tablet 0   • metoprolol succinate (TOPROL-XL) 25 mg 24 hr tablet TAKE 1 TABLET DAILY 90 tablet 1   • Multiple Vitamins-Minerals (MULTIVITAMIN ADULTS 50+ PO) Take 1 tablet by mouth daily     • nabumetone (RELAFEN) 750 mg tablet Take 1 tablet (750 mg total) by mouth 2 (two) times a day 60 tablet 1   • tirzepatide (Mounjaro) 2.5 MG/0.5ML Inject 0.5 mL (2.5 mg total) under the skin every 7 days 6 mL 0   • Vyvanse 20 MG capsule Take 1 capsule (20 mg total) by mouth every morning Max Daily Amount: 20 mg 30 capsule 0   • [DISCONTINUED] Vyvanse 20 MG CHEW Chew 20 mg daily after lunch Max Daily Amount: 20 mg 30 tablet 0   • Blood Glucose Monitoring Suppl (ONE TOUCH ULTRA MINI) w/Device KIT by  Does not apply route (Patient not taking: Reported on 7/1/2024)     • Mometasone Furoate (Asmanex HFA) 50 MCG/ACT AERO Inhale 2 puffs 2 (two) times a day (Patient not taking: Reported on 6/14/2024) 13 g 5   • [DISCONTINUED] cyclobenzaprine (FLEXERIL) 10 mg tablet Take 10 mg by mouth daily at bedtime as needed (Patient not taking: Reported on 10/29/2024)     • [DISCONTINUED] glucose blood (ONE TOUCH ULTRA TEST) test strip 1 each by Other route 2 (two) times a day (Patient not taking: Reported on 7/1/2024) 100 each 0   • [DISCONTINUED] HYDROcodone-acetaminophen (NORCO) 5-325 mg per tablet  (Patient not taking: Reported on 10/7/2024)     • [DISCONTINUED] methylPREDNISolone 4 MG tablet therapy pack Use as directed on package (Patient not taking: Reported on 10/29/2024) 1 each 0   • [DISCONTINUED] ONETOUCH DELICA LANCETS FINE MISC by Does not apply route daily (Patient not taking: Reported on 7/1/2024) 100 each 1   • [DISCONTINUED] oxyCODONE (ROXICODONE) 5 immediate release tablet Take 1 tablet every 4-6 hours by oral route. (Patient not taking: Reported on 10/7/2024)     • [DISCONTINUED] traMADol (ULTRAM) 50 mg tablet Take 50 mg by mouth every 6 to 8 hours (Patient not taking: Reported on 10/7/2024)     • [DISCONTINUED] traZODone (DESYREL) 50 mg tablet TAKE 1 TABLET BY MOUTH DAILY AT BEDTIME (Patient not taking: Reported on 10/29/2024) 90 tablet 2     No current facility-administered medications on file prior to visit.      Social History     Tobacco Use   • Smoking status: Every Day     Current packs/day: 0.50     Average packs/day: 0.5 packs/day for 51.8 years (25.9 ttl pk-yrs)     Types: Cigarettes     Start date: 1973   • Smokeless tobacco: Never   Vaping Use   • Vaping status: Never Used   Substance and Sexual Activity   • Alcohol use: No   • Drug use: No   • Sexual activity: Not on file         Objective     /84 (BP Location: Left arm, Patient Position: Sitting, Cuff Size: Adult)   Pulse 88   Temp 98.1 °F  "(36.7 °C) (Temporal)   Ht 5' 5.5\" (1.664 m)   Wt 67.4 kg (148 lb 9.6 oz)   SpO2 98%   BMI 24.35 kg/m²     Physical Exam  Vitals reviewed.   Constitutional:       General: She is not in acute distress.     Appearance: Normal appearance. She is well-developed.   HENT:      Head: Normocephalic and atraumatic.      Right Ear: Tympanic membrane, ear canal and external ear normal. There is no impacted cerumen.      Left Ear: Tympanic membrane, ear canal and external ear normal. There is no impacted cerumen.      Nose: Nose normal. No congestion or rhinorrhea.      Mouth/Throat:      Mouth: Mucous membranes are moist.      Pharynx: No oropharyngeal exudate or posterior oropharyngeal erythema.   Eyes:      General: No scleral icterus.        Right eye: No discharge.         Left eye: No discharge.      Extraocular Movements: Extraocular movements intact.      Conjunctiva/sclera: Conjunctivae normal.      Pupils: Pupils are equal, round, and reactive to light.   Neck:      Trachea: No tracheal deviation.   Cardiovascular:      Rate and Rhythm: Normal rate and regular rhythm.      Pulses: Normal pulses.           Dorsalis pedis pulses are 2+ on the right side and 2+ on the left side.        Posterior tibial pulses are 2+ on the right side and 2+ on the left side.      Heart sounds: Normal heart sounds. No murmur heard.     No friction rub. No gallop.   Pulmonary:      Effort: Pulmonary effort is normal. No respiratory distress.      Breath sounds: Normal breath sounds. No wheezing, rhonchi or rales.   Abdominal:      General: Bowel sounds are normal. There is no distension.      Palpations: Abdomen is soft.      Tenderness: There is no abdominal tenderness. There is no guarding or rebound.   Musculoskeletal:         General: Normal range of motion.      Cervical back: Normal range of motion and neck supple.      Right lower leg: No edema.      Left lower leg: No edema.   Lymphadenopathy:      Head:      Right side of " head: No submental or submandibular adenopathy.      Left side of head: No submental or submandibular adenopathy.      Cervical: No cervical adenopathy.      Right cervical: No superficial, deep or posterior cervical adenopathy.     Left cervical: No superficial, deep or posterior cervical adenopathy.   Skin:     General: Skin is warm and dry.      Findings: No erythema.   Neurological:      General: No focal deficit present.      Mental Status: She is alert and oriented to person, place, and time.      Cranial Nerves: No cranial nerve deficit.      Sensory: Sensation is intact. No sensory deficit.      Motor: Motor function is intact.   Psychiatric:         Attention and Perception: Attention and perception normal.         Mood and Affect: Mood is not anxious or depressed.         Speech: Speech normal.         Behavior: Behavior normal.         Thought Content: Thought content normal.         Judgment: Judgment normal.

## 2024-10-29 NOTE — ASSESSMENT & PLAN NOTE
Symptoms appear uncontrolled.  At this time, we will continue with her Vyvanse 20 mg in the morning however increase her Vyvanse chewable to 30 mg in the afternoon.  PDMP reviewed, no abnormality seen today.  Orders:    Lisdexamfetamine Dimesylate (Vyvanse) 30 MG CHEW; Chew 30 mg daily after lunch Max Daily Amount: 30 mg

## 2024-10-29 NOTE — ASSESSMENT & PLAN NOTE
Lab Results   Component Value Date    HGBA1C 7.1 (H) 08/23/2024       Orders:    Comprehensive metabolic panel; Future    Hemoglobin A1c (w/out EAG) ([facility] ONLY); Future    Microalbumin, Random Urine (W/Creatinine) ([facility] ONLY); Future    Hemoglobin A1c (w/out EAG) ([facility] ONLY)    Microalbumin, Random Urine (W/Creatinine) ([facility] ONLY)

## 2024-10-29 NOTE — PROGRESS NOTES
Adult Annual Physical  Name: Denice Vargas      : 1960      MRN: [id number]  Encounter Provider: Jimmy Kaur DO  Encounter Date: 10/29/2024   Encounter department: [facility]    Assessment & Plan  Encounter for immunization    Orders:    influenza vaccine, recombinant, PF, 0.5 mL IM (Flublok)    Annual physical exam         Attention deficit hyperactivity disorder (ADHD), predominantly hyperactive type    Orders:    Lisdexamfetamine Dimesylate (Vyvanse) 30 MG CHEW; Chew 30 mg daily after lunch Max Daily Amount: 30 mg    Type 2 diabetes mellitus with stage 3 chronic kidney disease, without long-term current use of insulin, unspecified whether stage 3a or 3b CKD (HCC)    Lab Results   Component Value Date    HGBA1C 7.1 (H) 2024       Orders:    Comprehensive metabolic panel; Future    Hemoglobin A1c (w/out EAG) ([facility] ONLY); Future    Microalbumin, Random Urine (W/Creatinine) ([facility] ONLY); Future    Hemoglobin A1c (w/out EAG) ([facility] ONLY)    Microalbumin, Random Urine (W/Creatinine) ([facility] ONLY)    Essential hypertension         Mixed hyperlipidemia    Orders:    Lipid Panel with Direct LDL reflex; Future    Abnormal thyroid function test    Orders:    TSH, 3rd generation with Free T4 reflex; Future    Immunizations and preventive care screenings were discussed with patient today. Appropriate education was printed on patient's after visit summary.    Counseling:  Alcohol/drug use: discussed moderation in alcohol intake, the recommendations for healthy alcohol use, and avoidance of illicit drug use.  Dental Health: discussed importance of regular tooth brushing, flossing, and dental visits.  Injury prevention: discussed safety/seat belts, safety helmets, smoke detectors, carbon monoxide detectors, and smoking near bedding or upholstery.  Sexual health: discussed sexually transmitted diseases, partner selection, use of condoms, avoidance of unintended pregnancy, and contraceptive  alternatives.  Exercise: the importance of regular exercise/physical activity was discussed. Recommend exercise 3-5 times per week for at least 30 minutes.          History of Present Illness     Adult Annual Physical:  Patient presents for annual physical.     Diet and Physical Activity:  - Diet/Nutrition: well balanced diet.  - Exercise: 1-2 times a week on average. Physical therapy s/p shoulder RTC repair    Depression Screening:    - PHQ-9 Score: 0    General Health:  - Sleep: sleeps well.  - Hearing: normal hearing bilateral ears.  - Vision: goes for regular eye exams.  - Dental: regular dental visits.    /GYN Health:  - Follows with GYN: no.   - Menopause: postmenopausal.   - Contraception: menopause.      Review of Systems   Constitutional:  Negative for activity change, chills, fatigue and fever.   HENT:  Negative for congestion, ear pain, sinus pressure and sore throat.    Eyes:  Negative for redness, itching and visual disturbance.   Respiratory:  Negative for cough and shortness of breath.    Cardiovascular:  Negative for chest pain and palpitations.   Gastrointestinal:  Negative for abdominal pain, diarrhea and nausea.   Endocrine: Negative for cold intolerance and heat intolerance.   Genitourinary:  Negative for dysuria, flank pain and frequency.   Musculoskeletal:  Negative for arthralgias, back pain, gait problem and myalgias.   Skin:  Negative for color change.   Allergic/Immunologic: Negative for environmental allergies.   Neurological:  Negative for dizziness, numbness and headaches.   Psychiatric/Behavioral:  Negative for behavioral problems and sleep disturbance.      Medical History Reviewed by provider this encounter:       Current Outpatient Medications on File Prior to Visit   Medication Sig Dispense Refill    albuterol (PROVENTIL HFA,VENTOLIN HFA) 90 mcg/act inhaler Inhale 2 puffs every 6 (six) hours as needed for wheezing 8 g 5    aspirin (ECOTRIN LOW STRENGTH) 81 mg EC tablet Take 1  tablet by mouth daily      atorvastatin (LIPITOR) 40 mg tablet TAKE 1 TABLET DAILY 90 tablet 3    cetirizine (ZyrTEC) 10 mg tablet Take by mouth Daily      cholecalciferol (VITAMIN D3) 1,000 units tablet Take by mouth      Continuous Glucose  (FreeStyle Saige 3 [place]) JAVAN       Continuous Glucose Sensor (FreeStyle Saige 3 Sensor) [facility] Use 1 each every 14 (fourteen) days 6 each 0    Empagliflozin (JARDIANCE) 10 MG TABS tablet Take 1 tablet (10 mg total) by mouth daily 30 tablet 5    escitalopram (LEXAPRO) 20 mg tablet Take 1 tablet (20 mg total) by mouth daily 90 tablet 1    famotidine (PEPCID) 20 mg tablet Take 1 tablet by mouth daily      fenofibrate (TRICOR) 145 mg tablet Take 1 tablet (145 mg total) by mouth daily 90 tablet 1    lisinopril (ZESTRIL) 5 mg tablet TAKE ONE-HALF (1/2) TABLET DAILY 45 tablet 1    metFORMIN (GLUCOPHAGE-XR) 500 mg 24 hr tablet TAKE 2 TABLETS BY MOUTH TWICE A DAY WITH FOOD 360 tablet 0    metoprolol succinate (TOPROL-XL) 25 mg 24 hr tablet TAKE 1 TABLET DAILY 90 tablet 1    Multiple Vitamins-Minerals (MULTIVITAMIN ADULTS 50+ PO) Take 1 tablet by mouth daily      nabumetone (RELAFEN) 750 mg tablet Take 1 tablet (750 mg total) by mouth 2 (two) times a day 60 tablet 1    tirzepatide (Mounjaro) 2.5 MG/0.5ML Inject 0.5 mL (2.5 mg total) under the skin every 7 days 6 mL 0    Vyvanse 20 MG capsule Take 1 capsule (20 mg total) by mouth every morning Max Daily Amount: 20 mg 30 capsule 0    [DISCONTINUED] Vyvanse 20 MG CHEW Chew 20 mg daily after lunch Max Daily Amount: 20 mg 30 tablet 0    Blood Glucose Monitoring Suppl (ONE TOUCH ULTRA MINI) w/Device KIT by Does not apply route (Patient not taking: Reported on 7/1/2024)      cyclobenzaprine (FLEXERIL) 10 mg tablet Take 10 mg by mouth daily at bedtime as needed (Patient not taking: Reported on 10/29/2024)      glucose blood (ONE TOUCH ULTRA TEST) test strip 1 each by Other route 2 (two) times a day (Patient not taking: Reported on  "7/1/2024) 100 each 0    HYDROcodone-acetaminophen (NORCO) 5-325 mg per tablet  (Patient not taking: Reported on 10/7/2024)      methylPREDNISolone 4 MG tablet therapy pack Use as directed on package (Patient not taking: Reported on 10/29/2024) 1 each 0    Mometasone Furoate (Asmanex HFA) 50 MCG/ACT AERO Inhale 2 puffs 2 (two) times a day (Patient not taking: Reported on 6/14/2024) 13 g 5    ONETOUCH DELICA LANCETS FINE MISC by Does not apply route daily (Patient not taking: Reported on 7/1/2024) 100 each 1    oxyCODONE (ROXICODONE) 5 immediate release tablet Take 1 tablet every 4-6 hours by oral route. (Patient not taking: Reported on 10/7/2024)      traMADol (ULTRAM) 50 mg tablet Take 50 mg by mouth every 6 to 8 hours (Patient not taking: Reported on 10/7/2024)      traZODone (DESYREL) 50 mg tablet TAKE 1 TABLET BY MOUTH DAILY AT BEDTIME (Patient not taking: Reported on 10/29/2024) 90 tablet 2     No current facility-administered medications on file prior to visit.      Social History     Tobacco Use    Smoking status: Every Day     Current packs/day: 0.50     Average packs/day: 0.5 packs/day for 51.8 years (25.9 ttl pk-yrs)     Types: Cigarettes     Start date: 1973    Smokeless tobacco: Never   Vaping Use    Vaping status: Never Used   Substance and Sexual Activity    Alcohol use: No    Drug use: No    Sexual activity: Not on file       Objective     /84 (BP Location: Left arm, Patient Position: Sitting, Cuff Size: Adult)   Pulse 88   Temp 98.1 °F (36.7 °C) (Temporal)   Ht 5' 5.5\" (1.664 m)   Wt 67.4 kg (148 lb 9.6 oz)   SpO2 98%   BMI 24.35 kg/m²     Physical Exam  Vitals reviewed.   Constitutional:       General: She is not in acute distress.     Appearance: Normal appearance. She is well-developed.   HENT:      Head: Normocephalic and atraumatic.      Right Ear: Tympanic membrane, ear canal and external ear normal. There is no impacted cerumen.      Left Ear: Tympanic membrane, ear canal and " external ear normal. There is no impacted cerumen.      Nose: Nose normal. No congestion or rhinorrhea.      Mouth/Throat:      Mouth: Mucous membranes are moist.      Pharynx: No oropharyngeal exudate or posterior oropharyngeal erythema.   Eyes:      General: No scleral icterus.        Right eye: No discharge.         Left eye: No discharge.      Extraocular Movements: Extraocular movements intact.      Conjunctiva/sclera: Conjunctivae normal.      Pupils: Pupils are equal, round, and reactive to light.   Neck:      Trachea: No tracheal deviation.   Cardiovascular:      Rate and Rhythm: Normal rate and regular rhythm.      Pulses: Normal pulses.           Dorsalis pedis pulses are 2+ on the right side and 2+ on the left side.        Posterior tibial pulses are 2+ on the right side and 2+ on the left side.      Heart sounds: Normal heart sounds. No murmur heard.     No friction rub. No gallop.   Pulmonary:      Effort: Pulmonary effort is normal. No respiratory distress.      Breath sounds: Normal breath sounds. No wheezing, rhonchi or rales.   Abdominal:      General: Bowel sounds are normal. There is no distension.      Palpations: Abdomen is soft.      Tenderness: There is no abdominal tenderness. There is no guarding or rebound.   Musculoskeletal:         General: Normal range of motion.      Cervical back: Normal range of motion and neck supple.      Right lower leg: No edema.      Left lower leg: No edema.   Lymphadenopathy:      Head:      Right side of head: No submental or submandibular adenopathy.      Left side of head: No submental or submandibular adenopathy.      Cervical: No cervical adenopathy.      Right cervical: No superficial, deep or posterior cervical adenopathy.     Left cervical: No superficial, deep or posterior cervical adenopathy.   Skin:     General: Skin is warm and dry.      Findings: No erythema.   Neurological:      General: No focal deficit present.      Mental Status: She is alert  and oriented to person, place, and time.      Cranial Nerves: No cranial nerve deficit.      Sensory: Sensation is intact. No sensory deficit.      Motor: Motor function is intact.   Psychiatric:         Attention and Perception: Attention and perception normal.         Mood and Affect: Mood is not anxious or depressed.         Speech: Speech normal.         Behavior: Behavior normal.         Thought Content: Thought content normal.         Judgment: Judgment normal.

## 2024-10-29 NOTE — PATIENT INSTRUCTIONS
"Patient Education     Routine physical for adults   The Basics   Written by the doctors and editors at [facility]   What is a physical? -- A physical is a routine visit, or \"check-up,\" with your doctor. You might also hear it called a \"wellness visit\" or \"preventive visit.\"  During each visit, the doctor will:   Ask about your physical and mental health   Ask about your habits, behaviors, and lifestyle   Do an exam   Give you vaccines if needed   Talk to you about any medicines you take   Give advice about your health   Answer your questions  Getting regular check-ups is an important part of taking care of your health. It can help your doctor find and treat any problems you have. But it's also important for preventing health problems.  A routine physical is different from a \"sick visit.\" A sick visit is when you see a doctor because of a health concern or problem. Since physicals are scheduled ahead of time, you can think about what you want to ask the doctor.  How often should I get a physical? -- It depends on your age and health. In general, for people age 21 years and older:   If you are younger than 50 years, you might be able to get a physical every 3 years.   If you are 50 years or older, your doctor might recommend a physical every year.  If you have an ongoing health condition, like diabetes or high blood pressure, your doctor will probably want to see you more often.  What happens during a physical? -- In general, each visit will include:   Physical exam - The doctor or nurse will check your height, weight, heart rate, and blood pressure. They will also look at your eyes and ears. They will ask about how you are feeling and whether you have any symptoms that bother you.   Medicines - It's a good idea to bring a list of all the medicines you take to each doctor visit. Your doctor will talk to you about your medicines and answer any questions. Tell them if you are having any side effects that bother you. You " "should also tell them if you are having trouble paying for any of your medicines.   Habits and behaviors - This includes:   Your diet   Your exercise habits   Whether you smoke, drink alcohol, or use drugs   Whether you are sexually active   Whether you feel safe at home  Your doctor will talk to you about things you can do to improve your health and lower your risk of health problems. They will also offer help and support. For example, if you want to quit smoking, they can give you advice and might prescribe medicines. If you want to improve your diet or get more physical activity, they can help you with this, too.   Lab tests, if needed - The tests you get will depend on your age and situation. For example, your doctor might want to check your:   Cholesterol   Blood sugar   Iron level   Vaccines - The recommended vaccines will depend on your age, health, and what vaccines you already had. Vaccines are very important because they can prevent certain serious or deadly infections.   Discussion of screening - \"Screening\" means checking for diseases or other health problems before they cause symptoms. Your doctor can recommend screening based on your age, risk, and preferences. This might include tests to check for:   Cancer, such as breast, prostate, cervical, ovarian, colorectal, prostate, lung, or skin cancer   Sexually transmitted infections, such as chlamydia and gonorrhea   Mental health conditions like depression and anxiety  Your doctor will talk to you about the different types of screening tests. They can help you decide which screenings to have. They can also explain what the results might mean.   Answering questions - The physical is a good time to ask the doctor or nurse questions about your health. If needed, they can refer you to other doctors or specialists, too.  Adults older than 65 years often need other care, too. As you get older, your doctor will talk to you about:   How to prevent falling at " home   Hearing or vision tests   Memory testing   How to take your medicines safely   Making sure that you have the help and support you need at home  All topics are updated as new evidence becomes available and our peer review process is complete.  This topic retrieved from [employer] on: May 02, 2024.  Topic 178327 Version 1.0  Release: 32.4.3 - C32.122  © 2024 UpToDate, Inc. and/or its affiliates. All rights reserved.  Consumer Information Use and Disclaimer   Disclaimer: This generalized information is a limited summary of diagnosis, treatment, and/or medication information. It is not meant to be comprehensive and should be used as a tool to help the user understand and/or assess potential diagnostic and treatment options. It does NOT include all information about conditions, treatments, medications, side effects, or risks that may apply to a specific patient. It is not intended to be medical advice or a substitute for the medical advice, diagnosis, or treatment of a health care provider based on the health care provider's examination and assessment of a patient's specific and unique circumstances. Patients must speak with a health care provider for complete information about their health, medical questions, and treatment options, including any risks or benefits regarding use of medications. This information does not endorse any treatments or medications as safe, effective, or approved for treating a specific patient. UpToDate, Inc. and its affiliates disclaim any warranty or liability relating to this information or the use thereof.The use of this information is governed by the Terms of Use, available at https://www.[employer].com/en/know/clinical-effectiveness-terms. 2024© UpToDate, Inc. and its affiliates and/or licensors. All rights reserved.  Copyright   © 2024 UpToDate, Inc. and/or its affiliates. All rights reserved.

## 2024-10-29 NOTE — TELEPHONE ENCOUNTER
Patient called to check the status of the Vyvanse capsules needed and that they will be sent to [facility] on [address] today as she is out of this medication.

## 2024-11-01 ENCOUNTER — APPOINTMENT (OUTPATIENT)
Dept: PHYSICAL THERAPY | Facility: CLINIC | Age: 64
End: 2024-11-01
Payer: COMMERCIAL

## 2024-11-02 DIAGNOSIS — N18.30 TYPE 2 DIABETES MELLITUS WITH STAGE 3 CHRONIC KIDNEY DISEASE, WITHOUT LONG-TERM CURRENT USE OF INSULIN, UNSPECIFIED WHETHER STAGE 3A OR 3B CKD (HCC): ICD-10-CM

## 2024-11-02 DIAGNOSIS — E11.22 TYPE 2 DIABETES MELLITUS WITH STAGE 3 CHRONIC KIDNEY DISEASE, WITHOUT LONG-TERM CURRENT USE OF INSULIN, UNSPECIFIED WHETHER STAGE 3A OR 3B CKD (HCC): ICD-10-CM

## 2024-11-03 RX ORDER — BLOOD-GLUCOSE SENSOR
1 EACH MISCELLANEOUS
Qty: 6 EACH | Refills: 0 | Status: SHIPPED | OUTPATIENT
Start: 2024-11-03

## 2024-11-05 ENCOUNTER — OFFICE VISIT (OUTPATIENT)
Dept: PHYSICAL THERAPY | Facility: CLINIC | Age: 64
End: 2024-11-05
Payer: COMMERCIAL

## 2024-11-05 DIAGNOSIS — Z98.890 S/P RIGHT ROTATOR CUFF REPAIR: Primary | ICD-10-CM

## 2024-11-05 PROCEDURE — 97110 THERAPEUTIC EXERCISES: CPT | Performed by: PHYSICAL THERAPIST

## 2024-11-05 PROCEDURE — 97140 MANUAL THERAPY 1/> REGIONS: CPT | Performed by: PHYSICAL THERAPIST

## 2024-11-05 NOTE — PROGRESS NOTES
Daily Note     Today's date: 2024  Patient name: Denice Vargas  : 1960  MRN: [id number]  Referring provider: Arthur Willis MD  Dx:   Encounter Diagnosis     ICD-10-CM    1. S/P right rotator cuff repair  Z98.890                                  Subjective: Patient reports that her shoulder is making improvement regarding pain and function.        Objective: See treatment diary below      Assessment: Shoulder PROM demonstrating improvement into flexion and abduction since last visit.  ER remains tight but also improving and is limited by pain.  Able to perform pain free AROM today with lower trap cues.  Overall patient is making steady progress.     Plan: Continue per plan of care. Update HEP.      Precautions:   Patient Active Problem List   Diagnosis    Anxiety disorder    Atrophic vaginitis    Benign essential hypertension    Coronary atherosclerosis    Midline cystocele    Dacryocystitis of left lacrimal sac    Diverticulitis of colon    Gastroparesis    GERD without esophagitis    Hiatal hernia    IBS (irritable bowel syndrome)    Insomnia    Left shoulder pain    Low back pain    Menopausal syndrome    Microscopic hematuria    Mixed hyperlipidemia    Mixed stress and urge urinary incontinence    Post-menopausal bleeding    Status post percutaneous transluminal coronary angioplasty    Right knee pain    Seasonal allergies    Type 2 diabetes mellitus with stage 3 chronic kidney disease, without long-term current use of insulin, unspecified whether stage 3a or 3b CKD (HCC)    Vitamin deficiency    Attention deficit hyperactivity disorder (ADHD), predominantly hyperactive type    PTSD (post-traumatic stress disorder)    Depressed    Adjustment reaction with anxiety    Age-related nuclear cataract of both eyes    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    Current tobacco use    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    Need for vaccination    [place]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    Bronchitis  "   Mucopurulent chronic bronchitis (HCC)    Tear of right rotator cuff    Pre-operative clearance       R RC Repair on 8/30/2024    Manuals 10/21 10/23 10/28 11/5      10/16   R shoulder PROM: Flexion to 160 deg, abd to 160 deg, ER to 90 deg and IR to 40 deg 12' 12' 12' 12'      12'   R shoulder capsule JM for pain modulation             R scar mobs 3'         3'   Supine rhythmic stabilizations  NV 3x20\" 3x20\"         Neuro Re-Ed                                                                                                        Ther Ex             UBE warm up - reverse   L1 4' L1 4'         Elbow flexion/extension AROM 2# 3x10 3# 3x10 DC       2# 3x10   Gentle cane ER stretch in sitting 10 x10\" 10 x10\" 10 x10\" 10 x10\"      10 x10\"   Pulleys AAROM 3x10 3x10 3x10 3x10      3x10   Triceps extension with yellow TB 3x10 3x10 DC       3x10   Side lying ER 3x10 3x10 1# 3x10 1# 3x10      3x10   TB rows - green 3x10 3x10 3x10          Prone shoulder flexion/abduction - palm down 2x10 ea. 2x10 ea. 2x10 ea. 2x10 ea.      2x10 ea.   Supine cane press 2x10 2x10 DC       2x10   TB ER/IR - orange TB  NV 2x10 ea. 2x10 ea.         Qped with arm lifts  NV 2x10 B/L 2x10 B/L         Standing shoulder AROM  NV Pain held 2x5         Wall slide   2x5 2x5         Ther Activity                                       Gait Training                                       Modalities                                            "

## 2024-11-06 ENCOUNTER — OFFICE VISIT (OUTPATIENT)
Dept: PHYSICAL THERAPY | Facility: CLINIC | Age: 64
End: 2024-11-06
Payer: COMMERCIAL

## 2024-11-06 DIAGNOSIS — Z98.890 S/P RIGHT ROTATOR CUFF REPAIR: Primary | ICD-10-CM

## 2024-11-06 PROCEDURE — 97140 MANUAL THERAPY 1/> REGIONS: CPT | Performed by: PHYSICAL THERAPIST

## 2024-11-06 PROCEDURE — 97110 THERAPEUTIC EXERCISES: CPT | Performed by: PHYSICAL THERAPIST

## 2024-11-06 NOTE — PROGRESS NOTES
Daily Note     Today's date: 2024  Patient name: Denice Vargas  : 1960  MRN: [id number]  Referring provider: Arthur Willis MD  Dx:   Encounter Diagnosis     ICD-10-CM    1. S/P right rotator cuff repair  Z98.890                                    Subjective: Patient reports no shoulder pain pre-tx and states that overall she has been making steady progress.       Objective: See treatment diary below      Assessment: Continued improvement in right shoulder PROM, specifically ER.  AROM mechanics improving each session with greater lower trap recruitment and postural awareness to limit impingement of the RC.  Overall patient continues to make steady progress toward goals.     Plan: Continue per plan of care. Update HEP.      Precautions:   Patient Active Problem List   Diagnosis    Anxiety disorder    Atrophic vaginitis    Benign essential hypertension    Coronary atherosclerosis    Midline cystocele    Dacryocystitis of left lacrimal sac    Diverticulitis of colon    Gastroparesis    GERD without esophagitis    Hiatal hernia    IBS (irritable bowel syndrome)    Insomnia    Left shoulder pain    Low back pain    Menopausal syndrome    Microscopic hematuria    Mixed hyperlipidemia    Mixed stress and urge urinary incontinence    Post-menopausal bleeding    Status post percutaneous transluminal coronary angioplasty    Right knee pain    Seasonal allergies    Type 2 diabetes mellitus with stage 3 chronic kidney disease, without long-term current use of insulin, unspecified whether stage 3a or 3b CKD (HCC)    Vitamin deficiency    Attention deficit hyperactivity disorder (ADHD), predominantly hyperactive type    PTSD (post-traumatic stress disorder)    Depressed    Adjustment reaction with anxiety    Age-related nuclear cataract of both eyes    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    Current tobacco use    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    Need for vaccination    [place] stone retinal  "degeneration    Bronchitis    Mucopurulent chronic bronchitis (HCC)    Tear of right rotator cuff    Pre-operative clearance       R RC Repair on 8/30/2024    Manuals 10/21 10/23 10/28 11/5 11/6     10/16   R shoulder PROM: Flexion to 160 deg, abd to 160 deg, ER to 90 deg and IR to 40 deg 12' 12' 12' 12' 12'     12'   R shoulder capsule JM for pain modulation             R scar mobs 3'         3'   Supine rhythmic stabilizations  NV 3x20\" 3x20\" 3x20\"        Neuro Re-Ed                                                                                                        Ther Ex             UBE warm up - reverse   L1 4' L1 4' L1 4'        Elbow flexion/extension AROM 2# 3x10 3# 3x10 DC       2# 3x10   Gentle cane ER stretch in sitting 10 x10\" 10 x10\" 10 x10\" 10 x10\" 10 x10\"     10 x10\"   Pulleys AAROM 3x10 3x10 3x10 3x10 3x10     3x10   Triceps extension with yellow TB 3x10 3x10 DC       3x10   Side lying ER 3x10 3x10 1# 3x10 1# 3x10 1# 3x10     3x10   TB rows - green 3x10 3x10 3x10          Prone shoulder flexion/abduction - palm down 2x10 ea. 2x10 ea. 2x10 ea. 2x10 ea. 2x10 ea.     2x10 ea.   Supine cane press 2x10 2x10 DC       2x10   TB ER/IR - orange TB  NV 2x10 ea. 2x10 ea. 3x10 ea.         Qped with arm lifts  NV 2x10 B/L 2x10 B/L 2x10 B/L        Standing shoulder AROM  NV Pain held 2x5 2x8        Wall slide   2x5 2x5 2x5        Ther Activity                                       Gait Training                                       Modalities                                            "

## 2024-11-07 LAB
ALBUMIN/CREAT UR: 7 MG/G CREAT
CREAT UR-MCNC: 87 MG/DL (ref 20–275)
HBA1C MFR BLD: 6 % OF TOTAL HGB
MICROALBUMIN UR-MCNC: 0.6 MG/DL

## 2024-11-10 ENCOUNTER — TELEPHONE (OUTPATIENT)
Dept: FAMILY MEDICINE CLINIC | Facility: CLINIC | Age: 64
End: 2024-11-10

## 2024-11-10 DIAGNOSIS — F90.1 ATTENTION DEFICIT HYPERACTIVITY DISORDER (ADHD), PREDOMINANTLY HYPERACTIVE TYPE: ICD-10-CM

## 2024-11-11 ENCOUNTER — OFFICE VISIT (OUTPATIENT)
Dept: PHYSICAL THERAPY | Facility: CLINIC | Age: 64
End: 2024-11-11
Payer: COMMERCIAL

## 2024-11-11 DIAGNOSIS — Z98.890 S/P RIGHT ROTATOR CUFF REPAIR: Primary | ICD-10-CM

## 2024-11-11 PROCEDURE — 97140 MANUAL THERAPY 1/> REGIONS: CPT | Performed by: PHYSICAL THERAPIST

## 2024-11-11 PROCEDURE — 97110 THERAPEUTIC EXERCISES: CPT | Performed by: PHYSICAL THERAPIST

## 2024-11-11 RX ORDER — LISDEXAMFETAMINE DIMESYLATE 20 MG/1
20 CAPSULE ORAL EVERY MORNING
Qty: 30 CAPSULE | Refills: 0 | Status: SHIPPED | OUTPATIENT
Start: 2024-11-11

## 2024-11-11 NOTE — PROGRESS NOTES
Daily Note     Today's date: 2024  Patient name: Denice Vargas  : 1960  MRN: [id number]  Referring provider: Arthur Willis MD  Dx:   Encounter Diagnosis     ICD-10-CM    1. S/P right rotator cuff repair  Z98.890                                    Subjective: Patient reports that the shoulder did well this weekend including ability to lift her arm without pain but notes increased discomfort this morning so far.      Objective: See treatment diary below      Assessment: Right shoulder ER PROM steadily improving.  Audible pop which causes discomfort noted with active lifting until appropriate scapular activation and stabilization.  Overhead weakness present, but able to perform scaption AROM without pain with scapular and cervical spine cueing for postural correction.      Plan: Continue per plan of care. Update HEP.      Precautions:   Patient Active Problem List   Diagnosis    Anxiety disorder    Atrophic vaginitis    Benign essential hypertension    Coronary atherosclerosis    Midline cystocele    Dacryocystitis of left lacrimal sac    Diverticulitis of colon    Gastroparesis    GERD without esophagitis    Hiatal hernia    IBS (irritable bowel syndrome)    Insomnia    Left shoulder pain    Low back pain    Menopausal syndrome    Microscopic hematuria    Mixed hyperlipidemia    Mixed stress and urge urinary incontinence    Post-menopausal bleeding    Status post percutaneous transluminal coronary angioplasty    Right knee pain    Seasonal allergies    Type 2 diabetes mellitus with stage 3 chronic kidney disease, without long-term current use of insulin, unspecified whether stage 3a or 3b CKD (HCC)    Vitamin deficiency    Attention deficit hyperactivity disorder (ADHD), predominantly hyperactive type    PTSD (post-traumatic stress disorder)    Depressed    Adjustment reaction with anxiety    Age-related nuclear cataract of both eyes    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 12' 12' 12' 12' 12' 12'    12'   R shoulder capsule JM for pain modulation             R scar mobs 3'         3'   Supine rhythmic stabilizations  NV 3x20\" 3x20\" 3x20\" 3x20\"       Neuro Re-Ed                                                                                                        Ther Ex             UBE warm up    L1 4' L1 4' L1 4' L1 2'/2'       Elbow flexion/extension AROM 2# 3x10 3# 3x10 DC       2# 3x10   Gentle cane ER stretch in sitting 10 x10\" 10 x10\" 10 x10\" 10 x10\" 10 x10\" 10 x10\"    10 x10\"   Pulleys AAROM 3x10 3x10 3x10 3x10 3x10 3x10    3x10   Triceps extension with yellow TB 3x10 3x10 DC       3x10   Side lying ER 3x10 3x10 1# 3x10 1# 3x10 1# 3x10 2# 3x10    3x10   TB rows - green 3x10 3x10 3x10          Prone shoulder flexion/abduction - palm down 2x10 ea. 2x10 ea. 2x10 ea. 2x10 ea. 2x10 ea. 3x10 ea.    2x10 ea.   Supine cane press 2x10 2x10 DC       2x10   TB ER/IR - green TB  NV 2x10 ea. 2x10 ea. 3x10 ea.  3x10 ea.       Qped with arm lifts  NV 2x10 B/L 2x10 B/L 2x10 B/L 2x10 B/L       Standing shoulder AROM  NV Pain held 2x5 2x8 2x8       Wall slide   2x5 2x5 2x5 x5       Ther Activity                                       Gait Training                                       Modalities                                            "

## 2024-11-11 NOTE — TELEPHONE ENCOUNTER
Patient called, request status of Medication refill, questions if Dr. Kaur received  request. Upon chart review/messages, confirmed previous message regarding   Vyvanse 20 MG capsule [[id number]] . Patient request a callback with an update, Please advise Patient at [phone], if any further questions.

## 2024-11-14 ENCOUNTER — OFFICE VISIT (OUTPATIENT)
Dept: PHYSICAL THERAPY | Facility: CLINIC | Age: 64
End: 2024-11-14
Payer: COMMERCIAL

## 2024-11-14 DIAGNOSIS — Z98.890 S/P RIGHT ROTATOR CUFF REPAIR: Primary | ICD-10-CM

## 2024-11-14 PROCEDURE — 97110 THERAPEUTIC EXERCISES: CPT | Performed by: PHYSICAL THERAPIST

## 2024-11-14 PROCEDURE — 97140 MANUAL THERAPY 1/> REGIONS: CPT | Performed by: PHYSICAL THERAPIST

## 2024-11-14 NOTE — PROGRESS NOTES
Daily Note     Today's date: 2024  Patient name: Denice Vargas  : 1960  MRN: [id number]  Referring provider: Arthur Willis MD  Dx:   Encounter Diagnosis     ICD-10-CM    1. S/P right rotator cuff repair  Z98.890                                      Subjective: Patient reports increased soreness from a fall onto her back which occurred while watching kids that fell into her arm and shoulder yesterday.        Objective: See treatment diary below      Assessment: Patient demonstrates scapular compensations early in treatment with limited mobility and weakness with discomfort.  Following warm up and manuals, improved A/AROM but remained less than prior session.  No popping/joint crepitus present with inferior glide of the humerus.      Plan: Continue per plan of care. Update HEP.      Precautions:   Patient Active Problem List   Diagnosis    Anxiety disorder    Atrophic vaginitis    Benign essential hypertension    Coronary atherosclerosis    Midline cystocele    Dacryocystitis of left lacrimal sac    Diverticulitis of colon    Gastroparesis    GERD without esophagitis    Hiatal hernia    IBS (irritable bowel syndrome)    Insomnia    Left shoulder pain    Low back pain    Menopausal syndrome    Microscopic hematuria    Mixed hyperlipidemia    Mixed stress and urge urinary incontinence    Post-menopausal bleeding    Status post percutaneous transluminal coronary angioplasty    Right knee pain    Seasonal allergies    Type 2 diabetes mellitus with stage 3 chronic kidney disease, without long-term current use of insulin, unspecified whether stage 3a or 3b CKD (HCC)    Vitamin deficiency    Attention deficit hyperactivity disorder (ADHD), predominantly hyperactive type    PTSD (post-traumatic stress disorder)    Depressed    Adjustment reaction with anxiety    Age-related nuclear cataract of both eyes    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 12' 12' 12' 12' 12' 12' 12'   12'   R shoulder capsule JM for pain modulation             R scar mobs 3'         3'   Supine rhythmic stabilizations  NV 3x20\" 3x20\" 3x20\" 3x20\" 3x20\"      Neuro Re-Ed                                                                                                        Ther Ex             UBE warm up    L1 4' L1 4' L1 4' L1 2'/2' L1 2'/2'      Elbow flexion/extension AROM 2# 3x10 3# 3x10 DC       2# 3x10   Gentle cane ER stretch in sitting 10 x10\" 10 x10\" 10 x10\" 10 x10\" 10 x10\" 10 x10\" 10 x10\"   10 x10\"   Pulleys AAROM 3x10 3x10 3x10 3x10 3x10 3x10 3x10   3x10   Triceps extension with yellow TB 3x10 3x10 DC       3x10   Side lying ER 3x10 3x10 1# 3x10 1# 3x10 1# 3x10 2# 3x10 2# 3x10   3x10   L side lying for R shoulder abduction lift       2x10      TB rows - green 3x10 3x10 3x10          Prone shoulder flexion/abduction - palm down 2x10 ea. 2x10 ea. 2x10 ea. 2x10 ea. 2x10 ea. 3x10 ea. 3x10 ea.   2x10 ea.   Supine cane press 2x10 2x10 DC       2x10   TB ER/IR - green TB  NV 2x10 ea. 2x10 ea. 3x10 ea.  3x10 ea. 3x10 ea.      Qped with arm lifts  NV 2x10 B/L 2x10 B/L 2x10 B/L 2x10 B/L 2x10 B/L      Standing shoulder AROM  NV Pain held 2x5 2x8 2x8 2x8      Wall slide   2x5 2x5 2x5 x5 hold      Ther Activity                                       Gait Training                                       Modalities                                            "

## 2024-11-18 ENCOUNTER — OFFICE VISIT (OUTPATIENT)
Dept: PHYSICAL THERAPY | Facility: CLINIC | Age: 64
End: 2024-11-18
Payer: COMMERCIAL

## 2024-11-18 DIAGNOSIS — Z98.890 S/P RIGHT ROTATOR CUFF REPAIR: Primary | ICD-10-CM

## 2024-11-18 PROCEDURE — 97140 MANUAL THERAPY 1/> REGIONS: CPT | Performed by: PHYSICAL THERAPIST

## 2024-11-18 PROCEDURE — 97110 THERAPEUTIC EXERCISES: CPT | Performed by: PHYSICAL THERAPIST

## 2024-11-18 NOTE — LETTER
2024    Arthur Willis MD  [address] [facility] [address]    Patient: Denice Vargas   YOB: 1960   Date of Visit: 2024     Encounter Diagnosis     ICD-10-CM    1. S/P right rotator cuff repair  Z98.890           Dear Dr. Willis:    Thank you for your recent referral of Denice Vargas. Please review the attached evaluation summary from Denice's recent visit.     Please verify that you agree with the plan of care by signing the attached order.     If you have any questions or concerns, please do not hesitate to call.     I sincerely appreciate the opportunity to share in the care of one of your patients and hope to have another opportunity to work with you in the near future.       Sincerely,    Andreas Rangel, PT      Referring Provider:      I certify that I have read the below Plan of Care and certify the need for these services furnished under this plan of treatment while under my care.                    Arthur Willis MD  [address] [facility] [address]  Via Fax: [phone]          PT Evaluation     Today's date: 2024  Patient name: Denice Vargas  : 1960  MRN: [id number]  Referring provider: Arthur Willis MD  Dx:   Encounter Diagnosis     ICD-10-CM    1. S/P right rotator cuff repair  Z98.890                      Assessment  Impairments: abnormal or restricted ROM, abnormal movement, activity intolerance, impaired physical strength and pain with function    Assessment details: Patient is a 64 y.o. year old female who attended physical therapy for 19 treatment sessions s/p right shoulder distal clavicular excision and RC repair on 2024 . Patient reports moderate improvement at this time which correlates to improved FOTO scoring.  Patient has shown improvement throughout PT by demonstrating decreased pain, increased range of motion, increased strength, and improved tolerance to activity.  Patient continues to present with pain,  decreased ROM, decreased strength, and decreased tolerance to activity. Denice would benefit from continued physical therapy to address these issues and to maximize function. Thank you.      Understanding of Dx/Px/POC: excellent     Prognosis: excellent    Goals  STG (8 weeks)  1. Patient will be independent with HEP (MET)  2. Decrease pain at worst by 2 points on NPRS (MET)  3. Increase right shoulder PROM to WFL in all planes (MET)  4. Patient will demonstrate ability to d/c sling and use arm below shoulder height without pain (MET)  LTG (16-20 weeks)  1. Decrease pain at worst from 4 points on NPRS (MET)  2. Increase right shoulder AROM to WFL in all planes (PROGRESSING)  3. Increase RC strength to > or equal to 4/5 per MMT for improved ADL and overhead function (PROGRESSING)  4. Patient will demonstrate ability to lift shoulder overhead without normal GH rhythm (PROGRESSING)  5. Increase FOTO score > or equal to expected outcome (PROGRESSING)    Plan  Patient would benefit from: skilled PT    Planned therapy interventions: joint mobilization, manual therapy, neuromuscular re-education, patient education, strengthening, stretching, therapeutic exercise, therapeutic activities, home exercise program, functional ROM exercises and ADL retraining    Frequency: 2x week  Duration in weeks: 8  Treatment plan discussed with: patient        Subjective Evaluation    History of Present Illness  Mechanism of injury: Patient reports to outpatient PT s/p right shoulder distal clavicular excision, RC repair and biceps tenodesis on 8/30/2024.  Post-operative pain described as  which is worse with accidental movement and improved with supported rest, taking off the sling and medications.  Patient is expected to wear the sing x4 weeks.  Patient is retired but notes ADL's and leisure functions as a result.  Patients goals for PT are to decrease the pain and return to PLOF (including taking care of her dogs).      11/18/2024: Patient  "reports significant reduction in pain since IE.  Notes a setback last week she kids ran into her shoulder resulting in a fall with more \"popping\" in the arm but notes that this is steadily improving.  States that her ROM is progressing to use for overhead function (no resistance) and that her primary goal is to improve overhead strength to hang laundry and perform other ADL's.    Quality of life: good    Patient Goals  Patient goals for therapy: increased strength, independence with ADLs/IADLs, return to sport/leisure activities, increased motion and decreased pain    Pain  Current pain ratin  At best pain ratin  At worst pain ratin  Quality: dull ache  Relieving factors: rest and relaxation  Aggravating factors: lifting    Social Support    Employment status: working  Hand dominance: right      Diagnostic Tests  MRI studies: abnormal        Objective     Postural Observations  Seated posture: fair  Standing posture: fair      Neurological Testing     Sensation     Shoulder     Right Shoulder   Intact: Light touch    Active Range of Motion   Left Shoulder   Flexion: 150 degrees with pain  Abduction: 115 degrees with pain  External rotation BTH: WFL  Internal rotation BTB: sacrum     Right Shoulder   Internal rotation BTB: T5     Passive Range of Motion     Right Shoulder   Flexion: 155 degrees   Abduction: 150 degrees   External rotation 90°: 85 degrees   Internal rotation 90°: 30 degrees     Strength/Myotome Testing     Left Shoulder     Planes of Motion   Flexion: 5   Abduction: 5   External rotation at 0°: 5   Internal rotation at 0°: 5     Right Shoulder     Planes of Motion   Flexion: 4-   Abduction: 4-   External rotation at 0°: 4-   Internal rotation at 0°: 4-              Precautions:   Patient Active Problem List   Diagnosis   • Anxiety disorder   • Atrophic vaginitis   • Benign essential hypertension   • Coronary atherosclerosis   • Midline cystocele   • Dacryocystitis of left lacrimal sac   • " "Diverticulitis of colon   • Gastroparesis   • GERD without esophagitis   • Hiatal hernia   • IBS (irritable bowel syndrome)   • Insomnia   • Left shoulder pain   • Low back pain   • Menopausal syndrome   • Microscopic hematuria   • Mixed hyperlipidemia   • Mixed stress and urge urinary incontinence   • Post-menopausal bleeding   • Status post percutaneous transluminal coronary angioplasty   • Right knee pain   • Seasonal allergies   • Type 2 diabetes mellitus with stage 3 chronic kidney disease, without long-term current use of insulin, unspecified whether stage 3a or 3b CKD ([facility])   • Vitamin deficiency   • Attention deficit hyperactivity disorder (ADHD), predominantly hyperactive type   • PTSD (post-traumatic stress disorder)   • Depressed   • Adjustment reaction with anxiety   • Age-related nuclear cataract of both eyes   • 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 12' 12' 12' 12' 12' 12' 12' 10'     R shoulder capsule JM for pain modulation             R scar mobs 3'            Supine rhythmic stabilizations  NV 3x20\" 3x20\" 3x20\" 3x20\" 3x20\"      Re-eval        15'     Neuro Re-Ed                                                                                                        Ther Ex             UBE warm up    L1 4' L1 4' L1 4' L1 2'/2' L1 2'/2' L1 2'/2'     Pulleys AAROM 3x10 3x10 3x10 3x10 3x10 3x10 3x10      Side lying ER 3x10 3x10 1# 3x10 1# 3x10 1# 3x10 2# 3x10 2# 3x10 2# 3x10     L side lying for R shoulder abduction lift       2x10      Prone shoulder flexion/abduction - palm down 2x10 ea. 2x10 ea. 2x10 ea. 2x10 ea. 2x10 ea. 3x10 " "ea. 3x10 ea. 3x10 ea.     TB ER/IR - green TB  NV 2x10 ea. 2x10 ea. 3x10 ea.  3x10 ea. 3x10 ea. 3x10 ea.     Qped with arm lifts  NV 2x10 B/L 2x10 B/L 2x10 B/L 2x10 B/L 2x10 B/L 2x10 BL     Standing shoulder AROM  NV Pain held 2x5 2x8 2x8 2x8 HEP     Wall slide   2x5 2x5 2x5 x5 hold      IR stretch w/ strap        10 x10\"                  Ther Activity                                       Gait Training                                       Modalities                                                            "

## 2024-11-18 NOTE — PROGRESS NOTES
PT Re-Evaluation     Today's date: 2024  Patient name: Denice Vargas  : 1960  MRN: [id number]  Referring provider: Arthur Willis MD  Dx:   Encounter Diagnosis     ICD-10-CM    1. S/P right rotator cuff repair  Z98.890                      Assessment  Impairments: abnormal or restricted ROM, abnormal movement, activity intolerance, impaired physical strength and pain with function    Assessment details: Patient is a 64 y.o. year old female who attended physical therapy for 19 treatment sessions s/p right shoulder distal clavicular excision and RC repair on 2024 . Patient reports moderate improvement at this time which correlates to improved FOTO scoring.  Patient has shown improvement throughout PT by demonstrating decreased pain, increased range of motion, increased strength, and improved tolerance to activity.  Patient continues to present with pain, decreased ROM, decreased strength, and decreased tolerance to activity. Denice would benefit from continued physical therapy to address these issues and to maximize function. Thank you.      Understanding of Dx/Px/POC: excellent     Prognosis: excellent    Goals  STG (8 weeks)  1. Patient will be independent with HEP (MET)  2. Decrease pain at worst by 2 points on NPRS (MET)  3. Increase right shoulder PROM to WFL in all planes (MET)  4. Patient will demonstrate ability to d/c sling and use arm below shoulder height without pain (MET)  LTG (16-20 weeks)  1. Decrease pain at worst from 4 points on NPRS (MET)  2. Increase right shoulder AROM to WFL in all planes (PROGRESSING)  3. Increase RC strength to > or equal to 4/5 per MMT for improved ADL and overhead function (PROGRESSING)  4. Patient will demonstrate ability to lift shoulder overhead without normal GH rhythm (PROGRESSING)  5. Increase FOTO score > or equal to expected outcome (PROGRESSING)    Plan  Patient would benefit from: skilled PT    Planned therapy interventions: joint mobilization,  "manual therapy, neuromuscular re-education, patient education, strengthening, stretching, therapeutic exercise, therapeutic activities, home exercise program, functional ROM exercises and ADL retraining    Frequency: 2x week  Duration in weeks: 8  Treatment plan discussed with: patient        Subjective Evaluation    History of Present Illness  Mechanism of injury: Patient reports to outpatient PT s/p right shoulder distal clavicular excision, RC repair and biceps tenodesis on 2024.  Post-operative pain described as  which is worse with accidental movement and improved with supported rest, taking off the sling and medications.  Patient is expected to wear the sing x4 weeks.  Patient is retired but notes ADL's and leisure functions as a result.  Patients goals for PT are to decrease the pain and return to PLOF (including taking care of her dogs).      2024: Patient reports significant reduction in pain since IE.  Notes a setback last week she kids ran into her shoulder resulting in a fall with more \"popping\" in the arm but notes that this is steadily improving.  States that her ROM is progressing to use for overhead function (no resistance) and that her primary goal is to improve overhead strength to hang laundry and perform other ADL's.    Quality of life: good    Patient Goals  Patient goals for therapy: increased strength, independence with ADLs/IADLs, return to sport/leisure activities, increased motion and decreased pain    Pain  Current pain ratin  At best pain ratin  At worst pain ratin  Quality: dull ache  Relieving factors: rest and relaxation  Aggravating factors: lifting    Social Support    Employment status: working  Hand dominance: right      Diagnostic Tests  MRI studies: abnormal        Objective     Postural Observations  Seated posture: fair  Standing posture: fair      Neurological Testing     Sensation     Shoulder     Right Shoulder   Intact: Light touch    Active Range of " Motion   Left Shoulder   Flexion: 150 degrees with pain  Abduction: 115 degrees with pain  External rotation BTH: WFL  Internal rotation BTB: sacrum     Right Shoulder   Internal rotation BTB: T5     Passive Range of Motion     Right Shoulder   Flexion: 155 degrees   Abduction: 150 degrees   External rotation 90°: 85 degrees   Internal rotation 90°: 30 degrees     Strength/Myotome Testing     Left Shoulder     Planes of Motion   Flexion: 5   Abduction: 5   External rotation at 0°: 5   Internal rotation at 0°: 5     Right Shoulder     Planes of Motion   Flexion: 4-   Abduction: 4-   External rotation at 0°: 4-   Internal rotation at 0°: 4-              Precautions:   Patient Active Problem List   Diagnosis    Anxiety disorder    Atrophic vaginitis    Benign essential hypertension    Coronary atherosclerosis    Midline cystocele    Dacryocystitis of left lacrimal sac    Diverticulitis of colon    Gastroparesis    GERD without esophagitis    Hiatal hernia    IBS (irritable bowel syndrome)    Insomnia    Left shoulder pain    Low back pain    Menopausal syndrome    Microscopic hematuria    Mixed hyperlipidemia    Mixed stress and urge urinary incontinence    Post-menopausal bleeding    Status post percutaneous transluminal coronary angioplasty    Right knee pain    Seasonal allergies    Type 2 diabetes mellitus with stage 3 chronic kidney disease, without long-term current use of insulin, unspecified whether stage 3a or 3b CKD (HCC)    Vitamin deficiency    Attention deficit hyperactivity disorder (ADHD), predominantly hyperactive type    PTSD (post-traumatic stress disorder)    Depressed    Adjustment reaction with anxiety    Age-related nuclear cataract of both eyes    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 12' 12' 12' 12' 12' 12' 12' 10'     R shoulder capsule JM for pain modulation             R scar mobs 3'            Supine rhythmic stabilizations  NV 3x20\" 3x20\" 3x20\" 3x20\" 3x20\"      Re-eval        15'     Neuro Re-Ed                                                                                                        Ther Ex             UBE warm up    L1 4' L1 4' L1 4' L1 2'/2' L1 2'/2' L1 2'/2'     Pulleys AAROM 3x10 3x10 3x10 3x10 3x10 3x10 3x10      Side lying ER 3x10 3x10 1# 3x10 1# 3x10 1# 3x10 2# 3x10 2# 3x10 2# 3x10     L side lying for R shoulder abduction lift       2x10      Prone shoulder flexion/abduction - palm down 2x10 ea. 2x10 ea. 2x10 ea. 2x10 ea. 2x10 ea. 3x10 ea. 3x10 ea. 3x10 ea.     TB ER/IR - green TB  NV 2x10 ea. 2x10 ea. 3x10 ea.  3x10 ea. 3x10 ea. 3x10 ea.     Qped with arm lifts  NV 2x10 B/L 2x10 B/L 2x10 B/L 2x10 B/L 2x10 B/L 2x10 BL     Standing shoulder AROM  NV Pain held 2x5 2x8 2x8 2x8 HEP     Wall slide   2x5 2x5 2x5 x5 hold      IR stretch w/ strap        10 x10\"                  Ther Activity                                       Gait Training                                       Modalities                                            "

## 2024-11-19 ENCOUNTER — APPOINTMENT (OUTPATIENT)
Dept: PHYSICAL THERAPY | Facility: CLINIC | Age: 64
End: 2024-11-19
Payer: COMMERCIAL

## 2024-11-25 DIAGNOSIS — F90.1 ATTENTION DEFICIT HYPERACTIVITY DISORDER (ADHD), PREDOMINANTLY HYPERACTIVE TYPE: ICD-10-CM

## 2024-11-26 RX ORDER — LISDEXAMFETAMINE DIMESYLATE 30 MG/1
30 CAPSULE ORAL EVERY MORNING
Qty: 30 CAPSULE | Refills: 0 | Status: SHIPPED | OUTPATIENT
Start: 2024-11-26

## 2024-12-02 ENCOUNTER — OFFICE VISIT (OUTPATIENT)
Dept: PHYSICAL THERAPY | Facility: CLINIC | Age: 64
End: 2024-12-02
Payer: COMMERCIAL

## 2024-12-02 DIAGNOSIS — E11.9 TYPE 2 DIABETES MELLITUS WITHOUT COMPLICATION, WITHOUT LONG-TERM CURRENT USE OF INSULIN (HCC): ICD-10-CM

## 2024-12-02 DIAGNOSIS — Z98.890 S/P RIGHT ROTATOR CUFF REPAIR: Primary | ICD-10-CM

## 2024-12-02 DIAGNOSIS — N18.30 TYPE 2 DIABETES MELLITUS WITH STAGE 3 CHRONIC KIDNEY DISEASE, WITHOUT LONG-TERM CURRENT USE OF INSULIN, UNSPECIFIED WHETHER STAGE 3A OR 3B CKD (HCC): ICD-10-CM

## 2024-12-02 DIAGNOSIS — E11.22 TYPE 2 DIABETES MELLITUS WITH STAGE 3 CHRONIC KIDNEY DISEASE, WITHOUT LONG-TERM CURRENT USE OF INSULIN, UNSPECIFIED WHETHER STAGE 3A OR 3B CKD (HCC): ICD-10-CM

## 2024-12-02 PROCEDURE — 97110 THERAPEUTIC EXERCISES: CPT | Performed by: PHYSICAL THERAPIST

## 2024-12-02 PROCEDURE — 97140 MANUAL THERAPY 1/> REGIONS: CPT | Performed by: PHYSICAL THERAPIST

## 2024-12-02 NOTE — PROGRESS NOTES
Daily Note     Today's date: 2024  Patient name: Denice Vargas  : 1960  MRN: [id number]  Referring provider: Arthur Willis MD  Dx:   Encounter Diagnosis     ICD-10-CM    1. S/P right rotator cuff repair  Z98.890                      Subjective: Patient reports HEP compliance and states that the shoulder is progressing regarding shoulder elevation and strength but some discomfort remains.       Objective: See treatment diary below      Assessment: Right shoulder PROM is progressing well, especially into IR.  Significant weakness and fatigue remains with supraspinatus lifts from the side but increased mobility with scapular activation.  Progressed TE as noted, with greatest challenge being functional ER/IR at the wall.       Plan: Continue per plan of care.      Precautions:   Patient Active Problem List   Diagnosis    Anxiety disorder    Atrophic vaginitis    Benign essential hypertension    Coronary atherosclerosis    Midline cystocele    Dacryocystitis of left lacrimal sac    Diverticulitis of colon    Gastroparesis    GERD without esophagitis    Hiatal hernia    IBS (irritable bowel syndrome)    Insomnia    Left shoulder pain    Low back pain    Menopausal syndrome    Microscopic hematuria    Mixed hyperlipidemia    Mixed stress and urge urinary incontinence    Post-menopausal bleeding    Status post percutaneous transluminal coronary angioplasty    Right knee pain    Seasonal allergies    Type 2 diabetes mellitus with stage 3 chronic kidney disease, without long-term current use of insulin, unspecified whether stage 3a or 3b CKD (HCC)    Vitamin deficiency    Attention deficit hyperactivity disorder (ADHD), predominantly hyperactive type    PTSD (post-traumatic stress disorder)    Depressed    Adjustment reaction with anxiety    Age-related nuclear cataract of both eyes    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 12' 12' 12' 12' 12' 12' 12' 10' 10'    R shoulder capsule JM for pain modulation             R scar mobs 3'            Supine rhythmic stabilizations  NV 3x20\" 3x20\" 3x20\" 3x20\" 3x20\"      Re-eval        15'     Neuro Re-Ed                                                                                                        Ther Ex             UBE warm up    L1 4' L1 4' L1 4' L1 2'/2' L1 2'/2' L1 2'/2' L1 2'/2'    Pulleys AAROM 3x10 3x10 3x10 3x10 3x10 3x10 3x10      Side lying ER 3x10 3x10 1# 3x10 1# 3x10 1# 3x10 2# 3x10 2# 3x10 2# 3x10 2# 3x10    L side lying for R shoulder abduction lift       2x10  2x10    Prone shoulder flexion/abduction - palm down 2x10 ea. 2x10 ea. 2x10 ea. 2x10 ea. 2x10 ea. 3x10 ea. 3x10 ea. 3x10 ea. DC    TB IR - black TB  NV 2x10 ea. 2x10 ea. 3x10 ea.  3x10 ea. 3x10 ea. 3x10 ea. 3x10.    Qped with arm lifts  NV 2x10 B/L 2x10 B/L 2x10 B/L 2x10 B/L 2x10 B/L 2x10 BL 2x10 B/L    ball ER/IR at wall         2x30 ea.    Wall slide   2x5 2x5 2x5 x5 hold      IR stretch w/ strap        10 x10\" 10 x10\"    TB extensions - green         3x10    TB ER in 45 deg of shoulder abduction - yellow TB         3x10                 Ther Activity                                       Gait Training                                       Modalities                                              "

## 2024-12-04 ENCOUNTER — TELEPHONE (OUTPATIENT)
Age: 64
End: 2024-12-04

## 2024-12-04 ENCOUNTER — APPOINTMENT (OUTPATIENT)
Dept: PHYSICAL THERAPY | Facility: CLINIC | Age: 64
End: 2024-12-04
Payer: COMMERCIAL

## 2024-12-04 DIAGNOSIS — E11.9 TYPE 2 DIABETES MELLITUS WITHOUT COMPLICATION, WITHOUT LONG-TERM CURRENT USE OF INSULIN (HCC): ICD-10-CM

## 2024-12-04 DIAGNOSIS — N18.30 TYPE 2 DIABETES MELLITUS WITH STAGE 3 CHRONIC KIDNEY DISEASE, WITHOUT LONG-TERM CURRENT USE OF INSULIN, UNSPECIFIED WHETHER STAGE 3A OR 3B CKD (HCC): ICD-10-CM

## 2024-12-04 DIAGNOSIS — N18.30 TYPE 2 DIABETES MELLITUS WITH STAGE 3 CHRONIC KIDNEY DISEASE, WITHOUT LONG-TERM CURRENT USE OF INSULIN, UNSPECIFIED WHETHER STAGE 3A OR 3B CKD (HCC): Primary | ICD-10-CM

## 2024-12-04 DIAGNOSIS — E11.22 TYPE 2 DIABETES MELLITUS WITH STAGE 3 CHRONIC KIDNEY DISEASE, WITHOUT LONG-TERM CURRENT USE OF INSULIN, UNSPECIFIED WHETHER STAGE 3A OR 3B CKD (HCC): ICD-10-CM

## 2024-12-04 DIAGNOSIS — E11.22 TYPE 2 DIABETES MELLITUS WITH STAGE 3 CHRONIC KIDNEY DISEASE, WITHOUT LONG-TERM CURRENT USE OF INSULIN, UNSPECIFIED WHETHER STAGE 3A OR 3B CKD (HCC): Primary | ICD-10-CM

## 2024-12-04 RX ORDER — TIRZEPATIDE 2.5 MG/.5ML
2.5 INJECTION, SOLUTION SUBCUTANEOUS WEEKLY
Qty: 2 ML | Refills: 5 | Status: SHIPPED | OUTPATIENT
Start: 2024-12-04

## 2024-12-04 NOTE — TELEPHONE ENCOUNTER
Patient stated pharmacist informed her there Rx: Tirzepatide 2.5 mg generic. Her insurance will not cover it.    Script must be sent for Rx: Mounjaro instead for her insurance to cover it.    Please review and advice.

## 2024-12-05 ENCOUNTER — APPOINTMENT (OUTPATIENT)
Dept: PHYSICAL THERAPY | Facility: CLINIC | Age: 64
End: 2024-12-05
Payer: COMMERCIAL

## 2024-12-09 DIAGNOSIS — F90.1 ATTENTION DEFICIT HYPERACTIVITY DISORDER (ADHD), PREDOMINANTLY HYPERACTIVE TYPE: ICD-10-CM

## 2024-12-10 ENCOUNTER — OFFICE VISIT (OUTPATIENT)
Dept: URGENT CARE | Facility: CLINIC | Age: 64
End: 2024-12-10
Payer: COMMERCIAL

## 2024-12-10 ENCOUNTER — OFFICE VISIT (OUTPATIENT)
Dept: PHYSICAL THERAPY | Facility: CLINIC | Age: 64
End: 2024-12-10
Payer: COMMERCIAL

## 2024-12-10 ENCOUNTER — TELEPHONE (OUTPATIENT)
Age: 64
End: 2024-12-10

## 2024-12-10 VITALS
RESPIRATION RATE: 20 BRPM | WEIGHT: 142 LBS | BODY MASS INDEX: 23.27 KG/M2 | SYSTOLIC BLOOD PRESSURE: 110 MMHG | OXYGEN SATURATION: 97 % | TEMPERATURE: 97.2 F | DIASTOLIC BLOOD PRESSURE: 62 MMHG | HEART RATE: 97 BPM

## 2024-12-10 DIAGNOSIS — Z98.890 S/P RIGHT ROTATOR CUFF REPAIR: Primary | ICD-10-CM

## 2024-12-10 DIAGNOSIS — J01.00 ACUTE NON-RECURRENT MAXILLARY SINUSITIS: Primary | ICD-10-CM

## 2024-12-10 PROCEDURE — S9083 URGENT CARE CENTER GLOBAL: HCPCS | Performed by: NURSE PRACTITIONER

## 2024-12-10 PROCEDURE — 97110 THERAPEUTIC EXERCISES: CPT | Performed by: PHYSICAL THERAPIST

## 2024-12-10 PROCEDURE — G0382 LEV 3 HOSP TYPE B ED VISIT: HCPCS | Performed by: NURSE PRACTITIONER

## 2024-12-10 PROCEDURE — 97140 MANUAL THERAPY 1/> REGIONS: CPT | Performed by: PHYSICAL THERAPIST

## 2024-12-10 RX ORDER — DOXYCYCLINE 100 MG/1
100 CAPSULE ORAL EVERY 12 HOURS SCHEDULED
Qty: 14 CAPSULE | Refills: 0 | Status: SHIPPED | OUTPATIENT
Start: 2024-12-10 | End: 2024-12-17

## 2024-12-10 RX ORDER — LISDEXAMFETAMINE DIMESYLATE 20 MG/1
20 CAPSULE ORAL EVERY MORNING
Qty: 30 CAPSULE | Refills: 0 | Status: SHIPPED | OUTPATIENT
Start: 2024-12-10

## 2024-12-10 RX ORDER — MUPIROCIN 20 MG/G
OINTMENT TOPICAL 2 TIMES DAILY
Qty: 22 G | Refills: 0 | Status: SHIPPED | OUTPATIENT
Start: 2024-12-10

## 2024-12-10 RX ORDER — PREDNISONE 20 MG/1
20 TABLET ORAL 2 TIMES DAILY WITH MEALS
Qty: 10 TABLET | Refills: 0 | Status: SHIPPED | OUTPATIENT
Start: 2024-12-10 | End: 2024-12-15

## 2024-12-10 NOTE — TELEPHONE ENCOUNTER
Patient called, request  the status of medication refillVyvanse 20 MG capsule [[id number]] .Patient states she has 1 pill left Upon chart review/medications, unable to locate pharmacy receipt for medication, Please advise patient at [phone], if any further questions.

## 2024-12-10 NOTE — PROGRESS NOTES
[facility]        NAME: Denice Vargas is a 64 y.o. female  : 1960    MRN: [id number]  DATE: December 10, 2024  TIME: 12:39 PM    Assessment and Plan   Acute non-recurrent maxillary sinusitis [J01.00]  1. Acute non-recurrent maxillary sinusitis  doxycycline hyclate (VIBRAMYCIN) 100 mg capsule    predniSONE 20 mg tablet    mupirocin (BACTROBAN) 2 % ointment        Will start course of doxy along with prednisone for sinusitis   Mupirocin on open sores   F/u with pcp   Pt in agreement with plan of care    Patient Instructions     Follow up with PCP in 3-5 days.  Proceed to  ER if symptoms worsen.    Chief Complaint     Chief Complaint   Patient presents with    Cold Like Symptoms     Patient with c/o sinus pain and pressure with a headache. States that she is having yellow nasal drainange         History of Present Illness   Denice Vargas presents to the clinic c/o    Cold Like Symptoms (Patient with c/o sinus pain and pressure with a headache. States that she is having yellow nasal drainange)  Pt states last week had a stomach virus   Then noted onset of sinusitis symptoms   Has some cold sores in her mouth along with sinus pain and pressure   Notes generalized body aches and headache also.        Review of Systems   Review of Systems   All other systems reviewed and are negative.        Current Medications     Long-Term Medications   Medication Sig Dispense Refill    aspirin (ECOTRIN LOW STRENGTH) 81 mg EC tablet Take 1 tablet by mouth daily      atorvastatin (LIPITOR) 40 mg tablet TAKE 1 TABLET DAILY 90 tablet 3    Blood Glucose Monitoring Suppl (ONE TOUCH ULTRA MINI) w/Device KIT Use      cholecalciferol (VITAMIN D3) 1,000 units tablet Take by mouth      escitalopram (LEXAPRO) 20 mg tablet Take 1 tablet (20 mg total) by mouth daily 90 tablet 1    famotidine (PEPCID) 20 mg tablet Take 1 tablet by mouth daily      lisdexamfetamine (Vyvanse) 30 MG capsule Take 1 capsule (30 mg total) by mouth every  morning Max Daily Amount: 30 mg 30 capsule 0    lisinopril (ZESTRIL) 5 mg tablet TAKE ONE-HALF (1/2) TABLET DAILY 45 tablet 1    metFORMIN (GLUCOPHAGE-XR) 500 mg 24 hr tablet TAKE 2 TABLETS BY MOUTH TWICE A DAY WITH FOOD 360 tablet 0    metoprolol succinate (TOPROL-XL) 25 mg 24 hr tablet TAKE 1 TABLET DAILY 90 tablet 1    mupirocin (BACTROBAN) 2 % ointment Apply topically 2 (two) times a day 22 g 0    Vyvanse 20 MG capsule Take 1 capsule (20 mg total) by mouth every morning Max Daily Amount: 20 mg 30 capsule 0    Empagliflozin (JARDIANCE) 10 MG TABS tablet Take 1 tablet (10 mg total) by mouth daily 30 tablet 5    fenofibrate (TRICOR) 145 mg tablet Take 1 tablet (145 mg total) by mouth daily 90 tablet 1    nabumetone (RELAFEN) 750 mg tablet Take 1 tablet (750 mg total) by mouth 2 (two) times a day (Patient not taking: Reported on 12/10/2024) 60 tablet 1       Current Allergies     Allergies as of 12/10/2024 - Reviewed 12/10/2024   Allergen Reaction Noted    Levaquin [levofloxacin] Anaphylaxis 05/18/2012            The following portions of the patient's history were reviewed and updated as appropriate: allergies, current medications, past family history, past medical history, past social history, past surgical history and problem list.    Objective   /62   Pulse 97   Temp (!) 97.2 °F (36.2 °C) (Tympanic)   Resp 20   Wt 64.4 kg (142 lb)   SpO2 97%   BMI 23.27 kg/m²        Physical Exam     Physical Exam  Vitals and nursing note reviewed.   Constitutional:       Appearance: Normal appearance. She is well-developed.   HENT:      Head: Normocephalic and atraumatic.      Right Ear: Hearing, tympanic membrane, ear canal and external ear normal.      Left Ear: Hearing, tympanic membrane, ear canal and external ear normal.      Nose: Mucosal edema and congestion present.      Right Sinus: Maxillary sinus tenderness present.      Left Sinus: Maxillary sinus tenderness present.      Mouth/Throat:      Lips: Pink.       Mouth: Mucous membranes are moist.      Pharynx: Oropharynx is clear.      Comments: Bilateral angular cheilitis noted  Eyes:      General: Lids are normal.      Conjunctiva/sclera: Conjunctivae normal.      Pupils: Pupils are equal, round, and reactive to light.   Cardiovascular:      Rate and Rhythm: Normal rate and regular rhythm.      Heart sounds: Normal heart sounds, S1 normal and S2 normal.   Pulmonary:      Effort: Pulmonary effort is normal.      Breath sounds: Normal breath sounds.   Abdominal:      General: Abdomen is flat. Bowel sounds are normal.      Palpations: Abdomen is soft.   Musculoskeletal:         General: Normal range of motion.      Cervical back: Full passive range of motion without pain, normal range of motion and neck supple.   Skin:     General: Skin is warm and dry.   Neurological:      General: No focal deficit present.      Mental Status: She is alert and oriented to person, place, and time.   Psychiatric:         Mood and Affect: Mood normal.         Speech: Speech normal.         Behavior: Behavior normal. Behavior is cooperative.         Thought Content: Thought content normal.         Judgment: Judgment normal.

## 2024-12-10 NOTE — PROGRESS NOTES
Daily Note     Today's date: 12/10/2024  Patient name: Denice Vargas  : 1960  MRN: [id number]  Referring provider: Arthur Wilils MD  Dx:   Encounter Diagnosis     ICD-10-CM    1. S/P right rotator cuff repair  Z98.890                        Subjective: Patient reports improvement in shoulder function and mobility.  States that she remains compliant with her HEP.       Objective: See treatment diary below      Assessment: Improving supraspinatus activation with side lying lifts.  AROM improving well with increased quadruped stability.  Functional IR remains limited but significantly improved over the past month. Overall patient is making progress toward pain, ROM And strength goals.       Plan: Continue per plan of care.      Precautions:   Patient Active Problem List   Diagnosis    Anxiety disorder    Atrophic vaginitis    Benign essential hypertension    Coronary atherosclerosis    Midline cystocele    Dacryocystitis of left lacrimal sac    Diverticulitis of colon    Gastroparesis    GERD without esophagitis    Hiatal hernia    IBS (irritable bowel syndrome)    Insomnia    Left shoulder pain    Low back pain    Menopausal syndrome    Microscopic hematuria    Mixed hyperlipidemia    Mixed stress and urge urinary incontinence    Post-menopausal bleeding    Status post percutaneous transluminal coronary angioplasty    Right knee pain    Seasonal allergies    Type 2 diabetes mellitus with stage 3 chronic kidney disease, without long-term current use of insulin, unspecified whether stage 3a or 3b CKD (HCC)    Vitamin deficiency    Attention deficit hyperactivity disorder (ADHD), predominantly hyperactive type    PTSD (post-traumatic stress disorder)    Depressed    Adjustment reaction with anxiety    Age-related nuclear cataract of both eyes    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 12' 12' 12' 12' 12' 12' 12' 10' 10' 10'   R shoulder capsule JM for pain modulation             R scar mobs 3'            Supine rhythmic stabilizations  NV 3x20\" 3x20\" 3x20\" 3x20\" 3x20\"   3x20\"   Re-eval        15'     Neuro Re-Ed                                                                                                        Ther Ex             UBE warm up    L1 4' L1 4' L1 4' L1 2'/2' L1 2'/2' L1 2'/2' L1 2'/2' L2 2'/2'   Pulleys AAROM 3x10 3x10 3x10 3x10 3x10 3x10 3x10      Side lying ER 3x10 3x10 1# 3x10 1# 3x10 1# 3x10 2# 3x10 2# 3x10 2# 3x10 2# 3x10 2# 3x10   L side lying for R shoulder abduction lift       2x10  2x10 2x10   Prone shoulder flexion/abduction - palm down 2x10 ea. 2x10 ea. 2x10 ea. 2x10 ea. 2x10 ea. 3x10 ea. 3x10 ea. 3x10 ea. DC    TB IR - black TB  NV 2x10 ea. 2x10 ea. 3x10 ea.  3x10 ea. 3x10 ea. 3x10 ea. 3x10. 3x10    Qped with arm lifts  NV 2x10 B/L 2x10 B/L 2x10 B/L 2x10 B/L 2x10 B/L 2x10 BL 2x10 B/L 2x10 B/L   ball ER/IR at wall         2x30 ea. 2x30 ea.   Wall slide   2x5 2x5 2x5 x5 hold      IR stretch w/ strap        10 x10\" 10 x10\" 10 x10\"   TB extensions - green         3x10 3x10   TB ER in 45 deg of shoulder abduction - yellow TB         3x10 3x10   Standing shoulder AROM          2x10   Ther Activity                                       Gait Training                                       Modalities                                              "

## 2024-12-13 ENCOUNTER — OFFICE VISIT (OUTPATIENT)
Dept: PHYSICAL THERAPY | Facility: CLINIC | Age: 64
End: 2024-12-13
Payer: COMMERCIAL

## 2024-12-13 DIAGNOSIS — Z98.890 S/P RIGHT ROTATOR CUFF REPAIR: Primary | ICD-10-CM

## 2024-12-13 PROCEDURE — 97140 MANUAL THERAPY 1/> REGIONS: CPT | Performed by: PHYSICAL THERAPIST

## 2024-12-13 PROCEDURE — 97110 THERAPEUTIC EXERCISES: CPT | Performed by: PHYSICAL THERAPIST

## 2024-12-13 NOTE — PROGRESS NOTES
PT Re-Evaluation     Today's date: 2024  Patient name: Denice Vargas  : 1960  MRN: [id number]  Referring provider: Arthur Willis MD  Dx:   Encounter Diagnosis     ICD-10-CM    1. S/P right rotator cuff repair  Z98.890                        Assessment  Impairments: abnormal or restricted ROM, abnormal movement, activity intolerance, impaired physical strength and pain with function    Assessment details: Patient is a 64 y.o. year old female who attended physical therapy for 22 treatment sessions s/p right shoulder distal clavicular excision and RC repair on 2024. Patient reports significant improvement at this time which correlates with FOTO scoring.  Patient has shown improvement throughout PT by demonstrating decreased pain, increased range of motion, increased strength, and improved tolerance to activity.  Secondary to achieving functional goals and independence with comprehensive Home Exercise Program, Denice will be discharged from PT at this time.  Thank you.      Understanding of Dx/Px/POC: excellent     Prognosis: excellent    Goals  STG (8 weeks)  1. Patient will be independent with HEP (MET)  2. Decrease pain at worst by 2 points on NPRS (MET)  3. Increase right shoulder PROM to WFL in all planes (MET)  4. Patient will demonstrate ability to d/c sling and use arm below shoulder height without pain (MET)  LTG (16-20 weeks)  1. Decrease pain at worst from 4 points on NPRS (MET)  2. Increase right shoulder AROM to WFL in all planes (MET)  3. Increase RC strength to > or equal to 4/5 per MMT for improved ADL and overhead function (MET)  4. Patient will demonstrate ability to lift shoulder overhead without normal GH rhythm (MET)  5. Increase FOTO score > or equal to expected outcome (MET)    Plan  Patient would benefit from: skilled PT    Planned therapy interventions: joint mobilization, manual therapy, neuromuscular re-education, patient education, strengthening, stretching, therapeutic  "exercise, therapeutic activities, home exercise program, functional ROM exercises and ADL retraining    Treatment plan discussed with: patient  Plan details: DC        Subjective Evaluation    History of Present Illness  Mechanism of injury: Patient reports to outpatient PT s/p right shoulder distal clavicular excision, RC repair and biceps tenodesis on 2024.  Post-operative pain described as  which is worse with accidental movement and improved with supported rest, taking off the sling and medications.  Patient is expected to wear the sing x4 weeks.  Patient is retired but notes ADL's and leisure functions as a result.  Patients goals for PT are to decrease the pain and return to PLOF (including taking care of her dogs).      2024: Patient reports significant reduction in pain since IE.  Notes a setback last week she kids ran into her shoulder resulting in a fall with more \"popping\" in the arm but notes that this is steadily improving.  States that her ROM is progressing to use for overhead function (no resistance) and that her primary goal is to improve overhead strength to hang laundry and perform other ADL's.      2024: Patient reports that pain remains under control and is making progressing with ADL's (hanging shirts) and chores.  Notes caution with activity including caring for her dog but overall is happy with the progress and is confident in continues progress upon discharge.    Quality of life: good    Patient Goals  Patient goals for therapy: increased strength, independence with ADLs/IADLs, return to sport/leisure activities, increased motion and decreased pain    Pain  Current pain ratin  At best pain ratin  At worst pain ratin  Quality: dull ache  Relieving factors: rest and relaxation  Aggravating factors: lifting    Social Support    Employment status: working  Hand dominance: right      Diagnostic Tests  MRI studies: abnormal        Objective     Postural " Observations  Seated posture: fair  Standing posture: fair      Neurological Testing     Sensation     Shoulder     Right Shoulder   Intact: Light touch    Active Range of Motion   Left Shoulder   Flexion: 155 degrees   Abduction: 155 degrees   External rotation BTH: WFL  Internal rotation BTB: L4     Right Shoulder   Flexion: 155 degrees   Abduction: 160 degrees   External rotation BTH: C6   Internal rotation BTB: T5     Passive Range of Motion     Right Shoulder   Flexion: 155 degrees   Abduction: 150 degrees   External rotation 90°: 85 degrees   Internal rotation 90°: 30 degrees     Strength/Myotome Testing     Left Shoulder     Planes of Motion   Flexion: 5   Abduction: 5   External rotation at 0°: 5   Internal rotation at 0°: 5     Right Shoulder     Planes of Motion   Flexion: 4   Abduction: 4   External rotation at 0°: 4   Internal rotation at 0°: 4              Precautions:   Patient Active Problem List   Diagnosis    Anxiety disorder    Atrophic vaginitis    Benign essential hypertension    Coronary atherosclerosis    Midline cystocele    Dacryocystitis of left lacrimal sac    Diverticulitis of colon    Gastroparesis    GERD without esophagitis    Hiatal hernia    IBS (irritable bowel syndrome)    Insomnia    Left shoulder pain    Low back pain    Menopausal syndrome    Microscopic hematuria    Mixed hyperlipidemia    Mixed stress and urge urinary incontinence    Post-menopausal bleeding    Status post percutaneous transluminal coronary angioplasty    Right knee pain    Seasonal allergies    Type 2 diabetes mellitus with stage 3 chronic kidney disease, without long-term current use of insulin, unspecified whether stage 3a or 3b CKD (HCC)    Vitamin deficiency    Attention deficit hyperactivity disorder (ADHD), predominantly hyperactive type    PTSD (post-traumatic stress disorder)    Depressed    Adjustment reaction with anxiety    Age-related nuclear cataract of both eyes    Benign neoplasm of choroid of  "right eye    Current tobacco use    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    Need for vaccination    P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    Bronchitis    Mucopurulent chronic bronchitis (HCC)    Tear of right rotator cuff    Pre-operative clearance         R RC Repair on 8/30/2024    Manuals 12/13 11/18 12/2 12/10   R shoulder PROM to vandana. 10'       10' 10' 10'   R shoulder capsule JM for pain modulation             R scar mobs             Supine rhythmic stabilizations 3x20\"         3x20\"   Re-eval 15'       15'     Neuro Re-Ed                                                                                                        Ther Ex             UBE warm up  L2 2'/2'       L1 2'/2' L1 2'/2' L2 2'/2'   Pulleys AAROM             Side lying ER        2# 3x10 2# 3x10 2# 3x10   L side lying for R shoulder abduction lift         2x10 2x10   Prone shoulder flexion/abduction - palm down        3x10 ea. DC    TB IR - black TB        3x10 ea. 3x10. 3x10    Qped with arm lifts        2x10 BL 2x10 B/L 2x10 B/L   ball ER/IR at wall         2x30 ea. 2x30 ea.   Wall slide             IR stretch w/ strap        10 x10\" 10 x10\" 10 x10\"   TB extensions - green         3x10 3x10   TB ER in 45 deg of shoulder abduction - yellow TB         3x10 3x10   Standing shoulder AROM          2x10   HEP Review 10'            Ther Activity                                       Gait Training                                       Modalities                                                 "

## 2024-12-16 ENCOUNTER — APPOINTMENT (OUTPATIENT)
Dept: PHYSICAL THERAPY | Facility: CLINIC | Age: 64
End: 2024-12-16
Payer: COMMERCIAL

## 2024-12-16 ENCOUNTER — TELEPHONE (OUTPATIENT)
Age: 64
End: 2024-12-16

## 2024-12-16 DIAGNOSIS — E78.2 MIXED HYPERLIPIDEMIA: ICD-10-CM

## 2024-12-16 NOTE — TELEPHONE ENCOUNTER
PA for nelida 3 plus sensor SUBMITTED to [employer]    via    []CMM-KEY:   [x]Surescripts-Case ID # [id number]  []Availity-Auth ID #  NDC #     []Faxed to plan   []Other website    []Phone call Case ID #      [x]PA sent as URGENT    All office notes, labs and other pertaining documents and studies sent. Clinical questions answered. Awaiting determination from insurance company.     Turnaround time for your insurance to make a decision on your Prior Authorization can take 7-21 business days.

## 2024-12-16 NOTE — TELEPHONE ENCOUNTER
PA for nelida sensor EXCLUDED from plan       Reason:(Screenshot if applicable)        Message sent to office clinical pool Yes      QUANTITY IS EXCLUDED

## 2024-12-17 ENCOUNTER — PATIENT MESSAGE (OUTPATIENT)
Dept: FAMILY MEDICINE CLINIC | Facility: CLINIC | Age: 64
End: 2024-12-17

## 2024-12-17 RX ORDER — ATORVASTATIN CALCIUM 40 MG/1
40 TABLET, FILM COATED ORAL DAILY
Qty: 90 TABLET | Refills: 1 | Status: SHIPPED | OUTPATIENT
Start: 2024-12-17

## 2024-12-18 ENCOUNTER — APPOINTMENT (OUTPATIENT)
Dept: PHYSICAL THERAPY | Facility: CLINIC | Age: 64
End: 2024-12-18
Payer: COMMERCIAL

## 2024-12-18 DIAGNOSIS — N18.30 TYPE 2 DIABETES MELLITUS WITH STAGE 3 CHRONIC KIDNEY DISEASE, WITHOUT LONG-TERM CURRENT USE OF INSULIN, UNSPECIFIED WHETHER STAGE 3A OR 3B CKD (HCC): Primary | ICD-10-CM

## 2024-12-18 DIAGNOSIS — E11.22 TYPE 2 DIABETES MELLITUS WITH STAGE 3 CHRONIC KIDNEY DISEASE, WITHOUT LONG-TERM CURRENT USE OF INSULIN, UNSPECIFIED WHETHER STAGE 3A OR 3B CKD (HCC): Primary | ICD-10-CM

## 2024-12-18 DIAGNOSIS — E11.9 TYPE 2 DIABETES MELLITUS WITHOUT COMPLICATION, WITHOUT LONG-TERM CURRENT USE OF INSULIN (HCC): ICD-10-CM

## 2024-12-18 RX ORDER — ACYCLOVIR 800 MG/1
1 TABLET ORAL
Qty: 6 EACH | Refills: 3 | Status: SHIPPED | OUTPATIENT
Start: 2024-12-18

## 2024-12-18 RX ORDER — KETOROLAC TROMETHAMINE 30 MG/ML
1 INJECTION, SOLUTION INTRAMUSCULAR; INTRAVENOUS ONCE
Qty: 1 EACH | Refills: 0 | Status: SHIPPED | OUTPATIENT
Start: 2024-12-18 | End: 2024-12-18

## 2024-12-19 NOTE — PATIENT COMMUNICATION
Spoke to patient. She said you sent the regular Freestyle and it needs to Freestyle Saige 3 plus. The regular Freestyle is not discontinued.

## 2024-12-23 ENCOUNTER — OFFICE VISIT (OUTPATIENT)
Dept: URGENT CARE | Facility: CLINIC | Age: 64
End: 2024-12-23
Payer: COMMERCIAL

## 2024-12-23 VITALS
TEMPERATURE: 97.2 F | DIASTOLIC BLOOD PRESSURE: 80 MMHG | OXYGEN SATURATION: 95 % | RESPIRATION RATE: 18 BRPM | HEART RATE: 104 BPM | SYSTOLIC BLOOD PRESSURE: 130 MMHG

## 2024-12-23 DIAGNOSIS — B02.9 HERPES ZOSTER WITHOUT COMPLICATION: Primary | ICD-10-CM

## 2024-12-23 PROCEDURE — G0382 LEV 3 HOSP TYPE B ED VISIT: HCPCS | Performed by: NURSE PRACTITIONER

## 2024-12-23 PROCEDURE — S9083 URGENT CARE CENTER GLOBAL: HCPCS | Performed by: NURSE PRACTITIONER

## 2024-12-23 RX ORDER — TRIAMCINOLONE ACETONIDE 5 MG/G
CREAM TOPICAL 2 TIMES DAILY
Qty: 15 G | Refills: 0 | Status: SHIPPED | OUTPATIENT
Start: 2024-12-23

## 2024-12-23 RX ORDER — TRAZODONE HYDROCHLORIDE 50 MG/1
50 TABLET, FILM COATED ORAL
COMMUNITY
Start: 2024-11-29

## 2024-12-23 NOTE — PROGRESS NOTES
[facility]        NAME: Denice Vargas is a 64 y.o. female  : 1960    MRN: [id number]  DATE: 2024  TIME: 11:00 AM    Assessment and Plan   Herpes zoster without complication [B02.9]  1. Herpes zoster without complication  triamcinolone (KENALOG) 0.5 % cream        2 weeks since onset -   Will start kenalog cream topically for symptom management   F/u with pcp   Pt in agreement with plan     Patient Instructions     Follow up with PCP in 3-5 days.  Proceed to  ER if symptoms worsen.    Chief Complaint     Chief Complaint   Patient presents with    Rash     Started 2 weeks ago. Area on back. Itchy, red, pain, irritated. Tried Cortizone cream.          History of Present Illness   Denice Vargas presents to the clinic c/o    Rash (Started 2 weeks ago. Area on back. Itchy, red, pain, irritated. Tried Cortizone cream. )  Had the shingles vaccine   Itching is the most bothersome symptom  States occasional pain        Review of Systems   Review of Systems   All other systems reviewed and are negative.        Current Medications     Long-Term Medications   Medication Sig Dispense Refill    aspirin (ECOTRIN LOW STRENGTH) 81 mg EC tablet Take 1 tablet by mouth daily      atorvastatin (LIPITOR) 40 mg tablet TAKE 1 TABLET DAILY 90 tablet 1    cholecalciferol (VITAMIN D3) 1,000 units tablet Take by mouth      escitalopram (LEXAPRO) 20 mg tablet Take 1 tablet (20 mg total) by mouth daily 90 tablet 1    famotidine (PEPCID) 20 mg tablet Take 1 tablet by mouth daily      lisdexamfetamine (Vyvanse) 30 MG capsule Take 1 capsule (30 mg total) by mouth every morning Max Daily Amount: 30 mg 30 capsule 0    lisinopril (ZESTRIL) 5 mg tablet TAKE ONE-HALF (1/2) TABLET DAILY 45 tablet 1    metFORMIN (GLUCOPHAGE-XR) 500 mg 24 hr tablet TAKE 2 TABLETS BY MOUTH TWICE A DAY WITH FOOD 360 tablet 0    metoprolol succinate (TOPROL-XL) 25 mg 24 hr tablet TAKE 1 TABLET DAILY 90 tablet 1    triamcinolone (KENALOG) 0.5 %  cream Apply topically 2 (two) times a day 15 g 0    Vyvanse 20 MG capsule Take 1 capsule (20 mg total) by mouth every morning Max Daily Amount: 20 mg 30 capsule 0    Blood Glucose Monitoring Suppl (ONE TOUCH ULTRA MINI) w/Device KIT Use (Patient not taking: Reported on 12/23/2024)      Empagliflozin (JARDIANCE) 10 MG TABS tablet Take 1 tablet (10 mg total) by mouth daily 30 tablet 5    fenofibrate (TRICOR) 145 mg tablet Take 1 tablet (145 mg total) by mouth daily 90 tablet 1    mupirocin (BACTROBAN) 2 % ointment Apply topically 2 (two) times a day (Patient not taking: Reported on 12/23/2024) 22 g 0    nabumetone (RELAFEN) 750 mg tablet Take 1 tablet (750 mg total) by mouth 2 (two) times a day (Patient not taking: Reported on 12/10/2024) 60 tablet 1       Current Allergies     Allergies as of 12/23/2024 - Reviewed 12/23/2024   Allergen Reaction Noted    Levaquin [levofloxacin] Anaphylaxis 05/18/2012            The following portions of the patient's history were reviewed and updated as appropriate: allergies, current medications, past family history, past medical history, past social history, past surgical history and problem list.    Objective   /80   Pulse 104   Temp (!) 97.2 °F (36.2 °C) (Tympanic)   Resp 18   SpO2 95%        Physical Exam     Physical Exam  Vitals and nursing note reviewed.   Constitutional:       Appearance: Normal appearance. She is well-developed.   HENT:      Head: Normocephalic and atraumatic.      Right Ear: Hearing normal.      Left Ear: Hearing normal.      Nose: Nose normal.      Mouth/Throat:      Lips: Pink.      Mouth: Mucous membranes are moist.      Pharynx: Oropharynx is clear.   Eyes:      General: Lids are normal.      Conjunctiva/sclera: Conjunctivae normal.      Pupils: Pupils are equal, round, and reactive to light.   Cardiovascular:      Rate and Rhythm: Normal rate and regular rhythm.      Heart sounds: Normal heart sounds, S1 normal and S2 normal.   Pulmonary:       Effort: Pulmonary effort is normal.      Breath sounds: Normal breath sounds.   Abdominal:      General: Abdomen is flat. Bowel sounds are normal.      Palpations: Abdomen is soft.   Musculoskeletal:         General: Normal range of motion.      Cervical back: Full passive range of motion without pain, normal range of motion and neck supple.   Skin:     General: Skin is warm and dry.      Findings: Rash present.             Comments: 3 healing scabbed lesions noted   Neurological:      General: No focal deficit present.      Mental Status: She is alert and oriented to person, place, and time.   Psychiatric:         Mood and Affect: Mood normal.         Speech: Speech normal.         Behavior: Behavior normal. Behavior is cooperative.         Thought Content: Thought content normal.         Judgment: Judgment normal.

## 2024-12-24 DIAGNOSIS — F90.1 ATTENTION DEFICIT HYPERACTIVITY DISORDER (ADHD), PREDOMINANTLY HYPERACTIVE TYPE: ICD-10-CM

## 2024-12-27 RX ORDER — LISDEXAMFETAMINE DIMESYLATE 30 MG/1
30 CAPSULE ORAL EVERY MORNING
Qty: 30 CAPSULE | Refills: 0 | Status: SHIPPED | OUTPATIENT
Start: 2024-12-27

## 2024-12-27 RX ORDER — LISDEXAMFETAMINE DIMESYLATE 20 MG/1
20 CAPSULE ORAL EVERY MORNING
Qty: 30 CAPSULE | Refills: 0 | OUTPATIENT
Start: 2024-12-27

## 2024-12-27 NOTE — TELEPHONE ENCOUNTER
Patient called, is out of medication. Is kindly asking if one of the providers in the office can send to the pharmacy today 12/27.  Pt requesting a return call one done.    Medication:     Vyvanse 20 MG capsule       Dose/Frequency:     Take 1 capsule (20 mg total) by mouth every morning Max Daily Amount: 20 mg       Quantity: : 30 capsule     [id number]     Office:   [x] PCP/Provider - Jimmy Kaur, DO   [] Speciality/Provider -     Does the patient have enough for 3 days?   [] Yes I   [x] No - Send as HP to POD - pt states has no medication remaining

## 2024-12-27 NOTE — TELEPHONE ENCOUNTER
Pt called again. Pt aware that this was forwarded to Dr. Mcgill. She is aware that Dr. Mcgill is with her last couple patients. Asking that she addresses today as she will need this rx tomorrow morning. Pt thanks us.

## 2024-12-29 DIAGNOSIS — E78.2 MIXED HYPERLIPIDEMIA: ICD-10-CM

## 2024-12-30 RX ORDER — FENOFIBRATE 145 MG/1
145 TABLET, COATED ORAL DAILY
Qty: 30 TABLET | Refills: 0 | Status: SHIPPED | OUTPATIENT
Start: 2024-12-30

## 2025-01-07 DIAGNOSIS — F90.1 ATTENTION DEFICIT HYPERACTIVITY DISORDER (ADHD), PREDOMINANTLY HYPERACTIVE TYPE: ICD-10-CM

## 2025-01-08 ENCOUNTER — TELEPHONE (OUTPATIENT)
Dept: FAMILY MEDICINE CLINIC | Facility: CLINIC | Age: 65
End: 2025-01-08

## 2025-01-08 DIAGNOSIS — G56.00 CARPAL TUNNEL SYNDROME, UNSPECIFIED LATERALITY: Primary | ICD-10-CM

## 2025-01-08 RX ORDER — LISDEXAMFETAMINE DIMESYLATE 20 MG/1
20 CAPSULE ORAL EVERY MORNING
Qty: 30 CAPSULE | Refills: 0 | Status: SHIPPED | OUTPATIENT
Start: 2025-01-08

## 2025-01-08 NOTE — TELEPHONE ENCOUNTER
Pt called and is requesting an [facility] referral for carpal tunnel in both hands. Pt has an appointment with Dr. Hardin on 1/8/25 at 1:40pm and their office told her to bring a referral with her to the appointment. Please advise. Call back [phone]

## 2025-01-21 DIAGNOSIS — F90.1 ATTENTION DEFICIT HYPERACTIVITY DISORDER (ADHD), PREDOMINANTLY HYPERACTIVE TYPE: ICD-10-CM

## 2025-01-21 RX ORDER — LISDEXAMFETAMINE DIMESYLATE 30 MG/1
30 CAPSULE ORAL EVERY MORNING
Qty: 30 CAPSULE | Refills: 0 | Status: SHIPPED | OUTPATIENT
Start: 2025-01-21

## 2025-01-30 DIAGNOSIS — E78.2 MIXED HYPERLIPIDEMIA: ICD-10-CM

## 2025-01-30 RX ORDER — FENOFIBRATE 145 MG/1
145 TABLET, COATED ORAL DAILY
Qty: 90 TABLET | Refills: 1 | Status: SHIPPED | OUTPATIENT
Start: 2025-01-30

## 2025-02-03 DIAGNOSIS — F90.1 ATTENTION DEFICIT HYPERACTIVITY DISORDER (ADHD), PREDOMINANTLY HYPERACTIVE TYPE: ICD-10-CM

## 2025-02-04 RX ORDER — LISDEXAMFETAMINE DIMESYLATE 20 MG/1
20 CAPSULE ORAL EVERY MORNING
Qty: 30 CAPSULE | Refills: 0 | Status: SHIPPED | OUTPATIENT
Start: 2025-02-04

## 2025-02-12 DIAGNOSIS — G47.00 INSOMNIA, UNSPECIFIED TYPE: Primary | ICD-10-CM

## 2025-02-12 DIAGNOSIS — N18.30 TYPE 2 DIABETES MELLITUS WITH STAGE 3 CHRONIC KIDNEY DISEASE, WITHOUT LONG-TERM CURRENT USE OF INSULIN, UNSPECIFIED WHETHER STAGE 3A OR 3B CKD (HCC): ICD-10-CM

## 2025-02-12 DIAGNOSIS — E11.22 TYPE 2 DIABETES MELLITUS WITH STAGE 3 CHRONIC KIDNEY DISEASE, WITHOUT LONG-TERM CURRENT USE OF INSULIN, UNSPECIFIED WHETHER STAGE 3A OR 3B CKD (HCC): ICD-10-CM

## 2025-02-12 DIAGNOSIS — I10 ESSENTIAL HYPERTENSION: ICD-10-CM

## 2025-02-12 RX ORDER — METOPROLOL SUCCINATE 25 MG/1
25 TABLET, EXTENDED RELEASE ORAL DAILY
Qty: 90 TABLET | Refills: 1 | Status: SHIPPED | OUTPATIENT
Start: 2025-02-12

## 2025-02-13 ENCOUNTER — TELEPHONE (OUTPATIENT)
Age: 65
End: 2025-02-13

## 2025-02-13 RX ORDER — TRAZODONE HYDROCHLORIDE 50 MG/1
50 TABLET, FILM COATED ORAL
Qty: 30 TABLET | Refills: 0 | Status: SHIPPED | OUTPATIENT
Start: 2025-02-13

## 2025-02-13 RX ORDER — TIRZEPATIDE 2.5 MG/.5ML
2.5 INJECTION, SOLUTION SUBCUTANEOUS WEEKLY
Qty: 2 ML | Refills: 0 | OUTPATIENT
Start: 2025-02-13

## 2025-02-13 NOTE — TELEPHONE ENCOUNTER
Patient called, request   Tirzepatide (Mounjaro) 2.5 MG/0.5ML SOAJ [[id number]] 3 months worth and ,traZODone (DESYREL) 50 mg tablet [[id number]] be sent to   [facility]/pharmacy #[id number] - [place], [place] - [address] [phone]   Instead of [facility] . Please advise Patient at [phone], if any further questions.

## 2025-02-17 DIAGNOSIS — F90.1 ATTENTION DEFICIT HYPERACTIVITY DISORDER (ADHD), PREDOMINANTLY HYPERACTIVE TYPE: ICD-10-CM

## 2025-02-18 RX ORDER — LISDEXAMFETAMINE DIMESYLATE 30 MG/1
30 CAPSULE ORAL EVERY MORNING
Qty: 30 CAPSULE | Refills: 0 | Status: SHIPPED | OUTPATIENT
Start: 2025-02-18

## 2025-02-18 NOTE — TELEPHONE ENCOUNTER
Pt notified and was unaware that there were refills on the Mounjaro. She will call pharmacy to get that ready.

## 2025-02-26 DIAGNOSIS — G47.00 INSOMNIA, UNSPECIFIED TYPE: ICD-10-CM

## 2025-02-26 RX ORDER — TRAZODONE HYDROCHLORIDE 50 MG/1
50 TABLET ORAL
Qty: 30 TABLET | Refills: 0 | Status: SHIPPED | OUTPATIENT
Start: 2025-02-26

## 2025-03-04 DIAGNOSIS — F90.1 ATTENTION DEFICIT HYPERACTIVITY DISORDER (ADHD), PREDOMINANTLY HYPERACTIVE TYPE: ICD-10-CM

## 2025-03-04 RX ORDER — LISDEXAMFETAMINE DIMESYLATE 20 MG/1
20 CAPSULE ORAL EVERY MORNING
Qty: 30 CAPSULE | Refills: 0 | Status: SHIPPED | OUTPATIENT
Start: 2025-03-04

## 2025-03-06 ENCOUNTER — OFFICE VISIT (OUTPATIENT)
Dept: URGENT CARE | Facility: CLINIC | Age: 65
End: 2025-03-06
Payer: COMMERCIAL

## 2025-03-06 VITALS
RESPIRATION RATE: 18 BRPM | HEART RATE: 81 BPM | OXYGEN SATURATION: 97 % | DIASTOLIC BLOOD PRESSURE: 72 MMHG | TEMPERATURE: 97.5 F | SYSTOLIC BLOOD PRESSURE: 122 MMHG

## 2025-03-06 DIAGNOSIS — J20.9 ACUTE BRONCHITIS, UNSPECIFIED ORGANISM: Primary | ICD-10-CM

## 2025-03-06 PROCEDURE — S9083 URGENT CARE CENTER GLOBAL: HCPCS

## 2025-03-06 PROCEDURE — G0382 LEV 3 HOSP TYPE B ED VISIT: HCPCS

## 2025-03-06 RX ORDER — AZITHROMYCIN 250 MG/1
TABLET, FILM COATED ORAL
Qty: 6 TABLET | Refills: 0 | Status: SHIPPED | OUTPATIENT
Start: 2025-03-06 | End: 2025-03-10

## 2025-03-06 NOTE — PATIENT INSTRUCTIONS
Saline nasal spray as often as needed in each nostril   The Flonase nasal spray 1 spray to each nostril daily   Mucinex in the blue box as directed   Take the antibiotic as prescribed    Increase your fluids and rest   Tylenol Motrin for any discomfort or pain.   Rest no housework today or tomorrow

## 2025-03-06 NOTE — PROGRESS NOTES
Acute bronchitis acute bronchitis  [facility]        NAME: Denice Vargas is a 64 y.o. female  : 1960    MRN: [id number]  DATE: 2025  TIME: 12:51 PM    Assessment and Plan   Acute bronchitis, unspecified organism [J20.9]  1. Acute bronchitis, unspecified organism  azithromycin (ZITHROMAX) 250 mg tablet            Patient Instructions   Saline nasal spray as often as needed in each nostril   The Flonase nasal spray 1 spray to each nostril daily   Mucinex in the blue box as directed   Take the antibiotic as prescribed    Increase your fluids and rest   Tylenol Motrin for any discomfort or pain.   Rest no housework today or tomorrow    Follow up with PCP in 3-5 days.  Proceed to  ER if symptoms worsen.    If tests have been performed at [facility], our office will contact you with results if changes need to be made to the care plan discussed with you at the visit.  [facility]    Chief Complaint     Chief Complaint   Patient presents with    Sore Throat     Started Friday. Sore throat, cough, chest tightness, difficulty sleeping, slight congestion. Tried delsum, dayquil, tylenol, ibuprofen, mucinex, sudafed.          History of Present Illness       This is a 64-year-old female who presents today with a 5-day history of chest congestion, postnasal drip, runny nose, sore throat, and fatigue.  She denies any fever or chills.  States she has a lot of mucus in her lungs.  Patient states she has been taking Mucinex Delsym for her cough.  Patient denies any sinus pain or pressure.  She states she has pain in her upper back from coughing.  Sats are 97 to 98% in the office lungs are clear.  Patient denies any wheezing    Sore Throat   Associated symptoms include congestion and coughing. Pertinent negatives include no headaches, shortness of breath or stridor.       Review of Systems   Review of Systems   Constitutional:  Positive for fatigue. Negative for  chills, diaphoresis and fever.   HENT:  Positive for congestion, postnasal drip and sore throat. Negative for sinus pressure and sinus pain.    Eyes: Negative.    Respiratory:  Positive for cough. Negative for shortness of breath, wheezing and stridor.    Cardiovascular: Negative.  Negative for chest pain and leg swelling.   Gastrointestinal: Negative.    Genitourinary: Negative.    Neurological:  Negative for headaches.         Current Medications       Current Outpatient Medications:     albuterol (PROVENTIL HFA,VENTOLIN HFA) 90 mcg/act inhaler, Inhale 2 puffs every 6 (six) hours as needed for wheezing, Disp: 8 g, Rfl: 5    aspirin (ECOTRIN LOW STRENGTH) 81 mg EC tablet, Take 1 tablet by mouth daily, Disp: , Rfl:     atorvastatin (LIPITOR) 40 mg tablet, TAKE 1 TABLET DAILY, Disp: 90 tablet, Rfl: 1    azithromycin (ZITHROMAX) 250 mg tablet, Take 2 tablets today then 1 tablet daily x 4 days, Disp: 6 tablet, Rfl: 0    cetirizine (ZyrTEC) 10 mg tablet, Take by mouth Daily, Disp: , Rfl:     cholecalciferol (VITAMIN D3) 1,000 units tablet, Take by mouth, Disp: , Rfl:     Continuous Glucose  (FreeStyle Saige 3 Weldon) [facility], , Disp: , Rfl:     Continuous Glucose Sensor (FreeStyle Saige 3 Sensor) MISC, Use 1 each every 14 (fourteen) days, Disp: 6 each, Rfl: 0    Continuous Glucose Sensor (FreeStyle Saige 3 Sensor) MISC, Use 1 each every 14 (fourteen) days, Disp: 6 each, Rfl: 3    escitalopram (LEXAPRO) 20 mg tablet, Take 1 tablet (20 mg total) by mouth daily, Disp: 90 tablet, Rfl: 1    famotidine (PEPCID) 20 mg tablet, Take 1 tablet by mouth daily, Disp: , Rfl:     fenofibrate (TRICOR) 145 mg tablet, TAKE 1 TABLET BY MOUTH EVERY DAY, Disp: 90 tablet, Rfl: 1    lisdexamfetamine (Vyvanse) 30 MG capsule, Take 1 capsule (30 mg total) by mouth every morning Max Daily Amount: 30 mg, Disp: 30 capsule, Rfl: 0    lisinopril (ZESTRIL) 5 mg tablet, TAKE ONE-HALF (1/2) TABLET DAILY, Disp: 45 tablet, Rfl: 1    metFORMIN  (GLUCOPHAGE-XR) 500 mg 24 hr tablet, TAKE 2 TABLETS BY MOUTH TWICE A DAY WITH FOOD, Disp: 360 tablet, Rfl: 0    metoprolol succinate (TOPROL-XL) 25 mg 24 hr tablet, TAKE 1 TABLET DAILY, Disp: 90 tablet, Rfl: 1    Multiple Vitamins-Minerals (MULTIVITAMIN ADULTS 50+ PO), Take 1 tablet by mouth daily, Disp: , Rfl:     Tirzepatide (Mounjaro) 2.5 MG/0.5ML SOAJ, Inject 2.5 mg under the skin once a week, Disp: 2 mL, Rfl: 5    traZODone (DESYREL) 50 mg tablet, TAKE 1 TABLET BY MOUTH EVERYDAY AT BEDTIME, Disp: 30 tablet, Rfl: 0    triamcinolone (KENALOG) 0.5 % cream, Apply topically 2 (two) times a day, Disp: 15 g, Rfl: 0    Vyvanse 20 MG capsule, Take 1 capsule (20 mg total) by mouth every morning Max Daily Amount: 20 mg, Disp: 30 capsule, Rfl: 0    Blood Glucose Monitoring Suppl (ONE TOUCH ULTRA MINI) w/Device KIT, Use (Patient not taking: Reported on 12/23/2024), Disp: , Rfl:     Empagliflozin (JARDIANCE) 10 MG TABS tablet, Take 1 tablet (10 mg total) by mouth daily, Disp: 30 tablet, Rfl: 5    Mometasone Furoate (Asmanex HFA) 50 MCG/ACT AERO, Inhale 2 puffs 2 (two) times a day (Patient not taking: Reported on 6/14/2024), Disp: 13 g, Rfl: 5    mupirocin (BACTROBAN) 2 % ointment, Apply topically 2 (two) times a day (Patient not taking: Reported on 3/6/2025), Disp: 22 g, Rfl: 0    nabumetone (RELAFEN) 750 mg tablet, Take 1 tablet (750 mg total) by mouth 2 (two) times a day (Patient not taking: Reported on 12/10/2024), Disp: 60 tablet, Rfl: 1    Current Allergies     Allergies as of 03/06/2025 - Reviewed 03/06/2025   Allergen Reaction Noted    Levaquin [levofloxacin] Anaphylaxis 05/18/2012            The following portions of the patient's history were reviewed and updated as appropriate: allergies, current medications, past family history, past medical history, past social history, past surgical history and problem list.     Past Medical History:   Diagnosis Date    Abnormal mammogram     RESOLVED: 09MAR2015    Arthritis      Diabetes mellitus (HCC)     Eczema of right external ear     RESOLVED: 05JAN2017    Foot drop, left foot     Heart attack (HCC)     Hematuria     RESOLVED: 09MAR2015    Impetigo     RESOLVED: 09MAR2015    Insomnia related to another mental disorder     AXIS I/II MENTAL DISORDER; RESOLVED: 09MAR2015       Past Surgical History:   Procedure Laterality Date    HYSTERECTOMY  1998    still has 1 ovary not sure which one was removed    OTHER SURGICAL HISTORY      STEND INDICATIONS: RESTENOSIS AT PREVIOUS PTCA LOCATION     SPLENECTOMY      TOTAL HIP ARTHROPLASTY Left        Family History   Problem Relation Age of Onset    Stroke Mother     Osteoporosis Mother     Diabetes type II Father     Heart attack Father     Cancer Father     Heart disease Father     Hypertension Father     Heart disease Sister     Diabetes type II Brother     Heart attack Brother     Heart disease Brother     Heart attack Brother     Heart attack Brother     Heart attack Brother     Diabetes type II Brother     Liver cancer Maternal Aunt 75    Alcohol abuse Neg Hx     Substance Abuse Neg Hx          Medications have been verified.        Objective   /72   Pulse 81   Temp 97.5 °F (36.4 °C) (Tympanic)   Resp 18   SpO2 97%   No LMP recorded. Patient is postmenopausal.       Physical Exam     Physical Exam  Vitals reviewed.   Constitutional:       General: She is not in acute distress.     Appearance: Normal appearance. She is well-developed. She is not ill-appearing.   HENT:      Head: Normocephalic and atraumatic.      Right Ear: Tympanic membrane and ear canal normal. No middle ear effusion. Tympanic membrane is not erythematous.      Left Ear: Tympanic membrane and ear canal normal.  No middle ear effusion. Tympanic membrane is not erythematous.      Nose: Congestion present.      Mouth/Throat:      Pharynx: No pharyngeal swelling, oropharyngeal exudate or posterior oropharyngeal erythema.   Eyes:      Extraocular Movements: Extraocular  movements intact.      Conjunctiva/sclera: Conjunctivae normal.   Cardiovascular:      Rate and Rhythm: Normal rate and regular rhythm.      Heart sounds: Normal heart sounds.   Pulmonary:      Effort: Pulmonary effort is normal.   Abdominal:      Palpations: Abdomen is soft.      Tenderness: There is no abdominal tenderness. There is guarding. There is no rebound.   Musculoskeletal:      Cervical back: Normal range of motion and neck supple.   Lymphadenopathy:      Cervical: No cervical adenopathy.   Skin:     General: Skin is warm.      Capillary Refill: Capillary refill takes less than 2 seconds.   Neurological:      General: No focal deficit present.      Mental Status: She is alert.   Psychiatric:         Mood and Affect: Mood normal.         Behavior: Behavior normal.         Judgment: Judgment normal.

## 2025-03-19 DIAGNOSIS — F90.1 ATTENTION DEFICIT HYPERACTIVITY DISORDER (ADHD), PREDOMINANTLY HYPERACTIVE TYPE: ICD-10-CM

## 2025-03-19 DIAGNOSIS — E11.22 TYPE 2 DIABETES MELLITUS WITH STAGE 3 CHRONIC KIDNEY DISEASE, WITHOUT LONG-TERM CURRENT USE OF INSULIN, UNSPECIFIED WHETHER STAGE 3A OR 3B CKD (HCC): ICD-10-CM

## 2025-03-19 DIAGNOSIS — E11.9 TYPE 2 DIABETES MELLITUS WITHOUT COMPLICATION, WITHOUT LONG-TERM CURRENT USE OF INSULIN (HCC): ICD-10-CM

## 2025-03-19 DIAGNOSIS — N18.30 TYPE 2 DIABETES MELLITUS WITH STAGE 3 CHRONIC KIDNEY DISEASE, WITHOUT LONG-TERM CURRENT USE OF INSULIN, UNSPECIFIED WHETHER STAGE 3A OR 3B CKD (HCC): ICD-10-CM

## 2025-03-20 RX ORDER — LISDEXAMFETAMINE DIMESYLATE 30 MG/1
30 CAPSULE ORAL EVERY MORNING
Qty: 30 CAPSULE | Refills: 0 | Status: SHIPPED | OUTPATIENT
Start: 2025-03-20

## 2025-03-20 RX ORDER — TIRZEPATIDE 2.5 MG/.5ML
2.5 INJECTION, SOLUTION SUBCUTANEOUS WEEKLY
Qty: 2 ML | Refills: 0 | Status: SHIPPED | OUTPATIENT
Start: 2025-03-20

## 2025-03-20 RX ORDER — METFORMIN HYDROCHLORIDE 500 MG/1
1000 TABLET, EXTENDED RELEASE ORAL 2 TIMES DAILY WITH MEALS
Qty: 360 TABLET | Refills: 0 | Status: SHIPPED | OUTPATIENT
Start: 2025-03-20

## 2025-04-01 DIAGNOSIS — F90.1 ATTENTION DEFICIT HYPERACTIVITY DISORDER (ADHD), PREDOMINANTLY HYPERACTIVE TYPE: ICD-10-CM

## 2025-04-02 RX ORDER — LISDEXAMFETAMINE DIMESYLATE 20 MG/1
20 CAPSULE ORAL EVERY MORNING
Qty: 30 CAPSULE | Refills: 0 | Status: SHIPPED | OUTPATIENT
Start: 2025-04-02

## 2025-04-14 DIAGNOSIS — G47.00 INSOMNIA, UNSPECIFIED TYPE: ICD-10-CM

## 2025-04-16 RX ORDER — TRAZODONE HYDROCHLORIDE 50 MG/1
50 TABLET ORAL
Qty: 90 TABLET | Refills: 1 | Status: SHIPPED | OUTPATIENT
Start: 2025-04-16

## 2025-04-17 DIAGNOSIS — F90.1 ATTENTION DEFICIT HYPERACTIVITY DISORDER (ADHD), PREDOMINANTLY HYPERACTIVE TYPE: ICD-10-CM

## 2025-04-18 RX ORDER — LISDEXAMFETAMINE DIMESYLATE 30 MG/1
30 CAPSULE ORAL EVERY MORNING
Qty: 30 CAPSULE | Refills: 0 | Status: SHIPPED | OUTPATIENT
Start: 2025-04-18

## 2025-04-21 DIAGNOSIS — E11.22 TYPE 2 DIABETES MELLITUS WITH STAGE 3 CHRONIC KIDNEY DISEASE, WITHOUT LONG-TERM CURRENT USE OF INSULIN, UNSPECIFIED WHETHER STAGE 3A OR 3B CKD (HCC): ICD-10-CM

## 2025-04-21 DIAGNOSIS — N18.30 TYPE 2 DIABETES MELLITUS WITH STAGE 3 CHRONIC KIDNEY DISEASE, WITHOUT LONG-TERM CURRENT USE OF INSULIN, UNSPECIFIED WHETHER STAGE 3A OR 3B CKD (HCC): ICD-10-CM

## 2025-04-22 RX ORDER — TIRZEPATIDE 2.5 MG/.5ML
2.5 INJECTION, SOLUTION SUBCUTANEOUS WEEKLY
Qty: 2 ML | Refills: 2 | Status: SHIPPED | OUTPATIENT
Start: 2025-04-22

## 2025-04-29 ENCOUNTER — OFFICE VISIT (OUTPATIENT)
Dept: FAMILY MEDICINE CLINIC | Facility: CLINIC | Age: 65
End: 2025-04-29
Payer: COMMERCIAL

## 2025-04-29 VITALS
WEIGHT: 141 LBS | OXYGEN SATURATION: 99 % | BODY MASS INDEX: 22.66 KG/M2 | SYSTOLIC BLOOD PRESSURE: 114 MMHG | HEART RATE: 79 BPM | TEMPERATURE: 98.1 F | HEIGHT: 66 IN | DIASTOLIC BLOOD PRESSURE: 62 MMHG

## 2025-04-29 DIAGNOSIS — E11.22 TYPE 2 DIABETES MELLITUS WITH STAGE 3 CHRONIC KIDNEY DISEASE, WITHOUT LONG-TERM CURRENT USE OF INSULIN, UNSPECIFIED WHETHER STAGE 3A OR 3B CKD (HCC): Primary | ICD-10-CM

## 2025-04-29 DIAGNOSIS — F41.9 ANXIETY DISORDER, UNSPECIFIED TYPE: ICD-10-CM

## 2025-04-29 DIAGNOSIS — N18.30 TYPE 2 DIABETES MELLITUS WITH STAGE 3 CHRONIC KIDNEY DISEASE, WITHOUT LONG-TERM CURRENT USE OF INSULIN, UNSPECIFIED WHETHER STAGE 3A OR 3B CKD (HCC): Primary | ICD-10-CM

## 2025-04-29 DIAGNOSIS — D72.829 LEUKOCYTOSIS, UNSPECIFIED TYPE: ICD-10-CM

## 2025-04-29 DIAGNOSIS — M79.10 MYALGIA: ICD-10-CM

## 2025-04-29 DIAGNOSIS — Z12.11 SCREENING FOR COLON CANCER: ICD-10-CM

## 2025-04-29 DIAGNOSIS — G47.00 INSOMNIA, UNSPECIFIED TYPE: ICD-10-CM

## 2025-04-29 DIAGNOSIS — F90.1 ATTENTION DEFICIT HYPERACTIVITY DISORDER (ADHD), PREDOMINANTLY HYPERACTIVE TYPE: ICD-10-CM

## 2025-04-29 DIAGNOSIS — R94.6 ABNORMAL THYROID FUNCTION TEST: ICD-10-CM

## 2025-04-29 DIAGNOSIS — E78.2 MIXED HYPERLIPIDEMIA: ICD-10-CM

## 2025-04-29 DIAGNOSIS — I10 ESSENTIAL HYPERTENSION: ICD-10-CM

## 2025-04-29 PROBLEM — J41.1 MUCOPURULENT CHRONIC BRONCHITIS (HCC): Status: RESOLVED | Noted: 2024-05-09 | Resolved: 2025-04-29

## 2025-04-29 PROCEDURE — 99214 OFFICE O/P EST MOD 30 MIN: CPT | Performed by: FAMILY MEDICINE

## 2025-04-29 PROCEDURE — 96372 THER/PROPH/DIAG INJ SC/IM: CPT

## 2025-04-29 RX ORDER — PREDNISONE 10 MG/1
TABLET ORAL
Qty: 21 TABLET | Refills: 0 | Status: SHIPPED | OUTPATIENT
Start: 2025-04-29

## 2025-04-29 RX ORDER — DEXAMETHASONE SODIUM PHOSPHATE 4 MG/ML
6 INJECTION, SOLUTION INTRA-ARTICULAR; INTRALESIONAL; INTRAMUSCULAR; INTRAVENOUS; SOFT TISSUE ONCE
Status: COMPLETED | OUTPATIENT
Start: 2025-04-29 | End: 2025-04-29

## 2025-04-29 RX ADMIN — DEXAMETHASONE SODIUM PHOSPHATE 6 MG: 4 INJECTION, SOLUTION INTRA-ARTICULAR; INTRALESIONAL; INTRAMUSCULAR; INTRAVENOUS; SOFT TISSUE at 12:48

## 2025-04-29 NOTE — ASSESSMENT & PLAN NOTE
Previously stable.  Recheck lipid panel.  Continue with a strict low-fat/low-cholesterol diet as well as her current treatment with atorvastatin.  Orders:    Lipid Panel with Direct LDL reflex; Future

## 2025-04-29 NOTE — ASSESSMENT & PLAN NOTE
Was previously exacerbated.  This is likely due to multiple home stressors.  At this time, continue with her current dose of Lexapro.  Orders:    TSH, 3rd generation with Free T4 reflex; Future

## 2025-04-29 NOTE — ASSESSMENT & PLAN NOTE
Stable.  Continue with her current dose of Vyvanse.  PDMP reviewed, no abnormality seen today.

## 2025-04-29 NOTE — PROGRESS NOTES
Name: Denice Vargas      : 1960      MRN: [id number]  Encounter Provider: Jimmy Kaur DO  Encounter Date: 2025   Encounter department: [facility]  :  Assessment & Plan  Myalgia  Unclear exact cause of patient's symptoms today.  Will check rheumatologic blood work to further evaluate for any systemic causes.  At this time, we will start treat with a prednisone taper.  She was given IM Decadron today.  Orders:    C-reactive protein; Future    Sedimentation rate, automated; Future    CK (with reflex to MB); Future    JUHI Screen w/Reflex Cascade; Future    RHEUMATOID FACTOR; Future    dexamethasone (DECADRON) injection 6 mg    predniSONE 10 mg tablet; Take 6 tablets By mouth  In the morning Qd.   Decrease by  By 1 tablet daily until completed.    Type 2 diabetes mellitus with stage 3 chronic kidney disease, without long-term current use of insulin, unspecified whether stage 3a or 3b CKD (HCC)  A1c previously was very well-controlled.  Recheck hemoglobin A1c tomorrow.  Continue with Mounjaro.  If very well-controlled, will discontinue metformin.  Lab Results   Component Value Date    HGBA1C 6.0 (H) 2024       Orders:    Comprehensive metabolic panel; Future    Hemoglobin A1C; Future    Attention deficit hyperactivity disorder (ADHD), predominantly hyperactive type  Stable.  Continue with her current dose of Vyvanse.  PDMP reviewed, no abnormality seen today.       Essential hypertension  Blood pressure appears stable.  Continue with routine home monitoring as well as her current treatment with lisinopril, metoprolol       Mixed hyperlipidemia  Previously stable.  Recheck lipid panel.  Continue with a strict low-fat/low-cholesterol diet as well as her current treatment with atorvastatin.  Orders:    Lipid Panel with Direct LDL reflex; Future    Insomnia, unspecified type  Stable.  Continue with her current treatment of trazodone.       Anxiety disorder, unspecified type  Was  previously exacerbated.  This is likely due to multiple home stressors.  At this time, continue with her current dose of Lexapro.  Orders:    TSH, 3rd generation with Free T4 reflex; Future    Abnormal thyroid function test    Orders:    TSH, 3rd generation with Free T4 reflex; Future    Leukocytosis, unspecified type    Orders:    CBC; Future    Screening for colon cancer    Orders:    Ambulatory Referral to Gastroenterology; Future           History of Present Illness   HPI  Patient is a 64-year-old female presents today for a follow-up on her chronic conditions.  She has type 2 diabetes, ADHD, essential hypertension, dyslipidemia, insomnia, anxiety disorder.  She has been taking her medications regularly.  She denies adverse reactions with her medications.  She states that she is been feeling systemic problems with myalgias as well as generalized fatigue.  She states that her stress levels have been much higher as well.  She has been having increasing home stressors.  Review of Systems   Constitutional:  Positive for fatigue. Negative for activity change, chills and fever.   HENT:  Negative for congestion, ear pain, sinus pressure and sore throat.    Eyes:  Negative for redness, itching and visual disturbance.   Respiratory:  Negative for cough and shortness of breath.    Cardiovascular:  Negative for chest pain and palpitations.   Gastrointestinal:  Negative for abdominal pain, diarrhea and nausea.   Endocrine: Negative for cold intolerance and heat intolerance.   Genitourinary:  Negative for dysuria, flank pain and frequency.   Musculoskeletal:  Positive for arthralgias and myalgias. Negative for back pain and gait problem.   Skin:  Positive for rash. Negative for color change.   Allergic/Immunologic: Negative for environmental allergies.   Neurological:  Negative for dizziness, numbness and headaches.   Psychiatric/Behavioral:  Negative for behavioral problems and sleep disturbance.        Objective   BP  "114/62 (BP Location: Left arm, Patient Position: Sitting, Cuff Size: Standard)   Pulse 79   Temp 98.1 °F (36.7 °C) (Temporal)   Ht 5' 5.5\" (1.664 m)   Wt 64 kg (141 lb)   SpO2 99%   BMI 23.11 kg/m²      Physical Exam  Vitals reviewed.   Constitutional:       General: She is not in acute distress.     Appearance: Normal appearance. She is well-developed.   HENT:      Head: Normocephalic and atraumatic.      Right Ear: Tympanic membrane, ear canal and external ear normal. There is no impacted cerumen.      Left Ear: Tympanic membrane, ear canal and external ear normal. There is no impacted cerumen.      Nose: Nose normal. No congestion or rhinorrhea.      Mouth/Throat:      Mouth: Mucous membranes are moist.      Pharynx: No oropharyngeal exudate or posterior oropharyngeal erythema.   Eyes:      General: No scleral icterus.        Right eye: No discharge.         Left eye: No discharge.      Extraocular Movements: Extraocular movements intact.      Conjunctiva/sclera: Conjunctivae normal.      Pupils: Pupils are equal, round, and reactive to light.   Neck:      Trachea: No tracheal deviation.   Cardiovascular:      Rate and Rhythm: Normal rate and regular rhythm.      Pulses: Normal pulses.           Dorsalis pedis pulses are 2+ on the right side and 2+ on the left side.        Posterior tibial pulses are 2+ on the right side and 2+ on the left side.      Heart sounds: Normal heart sounds. No murmur heard.     No friction rub. No gallop.   Pulmonary:      Effort: Pulmonary effort is normal. No respiratory distress.      Breath sounds: Normal breath sounds. No wheezing, rhonchi or rales.   Abdominal:      General: Bowel sounds are normal. There is no distension.      Palpations: Abdomen is soft.      Tenderness: There is no abdominal tenderness. There is no guarding or rebound.   Musculoskeletal:         General: Normal range of motion.      Cervical back: Normal range of motion and neck supple.      Right lower " leg: No edema.      Left lower leg: No edema.   Lymphadenopathy:      Head:      Right side of head: No submental or submandibular adenopathy.      Left side of head: No submental or submandibular adenopathy.      Cervical: No cervical adenopathy.      Right cervical: No superficial, deep or posterior cervical adenopathy.     Left cervical: No superficial, deep or posterior cervical adenopathy.   Skin:     General: Skin is warm and dry.      Findings: No erythema.   Neurological:      General: No focal deficit present.      Mental Status: She is alert and oriented to person, place, and time.      Cranial Nerves: No cranial nerve deficit.      Sensory: Sensation is intact. No sensory deficit.      Motor: Motor function is intact.   Psychiatric:         Attention and Perception: Attention and perception normal.         Mood and Affect: Mood is not anxious or depressed.         Speech: Speech normal.         Behavior: Behavior normal.         Thought Content: Thought content normal.         Judgment: Judgment normal.

## 2025-04-29 NOTE — ASSESSMENT & PLAN NOTE
A1c previously was very well-controlled.  Recheck hemoglobin A1c tomorrow.  Continue with Mounjaro.  If very well-controlled, will discontinue metformin.  Lab Results   Component Value Date    HGBA1C 6.0 (H) 11/06/2024       Orders:    Comprehensive metabolic panel; Future    Hemoglobin A1C; Future

## 2025-04-30 ENCOUNTER — TELEPHONE (OUTPATIENT)
Dept: FAMILY MEDICINE CLINIC | Facility: CLINIC | Age: 65
End: 2025-04-30

## 2025-04-30 NOTE — TELEPHONE ENCOUNTER
Pt requested labs faxed to [facility] as she was currently at the labs. Labs faxed and confirmed to [phone].

## 2025-05-01 LAB
ALBUMIN SERPL-MCNC: 4.3 G/DL (ref 3.6–5.1)
ALBUMIN/GLOB SERPL: 1.9 (CALC) (ref 1–2.5)
ALP SERPL-CCNC: 43 U/L (ref 37–153)
ALT SERPL-CCNC: 13 U/L (ref 6–29)
AST SERPL-CCNC: 14 U/L (ref 10–35)
BILIRUB SERPL-MCNC: 0.3 MG/DL (ref 0.2–1.2)
BUN SERPL-MCNC: 19 MG/DL (ref 7–25)
BUN/CREAT SERPL: NORMAL (CALC) (ref 6–22)
CALCIUM SERPL-MCNC: 9.8 MG/DL (ref 8.6–10.4)
CHLORIDE SERPL-SCNC: 104 MMOL/L (ref 98–110)
CHOLEST SERPL-MCNC: 122 MG/DL
CHOLEST/HDLC SERPL: 2.4 (CALC)
CK SERPL-CCNC: 110 U/L (ref 20–243)
CO2 SERPL-SCNC: 29 MMOL/L (ref 20–32)
CREAT SERPL-MCNC: 0.86 MG/DL (ref 0.5–1.05)
CRP SERPL-MCNC: <3 MG/L
ERYTHROCYTE [DISTWIDTH] IN BLOOD BY AUTOMATED COUNT: 12.4 % (ref 11–15)
ERYTHROCYTE [SEDIMENTATION RATE] IN BLOOD BY WESTERGREN METHOD: 2 MM/H
GFR/BSA.PRED SERPLBLD CYS-BASED-ARV: 76 ML/MIN/1.73M2
GLOBULIN SER CALC-MCNC: 2.3 G/DL (CALC) (ref 1.9–3.7)
GLUCOSE SERPL-MCNC: 91 MG/DL (ref 65–99)
HBA1C MFR BLD: 5.9 %
HCT VFR BLD AUTO: 34.2 % (ref 35–45)
HDLC SERPL-MCNC: 51 MG/DL
HGB BLD-MCNC: 11.3 G/DL (ref 11.7–15.5)
LDLC SERPL CALC-MCNC: 57 MG/DL (CALC)
MCH RBC QN AUTO: 30.1 PG (ref 27–33)
MCHC RBC AUTO-ENTMCNC: 33 G/DL (ref 32–36)
MCV RBC AUTO: 91 FL (ref 80–100)
NONHDLC SERPL-MCNC: 71 MG/DL (CALC)
PLATELET # BLD AUTO: 614 THOUSAND/UL (ref 140–400)
PMV BLD REES-ECKER: 10.3 FL (ref 7.5–12.5)
POTASSIUM SERPL-SCNC: 4.4 MMOL/L (ref 3.5–5.3)
PROT SERPL-MCNC: 6.6 G/DL (ref 6.1–8.1)
RBC # BLD AUTO: 3.76 MILLION/UL (ref 3.8–5.1)
RHEUMATOID FACT SERPL-ACNC: <10 IU/ML
SODIUM SERPL-SCNC: 139 MMOL/L (ref 135–146)
TRIGL SERPL-MCNC: 61 MG/DL
TSH SERPL-ACNC: 2.34 MIU/L (ref 0.4–4.5)
WBC # BLD AUTO: 12.9 THOUSAND/UL (ref 3.8–10.8)

## 2025-05-02 DIAGNOSIS — F90.1 ATTENTION DEFICIT HYPERACTIVITY DISORDER (ADHD), PREDOMINANTLY HYPERACTIVE TYPE: ICD-10-CM

## 2025-05-02 NOTE — TELEPHONE ENCOUNTER
Spoke to pt in regards to rx request. Pt's 20 mg vyvanse request has been received and message forwarded to provider. Pt understood.

## 2025-05-02 NOTE — TELEPHONE ENCOUNTER
Patient recd a call from [facility] about her previous refill but that was for the 30mg dosage, patient called to make sure she will still get her 20mg dosage. I warm transferred her to Marielle to assist further.

## 2025-05-03 ENCOUNTER — RESULTS FOLLOW-UP (OUTPATIENT)
Dept: FAMILY MEDICINE CLINIC | Facility: CLINIC | Age: 65
End: 2025-05-03

## 2025-05-03 DIAGNOSIS — M79.10 MYALGIA: ICD-10-CM

## 2025-05-03 DIAGNOSIS — D72.829 LEUKOCYTOSIS, UNSPECIFIED TYPE: Primary | ICD-10-CM

## 2025-05-03 DIAGNOSIS — R76.8 POSITIVE ANA (ANTINUCLEAR ANTIBODY): ICD-10-CM

## 2025-05-03 RX ORDER — LISDEXAMFETAMINE DIMESYLATE 20 MG/1
20 CAPSULE ORAL EVERY MORNING
Qty: 30 CAPSULE | Refills: 0 | Status: SHIPPED | OUTPATIENT
Start: 2025-05-03

## 2025-05-04 DIAGNOSIS — E11.22 TYPE 2 DIABETES MELLITUS WITH STAGE 3 CHRONIC KIDNEY DISEASE, WITHOUT LONG-TERM CURRENT USE OF INSULIN, UNSPECIFIED WHETHER STAGE 3A OR 3B CKD (HCC): ICD-10-CM

## 2025-05-04 DIAGNOSIS — N18.30 TYPE 2 DIABETES MELLITUS WITH STAGE 3 CHRONIC KIDNEY DISEASE, WITHOUT LONG-TERM CURRENT USE OF INSULIN, UNSPECIFIED WHETHER STAGE 3A OR 3B CKD (HCC): ICD-10-CM

## 2025-05-04 DIAGNOSIS — E11.9 TYPE 2 DIABETES MELLITUS WITHOUT COMPLICATION, WITHOUT LONG-TERM CURRENT USE OF INSULIN (HCC): ICD-10-CM

## 2025-05-05 LAB
ALBUMIN SERPL-MCNC: 4.3 G/DL (ref 3.6–5.1)
ALBUMIN/GLOB SERPL: 1.9 (CALC) (ref 1–2.5)
ALP SERPL-CCNC: 43 U/L (ref 37–153)
ALT SERPL-CCNC: 13 U/L (ref 6–29)
ANA PAT SER IF-IMP: ABNORMAL
ANA PAT SER-IMP: ABNORMAL
ANA SER QL IF: POSITIVE
ANA TITR SER IF: ABNORMAL TITER
ANA TITR SER IF: ABNORMAL TITER
AST SERPL-CCNC: 14 U/L (ref 10–35)
BILIRUB SERPL-MCNC: 0.3 MG/DL (ref 0.2–1.2)
BUN SERPL-MCNC: 19 MG/DL (ref 7–25)
BUN/CREAT SERPL: NORMAL (CALC) (ref 6–22)
CALCIUM SERPL-MCNC: 9.8 MG/DL (ref 8.6–10.4)
CHLORIDE SERPL-SCNC: 104 MMOL/L (ref 98–110)
CHOLEST SERPL-MCNC: 122 MG/DL
CHOLEST/HDLC SERPL: 2.4 (CALC)
CK SERPL-CCNC: 110 U/L (ref 20–243)
CO2 SERPL-SCNC: 29 MMOL/L (ref 20–32)
CREAT SERPL-MCNC: 0.86 MG/DL (ref 0.5–1.05)
CRP SERPL-MCNC: <3 MG/L
ERYTHROCYTE [DISTWIDTH] IN BLOOD BY AUTOMATED COUNT: 12.4 % (ref 11–15)
ERYTHROCYTE [SEDIMENTATION RATE] IN BLOOD BY WESTERGREN METHOD: 2 MM/H
GFR/BSA.PRED SERPLBLD CYS-BASED-ARV: 76 ML/MIN/1.73M2
GLOBULIN SER CALC-MCNC: 2.3 G/DL (CALC) (ref 1.9–3.7)
GLUCOSE SERPL-MCNC: 91 MG/DL (ref 65–99)
HBA1C MFR BLD: 5.9 %
HCT VFR BLD AUTO: 34.2 % (ref 35–45)
HDLC SERPL-MCNC: 51 MG/DL
HGB BLD-MCNC: 11.3 G/DL (ref 11.7–15.5)
LDLC SERPL CALC-MCNC: 57 MG/DL (CALC)
MCH RBC QN AUTO: 30.1 PG (ref 27–33)
MCHC RBC AUTO-ENTMCNC: 33 G/DL (ref 32–36)
MCV RBC AUTO: 91 FL (ref 80–100)
NONHDLC SERPL-MCNC: 71 MG/DL (CALC)
PLATELET # BLD AUTO: 614 THOUSAND/UL (ref 140–400)
PMV BLD REES-ECKER: 10.3 FL (ref 7.5–12.5)
POTASSIUM SERPL-SCNC: 4.4 MMOL/L (ref 3.5–5.3)
PROT SERPL-MCNC: 6.6 G/DL (ref 6.1–8.1)
RBC # BLD AUTO: 3.76 MILLION/UL (ref 3.8–5.1)
RHEUMATOID FACT SERPL-ACNC: <10 IU/ML
SODIUM SERPL-SCNC: 139 MMOL/L (ref 135–146)
TRIGL SERPL-MCNC: 61 MG/DL
TSH SERPL-ACNC: 2.34 MIU/L (ref 0.4–4.5)
WBC # BLD AUTO: 12.9 THOUSAND/UL (ref 3.8–10.8)

## 2025-05-05 RX ORDER — ACYCLOVIR 800 MG/1
1 TABLET ORAL
Qty: 6 EACH | Refills: 0 | Status: SHIPPED | OUTPATIENT
Start: 2025-05-05

## 2025-05-16 DIAGNOSIS — F90.1 ATTENTION DEFICIT HYPERACTIVITY DISORDER (ADHD), PREDOMINANTLY HYPERACTIVE TYPE: ICD-10-CM

## 2025-05-16 RX ORDER — LISDEXAMFETAMINE DIMESYLATE 30 MG/1
30 CAPSULE ORAL EVERY MORNING
Qty: 30 CAPSULE | Refills: 0 | Status: SHIPPED | OUTPATIENT
Start: 2025-05-16

## 2025-05-19 NOTE — TELEPHONE ENCOUNTER
Patient called to check on status of medication, made her aware message was sent to provider and we will call her as soon as we have a response . Patient understood .

## 2025-05-19 NOTE — TELEPHONE ENCOUNTER
Patient calling to request lisdexamfetamine (Vyvanse) 30 MG capsule be reordered and changed to brand name. [facility] pharmacy states generic is o backorder and they do not have a date when it will be available again. Please advise if brand name substitution can be sent.

## 2025-05-20 DIAGNOSIS — F90.1 ATTENTION DEFICIT HYPERACTIVITY DISORDER (ADHD), PREDOMINANTLY HYPERACTIVE TYPE: ICD-10-CM

## 2025-05-20 RX ORDER — LISDEXAMFETAMINE DIMESYLATE 20 MG/1
20 CAPSULE ORAL EVERY MORNING
Qty: 30 CAPSULE | Refills: 0 | Status: SHIPPED | OUTPATIENT
Start: 2025-05-20

## 2025-05-20 NOTE — TELEPHONE ENCOUNTER
Patient called in inquiring about above message patient would like the name BRAND NAME (Vyvanse)30 mg patient is requesting a call back at [phone] patient is out of medication    Please Advise

## 2025-05-20 NOTE — TELEPHONE ENCOUNTER
Patient was calling in reference to the BRAND NAME (Vyvanse)30 mg  to see if the request was out through. I advised that it was sent to Dr Kaur as a high priority and we are just waiting for him to review. Patient also stated she is out of the medication

## 2025-05-21 ENCOUNTER — TELEPHONE (OUTPATIENT)
Age: 65
End: 2025-05-21

## 2025-05-21 DIAGNOSIS — N18.30 TYPE 2 DIABETES MELLITUS WITH STAGE 3 CHRONIC KIDNEY DISEASE, WITHOUT LONG-TERM CURRENT USE OF INSULIN, UNSPECIFIED WHETHER STAGE 3A OR 3B CKD (HCC): ICD-10-CM

## 2025-05-21 DIAGNOSIS — E11.22 TYPE 2 DIABETES MELLITUS WITH STAGE 3 CHRONIC KIDNEY DISEASE, WITHOUT LONG-TERM CURRENT USE OF INSULIN, UNSPECIFIED WHETHER STAGE 3A OR 3B CKD (HCC): ICD-10-CM

## 2025-05-21 RX ORDER — TIRZEPATIDE 2.5 MG/.5ML
2.5 INJECTION, SOLUTION SUBCUTANEOUS WEEKLY
Qty: 2 ML | Refills: 0 | Status: SHIPPED | OUTPATIENT
Start: 2025-05-21

## 2025-05-21 NOTE — TELEPHONE ENCOUNTER
PA for Mounjaro 2.5MG/0.5ML APPROVED     Date(s) approved 04/21/2025-05/21/2026    Case #[id number]      Patient advised by          [x]MyChart Message  []Phone call   []LMOM  []L/M to call office as no active Communication consent on file  [x]Unable to leave detailed message as VM not approved on Communication consent       Pharmacy advised by    [x]Fax  []Phone call  []Secure Chat     Approval letter scanned into Media No Waiting for letter

## 2025-05-21 NOTE — TELEPHONE ENCOUNTER
Patient requested 3 month supply as the cost is the same for 1 month and 3 month. Please advise and return patients call at [phone].

## 2025-05-21 NOTE — TELEPHONE ENCOUNTER
PA for Mounjaro 2.5MG/0.5ML SUBMITTED to [facility]    via    []CMM-KEY:   [x][employer]-Case ID #[id number]    []Availity-Auth ID # NDC #   []Faxed to plan   []Other website   []Phone call Case ID #     [x]PA sent as URGENT    All office notes, labs and other pertaining documents and studies sent. Clinical questions answered. Awaiting determination from insurance company.     Turnaround time for your insurance to make a decision on your Prior Authorization can take 7-21 business days.

## 2025-05-21 NOTE — TELEPHONE ENCOUNTER
Reason for call:   [x] Prior Auth  [] Other:     Caller:  [x] Patient  [] Pharmacy  Name:   Address:   Callback Number:     Medication:  Tirzepatide (Mounjaro) 2.5 MG/0.5ML SOAJ    Dose/Frequency: Inject 2.5 mg under the skin once a week     Quantity: 2 mL     Ordering Provider:   [x] PCP/Provider - Jimmy Kaur  [] Speciality/Provider -     Has the patient tried other medications and failed? If failed, which medications did they fail?    [] No   [] Yes -     Is the patient's insurance updated in EPIC?   [x] Yes   [] No     Is a copy of the patient's insurance scanned in EPIC?   [] Yes   [] No

## 2025-05-25 ENCOUNTER — OFFICE VISIT (OUTPATIENT)
Dept: URGENT CARE | Facility: CLINIC | Age: 65
End: 2025-05-25
Payer: COMMERCIAL

## 2025-05-25 VITALS
SYSTOLIC BLOOD PRESSURE: 128 MMHG | DIASTOLIC BLOOD PRESSURE: 74 MMHG | HEART RATE: 88 BPM | RESPIRATION RATE: 20 BRPM | OXYGEN SATURATION: 100 % | TEMPERATURE: 98.2 F

## 2025-05-25 DIAGNOSIS — J06.9 BACTERIAL URI: Primary | ICD-10-CM

## 2025-05-25 DIAGNOSIS — K13.79 MOUTH SORE: ICD-10-CM

## 2025-05-25 DIAGNOSIS — B96.89 BACTERIAL URI: Primary | ICD-10-CM

## 2025-05-25 PROCEDURE — G0383 LEV 4 HOSP TYPE B ED VISIT: HCPCS | Performed by: NURSE PRACTITIONER

## 2025-05-25 PROCEDURE — S9083 URGENT CARE CENTER GLOBAL: HCPCS | Performed by: NURSE PRACTITIONER

## 2025-05-25 RX ORDER — DIPHENHYDRAMINE HYDROCHLORIDE AND LIDOCAINE HYDROCHLORIDE AND ALUMINUM HYDROXIDE AND MAGNESIUM HYDRO
10 KIT EVERY 6 HOURS PRN
Qty: 237 ML | Refills: 0 | Status: SHIPPED | OUTPATIENT
Start: 2025-05-25

## 2025-05-25 RX ORDER — AZITHROMYCIN 250 MG/1
TABLET, FILM COATED ORAL
Qty: 6 TABLET | Refills: 0 | Status: SHIPPED | OUTPATIENT
Start: 2025-05-25 | End: 2025-05-30

## 2025-05-25 NOTE — PATIENT INSTRUCTIONS
Take zyrtec or allegra daily  Use flonase 1-2 sprays in each nare daily   Use nasal saline to the nose,   Use humidifer in room  Symptoms worsen go to ER  Rest    Continue mucinex plain as directed  Take zyrtec daily or xyzol at night  Continue to use albuterol inhaler as needed.

## 2025-05-25 NOTE — PROGRESS NOTES
NAME: Denice Vargas is a 64 y.o. female  : 1960    MRN: [id number]    /74   Pulse 88   Temp 98.2 °F (36.8 °C) (Tympanic)   Resp 20   SpO2 100%     12:03 PM    Assessment and Plan   Bacterial URI [J06.9, B96.89]  1. Bacterial URI  azithromycin (ZITHROMAX) 250 mg tablet      2. Mouth sore  diphenhydramine, lidocaine, Al/Mg hydroxide, simethicone (Magic Mouthwash) SUSP          Denice was seen today for cold like symptoms.    Diagnoses and all orders for this visit:    Bacterial URI  -     azithromycin (ZITHROMAX) 250 mg tablet; Take 2 tablets today and only 1 pill daily until finished.    Mouth sore  -     diphenhydramine, lidocaine, Al/Mg hydroxide, simethicone (Magic Mouthwash) SUSP; Swish and spit 10 mL every 6 (six) hours as needed for mouth pain or discomfort        Patient Instructions     Patient Instructions   Take zyrtec or allegra daily  Use flonase 1-2 sprays in each nare daily   Use nasal saline to the nose,   Use humidifer in room  Symptoms worsen go to ER  Rest    Continue mucinex plain as directed  Take zyrtec daily or xyzol at night  Continue to use albuterol inhaler as needed.         Proceed to the nearest ER if symptoms worsen, Follow up with your PCP  Continue to social distance, wash your hands, and wear your masks. Please continue to follow the CDC.gov guidelines daily for they are subject to change on COVID-19    Chief Complaint     Chief Complaint   Patient presents with    Cold Like Symptoms      started not feeling well. Today having sinus pressure, voice is gone, runny nose, coughing, ear pain. Was taking OTC allergy meds/OTC cold meds. Pt originally had body pain (which is now resolved). Fever blisters in/on mouth         History of Present Illness     64 yr old patient here today with symptoms of not feeling well for about one month, has sinus pressure, runny nose, cough and ear pain. Has taken OTC allergy meds and cold meds and has body pain that was present but  that has resolved. Pt is wearing a mask and coughing at time of exam and has low voice.             Review of Systems   Review of Systems   Constitutional:  Negative for chills, fatigue and fever.   HENT:  Positive for postnasal drip, sore throat and voice change. Negative for congestion, ear pain, rhinorrhea, sinus pressure, sinus pain, sneezing, tinnitus and trouble swallowing.    Eyes: Negative.    Respiratory:  Positive for cough. Negative for shortness of breath.    Cardiovascular: Negative.    Gastrointestinal: Negative.    Genitourinary: Negative.    Musculoskeletal: Negative.    Skin: Negative.    Neurological:  Positive for headaches.   Psychiatric/Behavioral: Negative.           Current Medications     Current Medications[1]    Current Allergies     Allergies as of 05/25/2025 - Reviewed 05/25/2025   Allergen Reaction Noted    Levaquin [levofloxacin] Anaphylaxis 05/18/2012              Past Medical History[2]    Past Surgical History[3]    Family History[4]      Medications have been verified.    The following portions of the patient's history were reviewed and updated as appropriate: allergies, current medications, past family history, past medical history, past social history, past surgical history and problem list.    Objective   /74   Pulse 88   Temp 98.2 °F (36.8 °C) (Tympanic)   Resp 20   SpO2 100%      Physical Exam     Physical Exam  Constitutional:       General: She is awake. She is not in acute distress.     Appearance: Normal appearance. She is well-developed. She is not ill-appearing.   HENT:      Head: Normocephalic.      Right Ear: Hearing, tympanic membrane, ear canal and external ear normal. There is no impacted cerumen.      Left Ear: Hearing, tympanic membrane, ear canal and external ear normal. There is no impacted cerumen.      Nose: No mucosal edema, congestion or rhinorrhea.      Right Turbinates: Swollen and pale.      Left Turbinates: Swollen and pale.      Right Sinus: No  "maxillary sinus tenderness or frontal sinus tenderness.      Left Sinus: No maxillary sinus tenderness or frontal sinus tenderness.      Mouth/Throat:      Lips: Pink.      Mouth: Mucous membranes are moist.      Pharynx: Oropharynx is clear. Uvula midline. Postnasal drip present. No pharyngeal swelling, oropharyngeal exudate, posterior oropharyngeal erythema or uvula swelling.      Tonsils: No tonsillar exudate.      Comments: Clear drainage in back of throat, sore throat, PND in back of throat, small bumps on the lips and sores of the mouth.    Eyes:      General: Lids are normal.      Extraocular Movements: Extraocular movements intact.      Pupils: Pupils are equal, round, and reactive to light.       Cardiovascular:      Rate and Rhythm: Normal rate and regular rhythm.      Heart sounds: Normal heart sounds.   Pulmonary:      Effort: Pulmonary effort is normal.      Breath sounds: Normal breath sounds and air entry. No decreased breath sounds, wheezing, rhonchi or rales.     Skin:     General: Skin is warm.      Capillary Refill: Capillary refill takes less than 2 seconds.     Neurological:      General: No focal deficit present.      Mental Status: She is alert and oriented to person, place, and time.     Psychiatric:         Attention and Perception: Attention normal.         Mood and Affect: Mood is anxious.         Speech: Speech normal.         Behavior: Behavior is cooperative.         Thought Content: Thought content normal.               Note: Portions of this record may have been created with voice recognition software. Occasional wrong word or \"sound a like\" substitutions may have occurred due to the inherent limitations of voice recognition software. Please read the chart carefully and recognize, using context, where substitutions have occurred.*    HOA Bass       [1]   Current Outpatient Medications:     albuterol (PROVENTIL HFA,VENTOLIN HFA) 90 mcg/act inhaler, Inhale 2 puffs every 6 " (six) hours as needed for wheezing, Disp: 8 g, Rfl: 5    azithromycin (ZITHROMAX) 250 mg tablet, Take 2 tablets today and only 1 pill daily until finished., Disp: 6 tablet, Rfl: 0    diphenhydramine, lidocaine, Al/Mg hydroxide, simethicone (Magic Mouthwash) SUSP, Swish and spit 10 mL every 6 (six) hours as needed for mouth pain or discomfort, Disp: 237 mL, Rfl: 0    Tirzepatide (Mounjaro) 2.5 MG/0.5ML SOAJ, Inject 2.5 mg under the skin once a week, Disp: 2 mL, Rfl: 0    aspirin (ECOTRIN LOW STRENGTH) 81 mg EC tablet, Take 1 tablet by mouth daily, Disp: , Rfl:     atorvastatin (LIPITOR) 40 mg tablet, TAKE 1 TABLET DAILY, Disp: 90 tablet, Rfl: 1    Blood Glucose Monitoring Suppl (ONE TOUCH ULTRA MINI) w/Device KIT, Use (Patient not taking: Reported on 12/23/2024), Disp: , Rfl:     cetirizine (ZyrTEC) 10 mg tablet, Take by mouth Daily, Disp: , Rfl:     cholecalciferol (VITAMIN D3) 1,000 units tablet, Take by mouth, Disp: , Rfl:     Continuous Glucose  (FreeStyle Saige 3 Maynard) [facility], , Disp: , Rfl:     Continuous Glucose Sensor (FreeStyle Saige 3 Sensor) MISC, Use 1 each every 14 (fourteen) days, Disp: 6 each, Rfl: 0    Continuous Glucose Sensor (FreeStyle Saige 3 Sensor) MISC, Use 1 each every 14 (fourteen) days, Disp: 6 each, Rfl: 0    escitalopram (LEXAPRO) 20 mg tablet, Take 1 tablet (20 mg total) by mouth daily, Disp: 90 tablet, Rfl: 1    famotidine (PEPCID) 20 mg tablet, Take 1 tablet by mouth daily, Disp: , Rfl:     fenofibrate (TRICOR) 145 mg tablet, TAKE 1 TABLET BY MOUTH EVERY DAY, Disp: 90 tablet, Rfl: 1    lisdexamfetamine (Vyvanse) 30 MG capsule, Take 1 capsule (30 mg total) by mouth every morning Max Daily Amount: 30 mg, Disp: 30 capsule, Rfl: 0    lisinopril (ZESTRIL) 5 mg tablet, TAKE ONE-HALF (1/2) TABLET DAILY, Disp: 45 tablet, Rfl: 1    metFORMIN (GLUCOPHAGE-XR) 500 mg 24 hr tablet, Take 2 tablets (1,000 mg total) by mouth 2 (two) times a day with meals (Patient not taking: Reported on  5/25/2025), Disp: 360 tablet, Rfl: 0    metoprolol succinate (TOPROL-XL) 25 mg 24 hr tablet, TAKE 1 TABLET DAILY, Disp: 90 tablet, Rfl: 1    Mometasone Furoate (Asmanex HFA) 50 MCG/ACT AERO, Inhale 2 puffs 2 (two) times a day (Patient not taking: Reported on 6/14/2024), Disp: 13 g, Rfl: 5    Multiple Vitamins-Minerals (MULTIVITAMIN ADULTS 50+ PO), Take 1 tablet by mouth daily, Disp: , Rfl:     mupirocin (BACTROBAN) 2 % ointment, Apply topically 2 (two) times a day (Patient not taking: Reported on 12/23/2024), Disp: 22 g, Rfl: 0    predniSONE 10 mg tablet, Take 6 tablets By mouth  In the morning Qd.   Decrease by  By 1 tablet daily until completed., Disp: 21 tablet, Rfl: 0    traZODone (DESYREL) 50 mg tablet, TAKE 1 TABLET BY MOUTH EVERYDAY AT BEDTIME, Disp: 90 tablet, Rfl: 1    triamcinolone (KENALOG) 0.5 % cream, Apply topically 2 (two) times a day, Disp: 15 g, Rfl: 0    Vyvanse 20 MG capsule, Take 1 capsule (20 mg total) by mouth every morning Max Daily Amount: 20 mg, Disp: 30 capsule, Rfl: 0  [2]   Past Medical History:  Diagnosis Date    Abnormal mammogram     RESOLVED: 09MAR2015    Arthritis     Diabetes mellitus (HCC)     Eczema of right external ear     RESOLVED: 05JAN2017    Foot drop, left foot     Heart attack (HCC)     Hematuria     RESOLVED: 09MAR2015    Impetigo     RESOLVED: 09MAR2015    Insomnia related to another mental disorder     AXIS I/II MENTAL DISORDER; RESOLVED: 09MAR2015   [3]   Past Surgical History:  Procedure Laterality Date    HYSTERECTOMY  1998    still has 1 ovary not sure which one was removed    OTHER SURGICAL HISTORY      STEND INDICATIONS: RESTENOSIS AT PREVIOUS PTCA LOCATION     SPLENECTOMY      TOTAL HIP ARTHROPLASTY Left    [4]   Family History  Problem Relation Name Age of Onset    Stroke Mother      Osteoporosis Mother      Diabetes type II Father      Heart attack Father      Cancer Father      Heart disease Father      Hypertension Father      Heart disease Sister       Diabetes type II Brother Issa     Heart attack Brother Issa     Heart disease Brother Issa     Heart attack Brother Issa     Heart attack Brother Nikolai     Heart attack Brother Nikolai     Diabetes type II Brother Nikolai     Liver cancer Maternal Aunt  75    Alcohol abuse Neg Hx      Substance Abuse Neg Hx

## 2025-05-29 ENCOUNTER — VBI (OUTPATIENT)
Dept: ADMINISTRATIVE | Facility: OTHER | Age: 65
End: 2025-05-29

## 2025-05-29 NOTE — TELEPHONE ENCOUNTER
05/29/25 1:51 PM     Chart reviewed for Mammogram ; nothing is submitted to the patient's insurance at this time.     Janet Ruiz MA   [facility]

## 2025-06-04 ENCOUNTER — OFFICE VISIT (OUTPATIENT)
Dept: RHEUMATOLOGY | Facility: CLINIC | Age: 65
End: 2025-06-04
Payer: COMMERCIAL

## 2025-06-04 VITALS
WEIGHT: 145.4 LBS | SYSTOLIC BLOOD PRESSURE: 130 MMHG | HEART RATE: 86 BPM | HEIGHT: 66 IN | BODY MASS INDEX: 23.37 KG/M2 | OXYGEN SATURATION: 96 % | DIASTOLIC BLOOD PRESSURE: 64 MMHG

## 2025-06-04 DIAGNOSIS — M15.0 PRIMARY GENERALIZED (OSTEO)ARTHRITIS: ICD-10-CM

## 2025-06-04 DIAGNOSIS — M12.811 ROTATOR CUFF ARTHROPATHY OF BOTH SHOULDERS: ICD-10-CM

## 2025-06-04 DIAGNOSIS — M12.812 ROTATOR CUFF ARTHROPATHY OF BOTH SHOULDERS: ICD-10-CM

## 2025-06-04 DIAGNOSIS — M79.10 MYALGIA: Primary | ICD-10-CM

## 2025-06-04 PROCEDURE — 99245 OFF/OP CONSLTJ NEW/EST HI 55: CPT | Performed by: INTERNAL MEDICINE

## 2025-06-04 NOTE — PROGRESS NOTES
Name: Denice Vargas      : 1960      MRN: [id number]  Encounter Provider: Ghulam Alvarado MD  Encounter Date: 2025   Encounter department: [facility] RHEUMATOLOGY [facility]  :  Assessment & Plan  Myalgia  Denice Vargas is a 64 y.o. female who presents as a referral for diffuse myalgias and arthralgias.    No objective muscle weakness to suggest myositis. Symptoms did not remarkably improve with steroids and is not localized to hips and shoulder which also make PMR less likely    She does not have any other symptoms or signs to suggest systemic rheumatic disease but would complete full rheumatologic work-up for positive JUHI but doubt there is any ongoing systemic connective tissue disease process.    Orders:    Anti-DNA antibody, double-stranded; Future    Sm and Sm/RNP Antibodies; Future    Sjogren's Antibodies; Future    Anti-scleroderma antibody; Future    Aldolase; Future    Cyclic citrul peptide antibody, IgG; Future    C-reactive protein; Future    Sedimentation rate, automated; Future    Tissue Transglutaminase Ab, IgG; Future    Lyme Total AB W Reflex to IGM/IGG; Future    C3 complement; Future    C4 complement; Future    Urinalysis with microscopic; Future    Protein / creatinine ratio, urine; Future    Anti-HMGCR Autoantibodies; Future    MyoMarker 3 Plus Profile (RDL); Future    Primary generalized (osteo)arthritis    Orders:    Anti-DNA antibody, double-stranded; Future    Sm and Sm/RNP Antibodies; Future    Sjogren's Antibodies; Future    Anti-scleroderma antibody; Future    Aldolase; Future    Cyclic citrul peptide antibody, IgG; Future    C-reactive protein; Future    Sedimentation rate, automated; Future    Tissue Transglutaminase Ab, IgG; Future    Lyme Total AB W Reflex to IGM/IGG; Future    C3 complement; Future    C4 complement; Future    Urinalysis with microscopic; Future    Protein / creatinine ratio, urine; Future    Anti-HMGCR Autoantibodies; Future    MyoMarker  3 Plus Profile (RDL); Future    Rotator cuff arthropathy of both shoulders    Orders:    Anti-DNA antibody, double-stranded; Future    Sm and Sm/RNP Antibodies; Future    Sjogren's Antibodies; Future    Anti-scleroderma antibody; Future    Aldolase; Future    Cyclic citrul peptide antibody, IgG; Future    C-reactive protein; Future    Sedimentation rate, automated; Future    Tissue Transglutaminase Ab, IgG; Future    Lyme Total AB W Reflex to IGM/IGG; Future    C3 complement; Future    C4 complement; Future    Urinalysis with microscopic; Future    Protein / creatinine ratio, urine; Future    Anti-HMGCR Autoantibodies; Future    MyoMarker 3 Plus Profile (RDL); Future        History of Present Illness   HPI  Denice Vargas is a 64 y.o. female who presents as a referral for myalgias. Patient reports she has been having diffuse arthralgias and myalgias for the last 3 months and also reports feeling of swollen breasts and abdomen. She denies any stiffness and reports that her symptoms get worse towards the end of the day after working for too long but denies any symptoms in the morning. She also feels her muscles in her shoulders are weak but attribute that to soreness. She tool steroids 1 month ago from her PCP and did only has improvement of steroids with high dose but not with the low dose.    She reported skin rash on her upper back and arm 1 month ago but resolved. She denies sicca symptoms, raynaud's changes, chest pain, shortness of breath, blood in the urine.    History obtained from: patient    Review of Systems  Rest of ROS are negative except as noted in HPI    Medical History Reviewed by provider this encounter:     .  Past Medical History   Past Medical History[1]  Past Surgical History[2]  Family History[3]   reports that she has been smoking cigarettes. She started smoking about 52 years ago. She has a 26.2 pack-year smoking history. She has never used smokeless tobacco. She reports that she does not drink  alcohol and does not use drugs.  Current Outpatient Medications   Medication Instructions    albuterol (PROVENTIL HFA,VENTOLIN HFA) 90 mcg/act inhaler 2 puffs, Inhalation, Every 6 hours PRN    aspirin (ECOTRIN LOW STRENGTH) 81 mg EC tablet 1 tablet, Daily    atorvastatin (LIPITOR) 40 mg, Oral, Daily    Blood Glucose Monitoring Suppl (ONE TOUCH ULTRA MINI) w/Device KIT Use    cetirizine (ZyrTEC) 10 mg tablet Daily    cholecalciferol (VITAMIN D3) 1,000 units tablet Take by mouth    Continuous Glucose  (FreeStyle Saige 3 [place]) JAVAN     Continuous Glucose Sensor (FreeStyle Saige 3 Sensor) MISC 1 each, Does not apply, Every 14 days    Continuous Glucose Sensor (FreeStyle Saige 3 Sensor) MISC 1 each, Does not apply, Every 14 days    diphenhydramine, lidocaine, Al/Mg hydroxide, simethicone (Magic Mouthwash) SUSP 10 mL, Swish & Spit, Every 6 hours PRN    escitalopram (LEXAPRO) 20 mg, Oral, Daily    famotidine (PEPCID) 20 mg tablet 1 tablet, Daily    fenofibrate (TRICOR) 145 mg, Oral, Daily    lisdexamfetamine (VYVANSE) 30 mg, Oral, Every morning    lisinopril (ZESTRIL) 5 mg tablet TAKE ONE-HALF (1/2) TABLET DAILY    metFORMIN (GLUCOPHAGE-XR) 1,000 mg, Oral, 2 times daily with meals    metoprolol succinate (TOPROL-XL) 25 mg, Oral, Daily    Mometasone Furoate (Asmanex HFA) 50 MCG/ACT AERO 2 puffs, Inhalation, 2 times daily    Mounjaro 2.5 mg, Subcutaneous, Weekly    Multiple Vitamins-Minerals (MULTIVITAMIN ADULTS 50+ PO) 1 tablet, Daily    mupirocin (BACTROBAN) 2 % ointment Topical, 2 times daily    predniSONE 10 mg tablet Take 6 tablets By mouth  In the morning Qd.   Decrease by  By 1 tablet daily until completed.    traZODone (DESYREL) 50 mg, Oral, Daily at bedtime,       triamcinolone (KENALOG) 0.5 % cream Topical, 2 times daily    Vyvanse 20 mg, Oral, Every morning   Allergies[4]   Medications Ordered Prior to Encounter[5]   Social History[6]     Objective   /64 (BP Location: Left arm, Patient Position:  "Sitting, Cuff Size: Adult)   Pulse 86   Ht 5' 5.5\" (1.664 m)   Wt 66 kg (145 lb 6.4 oz)   SpO2 96%   BMI 23.83 kg/m²      Physical Exam  Constitutional:       Appearance: Normal appearance.   HENT:      Head: Normocephalic and atraumatic.   Pulmonary:      Effort: Pulmonary effort is normal.     Musculoskeletal:         General: No swelling, tenderness or deformity.      Comments: Muscle strength is 5/5 in all extremities     Skin:     Findings: No rash.     Neurological:      Mental Status: She is alert and oriented to person, place, and time.                [1]   Past Medical History:  Diagnosis Date    Abnormal mammogram     RESOLVED: 09MAR2015    Arthritis     Diabetes mellitus (HCC)     Eczema of right external ear     RESOLVED: 05JAN2017    Foot drop, left foot     Heart attack (HCC)     Hematuria     RESOLVED: 09MAR2015    Impetigo     RESOLVED: 09MAR2015    Insomnia related to another mental disorder     AXIS I/II MENTAL DISORDER; RESOLVED: 09MAR2015   [2]   Past Surgical History:  Procedure Laterality Date    CARPAL TUNNEL RELEASE Bilateral 02/2025    HYSTERECTOMY  1998    still has 1 ovary not sure which one was removed    OTHER SURGICAL HISTORY      STEND INDICATIONS: RESTENOSIS AT PREVIOUS PTCA LOCATION     SPLENECTOMY      TOTAL HIP ARTHROPLASTY Left     TOTAL SHOULDER REPLACEMENT Right 08/2024   [3]   Family History  Problem Relation Name Age of Onset    Stroke Mother      Osteoporosis Mother      Diabetes type II Father      Heart attack Father      Cancer Father      Heart disease Father      Hypertension Father      Heart disease Sister      Diabetes type II Brother Issa     Heart attack Brother Issa     Heart disease Brother Issa     Heart attack Brother Issa     Heart attack Brother Nikolai     Heart attack Brother Nikolai     Diabetes type II Brother Nikolai     Liver cancer Maternal Aunt  75    Alcohol abuse Neg Hx      Substance Abuse Neg Hx     [4]   Allergies  Allergen Reactions    " Levaquin [Levofloxacin] Anaphylaxis   [5]   Current Outpatient Medications on File Prior to Visit   Medication Sig Dispense Refill    albuterol (PROVENTIL HFA,VENTOLIN HFA) 90 mcg/act inhaler Inhale 2 puffs every 6 (six) hours as needed for wheezing 8 g 5    aspirin (ECOTRIN LOW STRENGTH) 81 mg EC tablet Take 1 tablet by mouth in the morning.      atorvastatin (LIPITOR) 40 mg tablet TAKE 1 TABLET DAILY 90 tablet 1    cetirizine (ZyrTEC) 10 mg tablet Take by mouth in the morning.      cholecalciferol (VITAMIN D3) 1,000 units tablet Take by mouth      Continuous Glucose  (FreeStyle Saige 3 [place]) JAVAN       Continuous Glucose Sensor (FreeStyle Saige 3 Sensor) MISC Use 1 each every 14 (fourteen) days 6 each 0    Continuous Glucose Sensor (FreeStyle Saige 3 Sensor) MISC Use 1 each every 14 (fourteen) days 6 each 0    diphenhydramine, lidocaine, Al/Mg hydroxide, simethicone (Magic Mouthwash) SUSP Swish and spit 10 mL every 6 (six) hours as needed for mouth pain or discomfort 237 mL 0    escitalopram (LEXAPRO) 20 mg tablet Take 1 tablet (20 mg total) by mouth daily 90 tablet 1    famotidine (PEPCID) 20 mg tablet Take 1 tablet by mouth in the morning.      fenofibrate (TRICOR) 145 mg tablet TAKE 1 TABLET BY MOUTH EVERY DAY 90 tablet 1    lisdexamfetamine (Vyvanse) 30 MG capsule Take 1 capsule (30 mg total) by mouth every morning Max Daily Amount: 30 mg 30 capsule 0    lisinopril (ZESTRIL) 5 mg tablet TAKE ONE-HALF (1/2) TABLET DAILY 45 tablet 1    metoprolol succinate (TOPROL-XL) 25 mg 24 hr tablet TAKE 1 TABLET DAILY 90 tablet 1    Multiple Vitamins-Minerals (MULTIVITAMIN ADULTS 50+ PO) Take 1 tablet by mouth in the morning.      predniSONE 10 mg tablet Take 6 tablets By mouth  In the morning Qd.   Decrease by  By 1 tablet daily until completed. 21 tablet 0    Tirzepatide (Mounjaro) 2.5 MG/0.5ML SOAJ Inject 2.5 mg under the skin once a week 2 mL 0    traZODone (DESYREL) 50 mg tablet TAKE 1 TABLET BY MOUTH  EVERYDAY AT BEDTIME 90 tablet 1    triamcinolone (KENALOG) 0.5 % cream Apply topically 2 (two) times a day 15 g 0    Vyvanse 20 MG capsule Take 1 capsule (20 mg total) by mouth every morning Max Daily Amount: 20 mg 30 capsule 0    Blood Glucose Monitoring Suppl (ONE TOUCH ULTRA MINI) w/Device KIT Use (Patient not taking: Reported on 12/23/2024)      metFORMIN (GLUCOPHAGE-XR) 500 mg 24 hr tablet Take 2 tablets (1,000 mg total) by mouth 2 (two) times a day with meals (Patient not taking: Reported on 6/4/2025) 360 tablet 0    Mometasone Furoate (Asmanex HFA) 50 MCG/ACT AERO Inhale 2 puffs 2 (two) times a day (Patient not taking: Reported on 6/14/2024) 13 g 5    mupirocin (BACTROBAN) 2 % ointment Apply topically 2 (two) times a day (Patient not taking: Reported on 6/4/2025) 22 g 0     No current facility-administered medications on file prior to visit.   [6]   Social History  Tobacco Use    Smoking status: Every Day     Current packs/day: 0.50     Average packs/day: 0.5 packs/day for 52.4 years (26.2 ttl pk-yrs)     Types: Cigarettes     Start date: 1973    Smokeless tobacco: Never   Vaping Use    Vaping status: Never Used   Substance and Sexual Activity    Alcohol use: No    Drug use: No

## 2025-06-07 LAB
APPEARANCE UR: CLEAR
B BURGDOR IGG+IGM SER QL IA: <=0.9 INDEX
BACTERIA UR QL AUTO: ABNORMAL /HPF
BILIRUB UR QL STRIP: NEGATIVE
C3 SERPL-MCNC: 139 MG/DL (ref 83–193)
C4 SERPL-MCNC: 20 MG/DL (ref 15–57)
CCP IGG SERPL-ACNC: <16 UNITS
COLOR UR: YELLOW
CREAT UR-MCNC: 37 MG/DL (ref 20–275)
CRP SERPL-MCNC: <3 MG/L
DSDNA AB SER-ACNC: <1 IU/ML
ENA SCL70 AB SER IA-ACNC: NORMAL AI
ENA SM AB SER IA-ACNC: NORMAL AI
ENA SM+RNP AB SER IA-ACNC: NORMAL AI
ENA SS-A AB SER IA-ACNC: NORMAL AI
ENA SS-B AB SER IA-ACNC: NORMAL AI
ERYTHROCYTE [SEDIMENTATION RATE] IN BLOOD BY WESTERGREN METHOD: 6 MM/H
GLUCOSE UR QL STRIP: NEGATIVE
HGB UR QL STRIP: NEGATIVE
HYALINE CASTS #/AREA URNS LPF: ABNORMAL /LPF
KETONES UR QL STRIP: NEGATIVE
LEUKOCYTE ESTERASE UR QL STRIP: ABNORMAL
NITRITE UR QL STRIP: NEGATIVE
PH UR STRIP: 6 [PH] (ref 5–8)
PROT UR QL STRIP: NEGATIVE
PROT UR-MCNC: <4 MG/DL (ref 5–24)
PROT/CREAT UR: ABNORMAL MG/G CREAT (ref 24–184)
PROT/CREAT UR: ABNORMAL MG/MG CREAT (ref 0.02–0.18)
RBC #/AREA URNS HPF: ABNORMAL /HPF
SP GR UR STRIP: 1.01 (ref 1–1.03)
SQUAMOUS #/AREA URNS HPF: ABNORMAL /HPF
TTG IGA SER-ACNC: <1 U/ML
WBC #/AREA URNS HPF: ABNORMAL /HPF

## 2025-06-10 LAB
ALDOLASE SERPL-CCNC: 3.6 U/L
APPEARANCE UR: CLEAR
B BURGDOR IGG+IGM SER QL IA: <=0.9 INDEX
BACTERIA UR QL AUTO: ABNORMAL /HPF
BILIRUB UR QL STRIP: NEGATIVE
C3 SERPL-MCNC: 139 MG/DL (ref 83–193)
C4 SERPL-MCNC: 20 MG/DL (ref 15–57)
CCP IGG SERPL-ACNC: <16 UNITS
COLOR UR: YELLOW
CREAT UR-MCNC: 37 MG/DL (ref 20–275)
CRP SERPL-MCNC: <3 MG/L
DSDNA AB SER-ACNC: <1 IU/ML
ENA SCL70 AB SER IA-ACNC: NORMAL AI
ENA SM AB SER IA-ACNC: NORMAL AI
ENA SM+RNP AB SER IA-ACNC: NORMAL AI
ENA SS-A AB SER IA-ACNC: NORMAL AI
ENA SS-B AB SER IA-ACNC: NORMAL AI
ERYTHROCYTE [SEDIMENTATION RATE] IN BLOOD BY WESTERGREN METHOD: 6 MM/H
GLUCOSE UR QL STRIP: NEGATIVE
HGB UR QL STRIP: NEGATIVE
HYALINE CASTS #/AREA URNS LPF: ABNORMAL /LPF
KETONES UR QL STRIP: NEGATIVE
LEUKOCYTE ESTERASE UR QL STRIP: ABNORMAL
NITRITE UR QL STRIP: NEGATIVE
PH UR STRIP: 6 [PH] (ref 5–8)
PROT UR QL STRIP: NEGATIVE
PROT UR-MCNC: <4 MG/DL (ref 5–24)
PROT/CREAT UR: ABNORMAL MG/G CREAT (ref 24–184)
PROT/CREAT UR: ABNORMAL MG/MG CREAT (ref 0.02–0.18)
RBC #/AREA URNS HPF: ABNORMAL /HPF
SP GR UR STRIP: 1.01 (ref 1–1.03)
SQUAMOUS #/AREA URNS HPF: ABNORMAL /HPF
TTG IGA SER-ACNC: <1 U/ML
WBC #/AREA URNS HPF: ABNORMAL /HPF

## 2025-06-13 DIAGNOSIS — E78.2 MIXED HYPERLIPIDEMIA: ICD-10-CM

## 2025-06-13 RX ORDER — ATORVASTATIN CALCIUM 40 MG/1
40 TABLET, FILM COATED ORAL DAILY
Qty: 90 TABLET | Refills: 3 | Status: SHIPPED | OUTPATIENT
Start: 2025-06-13

## 2025-06-15 DIAGNOSIS — F90.1 ATTENTION DEFICIT HYPERACTIVITY DISORDER (ADHD), PREDOMINANTLY HYPERACTIVE TYPE: ICD-10-CM

## 2025-06-16 RX ORDER — LIDOCAINE HYDROCHLORIDE 20 MG/ML
SOLUTION OROPHARYNGEAL
Refills: 0 | OUTPATIENT
Start: 2025-06-16

## 2025-06-16 RX ORDER — LISDEXAMFETAMINE DIMESYLATE 30 MG/1
30 CAPSULE ORAL EVERY MORNING
Qty: 30 CAPSULE | Refills: 0 | Status: SHIPPED | OUTPATIENT
Start: 2025-06-16

## 2025-06-20 PROBLEM — D64.9 ANEMIA, UNSPECIFIED: Status: ACTIVE | Noted: 2025-06-20

## 2025-06-22 DIAGNOSIS — E11.22 TYPE 2 DIABETES MELLITUS WITH STAGE 3 CHRONIC KIDNEY DISEASE, WITHOUT LONG-TERM CURRENT USE OF INSULIN, UNSPECIFIED WHETHER STAGE 3A OR 3B CKD (HCC): ICD-10-CM

## 2025-06-22 DIAGNOSIS — N18.30 TYPE 2 DIABETES MELLITUS WITH STAGE 3 CHRONIC KIDNEY DISEASE, WITHOUT LONG-TERM CURRENT USE OF INSULIN, UNSPECIFIED WHETHER STAGE 3A OR 3B CKD (HCC): ICD-10-CM

## 2025-06-22 DIAGNOSIS — F90.1 ATTENTION DEFICIT HYPERACTIVITY DISORDER (ADHD), PREDOMINANTLY HYPERACTIVE TYPE: ICD-10-CM

## 2025-06-23 RX ORDER — TIRZEPATIDE 2.5 MG/.5ML
2.5 INJECTION, SOLUTION SUBCUTANEOUS WEEKLY
Qty: 2 ML | Refills: 0 | Status: SHIPPED | OUTPATIENT
Start: 2025-06-23

## 2025-06-23 RX ORDER — LISDEXAMFETAMINE DIMESYLATE 20 MG/1
20 CAPSULE ORAL EVERY MORNING
Qty: 30 CAPSULE | Refills: 0 | Status: SHIPPED | OUTPATIENT
Start: 2025-06-23

## 2025-07-03 DIAGNOSIS — F41.9 ANXIETY DISORDER, UNSPECIFIED TYPE: ICD-10-CM

## 2025-07-03 RX ORDER — ESCITALOPRAM OXALATE 20 MG/1
20 TABLET ORAL DAILY
Qty: 90 TABLET | Refills: 1 | Status: SHIPPED | OUTPATIENT
Start: 2025-07-03

## 2025-07-15 DIAGNOSIS — F90.1 ATTENTION DEFICIT HYPERACTIVITY DISORDER (ADHD), PREDOMINANTLY HYPERACTIVE TYPE: ICD-10-CM

## 2025-07-16 RX ORDER — LISDEXAMFETAMINE DIMESYLATE 30 MG/1
30 CAPSULE ORAL EVERY MORNING
Qty: 30 CAPSULE | Refills: 0 | Status: SHIPPED | OUTPATIENT
Start: 2025-07-16

## 2025-07-20 DIAGNOSIS — N18.30 TYPE 2 DIABETES MELLITUS WITH STAGE 3 CHRONIC KIDNEY DISEASE, WITHOUT LONG-TERM CURRENT USE OF INSULIN, UNSPECIFIED WHETHER STAGE 3A OR 3B CKD (HCC): ICD-10-CM

## 2025-07-20 DIAGNOSIS — E11.22 TYPE 2 DIABETES MELLITUS WITH STAGE 3 CHRONIC KIDNEY DISEASE, WITHOUT LONG-TERM CURRENT USE OF INSULIN, UNSPECIFIED WHETHER STAGE 3A OR 3B CKD (HCC): ICD-10-CM

## 2025-07-22 RX ORDER — TIRZEPATIDE 2.5 MG/.5ML
2.5 INJECTION, SOLUTION SUBCUTANEOUS WEEKLY
Qty: 2 ML | Refills: 2 | Status: SHIPPED | OUTPATIENT
Start: 2025-07-22

## 2025-07-28 ENCOUNTER — TELEPHONE (OUTPATIENT)
Age: 65
End: 2025-07-28

## 2025-07-30 DIAGNOSIS — F90.1 ATTENTION DEFICIT HYPERACTIVITY DISORDER (ADHD), PREDOMINANTLY HYPERACTIVE TYPE: ICD-10-CM

## 2025-07-30 RX ORDER — LISDEXAMFETAMINE DIMESYLATE 20 MG/1
20 CAPSULE ORAL EVERY MORNING
Qty: 30 CAPSULE | Refills: 0 | Status: SHIPPED | OUTPATIENT
Start: 2025-07-30